# Patient Record
Sex: FEMALE | Race: WHITE | Employment: OTHER | URBAN - METROPOLITAN AREA
[De-identification: names, ages, dates, MRNs, and addresses within clinical notes are randomized per-mention and may not be internally consistent; named-entity substitution may affect disease eponyms.]

---

## 2017-03-02 ENCOUNTER — MYC MEDICAL ADVICE (OUTPATIENT)
Dept: FAMILY MEDICINE | Facility: CLINIC | Age: 67
End: 2017-03-02

## 2017-03-02 DIAGNOSIS — F33.0 MAJOR DEPRESSIVE DISORDER, RECURRENT EPISODE, MILD (H): ICD-10-CM

## 2017-03-02 RX ORDER — SERTRALINE HYDROCHLORIDE 100 MG/1
150 TABLET, FILM COATED ORAL DAILY
Qty: 135 TABLET | Refills: 0 | Status: SHIPPED | OUTPATIENT
Start: 2017-03-02 | End: 2017-03-23

## 2017-03-02 ASSESSMENT — PATIENT HEALTH QUESTIONNAIRE - PHQ9
10. IF YOU CHECKED OFF ANY PROBLEMS, HOW DIFFICULT HAVE THESE PROBLEMS MADE IT FOR YOU TO DO YOUR WORK, TAKE CARE OF THINGS AT HOME, OR GET ALONG WITH OTHER PEOPLE: SOMEWHAT DIFFICULT
SUM OF ALL RESPONSES TO PHQ QUESTIONS 1-9: 2

## 2017-03-02 NOTE — TELEPHONE ENCOUNTER
I will do one refill.  She is overdue for a diabetes follow up.  Please call and schedule her for this before the end of the month.

## 2017-03-02 NOTE — TELEPHONE ENCOUNTER
Pt wanting refill of her sertraline.  Had several visits last year  Sent her a my cht message to update her phq-9  Please advise on how long to refill for.   Last refill 1/16 for one year    Thanks!     Melba Dominguez RN

## 2017-03-03 ASSESSMENT — PATIENT HEALTH QUESTIONNAIRE - PHQ9: SUM OF ALL RESPONSES TO PHQ QUESTIONS 1-9: 2

## 2017-03-16 ENCOUNTER — TELEPHONE (OUTPATIENT)
Dept: FAMILY MEDICINE | Facility: CLINIC | Age: 67
End: 2017-03-16

## 2017-03-16 DIAGNOSIS — I10 HYPERTENSION GOAL BP (BLOOD PRESSURE) < 140/90: ICD-10-CM

## 2017-03-16 DIAGNOSIS — E03.9 HYPOTHYROIDISM, UNSPECIFIED TYPE: Primary | ICD-10-CM

## 2017-03-16 DIAGNOSIS — E78.5 HYPERLIPIDEMIA LDL GOAL <130: ICD-10-CM

## 2017-03-16 DIAGNOSIS — E11.9 TYPE 2 DIABETES MELLITUS WITHOUT COMPLICATION, WITHOUT LONG-TERM CURRENT USE OF INSULIN (H): ICD-10-CM

## 2017-03-16 NOTE — TELEPHONE ENCOUNTER
Contacted patient for diabetic appt, she scheduled her annual physical for 3/23/17.    She would like to come in 3/17/17 for labs, to discuss any concerning results at appt.   She said she would like potassium level checked along with regular screening labs if ok.

## 2017-03-17 DIAGNOSIS — E03.9 HYPOTHYROIDISM, UNSPECIFIED TYPE: ICD-10-CM

## 2017-03-17 DIAGNOSIS — Z13.21 ENCOUNTER FOR VITAMIN DEFICIENCY SCREENING: ICD-10-CM

## 2017-03-17 DIAGNOSIS — E11.9 TYPE 2 DIABETES MELLITUS WITHOUT COMPLICATION, WITHOUT LONG-TERM CURRENT USE OF INSULIN (H): ICD-10-CM

## 2017-03-17 DIAGNOSIS — E78.5 HYPERLIPIDEMIA LDL GOAL <130: ICD-10-CM

## 2017-03-17 LAB
ANION GAP SERPL CALCULATED.3IONS-SCNC: 11 MMOL/L (ref 3–14)
BUN SERPL-MCNC: 15 MG/DL (ref 7–30)
CALCIUM SERPL-MCNC: 9 MG/DL (ref 8.5–10.1)
CHLORIDE SERPL-SCNC: 104 MMOL/L (ref 94–109)
CHOLEST SERPL-MCNC: 247 MG/DL
CO2 SERPL-SCNC: 25 MMOL/L (ref 20–32)
CREAT SERPL-MCNC: 0.65 MG/DL (ref 0.52–1.04)
CREAT UR-MCNC: 166 MG/DL
GFR SERPL CREATININE-BSD FRML MDRD: ABNORMAL ML/MIN/1.7M2
GLUCOSE SERPL-MCNC: 124 MG/DL (ref 70–99)
HBA1C MFR BLD: 6.7 % (ref 4.3–6)
HDLC SERPL-MCNC: 34 MG/DL
LDLC SERPL CALC-MCNC: 153 MG/DL
MICROALBUMIN UR-MCNC: 18 MG/L
MICROALBUMIN/CREAT UR: 10.78 MG/G CR (ref 0–25)
NONHDLC SERPL-MCNC: 213 MG/DL
POTASSIUM SERPL-SCNC: 3.7 MMOL/L (ref 3.4–5.3)
SODIUM SERPL-SCNC: 140 MMOL/L (ref 133–144)
TRIGL SERPL-MCNC: 298 MG/DL
TSH SERPL DL<=0.005 MIU/L-ACNC: 1.32 MU/L (ref 0.4–4)

## 2017-03-17 PROCEDURE — 82306 VITAMIN D 25 HYDROXY: CPT | Performed by: NURSE PRACTITIONER

## 2017-03-17 PROCEDURE — 80061 LIPID PANEL: CPT | Performed by: NURSE PRACTITIONER

## 2017-03-17 PROCEDURE — 84443 ASSAY THYROID STIM HORMONE: CPT | Performed by: NURSE PRACTITIONER

## 2017-03-17 PROCEDURE — 36415 COLL VENOUS BLD VENIPUNCTURE: CPT | Performed by: NURSE PRACTITIONER

## 2017-03-17 PROCEDURE — 82043 UR ALBUMIN QUANTITATIVE: CPT | Performed by: NURSE PRACTITIONER

## 2017-03-17 PROCEDURE — 83036 HEMOGLOBIN GLYCOSYLATED A1C: CPT | Performed by: NURSE PRACTITIONER

## 2017-03-17 PROCEDURE — 80048 BASIC METABOLIC PNL TOTAL CA: CPT | Performed by: NURSE PRACTITIONER

## 2017-03-19 RX ORDER — ATORVASTATIN CALCIUM 10 MG/1
10 TABLET, FILM COATED ORAL DAILY
Qty: 90 TABLET | Refills: 3 | Status: SHIPPED | OUTPATIENT
Start: 2017-03-19 | End: 2019-09-25

## 2017-03-20 LAB — DEPRECATED CALCIDIOL+CALCIFEROL SERPL-MC: 24 UG/L (ref 20–75)

## 2017-03-23 ENCOUNTER — OFFICE VISIT (OUTPATIENT)
Dept: FAMILY MEDICINE | Facility: CLINIC | Age: 67
End: 2017-03-23
Payer: COMMERCIAL

## 2017-03-23 VITALS
WEIGHT: 213 LBS | HEIGHT: 65 IN | OXYGEN SATURATION: 96 % | HEART RATE: 86 BPM | TEMPERATURE: 97.8 F | BODY MASS INDEX: 35.49 KG/M2 | DIASTOLIC BLOOD PRESSURE: 82 MMHG | RESPIRATION RATE: 14 BRPM | SYSTOLIC BLOOD PRESSURE: 134 MMHG

## 2017-03-23 DIAGNOSIS — M79.641 PAIN OF RIGHT HAND: ICD-10-CM

## 2017-03-23 DIAGNOSIS — E03.9 ACQUIRED HYPOTHYROIDISM: ICD-10-CM

## 2017-03-23 DIAGNOSIS — Z00.00 ENCOUNTER FOR ROUTINE ADULT HEALTH EXAMINATION WITHOUT ABNORMAL FINDINGS: Primary | ICD-10-CM

## 2017-03-23 DIAGNOSIS — I10 HYPERTENSION GOAL BP (BLOOD PRESSURE) < 140/90: ICD-10-CM

## 2017-03-23 DIAGNOSIS — E11.9 TYPE 2 DIABETES MELLITUS WITHOUT COMPLICATION, WITHOUT LONG-TERM CURRENT USE OF INSULIN (H): ICD-10-CM

## 2017-03-23 DIAGNOSIS — F33.0 MAJOR DEPRESSIVE DISORDER, RECURRENT EPISODE, MILD (H): ICD-10-CM

## 2017-03-23 DIAGNOSIS — Z78.0 ASYMPTOMATIC MENOPAUSAL STATE: ICD-10-CM

## 2017-03-23 PROCEDURE — 99397 PER PM REEVAL EST PAT 65+ YR: CPT | Performed by: NURSE PRACTITIONER

## 2017-03-23 RX ORDER — LEVOTHYROXINE SODIUM 75 UG/1
75 TABLET ORAL DAILY
Qty: 90 TABLET | Refills: 3 | Status: SHIPPED | OUTPATIENT
Start: 2017-03-23 | End: 2018-05-18

## 2017-03-23 RX ORDER — TRAMADOL HYDROCHLORIDE 50 MG/1
50 TABLET ORAL EVERY 8 HOURS PRN
Qty: 30 TABLET | Refills: 2 | Status: SHIPPED | OUTPATIENT
Start: 2017-03-23 | End: 2017-08-11

## 2017-03-23 RX ORDER — TRIAMTERENE AND HYDROCHLOROTHIAZIDE 37.5; 25 MG/1; MG/1
CAPSULE ORAL
Qty: 90 CAPSULE | Status: SHIPPED | OUTPATIENT
Start: 2017-03-23 | End: 2018-05-18

## 2017-03-23 RX ORDER — SERTRALINE HYDROCHLORIDE 100 MG/1
150 TABLET, FILM COATED ORAL DAILY
Qty: 135 TABLET | Status: SHIPPED | OUTPATIENT
Start: 2017-03-23 | End: 2018-06-22

## 2017-03-23 NOTE — LETTER
My Depression Action Plan  Name: Leena Montaño   Date of Birth 1950  Date: 3/23/2017    My doctor: Jory Fragoso   My clinic: [address] [address]  [phone]          GREEN    ZONE   Good Control    What it looks like:     Things are going generally well. You have normal up s and down s. You may even feel depressed from time to time, but bad moods usually last less than a day.   What you need to do:  1. Continue to care for yourself (see self care plan)  2. Check your depression survival kit and update it as needed  3. Follow your physician s recommendations including any medication.  4. Do not stop taking medication unless you consult with your physician first.           YELLOW         ZONE Getting Worse    What it looks like:     Depression is starting to interfere with your life.     It may be hard to get out of bed; you may be starting to isolate yourself from others.    Symptoms of depression are starting to last most all day and this has happened for several days.     You may have suicidal thoughts but they are not constant.   What you need to do:     1. Call your care team, your response to treatment will improve if you keep your care team informed of your progress. Yellow periods are signs an adjustment may need to be made.     2. Continue your self-care, even if you have to fake it!    3. Talk to someone in your support network    4. Open up your depression survival kit           RED    ZONE Medical Alert - Get Help    What it looks like:     Depression is seriously interfering with your life.     You may experience these or other symptoms: You can t get out of bed most days, can t work or engage in other necessary activities, you have trouble taking care of basic hygiene, or basic responsibilities, thoughts of suicide or death that will not go away, self-injurious behavior.     What you need to do:  1. Call your  care team and request a same-day appointment. If they are not available (weekends or after hours) call your local crisis line, emergency room or 911.      Electronically signed by: Miladys Taveras, March 23, 2017    Depression Self Care Plan / Survival Kit    Self-Care for Depression  Here s the deal. Your body and mind are really not as separate as most people think.  What you do and think affects how you feel and how you feel influences what you do and think. This means if you do things that people who feel good do, it will help you feel better.  Sometimes this is all it takes.  There is also a place for medication and therapy depending on how severe your depression is, so be sure to consult with your medical provider and/ or Behavioral Health Consultant if your symptoms are worsening or not improving.     In order to better manage my stress, I will:    Exercise  Get some form of exercise, every day. This will help reduce pain and release endorphins, the  feel good  chemicals in your brain. This is almost as good as taking antidepressants!  This is not the same as joining a gym and then never going! (they count on that by the way ) It can be as simple as just going for a walk or doing some gardening, anything that will get you moving.      Hygiene   Maintain good hygiene (Get out of bed in the morning, Make your bed, Brush your teeth, Take a shower, and Get dressed like you were going to work, even if you are unemployed).  If your clothes don't fit try to get ones that do.    Diet  I will strive to eat foods that are good for me, drink plenty of water, and avoid excessive sugar, caffeine, alcohol, and other mood-altering substances.  Some foods that are helpful in depression are: complex carbohydrates, B vitamins, flaxseed, fish or fish oil, fresh fruits and vegetables.    Psychotherapy  I agree to participate in Individual Therapy (if recommended).    Medication  If prescribed medications, I agree to take them.   Missing doses can result in serious side effects.  I understand that drinking alcohol, or other illicit drug use, may cause potential side effects.  I will not stop my medication abruptly without first discussing it with my provider.    Staying Connected With Others  I will stay in touch with my friends, family members, and my primary care provider/team.    Use your imagination  Be creative.  We all have a creative side; it doesn t matter if it s oil painting, sand castles, or mud pies! This will also kick up the endorphins.    Witness Beauty  (AKA stop and smell the roses) Take a look outside, even in mid-winter. Notice colors, textures. Watch the squirrels and birds.     Service to others  Be of service to others.  There is always someone else in need.  By helping others we can  get out of ourselves  and remember the really important things.  This also provides opportunities for practicing all the other parts of the program.    Humor  Laugh and be silly!  Adjust your TV habits for less news and crime-drama and more comedy.    Control your stress  Try breathing deep, massage therapy, biofeedback, and meditation. Find time to relax each day.     My support system    Clinic Contact:  Phone number:    Contact 1:  Phone number:    Contact 2:  Phone number:    Rastafarian/:  Phone number:    Therapist:  Phone number:    Local crisis center:    Phone number:    Other community support:  Phone number:

## 2017-03-23 NOTE — LETTER
[address] [place], [place]  [address]  [phone]      March 23, 2017      eLena Hernandezunt  [address]  [facility] [address]              Dear Ms. Napoleon Montaño,        Results for orders placed or performed in visit on 03/17/17   Basic metabolic panel   Result Value Ref Range    Sodium 140 133 - 144 mmol/L    Potassium 3.7 3.4 - 5.3 mmol/L    Chloride 104 94 - 109 mmol/L    Carbon Dioxide 25 20 - 32 mmol/L    Anion Gap 11 3 - 14 mmol/L    Glucose 124 (H) 70 - 99 mg/dL    Urea Nitrogen 15 7 - 30 mg/dL    Creatinine 0.65 0.52 - 1.04 mg/dL    GFR Estimate >90  Non  GFR Calc   >60 mL/min/1.7m2    GFR Estimate If Black >90   GFR Calc   >60 mL/min/1.7m2    Calcium 9.0 8.5 - 10.1 mg/dL   TSH with free T4 reflex   Result Value Ref Range    TSH 1.32 0.40 - 4.00 mU/L   Hemoglobin A1c   Result Value Ref Range    Hemoglobin A1C 6.7 (H) 4.3 - 6.0 %   Albumin Random Urine Quantitative   Result Value Ref Range    Creatinine Urine 166 mg/dL    Albumin Urine mg/L 18 mg/L    Albumin Urine mg/g Cr 10.78 0 - 25 mg/g Cr   Lipid Profile with reflex to direct LDL   Result Value Ref Range    Cholesterol 247 (H) <200 mg/dL    Triglycerides 298 (H) <150 mg/dL    HDL Cholesterol 34 (L) >49 mg/dL    LDL Cholesterol Calculated 153 (H) <100 mg/dL    Non HDL Cholesterol 213 (H) <130 mg/dL   **Vitamin D Deficiency FUTURE anytime   Result Value Ref Range    Vitamin D Deficiency screening 24 20 - 75 ug/L               Sincerely,    Jory Fragoso, CNP

## 2017-03-23 NOTE — MR AVS SNAPSHOT
After Visit Summary   3/23/2017    Leena Montaño    MRN: [id number]           Patient Information     Date Of Birth          1950        Visit Information        Provider Department      3/23/2017 9:30 AM Jory Fragoso NP [facility]        Today's Diagnoses     Encounter for routine adult health examination without abnormal findings    -  1    Pain of right hand        Hypertension goal BP (blood pressure) < 140/90        Type 2 diabetes mellitus without complication, without long-term current use of insulin (H)        Acquired hypothyroidism        Asymptomatic menopausal state        Major depressive disorder, recurrent episode, mild (H)          Care Instructions      Preventive Health Recommendations    Female Ages 65 +    Yearly exam:     See your health care provider every year in order to  o Review health changes.   o Discuss preventive care.    o Review your medicines if your doctor has prescribed any.      You no longer need a yearly Pap test unless you've had an abnormal Pap test in the past 10 years. If you have vaginal symptoms, such as bleeding or discharge, be sure to talk with your provider about a Pap test.      Every 1 to 2 years, have a mammogram.  If you are over 69, talk with your health care provider about whether or not you want to continue having screening mammograms.      Every 10 years, have a colonoscopy. Or, have a yearly FIT test (stool test). These exams will check for colon cancer.       Have a cholesterol test every 5 years, or more often if your doctor advises it.       Have a diabetes test (fasting glucose) every three years. If you are at risk for diabetes, you should have this test more often.       At age 65, have a bone density scan (DEXA) to check for osteoporosis (brittle bone disease).    Shots:    Get a flu shot each year.    Get a tetanus shot every 10 years.    Talk to your doctor about your pneumonia vaccines. There  are now two you should receive - Pneumovax (PPSV 23) and Prevnar (PCV 13).    Talk to your doctor about the shingles vaccine.    Talk to your doctor about the hepatitis B vaccine.    Nutrition:     Eat at least 5 servings of fruits and vegetables each day.      Eat whole-grain bread, whole-wheat pasta and brown rice instead of white grains and rice.      Talk to your provider about Calcium and Vitamin D.     Lifestyle    Exercise at least 150 minutes a week (30 minutes a day, 5 days a week). This will help you control your weight and prevent disease.      Limit alcohol to one drink per day.      No smoking.       Wear sunscreen to prevent skin cancer.       See your dentist twice a year for an exam and cleaning.      See your eye doctor every 1 to 2 years to screen for conditions such as glaucoma, macular degeneration and cataracts.  Preventive Health Recommendations    Female Ages 65 +    Yearly exam:   See your health care provider every year in order to  Review health changes.   Discuss preventive care.    Review your medicines if your doctor has prescribed any.    You no longer need a yearly Pap test unless you've had an abnormal Pap test in the past 10 years. If you have vaginal symptoms, such as bleeding or discharge, be sure to talk with your provider about a Pap test.    Every 1 to 2 years, have a mammogram.  If you are over 69, talk with your health care provider about whether or not you want to continue having screening mammograms.    Every 10 years, have a colonoscopy. Or, have a yearly FIT test (stool test). These exams will check for colon cancer.     Have a cholesterol test every 5 years, or more often if your doctor advises it.     Have a diabetes test (fasting glucose) every three years. If you are at risk for diabetes, you should have this test more often.     At age 65, have a bone density scan (DEXA) to check for osteoporosis (brittle bone disease).    Shots:  Get a flu shot each year.  Get a  tetanus shot every 10 years.  Talk to your doctor about your pneumonia vaccines. There are now two you should receive - Pneumovax (PPSV 23) and Prevnar (PCV 13).  Talk to your doctor about the shingles vaccine.  Talk to your doctor about the hepatitis B vaccine.    Nutrition:   Eat at least 5 servings of fruits and vegetables each day.    Eat whole-grain bread, whole-wheat pasta and brown rice instead of white grains and rice.    Talk to your provider about Calcium and Vitamin D.     Lifestyle  Exercise at least 150 minutes a week (30 minutes a day, 5 days a week). This will help you control your weight and prevent disease.    Limit alcohol to one drink per day.    No smoking.     Wear sunscreen to prevent skin cancer.     See your dentist twice a year for an exam and cleaning.    See your eye doctor every 1 to 2 years to screen for conditions such as glaucoma, macular degeneration and cataracts.        Follow-ups after your visit        Additional Services     ORTHOPEDICS ADULT REFERRAL       Your provider has referred you to: [facility] - hand ortho - [place] [phone]   https://www.SANDOW/locations/[place]  [place] [phone]   https://www.SANDOW/locations/[facility] [phone]   https://www.SANDOW/locations/[place]  [place] [phone]   https://www.SANDOW/locations/[place]  [place] [phone]   https://www[employer]IP [employer]/locations/[place]  [place] [phone]   https://www.SANDOW/locations/[place]  [place] [phone]   https://www.SANDOW/locations/[place]  [place] [phone]   https://www.SANDOW/locations/[place] [phone]   https://www.SANDOW/locations/nathaniel Wolff [phone]   https://www.SANDOW/locations/[place]  [place] [phone]   https://www.[employer].com/locations/lisandra [place]ward [phone]   https://www.[employer].com/locations/marco Call [phone]    https://www.AppSame.Turn/locations/marjorie Griffin [phone]   https://www.[employer]Turn/locations/augusto Mayer [phone]   https://www.[employer]Turn/locations/shankar Cleaning [phone]   https://www.[employer]Turn/locations/[place]  [place] [phone]   https://www.[employer]Turn/locations/[place]  [place] [phone]   https://www.[employer]Turn/locations/[place]  [place] [phone]   https://www.Angle/locations/[facility]  [place] [phone]   https://www.[employer]Turn/locations/[place]  [place] [phone]   https://www.Angle/locations/[place]  [place] [phone]   https://www.Angle/locations/[place]  [place] [phone]   https://www.Angle/locations/[place]    Please be aware that coverage of these services is subject to the terms and limitations of your health insurance plan.  Call member services at your health plan with any benefit or coverage questions.      Please bring the following to your appointment:    >>   Any x-rays, CTs or MRIs which have been performed.  Contact the facility where they were done to arrange for  prior to your scheduled appointment.    >>   List of current medications   >>   This referral request   >>   Any documents/labs given to you for this referral                  Future tests that were ordered for you today     Open Future Orders        Priority Expected Expires Ordered    DX Hip/Pelvis/Spine Routine  3/23/2018 3/23/2017            Who to contact     If you have questions or need follow up information about today's clinic visit or your schedule please contact [facility] directly at [phone].  Normal or non-critical lab and imaging results will be communicated to you by MyChart, letter or phone within 4 business days after the clinic has received the results. If you do not hear from us within 7 days, please contact the clinic through [employer]STR[employer] or phone. If you have a critical or abnormal lab  "result, we will notify you by phone as soon as possible.  Submit refill requests through [employer] or call your pharmacy and they will forward the refill request to us. Please allow 3 business days for your refill to be completed.          Additional Information About Your Visit        Mes[employer] Information     [employer] gives you secure access to your electronic health record. If you see a primary care provider, you can also send messages to your care team and make appointments. If you have questions, please call your primary care clinic.  If you do not have a primary care provider, please call [phone] and they will assist you.        Care EveryWhere ID     This is your Care EveryWhere ID. This could be used by other organizations to access your [place] medical records  [id number]        Your Vitals Were     Pulse Temperature Respirations Height Pulse Oximetry BMI (Body Mass Index)    86 97.8  F (36.6  C) (Oral) 14 5' 5\" (1.651 m) 96% 35.45 kg/m2       Blood Pressure from Last 3 Encounters:   03/23/17 134/82   12/29/16 142/82   08/30/16 126/78    Weight from Last 3 Encounters:   03/23/17 213 lb (96.6 kg)   12/29/16 212 lb (96.2 kg)   08/30/16 208 lb (94.3 kg)              We Performed the Following     ORTHOPEDICS ADULT REFERRAL          Today's Medication Changes          These changes are accurate as of: 3/23/17 10:29 AM.  If you have any questions, ask your nurse or doctor.               Start taking these medicines.        Dose/Directions    traMADol 50 MG tablet   Commonly known as:  ULTRAM   Used for:  Pain of right hand   Started by:  Jory Fragoso NP        Dose:  50 mg   Take 1 tablet (50 mg) by mouth every 8 hours as needed for moderate pain   Quantity:  30 tablet   Refills:  2         These medicines have changed or have updated prescriptions.        Dose/Directions    diclofenac 1 % Gel topical gel   Commonly known as:  VOLTAREN   This may have changed:  when to take this   Used for:  Pain of " right hand   Changed by:  Jory Fragoso NP        Dose:  2 g   Apply 2 g topically 4 times daily   Quantity:  100 g   Refills:  PRN       levothyroxine 75 MCG tablet   Commonly known as:  SYNTHROID/LEVOTHROID   This may have changed:  additional instructions   Used for:  Acquired hypothyroidism   Changed by:  Jory Fragoso NP        Dose:  75 mcg   Take 1 tablet (75 mcg) by mouth daily   Quantity:  90 tablet   Refills:  3            Where to get your medicines      These medications were sent to [facility] - [place], [place] - [address]  [address], [place] [address]     Phone:  [phone]     diclofenac 1 % Gel topical gel    levothyroxine 75 MCG tablet    sertraline 100 MG tablet    triamterene-hydrochlorothiazide 37.5-25 MG per capsule         Some of these will need a paper prescription and others can be bought over the counter.  Ask your nurse if you have questions.     Bring a paper prescription for each of these medications     traMADol 50 MG tablet                Primary Care Provider Office Phone # Fax #    Jory Fragoso [phone]       [facility] [address]  [place] [address]        Thank you!     Thank you for choosing [facility]  for your care. Our goal is always to provide you with excellent care. Hearing back from our patients is one way we can continue to improve our services. Please take a few minutes to complete the written survey that you may receive in the mail after your visit with us. Thank you!             Your Updated Medication List - Protect others around you: Learn how to safely use, store and throw away your medicines at www.disposemymeds.org.          This list is accurate as of: 3/23/17 10:29 AM.  Always use your most recent med list.                   Brand Name Dispense Instructions for use    aspirin 325 MG EC tablet      Take 325 mg by mouth daily       atorvastatin 10 MG  tablet    LIPITOR    90 tablet    Take 1 tablet (10 mg) by mouth daily       blood glucose monitoring lancets     1 Box    Use to test blood sugar 2 times daily or as directed.       blood glucose monitoring meter device kit     1 kit    Use to test blood sugars 1 time daily or as directed.       blood glucose monitoring test strip    MILLY CONTOUR    3 Box    Use to test blood sugars 1-2 times daily or as directed.       CALCIUM 1200 PO      Take  by mouth.       cholecalciferol 1000 UNITS capsule    vitamin  -D     Take 1 capsule by mouth daily Vitamin D-3       diclofenac 1 % Gel topical gel    VOLTAREN    100 g    Apply 2 g topically 4 times daily       fluticasone 50 MCG/ACT spray    FLONASE    48 g    Spray 2 sprays into both nostrils daily       GLUCOSAMINE CHONDROITIN COMPLX PO      Take  by mouth daily.       ibuprofen 200 MG capsule     90 capsule    Take 400-600 mg by mouth every 6 hours as needed for fever       levothyroxine 75 MCG tablet    SYNTHROID/LEVOTHROID    90 tablet    Take 1 tablet (75 mcg) by mouth daily       MULTIVITAMIN TABS   OR      1 TABLET DAILY       * order for DME     1 each    Equipment being ordered: CPAP - adjustable levels (4-20) with a water chamber       * order for DME     1 Units    Equipment being ordered: CPAP mask and tubing       sertraline 100 MG tablet    ZOLOFT    135 tablet    Take 1.5 tablets (150 mg) by mouth daily       tiZANidine 2 MG tablet    ZANAFLEX    60 tablet    Take 1-2 tablets (2-4 mg) by mouth 3 times daily as needed for muscle spasms       traMADol 50 MG tablet    ULTRAM    30 tablet    Take 1 tablet (50 mg) by mouth every 8 hours as needed for moderate pain       triamterene-hydrochlorothiazide 37.5-25 MG per capsule    DYAZIDE    90 capsule    TAKE ONE CAPSULE BY MOUTH ONCE DAILY       * Notice:  This list has 2 medication(s) that are the same as other medications prescribed for you. Read the directions carefully, and ask your doctor or other care  provider to review them with you.

## 2017-03-23 NOTE — PROGRESS NOTES
SUBJECTIVE:                                                            Leena Montaño is a 66 year old female who presents for Preventive Visit.      Are you in the first 12 months of your Medicare coverage?  No    Physical   Annual:     Getting at least 3 servings of Calcium per day::  Yes    Bi-annual eye exam::  Yes    Dental care twice a year::  NO    Sleep apnea or symptoms of sleep apnea::  Sleep apnea    Diet::  Regular (no restrictions), Low salt and Gluten-free/reduced    Taking medications regularly::  Yes    Medication side effects::  Lightheadedness    Additional concerns today::  YES    She has not been checking her blood sugars.  She has not been very good about exercise and plans to start exercising regularly and eating better.  She currently is not on any medication for her diabetes.  She is interested in getting a heart scan for calcium.  I did send in a Lipitor prescription recently, but she is reluctant to take a statin.     She has been having right wrist and thumb pain since last summer.     COGNITIVE SCREEN  1) Repeat 3 items (Banana, Sunrise, Chair)    2) Clock draw: NORMAL  3) 3 item recall: Recalls 3 objects  Results: 3 items recalled: COGNITIVE IMPAIRMENT LESS LIKELY    Mini-CogTM Copyright S Taye. Licensed by the author for use in [facility]; reprinted with permission (soob@[facility]). All rights reserved.        Reviewed and updated as needed this visit by clinical staff  Tobacco  Allergies  Meds         Reviewed and updated as needed this visit by Provider        Social History   Substance Use Topics     Smoking status: Never Smoker     Smokeless tobacco: Never Used      Comment: am using sm. amt. of marijuana for sleep/pain     Alcohol use Yes      Comment: really managed/not my drug to enjoy. marijuana 3 times per week        The patient does not drink >3 drinks per day nor >7 drinks per week.        Skin tag      Onset:     Description (location/number): skin  tag beneath right breast    Accompanying signs and symptoms: Painful: no    History: prior skin tags: no    Therapies tried and outcome: None     Bone Density  Today's PHQ-2 Score:   PHQ-2 ( 1999 Pfizer) 3/23/2017   Little interest or pleasure in doing things Several days   Feeling down, depressed or hopeless Not at all   PHQ-2 Score 1       Do you feel safe in your environment - Yes    Do you have a Health Care Directive?: No: Advance care planning was reviewed with patient; patient declined at this time.    Current providers sharing in care for this patient include:   Patient Care Team:  Jory Fragoso, NP as PCP - General      Hearing impairment: No    Ability to successfully perform activities of daily living: Yes, no assistance needed     Fall risk:  Fallen 2 or more times in the past year?: No  Any fall with injury in the past year?: No  Timed Up and Go Test (>13.5 is fall risk; contact physician) : 12.4    Home safety:  none identified      The following health maintenance items are reviewed in Epic and correct as of today:  Health Maintenance   Topic Date Due     FOOT EXAM Q1 YEAR( NO INBASKET)  05/26/1951     EYE EXAM Q1 YEAR( NO INBASKET)  05/26/1951     MAMMO SCREEN Q2 YR (SYSTEM ASSIGNED)  08/01/2014     FALL RISK ASSESSMENT  05/26/2016     DEXA SCAN SCREENING (SYSTEM ASSIGNED)  05/26/2016     PNEUMOCOCCAL (1 of 2 - PCV13) 05/26/2016     TETANUS IMMUNIZATION (SYSTEM ASSIGNED)  06/16/2016     DEPRESSION ACTION PLAN Q1 YR (NO INBASKET)  01/08/2017     ADVANCE DIRECTIVE PLANNING Q5 YRS (NO INBASKET)  01/31/2017     INFLUENZA VACCINE (SYSTEM ASSIGNED)  09/01/2017     PHQ-9 Q6 MONTHS (NO INBASKET)  09/02/2017     A1C Q6 MO( NO INBASKET)  09/17/2017     COLONOSCOPY Q3 YR INBASKET MESSAGE  12/02/2017     TSH Q1 YEAR (NO INBASKET)  03/17/2018     BMP Q1 YR (NO INBASKET)  03/17/2018     LIPID MONITORING Q1 YEAR( NO INBASKET)  03/17/2018     MICROALBUMIN Q1 YEAR( NO INBASKET)  03/17/2018     TSH W/ FREE T4  "REFLEX Q2 YEAR (NO INBASKET)  03/17/2019     HEPATITIS C SCREENING  Completed              ROS:  C: NEGATIVE for fever, chills, change in weight  INTEGUMENTARY/SKIN: NEGATIVE for worrisome rashes, moles or lesions  EYES: NEGATIVE for vision changes or irritation  E/M: NEGATIVE for ear, mouth and throat problems  R: NEGATIVE for significant cough or SOB  BREAST: NEGATIVE for masses, tenderness or discharge  CV: NEGATIVE for chest pain, palpitations or peripheral edema  GI: NEGATIVE for nausea, abdominal pain, heartburn, or change in bowel habits  MUSCULOSKELETAL: see HPI  NEURO: NEGATIVE for weakness, dizziness or paresthesias  PSYCHIATRIC: NEGATIVE for changes in mood or affect    Problem list, Medication list, Allergies, and Medical/Social/Surgical histories reviewed in [facility] and updated as appropriate.  OBJECTIVE:                                                            /82 (BP Location: Left arm)  Pulse 86  Temp 97.8  F (36.6  C) (Oral)  Resp 14  Ht 5' 5\" (1.651 m)  Wt 213 lb (96.6 kg)  SpO2 96%  BMI 35.45 kg/m2 Estimated body mass index is 35.45 kg/(m^2) as calculated from the following:    Height as of this encounter: 5' 5\" (1.651 m).    Weight as of this encounter: 213 lb (96.6 kg).  EXAM:   GENERAL: healthy, alert and no distress  EYES: Eyes grossly normal to inspection, PERRL and conjunctivae and sclerae normal  HENT: ear canals and TM's normal, nose and mouth without ulcers or lesions  NECK: no adenopathy, no asymmetry, masses, or scars and thyroid normal to palpation  RESP: lungs clear to auscultation - no rales, rhonchi or wheezes  BREAST: normal without masses, tenderness or nipple discharge and no palpable axillary masses or adenopathy  CV: regular rate and rhythm, normal S1 S2, no S3 or S4, no murmur, click or rub, no peripheral edema and peripheral pulses strong  ABDOMEN: soft, nontender, no hepatosplenomegaly, no masses and bowel sounds normal  MS: tenderness right radial styoid  SKIN: " no suspicious lesions or rashes  NEURO: Normal strength and tone, mentation intact and speech normal  PSYCH: mentation appears normal, affect normal/bright    ASSESSMENT / PLAN:                                                            1. Encounter for routine adult health examination without abnormal findings      2. Pain of right hand  Will refer to hand orthopedist.   - diclofenac (VOLTAREN) 1 % GEL topical gel; Apply 2 g topically 4 times daily  Dispense: 100 g; Refill: PRN  - ORTHOPEDICS ADULT REFERRAL  - traMADol (ULTRAM) 50 MG tablet; Take 1 tablet (50 mg) by mouth every 8 hours as needed for moderate pain  Dispense: 30 tablet; Refill: 2    3. Hypertension goal BP (blood pressure) < 140/90  At goal  The current medical regimen is effective;  continue present plan and medications.   - triamterene-hydrochlorothiazide (DYAZIDE) 37.5-25 MG per capsule; TAKE ONE CAPSULE BY MOUTH ONCE DAILY  Dispense: 90 capsule; Refill: PRN    4. Type 2 diabetes mellitus without complication, without long-term current use of insulin (H)  Discussed diabetes/cholesterol guidelines, recommendation for a statin.  Offered her another referral to a diabetes educator, she declined.  Follow up in 3 months.     5. Acquired hypothyroidism  The current medical regimen is effective;  continue present plan and medications.   - levothyroxine (SYNTHROID/LEVOTHROID) 75 MCG tablet; Take 1 tablet (75 mcg) by mouth daily  Dispense: 90 tablet; Refill: 3    6. Asymptomatic menopausal state    - DX Hip/Pelvis/Spine; Future    7. Major depressive disorder, recurrent episode, mild (H)  Stable  The current medical regimen is effective;  continue present plan and medications.   - sertraline (ZOLOFT) 100 MG tablet; Take 1.5 tablets (150 mg) by mouth daily  Dispense: 135 tablet; Refill: PRN    End of Life Planning:  Patient currently has an advanced directive: Yes.  Practitioner is supportive of decision.    COUNSELING:  Reviewed preventive health  "counseling, as reflected in patient instructions        Estimated body mass index is 35.45 kg/(m^2) as calculated from the following:    Height as of this encounter: 5' 5\" (1.651 m).    Weight as of this encounter: 213 lb (96.6 kg).  Weight management plan: Discussed healthy diet and exercise guidelines and patient will follow up in 12 months in clinic to re-evaluate.   reports that she has never smoked. She has never used smokeless tobacco.      Appropriate preventive services were discussed with this patient, including applicable screening as appropriate for cardiovascular disease, diabetes, osteopenia/osteoporosis, and glaucoma.  As appropriate for age/gender, discussed screening for colorectal cancer, prostate cancer, breast cancer, and cervical cancer. Checklist reviewing preventive services available has been given to the patient.    Reviewed patients plan of care and provided an AVS. The Basic Care Plan (routine screening as documented in Health Maintenance) for Leena meets the Care Plan requirement. This Care Plan has been established and reviewed with the Patient.    Counseling Resources:  ATP IV Guidelines  Pooled Cohorts Equation Calculator  Breast Cancer Risk Calculator  FRAX Risk Assessment  ICSI Preventive Guidelines  Dietary Guidelines for Americans, 2010  Mercora's MyPlate  ASA Prophylaxis  Lung CA Screening    Jory Fragoso NP  [facility]  Answers for HPI/ROS submitted by the patient on 3/23/2017   Q1: Little interest or pleasure in doing things: 1=Several days  Q2: Feeling down, depressed or hopeless: 0=Not at all  PHQ-2 Score: 1    "

## 2017-03-23 NOTE — NURSING NOTE
"Chief Complaint   Patient presents with     Physical       Initial /82 (BP Location: Left arm)  Pulse 86  Temp 97.8  F (36.6  C) (Oral)  Resp 14  Ht 5' 5\" (1.651 m)  Wt 213 lb (96.6 kg)  SpO2 96%  BMI 35.45 kg/m2 Estimated body mass index is 35.45 kg/(m^2) as calculated from the following:    Height as of this encounter: 5' 5\" (1.651 m).    Weight as of this encounter: 213 lb (96.6 kg).  Medication Reconciliation: complete     Miladys Taveras CMA      "

## 2017-05-22 ENCOUNTER — MYC MEDICAL ADVICE (OUTPATIENT)
Dept: FAMILY MEDICINE | Facility: CLINIC | Age: 67
End: 2017-05-22

## 2017-05-22 DIAGNOSIS — M54.41 BILATERAL LOW BACK PAIN WITH RIGHT-SIDED SCIATICA, UNSPECIFIED CHRONICITY: ICD-10-CM

## 2017-05-22 NOTE — TELEPHONE ENCOUNTER
tiZANidine (ZANAFLEX) 2 MG tablet      Last Written Prescription Date: 8/30/2016  Last Fill Quantity: 60,  # refills: 0   Last Office Visit with [facility] or [facility] prescribing provider: 3/23/2017    Routing refill request to provider for review/approval because:  Drug not on the [facility] refill protocol       Thank you,  Samia Estevez RN

## 2017-05-23 RX ORDER — TIZANIDINE 2 MG/1
2-4 TABLET ORAL 3 TIMES DAILY PRN
Qty: 60 TABLET | Refills: 0 | Status: SHIPPED | OUTPATIENT
Start: 2017-05-23 | End: 2017-10-09

## 2017-06-06 ENCOUNTER — OFFICE VISIT (OUTPATIENT)
Dept: FAMILY MEDICINE | Facility: CLINIC | Age: 67
End: 2017-06-06
Payer: COMMERCIAL

## 2017-06-06 VITALS
RESPIRATION RATE: 16 BRPM | HEART RATE: 78 BPM | DIASTOLIC BLOOD PRESSURE: 79 MMHG | OXYGEN SATURATION: 98 % | WEIGHT: 208 LBS | SYSTOLIC BLOOD PRESSURE: 128 MMHG | TEMPERATURE: 98.5 F | BODY MASS INDEX: 34.61 KG/M2

## 2017-06-06 DIAGNOSIS — Z78.0 ASYMPTOMATIC POSTMENOPAUSAL STATUS: ICD-10-CM

## 2017-06-06 DIAGNOSIS — E11.9 TYPE 2 DIABETES MELLITUS WITHOUT COMPLICATION, WITHOUT LONG-TERM CURRENT USE OF INSULIN (H): ICD-10-CM

## 2017-06-06 DIAGNOSIS — Z12.31 VISIT FOR SCREENING MAMMOGRAM: ICD-10-CM

## 2017-06-06 DIAGNOSIS — F33.0 MAJOR DEPRESSIVE DISORDER, RECURRENT EPISODE, MILD (H): ICD-10-CM

## 2017-06-06 DIAGNOSIS — E66.01 MORBID OBESITY, UNSPECIFIED OBESITY TYPE (H): ICD-10-CM

## 2017-06-06 DIAGNOSIS — J01.90 ACUTE SINUSITIS WITH COEXISTING CONDITION REQUIRING PROPHYLACTIC TREATMENT: Primary | ICD-10-CM

## 2017-06-06 DIAGNOSIS — Z23 NEED FOR PROPHYLACTIC VACCINATION WITH TETANUS-DIPHTHERIA (TD): ICD-10-CM

## 2017-06-06 DIAGNOSIS — Z23 NEED FOR PROPHYLACTIC VACCINATION AGAINST STREPTOCOCCUS PNEUMONIAE (PNEUMOCOCCUS): ICD-10-CM

## 2017-06-06 PROCEDURE — 99214 OFFICE O/P EST MOD 30 MIN: CPT | Performed by: FAMILY MEDICINE

## 2017-06-06 RX ORDER — AMOXICILLIN 500 MG/1
1000 CAPSULE ORAL 3 TIMES DAILY
Qty: 60 CAPSULE | Refills: 0 | Status: SHIPPED | OUTPATIENT
Start: 2017-06-06 | End: 2017-06-16

## 2017-06-06 NOTE — MR AVS SNAPSHOT
After Visit Summary   6/6/2017    Leena Montaño    MRN: [id number]           Patient Information     Date Of Birth          1950        Visit Information        Provider Department      6/6/2017 10:00 AM Andrade Ramires MD [facility]        Today's Diagnoses     Acute sinusitis with coexisting condition requiring prophylactic treatment    -  1    Major depressive disorder, recurrent episode, mild (H)        Type 2 diabetes mellitus without complication, without long-term current use of insulin (H)        Morbid obesity, unspecified obesity type (H)        Asymptomatic postmenopausal status        Visit for screening mammogram        Need for prophylactic vaccination against Streptococcus pneumoniae (pneumococcus)        Need for prophylactic vaccination with tetanus-diphtheria (TD)           Follow-ups after your visit        Your next 10 appointments already scheduled     Jun 12, 2017 12:45 PM CDT   MA SCREENING DIGITAL BILATERAL with EAMA1   [facility]    [address] ,[address]  [place] [address] [phone]           Do not use any powder, lotion or deodorant under your arms or on your breast. If you do, we will ask you to remove it before your exam.  Wear comfortable, two-piece clothing.  If you have any allergies, tell your care team.  Bring any previous mammograms from other facilities or have them mailed to the breast center.              Future tests that were ordered for you today     Open Future Orders        Priority Expected Expires Ordered    MA SCREENING DIGITAL BILAT - Future  (s+30) Routine  6/6/2018 6/6/2017            Who to contact     If you have questions or need follow up information about today's clinic visit or your schedule please contact [facility] directly at [phone].  Normal or non-critical lab and imaging results will be communicated to you by Od[facility],  letter or phone within 4 business days after the clinic has received the results. If you do not hear from us within 7 days, please contact the clinic through [employer] or phone. If you have a critical or abnormal lab result, we will notify you by phone as soon as possible.  Submit refill requests through [employer] or call your pharmacy and they will forward the refill request to us. Please allow 3 business days for your refill to be completed.          Additional Information About Your Visit        [employer] Information     [employer] gives you secure access to your electronic health record. If you see a primary care provider, you can also send messages to your care team and make appointments. If you have questions, please call your primary care clinic.  If you do not have a primary care provider, please call [phone] and they will assist you.        Care EveryWhere ID     This is your Care EveryWhere ID. This could be used by other organizations to access your [facility] medical records  [id number]        Your Vitals Were     Pulse Temperature Respirations Pulse Oximetry BMI (Body Mass Index)       78 98.5  F (36.9  C) (Oral) 16 98% 34.61 kg/m2        Blood Pressure from Last 3 Encounters:   06/06/17 128/79   03/23/17 134/82   12/29/16 142/82    Weight from Last 3 Encounters:   06/06/17 208 lb (94.3 kg)   03/23/17 213 lb (96.6 kg)   12/29/16 212 lb (96.2 kg)                 Today's Medication Changes          These changes are accurate as of: 6/6/17  1:23 PM.  If you have any questions, ask your nurse or doctor.               Start taking these medicines.        Dose/Directions    amoxicillin 500 MG capsule   Commonly known as:  AMOXIL   Used for:  Acute sinusitis with coexisting condition requiring prophylactic treatment   Started by:  Andrade Ramires MD        Dose:  1000 mg   Take 2 capsules (1,000 mg) by mouth 3 times daily for 10 days   Quantity:  60 capsule   Refills:  0       blood glucose lancets standard    Commonly known as:  no brand specified   Used for:  Type 2 diabetes mellitus without complication, without long-term current use of insulin (H)   Started by:  Andrade Ramires MD        Use to test blood sugar 1 times daily or as directed.   Quantity:  1 Box   Refills:  0         These medicines have changed or have updated prescriptions.        Dose/Directions    * blood glucose monitoring meter device kit   This may have changed:  Another medication with the same name was added. Make sure you understand how and when to take each.   Used for:  Prediabetes   Changed by:  Jory Fragoso NP        Use to test blood sugars 1 time daily or as directed.   Quantity:  1 kit   Refills:  0       * blood glucose monitoring meter device kit   Commonly known as:  no brand specified   This may have changed:  You were already taking a medication with the same name, and this prescription was added. Make sure you understand how and when to take each.   Used for:  Type 2 diabetes mellitus without complication, without long-term current use of insulin (H)   Changed by:  Andrade Ramires MD        Use to test blood sugar 1 times daily or as directed.   Quantity:  1 kit   Refills:  0       * blood glucose monitoring test strip   Commonly known as:  MILLY CONTOUR   This may have changed:  Another medication with the same name was added. Make sure you understand how and when to take each.   Used for:  Type 2 diabetes mellitus without complication (H)   Changed by:  Jory Fragoso NP        Use to test blood sugars 1-2 times daily or as directed.   Quantity:  3 Box   Refills:  PRN       * blood glucose monitoring test strip   Commonly known as:  no brand specified   This may have changed:  You were already taking a medication with the same name, and this prescription was added. Make sure you understand how and when to take each.   Used for:  Type 2 diabetes mellitus without complication, without long-term current use of  insulin (H)   Changed by:  Andrade Ramires MD        Use to test blood sugars 1 times daily or as directed   Quantity:  100 strip   Refills:  11       * Notice:  This list has 4 medication(s) that are the same as other medications prescribed for you. Read the directions carefully, and ask your doctor or other care provider to review them with you.         Where to get your medicines      These medications were sent to [facility] - [place], [place] - [address]  [address], [place] [address]     Phone:  [phone]     amoxicillin 500 MG capsule    blood glucose lancets standard    blood glucose monitoring meter device kit    blood glucose monitoring test strip                Primary Care Provider Office Phone # Fax #    Jory Fragoso [phone]       [facility] [place] [address]  [facility] [address]        Thank you!     Thank you for choosing [facility]  for your care. Our goal is always to provide you with excellent care. Hearing back from our patients is one way we can continue to improve our services. Please take a few minutes to complete the written survey that you may receive in the mail after your visit with us. Thank you!             Your Updated Medication List - Protect others around you: Learn how to safely use, store and throw away your medicines at www.disposemymeds.org.          This list is accurate as of: 6/6/17  1:23 PM.  Always use your most recent med list.                   Brand Name Dispense Instructions for use    amoxicillin 500 MG capsule    AMOXIL    60 capsule    Take 2 capsules (1,000 mg) by mouth 3 times daily for 10 days       aspirin 325 MG EC tablet      Take 325 mg by mouth daily       atorvastatin 10 MG tablet    LIPITOR    90 tablet    Take 1 tablet (10 mg) by mouth daily       blood glucose lancets standard    no brand specified    1 Box    Use to test blood sugar 1 times daily or as  directed.       blood glucose monitoring lancets     1 Box    Use to test blood sugar 2 times daily or as directed.       * blood glucose monitoring meter device kit     1 kit    Use to test blood sugars 1 time daily or as directed.       * blood glucose monitoring meter device kit    no brand specified    1 kit    Use to test blood sugar 1 times daily or as directed.       * blood glucose monitoring test strip    MILLY CONTOUR    3 Box    Use to test blood sugars 1-2 times daily or as directed.       * blood glucose monitoring test strip    no brand specified    100 strip    Use to test blood sugars 1 times daily or as directed       CALCIUM 1200 PO      Take  by mouth.       cholecalciferol 1000 UNITS capsule    vitamin  -D     Take 1 capsule by mouth daily Vitamin D-3       diclofenac 1 % Gel topical gel    VOLTAREN    100 g    Apply 2 g topically 4 times daily       fluticasone 50 MCG/ACT spray    FLONASE    48 g    Spray 2 sprays into both nostrils daily       GLUCOSAMINE CHONDROITIN COMPLX PO      Take  by mouth daily.       ibuprofen 200 MG capsule     90 capsule    Take 400-600 mg by mouth every 6 hours as needed for fever       levothyroxine 75 MCG tablet    SYNTHROID/LEVOTHROID    90 tablet    Take 1 tablet (75 mcg) by mouth daily       MULTIVITAMIN TABS   OR      1 TABLET DAILY       * order for DME     1 each    Equipment being ordered: CPAP - adjustable levels (4-20) with a water chamber       * order for DME     1 Units    Equipment being ordered: CPAP mask and tubing       sertraline 100 MG tablet    ZOLOFT    135 tablet    Take 1.5 tablets (150 mg) by mouth daily       tiZANidine 2 MG tablet    ZANAFLEX    60 tablet    Take 1-2 tablets (2-4 mg) by mouth 3 times daily as needed for muscle spasms       traMADol 50 MG tablet    ULTRAM    30 tablet    Take 1 tablet (50 mg) by mouth every 8 hours as needed for moderate pain       triamterene-hydrochlorothiazide 37.5-25 MG per capsule    DYAZIDE    90  capsule    TAKE ONE CAPSULE BY MOUTH ONCE DAILY       * Notice:  This list has 6 medication(s) that are the same as other medications prescribed for you. Read the directions carefully, and ask your doctor or other care provider to review them with you.

## 2017-06-06 NOTE — NURSING NOTE
"Chief Complaint   Patient presents with     Sinus Problem       Initial /79  Pulse 78  Temp 98.5  F (36.9  C) (Oral)  Resp 16  Wt 208 lb (94.3 kg)  SpO2 98%  BMI 34.61 kg/m2 Estimated body mass index is 34.61 kg/(m^2) as calculated from the following:    Height as of 3/23/17: 5' 5\" (1.651 m).    Weight as of this encounter: 208 lb (94.3 kg).  Medication Reconciliation: complete     Ramila Gupta MA      "

## 2017-06-06 NOTE — PROGRESS NOTES
SUBJECTIVE:                                                    Leena Montaño is a 67 year old female who presents to clinic today for the following health issues:    Acute Illness   Acute illness concerns: nasal drainage, sinus pressure  Onset: x 4 days    Fever: no    Chills/Sweats: possible sweats    Headache (location?): no    Sinus Pressure:YES    Conjunctivitis:  Watery eyes    Ear Pain: small amount    Rhinorrhea: YES    Congestion: probably    Sore Throat: YES     Cough: YES    Wheeze: no    Decreased Appetite: no    Nausea: no    Vomiting: no    Diarrhea:  no    Dysuria/Freq.: no    Fatigue/Achiness: YES    Sick/Strep Exposure: YES- friend     Therapies Tried and outcome: Tessalon, lozenges, and cpap    No shortness of breath. No recent travel. No tobacco.     Exposed to an illness in a friend that was similar.     Hard for her to breathe due to CATIE.     The patient has a history of allergies.     Also would like new bg monitor. Last a1c at goal. Not taking statin medication. Nor ASA due to worries about side effects.    No cv, neuro symptoms, she reports her mood is good.   OBJECTIVE: /79  Pulse 78  Temp 98.5  F (36.9  C) (Oral)  Resp 16  Wt 208 lb (94.3 kg)  SpO2 98%  BMI 34.61 kg/m2 no apparent distress   Exam:  GENERAL APPEARANCE: healthy, alert and no distress  HENT: ear canals and TM's normal, nose and mouth without ulcers or lesions and maxillary sinus tenderness right  NECK: no adenopathy, no asymmetry, masses, or scars and thyroid normal to palpation  RESP: lungs clear to auscultation - no rales, rhonchi or wheezes  CV: regular rates and rhythm, normal S1 S2, no S3 or S4 and no murmur, click or rub -  ABDOMEN:  soft, nontender, no HSM or masses and bowel sounds normal  SKIN: no suspicious lesions or rashes  PSYCH: mentation appears normal and affect normal/bright       ICD-10-CM    1. Acute sinusitis with coexisting condition requiring prophylactic treatment J01.90  amoxicillin (AMOXIL) 500 MG capsule   2. Major depressive disorder, recurrent episode, mild (H) F33.0    3. Type 2 diabetes mellitus without complication, without long-term current use of insulin (H) E11.9 blood glucose monitoring (NO BRAND SPECIFIED) test strip     blood glucose (NO BRAND SPECIFIED) lancets standard     blood glucose monitoring (NO BRAND SPECIFIED) meter device kit   4. Morbid obesity, unspecified obesity type (H) E66.01    5. Asymptomatic postmenopausal status Z78.0    6. Visit for screening mammogram Z12.31 MA SCREENING DIGITAL BILAT - Future  (s+30)   7. Need for prophylactic vaccination against Streptococcus pneumoniae (pneumococcus) Z23    8. Need for prophylactic vaccination with tetanus-diphtheria (TD) Z23     discussed using antibiotics now or using over the counter meds first as more likely viral/allergic. Trial flonase, over the counters meds and Use antibiotics if symptoms persists > 3 days without change. Discussed statin use. Given script for monitor. Recommended screenings. Declined pneumovax, and adacel. Did not address her obesity today. Recommended primary follow up.

## 2017-06-12 ENCOUNTER — RADIANT APPOINTMENT (OUTPATIENT)
Dept: MAMMOGRAPHY | Facility: CLINIC | Age: 67
End: 2017-06-12
Attending: FAMILY MEDICINE
Payer: COMMERCIAL

## 2017-06-12 DIAGNOSIS — Z12.31 VISIT FOR SCREENING MAMMOGRAM: ICD-10-CM

## 2017-06-12 PROCEDURE — G0202 SCR MAMMO BI INCL CAD: HCPCS | Mod: TC

## 2017-06-12 PROCEDURE — 77063 BREAST TOMOSYNTHESIS BI: CPT | Mod: TC

## 2017-07-24 ENCOUNTER — MYC MEDICAL ADVICE (OUTPATIENT)
Dept: FAMILY MEDICINE | Facility: CLINIC | Age: 67
End: 2017-07-24

## 2017-07-24 DIAGNOSIS — E11.9 TYPE 2 DIABETES MELLITUS WITHOUT COMPLICATION, WITHOUT LONG-TERM CURRENT USE OF INSULIN (H): ICD-10-CM

## 2017-07-24 NOTE — TELEPHONE ENCOUNTER
Once daily on average is what is generally considered enough for people not on insulin and controlled. She could check some aftenoon and some mornings. But should on average be at once daily.     Andrade Ramires

## 2017-07-25 ENCOUNTER — MYC MEDICAL ADVICE (OUTPATIENT)
Dept: FAMILY MEDICINE | Facility: CLINIC | Age: 67
End: 2017-07-25

## 2017-07-25 NOTE — TELEPHONE ENCOUNTER
JOLYNN review:  This patient, even though she has refills of test strips,  used them up faster that the instructions allowed. She will not be able to fill them for the next week. ( until she is allowed another refill)     We would need a new order to test more than once daily. Somehow the patient thinks this is to her benefit to test more than once a day.  Please advise.   Phoebe Vicente RN

## 2017-07-25 NOTE — TELEPHONE ENCOUNTER
I don't think insurance will allow this early refill. Based on most recent A1c, should be OK to wait until allowed another refill.    John Bell MD  Family Medicine Physician

## 2017-07-25 NOTE — TELEPHONE ENCOUNTER
Sent patient the provider comments via my chart.  She does have test strips just ordered for the year on 6/6/17.  Phoebe Vicente RN

## 2017-08-11 DIAGNOSIS — M79.641 PAIN OF RIGHT HAND: ICD-10-CM

## 2017-08-11 NOTE — TELEPHONE ENCOUNTER
traMADol (ULTRAM) 50 MG tablet      Last Written Prescription Date:  03/23/17  Last Fill Quantity: 30,   # refills: 2  Last Office Visit with [facility], P or [facility] prescribing provider: 06/06/17  Future Office visit:       Routing refill request to provider for review/approval because:  Drug not on the [facility], [facility] or [facility] refill protocol or controlled substance

## 2017-08-14 RX ORDER — TRAMADOL HYDROCHLORIDE 50 MG/1
50 TABLET ORAL EVERY 8 HOURS PRN
Qty: 30 TABLET | Refills: 0 | Status: SHIPPED | OUTPATIENT
Start: 2017-08-14 | End: 2017-10-09

## 2017-08-15 NOTE — TELEPHONE ENCOUNTER
Took Rx for Tramadol (Ultram) 50 MG tablet from nurse basket and walked to  Pharmacy.  Start date: 08/14/2017    May Sears MA

## 2017-08-28 ENCOUNTER — MYC MEDICAL ADVICE (OUTPATIENT)
Dept: FAMILY MEDICINE | Facility: CLINIC | Age: 67
End: 2017-08-28

## 2017-08-28 DIAGNOSIS — R73.03 PRE-DIABETES: Primary | ICD-10-CM

## 2017-09-28 ENCOUNTER — TRANSFERRED RECORDS (OUTPATIENT)
Dept: HEALTH INFORMATION MANAGEMENT | Facility: CLINIC | Age: 67
End: 2017-09-28

## 2017-10-09 ENCOUNTER — RECORDS - HEALTHEAST (OUTPATIENT)
Dept: ADMINISTRATIVE | Facility: OTHER | Age: 67
End: 2017-10-09

## 2017-10-09 ENCOUNTER — OFFICE VISIT (OUTPATIENT)
Dept: FAMILY MEDICINE | Facility: CLINIC | Age: 67
End: 2017-10-09
Payer: COMMERCIAL

## 2017-10-09 VITALS
BODY MASS INDEX: 34.84 KG/M2 | TEMPERATURE: 98.5 F | RESPIRATION RATE: 16 BRPM | HEART RATE: 102 BPM | WEIGHT: 209.38 LBS | SYSTOLIC BLOOD PRESSURE: 138 MMHG | DIASTOLIC BLOOD PRESSURE: 88 MMHG | OXYGEN SATURATION: 97 %

## 2017-10-09 DIAGNOSIS — I10 HYPERTENSION GOAL BP (BLOOD PRESSURE) < 140/90: ICD-10-CM

## 2017-10-09 DIAGNOSIS — E55.9 VITAMIN D DEFICIENCY: ICD-10-CM

## 2017-10-09 DIAGNOSIS — Z23 NEED FOR VACCINATION: ICD-10-CM

## 2017-10-09 DIAGNOSIS — M48.02 CERVICAL STENOSIS OF SPINAL CANAL: ICD-10-CM

## 2017-10-09 DIAGNOSIS — E11.9 TYPE 2 DIABETES MELLITUS WITHOUT COMPLICATION, WITHOUT LONG-TERM CURRENT USE OF INSULIN (H): ICD-10-CM

## 2017-10-09 DIAGNOSIS — Z01.818 PREOP GENERAL PHYSICAL EXAM: Primary | ICD-10-CM

## 2017-10-09 DIAGNOSIS — M54.41 BILATERAL LOW BACK PAIN WITH RIGHT-SIDED SCIATICA, UNSPECIFIED CHRONICITY: ICD-10-CM

## 2017-10-09 PROBLEM — E66.01 MORBID OBESITY, UNSPECIFIED OBESITY TYPE (H): Status: RESOLVED | Noted: 2017-06-06 | Resolved: 2017-10-09

## 2017-10-09 LAB
ERYTHROCYTE [DISTWIDTH] IN BLOOD BY AUTOMATED COUNT: 13.5 % (ref 10–15)
HBA1C MFR BLD: 6.3 % (ref 4.3–6)
HCT VFR BLD AUTO: 42.5 % (ref 35–47)
HGB BLD-MCNC: 14.5 G/DL (ref 11.7–15.7)
MCH RBC QN AUTO: 29.6 PG (ref 26.5–33)
MCHC RBC AUTO-ENTMCNC: 34.1 G/DL (ref 31.5–36.5)
MCV RBC AUTO: 87 FL (ref 78–100)
PLATELET # BLD AUTO: 326 10E9/L (ref 150–450)
RBC # BLD AUTO: 4.9 10E12/L (ref 3.8–5.2)
WBC # BLD AUTO: 12.7 10E9/L (ref 4–11)

## 2017-10-09 PROCEDURE — 99215 OFFICE O/P EST HI 40 MIN: CPT | Mod: 25 | Performed by: NURSE PRACTITIONER

## 2017-10-09 PROCEDURE — 36415 COLL VENOUS BLD VENIPUNCTURE: CPT | Performed by: NURSE PRACTITIONER

## 2017-10-09 PROCEDURE — 82306 VITAMIN D 25 HYDROXY: CPT | Performed by: NURSE PRACTITIONER

## 2017-10-09 PROCEDURE — 80048 BASIC METABOLIC PNL TOTAL CA: CPT | Performed by: NURSE PRACTITIONER

## 2017-10-09 PROCEDURE — 83036 HEMOGLOBIN GLYCOSYLATED A1C: CPT | Performed by: NURSE PRACTITIONER

## 2017-10-09 PROCEDURE — 85027 COMPLETE CBC AUTOMATED: CPT | Performed by: NURSE PRACTITIONER

## 2017-10-09 PROCEDURE — 90662 IIV NO PRSV INCREASED AG IM: CPT | Performed by: NURSE PRACTITIONER

## 2017-10-09 PROCEDURE — 90471 IMMUNIZATION ADMIN: CPT | Performed by: NURSE PRACTITIONER

## 2017-10-09 PROCEDURE — 93000 ELECTROCARDIOGRAM COMPLETE: CPT | Performed by: NURSE PRACTITIONER

## 2017-10-09 RX ORDER — TRAMADOL HYDROCHLORIDE 50 MG/1
50 TABLET ORAL EVERY 8 HOURS PRN
Qty: 30 TABLET | Refills: 2 | Status: SHIPPED | OUTPATIENT
Start: 2017-10-09 | End: 2018-07-03

## 2017-10-09 ASSESSMENT — ANXIETY QUESTIONNAIRES
6. BECOMING EASILY ANNOYED OR IRRITABLE: SEVERAL DAYS
5. BEING SO RESTLESS THAT IT IS HARD TO SIT STILL: SEVERAL DAYS
7. FEELING AFRAID AS IF SOMETHING AWFUL MIGHT HAPPEN: NOT AT ALL
1. FEELING NERVOUS, ANXIOUS, OR ON EDGE: SEVERAL DAYS
2. NOT BEING ABLE TO STOP OR CONTROL WORRYING: SEVERAL DAYS
GAD7 TOTAL SCORE: 6
3. WORRYING TOO MUCH ABOUT DIFFERENT THINGS: SEVERAL DAYS

## 2017-10-09 ASSESSMENT — PATIENT HEALTH QUESTIONNAIRE - PHQ9
5. POOR APPETITE OR OVEREATING: SEVERAL DAYS
SUM OF ALL RESPONSES TO PHQ QUESTIONS 1-9: 6

## 2017-10-09 NOTE — NURSING NOTE
"Chief Complaint   Patient presents with     Pre-Op Exam       Initial BP (!) 147/95 (BP Location: Left arm, Patient Position: Sitting, Cuff Size: Adult Large)  Pulse 102  Temp 98.5  F (36.9  C) (Oral)  Resp 16  Wt 209 lb 6 oz (95 kg)  LMP  (LMP Unknown)  SpO2 97%  Breastfeeding? No  BMI 34.84 kg/m2 Estimated body mass index is 34.84 kg/(m^2) as calculated from the following:    Height as of 3/23/17: 5' 5\" (1.651 m).    Weight as of this encounter: 209 lb 6 oz (95 kg).  Medication Reconciliation: complete     Riley Gupta MA      "

## 2017-10-09 NOTE — LETTER
My Depression Action Plan  Name: Leena Montaño   Date of Birth 1950  Date: 10/9/2017    My doctor: Jory Fragoso   My clinic: [address] [address]  [phone]          GREEN    ZONE   Good Control    What it looks like:     Things are going generally well. You have normal up s and down s. You may even feel depressed from time to time, but bad moods usually last less than a day.   What you need to do:  1. Continue to care for yourself (see self care plan)  2. Check your depression survival kit and update it as needed  3. Follow your physician s recommendations including any medication.  4. Do not stop taking medication unless you consult with your physician first.           YELLOW         ZONE Getting Worse    What it looks like:     Depression is starting to interfere with your life.     It may be hard to get out of bed; you may be starting to isolate yourself from others.    Symptoms of depression are starting to last most all day and this has happened for several days.     You may have suicidal thoughts but they are not constant.   What you need to do:     1. Call your care team, your response to treatment will improve if you keep your care team informed of your progress. Yellow periods are signs an adjustment may need to be made.     2. Continue your self-care, even if you have to fake it!    3. Talk to someone in your support network    4. Open up your depression survival kit           RED    ZONE Medical Alert - Get Help    What it looks like:     Depression is seriously interfering with your life.     You may experience these or other symptoms: You can t get out of bed most days, can t work or engage in other necessary activities, you have trouble taking care of basic hygiene, or basic responsibilities, thoughts of suicide or death that will not go away, self-injurious behavior.     What you need to do:  1. Call your care  team and request a same-day appointment. If they are not available (weekends or after hours) call your local crisis line, emergency room or 911.      Electronically signed by: May Sears, October 9, 2017    Depression Self Care Plan / Survival Kit    Self-Care for Depression  Here s the deal. Your body and mind are really not as separate as most people think.  What you do and think affects how you feel and how you feel influences what you do and think. This means if you do things that people who feel good do, it will help you feel better.  Sometimes this is all it takes.  There is also a place for medication and therapy depending on how severe your depression is, so be sure to consult with your medical provider and/ or Behavioral Health Consultant if your symptoms are worsening or not improving.     In order to better manage my stress, I will:    Exercise  Get some form of exercise, every day. This will help reduce pain and release endorphins, the  feel good  chemicals in your brain. This is almost as good as taking antidepressants!  This is not the same as joining a gym and then never going! (they count on that by the way ) It can be as simple as just going for a walk or doing some gardening, anything that will get you moving.      Hygiene   Maintain good hygiene (Get out of bed in the morning, Make your bed, Brush your teeth, Take a shower, and Get dressed like you were going to work, even if you are unemployed).  If your clothes don't fit try to get ones that do.    Diet  I will strive to eat foods that are good for me, drink plenty of water, and avoid excessive sugar, caffeine, alcohol, and other mood-altering substances.  Some foods that are helpful in depression are: complex carbohydrates, B vitamins, flaxseed, fish or fish oil, fresh fruits and vegetables.    Psychotherapy  I agree to participate in Individual Therapy (if recommended).    Medication  If prescribed medications, I agree to take them.  Missing  doses can result in serious side effects.  I understand that drinking alcohol, or other illicit drug use, may cause potential side effects.  I will not stop my medication abruptly without first discussing it with my provider.    Staying Connected With Others  I will stay in touch with my friends, family members, and my primary care provider/team.    Use your imagination  Be creative.  We all have a creative side; it doesn t matter if it s oil painting, sand castles, or mud pies! This will also kick up the endorphins.    Witness Beauty  (AKA stop and smell the roses) Take a look outside, even in mid-winter. Notice colors, textures. Watch the squirrels and birds.     Service to others  Be of service to others.  There is always someone else in need.  By helping others we can  get out of ourselves  and remember the really important things.  This also provides opportunities for practicing all the other parts of the program.    Humor  Laugh and be silly!  Adjust your TV habits for less news and crime-drama and more comedy.    Control your stress  Try breathing deep, massage therapy, biofeedback, and meditation. Find time to relax each day.     My support system    Clinic Contact:  Phone number:    Contact 1:  Phone number:    Contact 2:  Phone number:    Ep[employer]/:  Phone number:    Therapist:  Phone number:    Local crisis center:    Phone number:    Other community support:  Phone number:

## 2017-10-09 NOTE — MR AVS SNAPSHOT
After Visit Summary   10/9/2017    Leena Montaño    MRN: [id number]           Patient Information     Date Of Birth          1950        Visit Information        Provider Department      10/9/2017 2:30 PM Jory Fragoso NP [facility]        Today's Diagnoses     Preop general physical exam    -  1    Cervical stenosis of spinal canal        Hypertension goal BP (blood pressure) < 140/90        Type 2 diabetes mellitus without complication, without long-term current use of insulin (H)        Need for vaccination        Vitamin D deficiency        Bilateral low back pain with right-sided sciatica, unspecified chronicity          Care Instructions      Before Your Surgery      Call your surgeon if there is any change in your health. This includes signs of a cold or flu (such as a sore throat, runny nose, cough, rash or fever).    Do not smoke, drink alcohol or take over the counter medicine (unless your surgeon or primary care doctor tells you to) for the 24 hours before and after surgery.    If you take prescribed drugs: Follow your doctor s orders about which medicines to take and which to stop until after surgery.    Eating and drinking prior to surgery: follow the instructions from your surgeon    Take a shower or bath the night before surgery. Use the soap your surgeon gave you to gently clean your skin. If you do not have soap from your surgeon, use your regular soap. Do not shave or scrub the surgery site.  Wear clean pajamas and have clean sheets on your bed.           Follow-ups after your visit        Future tests that were ordered for you today     Open Future Orders        Priority Expected Expires Ordered    PNEUMOCOCCAL CONJ VACCINE 13 VALENT IM Routine  10/9/2018 10/9/2017            Who to contact     If you have questions or need follow up information about today's clinic visit or your schedule please contact [facility] directly  at [phone].  Normal or non-critical lab and imaging results will be communicated to you by MyChart, letter or phone within 4 business days after the clinic has received the results. If you do not hear from us within 7 days, please contact the clinic through [employer] or phone. If you have a critical or abnormal lab result, we will notify you by phone as soon as possible.  Submit refill requests through [employer] or call your pharmacy and they will forward the refill request to us. Please allow 3 business days for your refill to be completed.          Additional Information About Your Visit        [employer] Information     [employer] gives you secure access to your electronic health record. If you see a primary care provider, you can also send messages to your care team and make appointments. If you have questions, please call your primary care clinic.  If you do not have a primary care provider, please call [phone] and they will assist you.        Care EveryWhere ID     This is your Care EveryWhere ID. This could be used by other organizations to access your [facility] medical records  [id number]        Your Vitals Were     Pulse Temperature Respirations Last Period Pulse Oximetry Breastfeeding?    102 98.5  F (36.9  C) (Oral) 16 (LMP Unknown) 97% No    BMI (Body Mass Index)                   34.84 kg/m2            Blood Pressure from Last 3 Encounters:   10/09/17 138/88   06/06/17 128/79   03/23/17 134/82    Weight from Last 3 Encounters:   10/09/17 209 lb 6 oz (95 kg)   06/06/17 208 lb (94.3 kg)   03/23/17 213 lb (96.6 kg)              We Performed the Following     Basic metabolic panel     CBC with platelets     EKG 12-lead complete w/read - Clinics     FLU VACCINE, INCREASED ANTIGEN, PRESV FREE     Hemoglobin A1c     Vitamin D Deficiency          Today's Medication Changes          These changes are accurate as of: 10/9/17  3:48 PM.  If you have any questions, ask your nurse or doctor.               These  medicines have changed or have updated prescriptions.        Dose/Directions    * blood glucose monitoring test strip   Commonly known as:  MILLY CONTOUR   This may have changed:  Another medication with the same name was changed. Make sure you understand how and when to take each.   Used for:  Type 2 diabetes mellitus without complication (H)   Changed by:  Jory Fragoso NP        Use to test blood sugars 1-2 times daily or as directed.   Quantity:  3 Box   Refills:  PRN       * blood glucose monitoring test strip   Commonly known as:  no brand specified   This may have changed:  additional instructions   Used for:  Type 2 diabetes mellitus without complication, without long-term current use of insulin (H)   Changed by:  Jory Fragoso NP        Use to test blood sugars 2 times daily or as directed   Quantity:  200 strip   Refills:  11       tiZANidine 4 MG tablet   Commonly known as:  ZANAFLEX   This may have changed:    - medication strength  - how much to take  - when to take this   Used for:  Bilateral low back pain with right-sided sciatica, unspecified chronicity   Changed by:  Jory Fragoso NP        Dose:  4 mg   Take 1 tablet (4 mg) by mouth nightly as needed for muscle spasms   Quantity:  30 tablet   Refills:  2       * Notice:  This list has 2 medication(s) that are the same as other medications prescribed for you. Read the directions carefully, and ask your doctor or other care provider to review them with you.         Where to get your medicines      These medications were sent to [facility] - [place], [place] - [address]  [address], [place] [address]     Phone:  [phone]     blood glucose monitoring test strip    tiZANidine 4 MG tablet         Some of these will need a paper prescription and others can be bought over the counter.  Ask your nurse if you have questions.     Bring a paper prescription for each of these medications     traMADol 50 MG  tablet                Primary Care Provider Office Phone # Fax #    Jory FragosoDINO [phone] [address] [place]  [facility] [address]        Equal Access to Services     MEGAN CABRERA : Hadii aad ku hadingrido Soomaali, waaxda luqadaha, qaybta kaalmada adeegyada, katt lópez montylashonda macdonald laCharodorys rockwell. So [facility] [phone].    ATENCIÓN: Si habla español, tiene a saenz disposición servicios gratuitos de asistencia lingüística. Llame al [phone].    We comply with applicable federal civil rights laws and [place] laws. We do not discriminate on the basis of race, color, national origin, age, disability, sex, sexual orientation, or gender identity.            Thank you!     Thank you for choosing [facility]  for your care. Our goal is always to provide you with excellent care. Hearing back from our patients is one way we can continue to improve our services. Please take a few minutes to complete the written survey that you may receive in the mail after your visit with us. Thank you!             Your Updated Medication List - Protect others around you: Learn how to safely use, store and throw away your medicines at www.disposemymeds.org.          This list is accurate as of: 10/9/17  3:48 PM.  Always use your most recent med list.                   Brand Name Dispense Instructions for use Diagnosis    aspirin 325 MG EC tablet      Take 325 mg by mouth daily        atorvastatin 10 MG tablet    LIPITOR    90 tablet    Take 1 tablet (10 mg) by mouth daily    Type 2 diabetes mellitus without complication, without long-term current use of insulin (H), Hyperlipidemia LDL goal <130       blood glucose lancets standard    no brand specified    1 Box    Use to test blood sugar 1 times daily or as directed.    Type 2 diabetes mellitus without complication, without long-term current use of insulin (H)       blood glucose monitoring lancets     1 Box    Use to test blood sugar 2 times  daily or as directed.    Type 2 diabetes mellitus without complication (H)       * blood glucose monitoring meter device kit     1 kit    Use to test blood sugars 1 time daily or as directed.    Prediabetes       * blood glucose monitoring meter device kit    no brand specified    1 kit    Use to test blood sugar 1 times daily or as directed.    Type 2 diabetes mellitus without complication, without long-term current use of insulin (H)       * blood glucose monitoring test strip    MILLY CONTOUR    3 Box    Use to test blood sugars 1-2 times daily or as directed.    Type 2 diabetes mellitus without complication (H)       * blood glucose monitoring test strip    no brand specified    200 strip    Use to test blood sugars 2 times daily or as directed    Type 2 diabetes mellitus without complication, without long-term current use of insulin (H)       CALCIUM 1200 PO      Take  by mouth.        cholecalciferol 1000 UNITS capsule    vitamin  -D     Take 1 capsule by mouth daily Vitamin D-3        diclofenac 1 % Gel topical gel    VOLTAREN    100 g    Apply 2 g topically 4 times daily    Pain of right hand       fluticasone 50 MCG/ACT spray    FLONASE    48 g    Spray 2 sprays into both nostrils daily    Allergic rhinitis, unspecified allergic rhinitis type       GLUCOSAMINE CHONDROITIN COMPLX PO      Take  by mouth daily.        ibuprofen 200 MG capsule     90 capsule    Take 400-600 mg by mouth every 6 hours as needed for fever    Shoulder joint pain, right, Knee pain, left       levothyroxine 75 MCG tablet    SYNTHROID/LEVOTHROID    90 tablet    Take 1 tablet (75 mcg) by mouth daily    Acquired hypothyroidism       MULTIVITAMIN TABS   OR      1 TABLET DAILY        * order for DME     1 each    Equipment being ordered: CPAP - adjustable levels (4-20) with a water chamber    Unspecified sleep apnea       * order for DME     1 Units    Equipment being ordered: CPAP mask and tubing    Unspecified sleep apnea        sertraline 100 MG tablet    ZOLOFT    135 tablet    Take 1.5 tablets (150 mg) by mouth daily    Major depressive disorder, recurrent episode, mild (H)       tiZANidine 4 MG tablet    ZANAFLEX    30 tablet    Take 1 tablet (4 mg) by mouth nightly as needed for muscle spasms    Bilateral low back pain with right-sided sciatica, unspecified chronicity       traMADol 50 MG tablet    ULTRAM    30 tablet    Take 1 tablet (50 mg) by mouth every 8 hours as needed for moderate pain    Bilateral low back pain with right-sided sciatica, unspecified chronicity       triamterene-hydrochlorothiazide 37.5-25 MG per capsule    DYAZIDE    90 capsule    TAKE ONE CAPSULE BY MOUTH ONCE DAILY    Hypertension goal BP (blood pressure) < 140/90       TYLENOL PO      Take 600-1,000 mg by mouth        * Notice:  This list has 6 medication(s) that are the same as other medications prescribed for you. Read the directions carefully, and ask your doctor or other care provider to review them with you.

## 2017-10-09 NOTE — PROGRESS NOTES
[address] [address]  [phone]  Dept: [phone]    PRE-OP EVALUATION:  Today's date: 10/9/2017    Leena Montaño (: 1950) presents for pre-operative evaluation assessment as requested by Dr. Fernández.  She requires evaluation and anesthesia risk assessment prior to undergoing surgery/procedure for treatment of C7 fusion .  Proposed procedure: C7 cental stenosis    Date of Surgery/ Procedure: 10/18/17  Time of Surgery/ Procedure: 11:00 am   Hospital/Surgical Facility: [facility]   Fax number for surgical facility:   Primary Physician: Jory Fragoso  Type of Anesthesia Anticipated: General    Patient has a Health Care Directive or Living Will:  NO    Preop Questions 10/9/2017   1.  Do you have a history of heart attack, stroke, stent, bypass or surgery on an artery in the head, neck, heart or legs? No   2.  Do you ever have any pain or discomfort in your chest? No   3.  Do you have a history of  Heart Failure? No   4.   Are you troubled by shortness of breath when:  walking on a level surface, or up a slight hill, or at night? No   5.  Do you currently have a cold, bronchitis or other respiratory infection? No   6.  Do you have a cough, shortness of breath, or wheezing? No   7.  Do you sometimes get pains in the calves of your legs when you walk? No   8. Do you or anyone in your family have previous history of blood clots? YES - mother had a stroke   9.  Do you or does anyone in your family have a serious bleeding problem such as prolonged bleeding following surgeries or cuts? No   10. Have you ever had problems with anemia or been told to take iron pills? No   11. Have you had any abnormal blood loss such as black, tarry or bloody stools, or abnormal vaginal bleeding? No   12. Have you ever had a blood transfusion? No   13. Have you or any of your relatives ever had problems with anesthesia? YES - mother sensitive to  anesthesia   14. Do you have sleep apnea, excessive snoring or daytime drowsiness? YES - CATIE, uses a CPAP   15. Do you have any prosthetic heart valves? No   16. Do you have prosthetic joints? YES - knee   17. Is there any chance that you may be pregnant? No           HPI:                                                      Brief HPI related to upcoming procedure: Right arm pain, MRI shows central stenosis      DIABETES - Patient has a longstanding history of DiabetesType Type II . Patient is being treated with diet and exercise and denies significant side effects. Control has been good. Complicating factors include but are not limited to: obesity.                                                                                                             .  HYPERTENSION - Patient has longstanding history of mod-severe HTN , currently denies any symptoms referable to elevated blood pressure. Specifically denies chest pain, palpitations, dyspnea, orthopnea, PND or peripheral edema. Blood pressure readings have been in normal range. Current medication regimen is as listed below. Patient denies any side effects of medication.         Sleep Apnea -  Uses a CPAP.                                                                                                                                                                                    .    MEDICAL HISTORY:                                                    Patient Active Problem List    Diagnosis Date Noted     Type 2 diabetes mellitus without complication (H) 03/28/2016     Priority: Medium     Mild major depression (H) 10/31/2012     Priority: Medium     Anxiety 09/11/2012     Priority: Medium     Advanced directives, counseling/discussion 01/31/2012     Priority: Medium     Advance Directive Problem List Overview:   Name Relationship Phone    Primary Health Care Agent            Alternative Health Care Agent          Discussed advance care planning with patient;  however, patient declined at this time. 1/31/2012          Neck pain 12/13/2011     Priority: Medium     Hypertension goal BP (blood pressure) < 140/90 05/05/2011     Priority: Medium     Hyperlipidemia LDL goal <130 10/31/2010     Priority: Medium     Provider agrees       Peripheral neuropathy      Priority: Medium     non-diabetic       Hypothyroidism 03/19/2010     Priority: Medium     OA (osteoarthritis) of knee 10/28/2008     Priority: Medium     Disorder of uterus 03/05/2007     Priority: Medium     Problem list name updated by automated process. Provider to review       CHRONIC BACK PAIN      Priority: Medium     degenerative disc disease and disc protrusion       Osteoarthritis 07/22/2004     Priority: Medium     Degenerative joint disease of the knees bilaterally  Problem list name updated by automated process. Provider to review       Sleep apnea 07/22/2004     Priority: Medium     Problem list name updated by automated process. Provider to review       Allergic rhinitis 07/22/2004     Priority: Medium     Problem list name updated by automated process. Provider to review        Past Medical History:   Diagnosis Date     Allergic rhinitis, cause unspecified      CKD (chronic kidney disease) stage 2, GFR 60-89 ml/min 12/31/2010     Depressive disorder, not elsewhere classified      Hyperlipidemia LDL goal <130 10/31/2010     Hypertension goal BP (blood pressure) < 130/80 12/31/2010     Lumbago     degenerative disc disease and disc protrusion     Osteoarthrosis, unspecified whether generalized or localized, unspecified site      Peripheral neuropathy     non-diabetic     Thyroid disease      Type 2 diabetes mellitus without complication (H) 3/28/2016     Unspecified essential hypertension      Unspecified sleep apnea      Past Surgical History:   Procedure Laterality Date     BIOPSY  10/15    during colonoscopy     COLONOSCOPY  10/15     HC REMOVAL OF TONSILS,<12 Y/O      Tonsils <13y.o.     JOINT  REPLACEMTN, KNEE RT/LT  2009    Joint Replacement knee RT     Current Outpatient Prescriptions   Medication Sig Dispense Refill     Acetaminophen (TYLENOL PO) Take 600-1,000 mg by mouth       blood glucose monitoring (NO BRAND SPECIFIED) test strip Use to test blood sugars 2 times daily or as directed 200 strip 11     traMADol (ULTRAM) 50 MG tablet Take 1 tablet (50 mg) by mouth every 8 hours as needed for moderate pain 30 tablet 2     tiZANidine (ZANAFLEX) 4 MG tablet Take 1 tablet (4 mg) by mouth nightly as needed for muscle spasms 30 tablet 2     blood glucose (NO BRAND SPECIFIED) lancets standard Use to test blood sugar 1 times daily or as directed. 1 Box 0     blood glucose monitoring (NO BRAND SPECIFIED) meter device kit Use to test blood sugar 1 times daily or as directed. 1 kit 0     diclofenac (VOLTAREN) 1 % GEL topical gel Apply 2 g topically 4 times daily 100 g PRN     levothyroxine (SYNTHROID/LEVOTHROID) 75 MCG tablet Take 1 tablet (75 mcg) by mouth daily 90 tablet 3     triamterene-hydrochlorothiazide (DYAZIDE) 37.5-25 MG per capsule TAKE ONE CAPSULE BY MOUTH ONCE DAILY 90 capsule PRN     sertraline (ZOLOFT) 100 MG tablet Take 1.5 tablets (150 mg) by mouth daily 135 tablet PRN     atorvastatin (LIPITOR) 10 MG tablet Take 1 tablet (10 mg) by mouth daily 90 tablet 3     aspirin 325 MG EC tablet Take 325 mg by mouth daily       blood glucose monitoring (MILLY CONTOUR) test strip Use to test blood sugars 1-2 times daily or as directed. 3 Box PRN     blood glucose monitoring (MILLY MICROLET) lancets Use to test blood sugar 2 times daily or as directed. 1 Box PRN     fluticasone (FLONASE) 50 MCG/ACT nasal spray Spray 2 sprays into both nostrils daily 48 g PRN     blood glucose monitoring (MILLY CONTOUR MONITOR) meter device kit Use to test blood sugars 1 time daily or as directed. 1 kit 0     ibuprofen 200 MG capsule Take 400-600 mg by mouth every 6 hours as needed for fever 90 capsule PRN     ORDER FOR DME  Equipment being ordered: CPAP mask and tubing 1 Units 0     Calcium Carbonate-Vit D-Min (CALCIUM 1200 PO) Take  by mouth.       ORDER FOR DME Equipment being ordered: CPAP - adjustable levels (4-20) with a water chamber 1 each 0     cholecalciferol (VITAMIN  -D) 1000 UNIT capsule Take 1 capsule by mouth daily Vitamin D-3       GLUCOSAMINE CHONDROITIN COMPLX PO Take  by mouth daily.       MULTIVITAMIN TABS   OR 1 TABLET DAILY       OTC products: None, except as noted above    Allergies   Allergen Reactions     Cats      Dogs       Latex Allergy: NO    Social History   Substance Use Topics     Smoking status: Never Smoker     Smokeless tobacco: Never Used      Comment: am using sm. amt. of marijuana for sleep/pain     Alcohol use Yes      Comment: really managed/not my drug to enjoy. marijuana 3 times per week      History   Drug Use     Yes     Special: IV       REVIEW OF SYSTEMS:                                                    C: NEGATIVE for fever, chills, change in weight  I: NEGATIVE for worrisome rashes, moles or lesions  E: NEGATIVE for vision changes or irritation  E/M: NEGATIVE for ear, mouth and throat problems  R: NEGATIVE for significant cough or SOB  B: NEGATIVE for masses, tenderness or discharge  CV: NEGATIVE for chest pain, palpitations or peripheral edema  GI: NEGATIVE for nausea, abdominal pain, heartburn, or change in bowel habits  : NEGATIVE for frequency, dysuria, or hematuria  MUSCULOSKELETAL:right arm pain  NEURO: right arm paresthesias  E: NEGATIVE for temperature intolerance, skin/hair changes  H: NEGATIVE for bleeding problems  P: NEGATIVE for changes in mood or affect    EXAM:                                                    /86  Pulse 102  Temp 98.5  F (36.9  C) (Oral)  Resp 16  Wt 209 lb 6 oz (95 kg)  LMP  (LMP Unknown)  SpO2 97%  Breastfeeding? No  BMI 34.84 kg/m2    GENERAL APPEARANCE: healthy, alert and no distress     EYES: EOMI, PERRL     HENT: ear canals and TM's  normal and nose and mouth without ulcers or lesions     NECK: no adenopathy, no asymmetry, masses, or scars and thyroid normal to palpation     RESP: lungs clear to auscultation - no rales, rhonchi or wheezes     CV: regular rates and rhythm, normal S1 S2, no S3 or S4 and no murmur, click or rub     ABDOMEN:  soft, nontender, no HSM or masses and bowel sounds normal     MS: extremities normal- no gross deformities noted, no evidence of inflammation in joints, FROM in all extremities.     SKIN: no suspicious lesions or rashes     NEURO: Normal strength and tone, sensory exam grossly normal, mentation intact and speech normal     PSYCH: mentation appears normal. and affect normal/bright     LYMPHATICS: No axillary, cervical, or supraclavicular nodes    DIAGNOSTICS:                                                      EKG: attached, unchanged from previous tracings  Labs Resulted Today:   Results for orders placed or performed in visit on 10/09/17   Hemoglobin A1c   Result Value Ref Range    Hemoglobin A1C 6.3 (H) 4.3 - 6.0 %   CBC with platelets   Result Value Ref Range    WBC 12.7 (H) 4.0 - 11.0 10e9/L    RBC Count 4.90 3.8 - 5.2 10e12/L    Hemoglobin 14.5 11.7 - 15.7 g/dL    Hematocrit 42.5 35.0 - 47.0 %    MCV 87 78 - 100 fl    MCH 29.6 26.5 - 33.0 pg    MCHC 34.1 31.5 - 36.5 g/dL    RDW 13.5 10.0 - 15.0 %    Platelet Count 326 150 - 450 10e9/L     Labs Drawn and in Process:   Unresulted Labs Ordered in the Past 30 Days of this Admission     Date and Time Order Name Status Description    10/9/2017 1538 VITAMIN D DEFICIENCY SCREENING In process     10/9/2017 1538 BASIC METABOLIC PANEL In process           Recent Labs   Lab Test  03/17/17   1013  03/30/16   1451   02/24/16   1043   HGB   --   14.0   --   13.6   PLT   --   313   --   287   INR   --   0.95   --   1.01   NA  140  136   < >   --    POTASSIUM  3.7  3.9   < >   --    CR  0.65  0.63   < >   --    A1C  6.7*   --    --   6.7*    < > = values in this interval  not displayed.        IMPRESSION:                                                    Reason for surgery/procedure: C6-7 fusion    The proposed surgical procedure is considered INTERMEDIATE risk.    REVISED CARDIAC RISK INDEX  The patient has the following serious cardiovascular risks for perioperative complications such as (MI, PE, VFib and 3  AV Block):  No serious cardiac risks  INTERPRETATION: 0 risks: Class I (very low risk - 0.4% complication rate)    The patient has the following additional risks for perioperative complications:  No identified additional risks      ICD-10-CM    1. Preop general physical exam Z01.818 CBC with platelets     EKG 12-lead complete w/read - Clinics   2. Cervical stenosis of spinal canal M48.02    3. Hypertension goal BP (blood pressure) < 140/90 I10 Basic metabolic panel   4. Type 2 diabetes mellitus without complication, without long-term current use of insulin (H) E11.9 Hemoglobin A1c     Basic metabolic panel     blood glucose monitoring (NO BRAND SPECIFIED) test strip   5. Need for vaccination Z23 FLU VACCINE, INCREASED ANTIGEN, PRESV FREE     PNEUMOCOCCAL CONJ VACCINE 13 VALENT IM   6. Vitamin D deficiency E55.9 Vitamin D Deficiency   7. Bilateral low back pain with right-sided sciatica, unspecified chronicity M54.41 traMADol (ULTRAM) 50 MG tablet     tiZANidine (ZANAFLEX) 4 MG tablet       RECOMMENDATIONS:                                                      Hypertension: Monitor blood pressure preoperatively  Sleep apnea: Recommend careful positioning to prevent airway compromise and close monitoring of the patient's respiratory status.    Diabetes: Recommend close monitoring of blood glucose levels throughout the perioperative period.  Obesity: Recommend careful positioning to prevent airway/ventilatory compromise, or tissue injury.       --Patient is to take all scheduled medications on the day of surgery EXCEPT for modifications listed below.    APPROVAL GIVEN to proceed  with proposed procedure, without further diagnostic evaluation       Signed Electronically by: Jory Fragoso NP    Copy of this evaluation report is provided to requesting physician.    [place] Preop Guidelines    Injectable Influenza Immunization Documentation    Prior to injection verified patient identity using patient's name and date of birth.      1.  Is the person to be vaccinated sick today?   No, headache     2. Does the person to be vaccinated have an allergy to a component   of the vaccine?   No    3. Has the person to be vaccinated ever had a serious reaction   to influenza vaccine in the past?   No    4. Has the person to be vaccinated ever had Guillain-Barré syndrome?   No    Form completed by May Sears MA

## 2017-10-10 LAB
ANION GAP SERPL CALCULATED.3IONS-SCNC: 13 MMOL/L (ref 3–14)
BUN SERPL-MCNC: 18 MG/DL (ref 7–30)
CALCIUM SERPL-MCNC: 9.4 MG/DL (ref 8.5–10.1)
CHLORIDE SERPL-SCNC: 100 MMOL/L (ref 94–109)
CO2 SERPL-SCNC: 24 MMOL/L (ref 20–32)
CREAT SERPL-MCNC: 0.72 MG/DL (ref 0.52–1.04)
DEPRECATED CALCIDIOL+CALCIFEROL SERPL-MC: 28 UG/L (ref 20–75)
GFR SERPL CREATININE-BSD FRML MDRD: 81 ML/MIN/1.7M2
GLUCOSE SERPL-MCNC: 92 MG/DL (ref 70–99)
POTASSIUM SERPL-SCNC: 3.5 MMOL/L (ref 3.4–5.3)
SODIUM SERPL-SCNC: 137 MMOL/L (ref 133–144)

## 2017-10-10 ASSESSMENT — ANXIETY QUESTIONNAIRES: GAD7 TOTAL SCORE: 6

## 2017-10-11 ENCOUNTER — MYC MEDICAL ADVICE (OUTPATIENT)
Dept: FAMILY MEDICINE | Facility: CLINIC | Age: 67
End: 2017-10-11

## 2017-10-12 NOTE — TELEPHONE ENCOUNTER
I sent her a result note.  Antibiotic prophylaxis should not be needed, but I can look at her paperwork when she drops it off.

## 2017-10-13 ASSESSMENT — MIFFLIN-ST. JEOR: SCORE: 1453.02

## 2017-10-18 ENCOUNTER — ANESTHESIA - HEALTHEAST (OUTPATIENT)
Dept: SURGERY | Facility: CLINIC | Age: 67
End: 2017-10-18

## 2017-10-18 ENCOUNTER — SURGERY - HEALTHEAST (OUTPATIENT)
Dept: SURGERY | Facility: CLINIC | Age: 67
End: 2017-10-18

## 2017-10-18 ENCOUNTER — TRANSFERRED RECORDS (OUTPATIENT)
Dept: HEALTH INFORMATION MANAGEMENT | Facility: CLINIC | Age: 67
End: 2017-10-18

## 2017-10-19 ASSESSMENT — MIFFLIN-ST. JEOR: SCORE: 1453.02

## 2017-10-26 ENCOUNTER — MYC MEDICAL ADVICE (OUTPATIENT)
Dept: FAMILY MEDICINE | Facility: CLINIC | Age: 67
End: 2017-10-26

## 2017-10-26 NOTE — TELEPHONE ENCOUNTER
Pt not on blood thinners, per se  Think she may just be referring to taking the asa 325??  Please see pt msg below and advise.    Thanks!     Melba Dominguez RN

## 2017-11-13 ENCOUNTER — TRANSFERRED RECORDS (OUTPATIENT)
Dept: HEALTH INFORMATION MANAGEMENT | Facility: CLINIC | Age: 67
End: 2017-11-13

## 2017-11-15 ENCOUNTER — TELEPHONE (OUTPATIENT)
Dept: FAMILY MEDICINE | Facility: CLINIC | Age: 67
End: 2017-11-15

## 2017-12-04 ENCOUNTER — OFFICE VISIT (OUTPATIENT)
Dept: FAMILY MEDICINE | Facility: CLINIC | Age: 67
End: 2017-12-04
Payer: COMMERCIAL

## 2017-12-04 VITALS
BODY MASS INDEX: 34.25 KG/M2 | RESPIRATION RATE: 16 BRPM | WEIGHT: 205.8 LBS | HEART RATE: 86 BPM | TEMPERATURE: 99 F | SYSTOLIC BLOOD PRESSURE: 119 MMHG | DIASTOLIC BLOOD PRESSURE: 76 MMHG

## 2017-12-04 DIAGNOSIS — J01.01 ACUTE RECURRENT MAXILLARY SINUSITIS: Primary | ICD-10-CM

## 2017-12-04 DIAGNOSIS — R05.9 COUGH: ICD-10-CM

## 2017-12-04 PROCEDURE — 99213 OFFICE O/P EST LOW 20 MIN: CPT | Performed by: NURSE PRACTITIONER

## 2017-12-04 RX ORDER — BENZONATATE 200 MG/1
200 CAPSULE ORAL 3 TIMES DAILY PRN
Qty: 21 CAPSULE | Refills: 0 | Status: SHIPPED | OUTPATIENT
Start: 2017-12-04 | End: 2018-07-03

## 2017-12-04 RX ORDER — AZITHROMYCIN 250 MG/1
TABLET, FILM COATED ORAL
Qty: 6 TABLET | Refills: 0 | Status: SHIPPED | OUTPATIENT
Start: 2017-12-04 | End: 2018-07-03

## 2017-12-04 NOTE — MR AVS SNAPSHOT
After Visit Summary   12/4/2017    Leena Montaño    MRN: [id number]           Patient Information     Date Of Birth          1950        Visit Information        Provider Department      12/4/2017 1:00 PM Karen Hernández APRN CNP [facility]        Today's Diagnoses     Acute recurrent maxillary sinusitis    -  1    Cough           Follow-ups after your visit        Who to contact     If you have questions or need follow up information about today's clinic visit or your schedule please contact [facility] directly at [phone].  Normal or non-critical lab and imaging results will be communicated to you by [employer], letter or phone within 4 business days after the clinic has received the results. If you do not hear from us within 7 days, please contact the clinic through [employer] or phone. If you have a critical or abnormal lab result, we will notify you by phone as soon as possible.  Submit refill requests through [employer] or call your pharmacy and they will forward the refill request to us. Please allow 3 business days for your refill to be completed.          Additional Information About Your Visit        MyChart Information     [employer] gives you secure access to your electronic health record. If you see a primary care provider, you can also send messages to your care team and make appointments. If you have questions, please call your primary care clinic.  If you do not have a primary care provider, please call [phone] and they will assist you.        Care EveryWhere ID     This is your Care EveryWhere ID. This could be used by other organizations to access your [facility] medical records  [id number]        Your Vitals Were     Pulse Temperature Respirations Last Period BMI (Body Mass Index)       86 99  F (37.2  C) (Oral) 16 (LMP Unknown) 34.25 kg/m2        Blood Pressure from Last 3 Encounters:   12/04/17 119/76   10/09/17 138/88    06/06/17 128/79    Weight from Last 3 Encounters:   12/04/17 205 lb 12.8 oz (93.4 kg)   10/09/17 209 lb 6 oz (95 kg)   06/06/17 208 lb (94.3 kg)              Today, you had the following     No orders found for display         Today's Medication Changes          These changes are accurate as of: 12/4/17  2:51 PM.  If you have any questions, ask your nurse or doctor.               Start taking these medicines.        Dose/Directions    azithromycin 250 MG tablet   Commonly known as:  ZITHROMAX   Used for:  Acute recurrent maxillary sinusitis   Started by:  Karen Hernández APRN CNP        Two tablets first day, then one tablet daily for four days.   Quantity:  6 tablet   Refills:  0       benzonatate 200 MG capsule   Commonly known as:  TESSALON   Used for:  Cough   Started by:  Karen Hernández APRN CNP        Dose:  200 mg   Take 1 capsule (200 mg) by mouth 3 times daily as needed for cough   Quantity:  21 capsule   Refills:  0            Where to get your medicines      These medications were sent to [facility] - [place], [place] - [address]  [address], [place] [address]     Phone:  [phone]     azithromycin 250 MG tablet    benzonatate 200 MG capsule                Primary Care Provider Office Phone # Fax #    Jory Fragoso [phone] [address] [facility] [address]        Equal Access to Services     [facility] AH: Hadii olayinka ku hadasho Sokinzaali, waaxda luqadaha, qaybta kaalmada adeegyada, katt null . So [facility] [phone].    ATENCIÓN: Si habla español, tiene a saenz disposición servicios gratuitos de asistencia lingüística. Llame al [phone].    We comply with applicable federal civil rights laws and [place] laws. We do not discriminate on the basis of race, color, national origin, age, disability, sex, sexual orientation, or gender identity.            Thank you!     Thank you for choosing [facility]  for your care. Our goal is always to provide you with excellent care. Hearing back from our patients is one way we can continue to improve our services. Please take a few minutes to complete the written survey that you may receive in the mail after your visit with us. Thank you!             Your Updated Medication List - Protect others around you: Learn how to safely use, store and throw away your medicines at www.disposemymeds.org.          This list is accurate as of: 12/4/17  2:51 PM.  Always use your most recent med list.                   Brand Name Dispense Instructions for use Diagnosis    * aspirin 325 MG EC tablet      Take 325 mg by mouth daily        * aspirin 81 MG tablet     30 tablet    Take 1 tablet (81 mg) by mouth daily        atorvastatin 10 MG tablet    LIPITOR    90 tablet    Take 1 tablet (10 mg) by mouth daily    Type 2 diabetes mellitus without complication, without long-term current use of insulin (H), Hyperlipidemia LDL goal <130       azithromycin 250 MG tablet    ZITHROMAX    6 tablet    Two tablets first day, then one tablet daily for four days.    Acute recurrent maxillary sinusitis       benzonatate 200 MG capsule    TESSALON    21 capsule    Take 1 capsule (200 mg) by mouth 3 times daily as needed for cough    Cough       blood glucose lancets standard    no brand specified    1 Box    Use to test blood sugar 1 times daily or as directed.    Type 2 diabetes mellitus without complication, without long-term current use of insulin (H)       blood glucose monitoring lancets     1 Box    Use to test blood sugar 2 times daily or as directed.    Type 2 diabetes mellitus without complication (H)       * blood glucose monitoring meter device kit     1 kit    Use to test blood sugars 1 time daily or as directed.    Prediabetes       * blood glucose monitoring meter device kit    no brand specified    1 kit    Use to test blood sugar 1 times daily or as directed.    Type 2  diabetes mellitus without complication, without long-term current use of insulin (H)       * blood glucose monitoring test strip    MILLY CONTOUR    3 Box    Use to test blood sugars 1-2 times daily or as directed.    Type 2 diabetes mellitus without complication (H)       * blood glucose monitoring test strip    no brand specified    200 strip    Use to test blood sugars 2 times daily or as directed    Type 2 diabetes mellitus without complication, without long-term current use of insulin (H)       CALCIUM 1200 PO      Take  by mouth.        cholecalciferol 1000 UNITS capsule    vitamin  -D     Take 1 capsule by mouth daily Vitamin D-3        diclofenac 1 % Gel topical gel    VOLTAREN    100 g    Apply 2 g topically 4 times daily    Pain of right hand       fluticasone 50 MCG/ACT spray    FLONASE    48 g    Spray 2 sprays into both nostrils daily    Allergic rhinitis, unspecified allergic rhinitis type       GLUCOSAMINE CHONDROITIN COMPLX PO      Take  by mouth daily.        ibuprofen 200 MG capsule     90 capsule    Take 400-600 mg by mouth every 6 hours as needed for fever    Shoulder joint pain, right, Knee pain, left       levothyroxine 75 MCG tablet    SYNTHROID/LEVOTHROID    90 tablet    Take 1 tablet (75 mcg) by mouth daily    Acquired hypothyroidism       MULTIVITAMIN TABS   OR      1 TABLET DAILY        * order for DME     1 each    Equipment being ordered: CPAP - adjustable levels (4-20) with a water chamber    Unspecified sleep apnea       * order for DME     1 Units    Equipment being ordered: CPAP mask and tubing    Unspecified sleep apnea       sertraline 100 MG tablet    ZOLOFT    135 tablet    Take 1.5 tablets (150 mg) by mouth daily    Major depressive disorder, recurrent episode, mild (H)       tiZANidine 4 MG tablet    ZANAFLEX    30 tablet    Take 1 tablet (4 mg) by mouth nightly as needed for muscle spasms    Bilateral low back pain with right-sided sciatica, unspecified chronicity        traMADol 50 MG tablet    ULTRAM    30 tablet    Take 1 tablet (50 mg) by mouth every 8 hours as needed for moderate pain    Bilateral low back pain with right-sided sciatica, unspecified chronicity       triamterene-hydrochlorothiazide 37.5-25 MG per capsule    DYAZIDE    90 capsule    TAKE ONE CAPSULE BY MOUTH ONCE DAILY    Hypertension goal BP (blood pressure) < 140/90       TYLENOL PO      Take 600-1,000 mg by mouth        * Notice:  This list has 8 medication(s) that are the same as other medications prescribed for you. Read the directions carefully, and ask your doctor or other care provider to review them with you.

## 2017-12-04 NOTE — PROGRESS NOTES
SUBJECTIVE:   Leena Montaño is a 67 year old female who presents to clinic today for the following health issues:      RESPIRATORY SYMPTOMS      Duration: 1 week    Description  cough, headache and diarrhea x 1    Severity: moderate    Accompanying signs and symptoms: None    History (predisposing factors):  none    Precipitating or alleviating factors: None    Therapies tried and outcome:  rest and fluids    Reviewed and updated as needed this visit by clinical staffTobacco  Allergies  Meds  Problems  Med Hx  Surg Hx  Fam Hx  Soc Hx        Reviewed and updated as needed this visit by Provider  Tobacco  Allergies  Meds  Problems  Med Hx  Surg Hx  Fam Hx  Soc Hx          ROS:  Constitutional, HEENT, cardiovascular, pulmonary, gi and gu systems are negative, except as otherwise noted.      OBJECTIVE:   /76  Pulse 86  Temp 99  F (37.2  C) (Oral)  Resp 16  Wt 205 lb 12.8 oz (93.4 kg)  LMP  (LMP Unknown)  BMI 34.25 kg/m2  Body mass index is 34.25 kg/(m^2).  GENERAL: healthy, alert and no distress  EYES: Eyes grossly normal to inspection, PERRL and conjunctivae and sclerae normal  HENT: Bilateral frontal and maxillary sinus tenderness ear canals and TM's normal, nose and mouth without ulcers or lesions  NECK: no adenopathy, no asymmetry, masses, or scars and thyroid normal to palpation  RESP: lungs clear to auscultation - no rales, rhonchi or wheezes  CV: regular rate and rhythm, normal S1 S2, no S3 or S4, no murmur, click or rub, no peripheral edema and peripheral pulses strong  ABDOMEN: soft, nontender, no hepatosplenomegaly, no masses and bowel sounds normal  MS: no gross musculoskeletal defects noted, no edema  SKIN: no suspicious lesions or rashes      ASSESSMENT/PLAN:       ICD-10-CM    1. Acute recurrent maxillary sinusitis J01.01 azithromycin (ZITHROMAX) 250 MG tablet   2. Cough R05 benzonatate (TESSALON) 200 MG capsule     I think this is primarily related to a viral illness  given the various associated symptoms.  Went over the treatment of viral illnesses, which is primarily supportive.    Recheck if not improving as expected.  Contigency script for antibiotics given if not improving.    See Patient Instructions    REYNALDO Clayton CNP  [facility]

## 2017-12-27 DIAGNOSIS — E11.9 TYPE 2 DIABETES MELLITUS WITHOUT COMPLICATION, WITHOUT LONG-TERM CURRENT USE OF INSULIN (H): ICD-10-CM

## 2017-12-28 RX ORDER — LANCETS 33 GAUGE
EACH MISCELLANEOUS
Qty: 100 EACH | Refills: 1 | Status: SHIPPED | OUTPATIENT
Start: 2017-12-28 | End: 2019-06-03

## 2018-01-29 ENCOUNTER — MYC MEDICAL ADVICE (OUTPATIENT)
Dept: FAMILY MEDICINE | Facility: CLINIC | Age: 68
End: 2018-01-29

## 2018-01-29 NOTE — TELEPHONE ENCOUNTER
It showed osteopenia - some bone loss, but not to the extent of osteoporosis.  She should continue with calcium and vitamin D.

## 2018-01-29 NOTE — TELEPHONE ENCOUNTER
[place]  Can you please review the dexa scan results in the abstracted transferred records from 11/13/17 and advise on    Thanks!     Melba Dominguez RN

## 2018-02-03 ENCOUNTER — HEALTH MAINTENANCE LETTER (OUTPATIENT)
Age: 68
End: 2018-02-03

## 2018-06-22 ENCOUNTER — TELEPHONE (OUTPATIENT)
Dept: FAMILY MEDICINE | Facility: CLINIC | Age: 68
End: 2018-06-22

## 2018-06-22 DIAGNOSIS — F33.0 MAJOR DEPRESSIVE DISORDER, RECURRENT EPISODE, MILD (H): ICD-10-CM

## 2018-06-22 RX ORDER — SERTRALINE HYDROCHLORIDE 100 MG/1
150 TABLET, FILM COATED ORAL DAILY
Qty: 135 TABLET | Refills: 0 | Status: SHIPPED | OUTPATIENT
Start: 2018-06-22 | End: 2018-07-03

## 2018-06-22 NOTE — TELEPHONE ENCOUNTER
Reason for call:  Medication   If this is a refill request, has the caller requested the refill from the pharmacy already? No  Will the patient be using a [facility]? Yes  Name of the pharmacy and phone number for the current request: [facility]    Name of the medication requested: FROM [facility]:  well, I have always been a procrastinator...my Sertraline prescription is running out by next Tues.  I was warned that I needed to see Shelia before it could be refilled......IS THERE A WAY I CAN GET A TWO WEEK EXTENSION OF THIS DRUG,  I REALLY DO NOT WANT TO EXPERIMENT FOR HOW I FEEL WITHOUT IT...             Other request: Patient has an appointment with Pema 7/3/18 please reply via [facility]     Phone number to reach patient:  Cell number on file:    Telephone Information:   Mobile [phone]       Best Time:  any    Can we leave a detailed message on this number?  YES

## 2018-07-03 ENCOUNTER — OFFICE VISIT (OUTPATIENT)
Dept: FAMILY MEDICINE | Facility: CLINIC | Age: 68
End: 2018-07-03
Payer: COMMERCIAL

## 2018-07-03 VITALS
RESPIRATION RATE: 18 BRPM | DIASTOLIC BLOOD PRESSURE: 85 MMHG | TEMPERATURE: 97.5 F | OXYGEN SATURATION: 97 % | HEART RATE: 73 BPM | BODY MASS INDEX: 36.44 KG/M2 | WEIGHT: 219 LBS | SYSTOLIC BLOOD PRESSURE: 132 MMHG

## 2018-07-03 DIAGNOSIS — B37.2 CUTANEOUS CANDIDIASIS: ICD-10-CM

## 2018-07-03 DIAGNOSIS — I10 HYPERTENSION GOAL BP (BLOOD PRESSURE) < 140/90: ICD-10-CM

## 2018-07-03 DIAGNOSIS — Z23 NEED FOR TDAP VACCINATION: ICD-10-CM

## 2018-07-03 DIAGNOSIS — E11.9 TYPE 2 DIABETES MELLITUS WITHOUT COMPLICATION, WITHOUT LONG-TERM CURRENT USE OF INSULIN (H): ICD-10-CM

## 2018-07-03 DIAGNOSIS — Z23 NEED FOR PNEUMOCOCCAL VACCINATION: ICD-10-CM

## 2018-07-03 DIAGNOSIS — E55.9 VITAMIN D DEFICIENCY: ICD-10-CM

## 2018-07-03 DIAGNOSIS — Z00.00 ENCOUNTER FOR ROUTINE ADULT HEALTH EXAMINATION WITHOUT ABNORMAL FINDINGS: Primary | ICD-10-CM

## 2018-07-03 DIAGNOSIS — B35.3 TINEA PEDIS OF BOTH FEET: ICD-10-CM

## 2018-07-03 DIAGNOSIS — E03.9 ACQUIRED HYPOTHYROIDISM: ICD-10-CM

## 2018-07-03 DIAGNOSIS — E03.9 HYPOTHYROIDISM, UNSPECIFIED TYPE: ICD-10-CM

## 2018-07-03 DIAGNOSIS — E66.01 MORBID OBESITY (H): ICD-10-CM

## 2018-07-03 DIAGNOSIS — F33.0 MAJOR DEPRESSIVE DISORDER, RECURRENT EPISODE, MILD (H): ICD-10-CM

## 2018-07-03 LAB
ALBUMIN SERPL-MCNC: 3.7 G/DL (ref 3.4–5)
ALP SERPL-CCNC: 86 U/L (ref 40–150)
ALT SERPL W P-5'-P-CCNC: 26 U/L (ref 0–50)
ANION GAP SERPL CALCULATED.3IONS-SCNC: 11 MMOL/L (ref 3–14)
AST SERPL W P-5'-P-CCNC: 15 U/L (ref 0–45)
BILIRUB SERPL-MCNC: 0.8 MG/DL (ref 0.2–1.3)
BUN SERPL-MCNC: 19 MG/DL (ref 7–30)
CALCIUM SERPL-MCNC: 9.1 MG/DL (ref 8.5–10.1)
CHLORIDE SERPL-SCNC: 103 MMOL/L (ref 94–109)
CHOLEST SERPL-MCNC: 251 MG/DL
CO2 SERPL-SCNC: 26 MMOL/L (ref 20–32)
CREAT SERPL-MCNC: 0.7 MG/DL (ref 0.52–1.04)
CREAT UR-MCNC: 64 MG/DL
GFR SERPL CREATININE-BSD FRML MDRD: 83 ML/MIN/1.7M2
GLUCOSE SERPL-MCNC: 148 MG/DL (ref 70–99)
HBA1C MFR BLD: 6.7 % (ref 0–5.6)
HDLC SERPL-MCNC: 39 MG/DL
LDLC SERPL CALC-MCNC: 175 MG/DL
MAGNESIUM SERPL-MCNC: 2.2 MG/DL (ref 1.6–2.3)
MICROALBUMIN UR-MCNC: <5 MG/L
MICROALBUMIN/CREAT UR: NORMAL MG/G CR (ref 0–25)
NONHDLC SERPL-MCNC: 212 MG/DL
POTASSIUM SERPL-SCNC: 3.7 MMOL/L (ref 3.4–5.3)
PROT SERPL-MCNC: 7.5 G/DL (ref 6.8–8.8)
SODIUM SERPL-SCNC: 140 MMOL/L (ref 133–144)
TRIGL SERPL-MCNC: 186 MG/DL
TSH SERPL DL<=0.005 MIU/L-ACNC: 1.24 MU/L (ref 0.4–4)

## 2018-07-03 PROCEDURE — 80061 LIPID PANEL: CPT | Performed by: NURSE PRACTITIONER

## 2018-07-03 PROCEDURE — 82306 VITAMIN D 25 HYDROXY: CPT | Performed by: NURSE PRACTITIONER

## 2018-07-03 PROCEDURE — 80053 COMPREHEN METABOLIC PANEL: CPT | Performed by: NURSE PRACTITIONER

## 2018-07-03 PROCEDURE — G0439 PPPS, SUBSEQ VISIT: HCPCS | Performed by: NURSE PRACTITIONER

## 2018-07-03 PROCEDURE — 83036 HEMOGLOBIN GLYCOSYLATED A1C: CPT | Performed by: NURSE PRACTITIONER

## 2018-07-03 PROCEDURE — 82043 UR ALBUMIN QUANTITATIVE: CPT | Performed by: NURSE PRACTITIONER

## 2018-07-03 PROCEDURE — 99213 OFFICE O/P EST LOW 20 MIN: CPT | Mod: 25 | Performed by: NURSE PRACTITIONER

## 2018-07-03 PROCEDURE — 36415 COLL VENOUS BLD VENIPUNCTURE: CPT | Performed by: NURSE PRACTITIONER

## 2018-07-03 PROCEDURE — 90471 IMMUNIZATION ADMIN: CPT | Performed by: NURSE PRACTITIONER

## 2018-07-03 PROCEDURE — 84443 ASSAY THYROID STIM HORMONE: CPT | Performed by: NURSE PRACTITIONER

## 2018-07-03 PROCEDURE — G0009 ADMIN PNEUMOCOCCAL VACCINE: HCPCS | Mod: 59 | Performed by: NURSE PRACTITIONER

## 2018-07-03 PROCEDURE — 90715 TDAP VACCINE 7 YRS/> IM: CPT | Performed by: NURSE PRACTITIONER

## 2018-07-03 PROCEDURE — 83735 ASSAY OF MAGNESIUM: CPT | Performed by: NURSE PRACTITIONER

## 2018-07-03 PROCEDURE — 90670 PCV13 VACCINE IM: CPT | Performed by: NURSE PRACTITIONER

## 2018-07-03 RX ORDER — SERTRALINE HYDROCHLORIDE 100 MG/1
150 TABLET, FILM COATED ORAL DAILY
Qty: 135 TABLET | Status: SHIPPED | OUTPATIENT
Start: 2018-07-03 | End: 2019-08-26

## 2018-07-03 RX ORDER — TRIAMTERENE AND HYDROCHLOROTHIAZIDE 37.5; 25 MG/1; MG/1
1 CAPSULE ORAL DAILY
Qty: 90 CAPSULE | Status: SHIPPED | OUTPATIENT
Start: 2018-07-03 | End: 2019-08-26

## 2018-07-03 RX ORDER — NYSTATIN 100000 U/G
CREAM TOPICAL 2 TIMES DAILY
Qty: 60 G | Status: SHIPPED | OUTPATIENT
Start: 2018-07-03 | End: 2022-09-23

## 2018-07-03 RX ORDER — LEVOTHYROXINE SODIUM 75 UG/1
75 TABLET ORAL DAILY
Qty: 90 TABLET | Status: SHIPPED | OUTPATIENT
Start: 2018-07-03 | End: 2019-08-26

## 2018-07-03 ASSESSMENT — ACTIVITIES OF DAILY LIVING (ADL)
I_NEED_ASSISTANCE_FOR_THE_FOLLOWING_DAILY_ACTIVITIES:: NO ASSISTANCE IS NEEDED
CURRENT_FUNCTION: NO ASSISTANCE NEEDED

## 2018-07-03 NOTE — PROGRESS NOTES
Screening Questionnaire for Adult Immunization    Are you sick today?   NO   Do you have allergies to medications, food, a vaccine component or latex?   No   Have you ever had a serious reaction after receiving a vaccination?   No   Do you have a long-term health problem with heart disease, lung disease, asthma, kidney disease, metabolic disease (e.g. diabetes), anemia, or other blood disorder?   No   Do you have cancer, leukemia, HIV/AIDS, or any other immune system problem?   No   In the past 3 months, have you taken medications that affect  your immune system, such as prednisone, other steroids, or anticancer drugs; drugs for the treatment of rheumatoid arthritis, Crohn s disease, or psoriasis; or have you had radiation treatments?   No   Have you had a seizure, or a brain or other nervous system problem?   No   During the past year, have you received a transfusion of blood or blood     products, or been given immune (gamma) globulin or antiviral drug?   No   For women: Are you pregnant or is there a chance you could become        pregnant during the next month?   No   Have you received any vaccinations in the past 4 weeks?   No     Immunization questionnaire answers were all negative.        Per orders of Dr. jean, injection of Adacep, Prenvar given by May Chandler. Patient instructed to remain in clinic for 15 minutes afterwards, and to report any adverse reaction to me immediately.       Screening performed by May Chandler on 7/3/2018 at 11:18 AM.  Answers for HPI/ROS submitted by the patient on 7/3/2018   Annual Exam:  Getting at least 3 servings of Calcium per day:: Yes  Bi-annual eye exam:: Yes  Dental care twice a year:: NO  Sleep apnea or symptoms of sleep apnea:: Sleep apnea  Diet:: Regular (no restrictions)  Taking medications regularly:: Yes  Medication side effects:: Other  Additional concerns today:: YES  Activities of Daily Living: no assistance needed  Home safety: no safety concerns  identified  Hearing Impairment:: difficult to understand a speaker at a public meeting or [employer]az[employer] service  PHQ-2 Score: 1

## 2018-07-03 NOTE — PROGRESS NOTES
SUBJECTIVE:   Leena Montaño is a 68 year old female who presents for Preventive Visit.  Refuses to take Lipitor   Are you in the first 12 months of your Medicare coverage?  No    Fall     Physical   Annual:     Getting at least 3 servings of Calcium per day:  Yes    Bi-annual eye exam:  Yes    Dental care twice a year:  NO    Sleep apnea or symptoms of sleep apnea:  Sleep apnea    Diet:  Regular (no restrictions)    Taking medications regularly:  Yes    Medication side effects:  Other    Additional concerns today:  YES    Ability to successfully perform activities of daily living: no assistance needed    Home Safety:  No safety concerns identified    Hearing Impairment: difficult to understand a speaker at a public meeting or [employer] service    185 blood sugar reading this morning     Possible athlete's foot.  Some itching.  Rash under her abdomen and in the inguinal creases.     She fell last night at home and hit her forehead on the radiator.  Vision is normal.  No headache.  No dizziness.  Lump on forehead is tender.          Fall risk: completed     COGNITIVE SCREEN  1) Repeat 3 items (Leader, Season, Table)    2) Clock draw: NORMAL  3) 3 item recall:   Results: NORMAL clock, 1-2 items recalled: COGNITIVE IMPAIRMENT LESS LIKELY    Mini-CogTM Copyright S Taye. Licensed by the author for use in [facility]; reprinted with permission (theresa@[facility]). All rights reserved.        Reviewed and updated as needed this visit by clinical staff  Tobacco  Allergies  Meds         Reviewed and updated as needed this visit by Provider        Social History   Substance Use Topics     Smoking status: Never Smoker     Smokeless tobacco: Never Used      Comment: am using sm. amt. of marijuana for sleep/pain     Alcohol use Yes      Comment: really managed/not my drug to enjoy. marijuana 3 times per week        Alcohol Use 7/3/2018   If you drink alcohol do you typically have greater than 3 drinks per  day OR greater than 7 drinks per week? Not Applicable               Today's PHQ-2 Score:   PHQ-2 ( 1999 [employer]) 7/3/2018   Q1: Little interest or pleasure in doing things 1   Q2: Feeling down, depressed or hopeless 0   PHQ-2 Score 1   Q1: Little interest or pleasure in doing things Several days   Q2: Feeling down, depressed or hopeless Not at all   PHQ-2 Score 1       Do you feel safe in your environment - Yes    Do you have a Health Care Directive?: Yes: Advance Directive has been received and scanned.    Current providers sharing in care for this patient include:   Patient Care Team:  Jory Fragoso NP as PCP - General    The following health maintenance items are reviewed in Epic and correct as of today:  Health Maintenance   Topic Date Due     FOOT EXAM Q1 YEAR  05/26/1951     EYE EXAM Q1 YEAR  05/26/1951     PNEUMOCOCCAL (1 of 2 - PCV13) 05/26/2016     TETANUS IMMUNIZATION (SYSTEM ASSIGNED)  06/16/2016     ADVANCE DIRECTIVE PLANNING Q5 YRS  01/31/2017     COLONOSCOPY Q3 YR  12/02/2017     TSH Q1 YEAR  03/17/2018     LIPID MONITORING Q1 YEAR  03/17/2018     MICROALBUMIN Q1 YEAR  03/17/2018     FALL RISK ASSESSMENT  03/23/2018     A1C Q6 MO  04/09/2018     PHQ-9 Q6 MONTHS  04/09/2018     INFLUENZA VACCINE (1) 09/01/2018     BMP Q1 YR  10/09/2018     DEPRESSION ACTION PLAN Q1 YR  10/09/2018     TSH W/ FREE T4 REFLEX Q2 YEAR  03/17/2019     MAMMO SCREEN Q2 YR (SYSTEM ASSIGNED)  06/13/2019     DEXA SCAN SCREENING (SYSTEM ASSIGNED)  Completed     HEPATITIS C SCREENING  Completed             Review of Systems  CONSTITUTIONAL: NEGATIVE for fever, chills, change in weight  INTEGUMENTARY/SKIN: see HPI  ENT/MOUTH: NEGATIVE for ear, mouth and throat problems  RESP: NEGATIVE for significant cough or SOB  CV: NEGATIVE for chest pain, palpitations or peripheral edema  GI: NEGATIVE for nausea, abdominal pain, heartburn, or change in bowel habits  MUSCULOSKELETAL: NEGATIVE for significant arthralgias or myalgia  NEURO: see  "HPI  ENDOCRINE: NEGATIVE for temperature intolerance, skin/hair changes  HEME/ALLERGY/IMMUNE: NEGATIVE for bleeding problems  PSYCHIATRIC: NEGATIVE for changes in mood or affect    OBJECTIVE:   /85  Pulse 73  Temp 97.5  F (36.4  C) (Oral)  Resp 18  Wt 219 lb (99.3 kg)  LMP  (LMP Unknown)  SpO2 97%  BMI 36.44 kg/m2 Estimated body mass index is 36.44 kg/(m^2) as calculated from the following:    Height as of 3/23/17: 5' 5\" (1.651 m).    Weight as of this encounter: 219 lb (99.3 kg).  Physical Exam  GENERAL: healthy, alert and no distress  EYES: Eyes grossly normal to inspection, PERRL and conjunctivae and sclerae normal  HENT: ear canals and TM's normal, nose and mouth without ulcers or lesions  NECK: no adenopathy, no asymmetry, masses, or scars and thyroid normal to palpation  RESP: lungs clear to auscultation - no rales, rhonchi or wheezes  BREAST: normal without masses, tenderness or nipple discharge and no palpable axillary masses or adenopathy  CV: regular rate and rhythm, normal S1 S2, no S3 or S4, no murmur, click or rub, no peripheral edema and peripheral pulses strong  ABDOMEN: soft, nontender, no hepatosplenomegaly, no masses and bowel sounds normal  MS: no gross musculoskeletal defects noted, no edema  SKIN: mild maceration between toes bilaterally; mild erythematous plaques under abdominal esdras and inguinal creases bilaterally  NEURO: Normal strength and tone, mentation intact and speech normal  PSYCH: mentation appears normal, affect normal/bright        ASSESSMENT / PLAN:   1. Encounter for routine adult health examination without abnormal findings    - GASTROENTEROLOGY ADULT REF PROCEDURE ONLY Other; [facility] [phone]    2. Type 2 diabetes mellitus without complication, without long-term current use of insulin (H)  At goal  Will refer to diabetes education for refresher.    Encouraged her to reconsider trying atorvastatin.   Follow up in 6 months.   - Magnesium  - Comprehensive " "metabolic panel (BMP + Alb, Alk Phos, ALT, AST, Total. Bili, TP)  - Lipid panel reflex to direct LDL Fasting  - Hemoglobin A1c  - DIABETES EDUCATOR REFERRAL  - Albumin Random Urine Quantitative with Creat Ratio    3. BMI > 35  Discussed diet and exercise.      4. Hypertension goal BP (blood pressure) < 140/90  At goal  The current medical regimen is effective;  continue present plan and medications.   - triamterene-hydrochlorothiazide (DYAZIDE) 37.5-25 MG per capsule; Take 1 capsule by mouth daily  Dispense: 90 capsule; Refill: PRN    5. Hypothyroidism, unspecified type    - TSH with free T4 reflex    6. Major depressive disorder, recurrent episode, mild (H)  The current medical regimen is effective;  continue present plan and medications.   - sertraline (ZOLOFT) 100 MG tablet; Take 1.5 tablets (150 mg) by mouth daily  Dispense: 135 tablet; Refill: PRN    7. Acquired hypothyroidism    - levothyroxine (SYNTHROID/LEVOTHROID) 75 MCG tablet; Take 1 tablet (75 mcg) by mouth daily  Dispense: 90 tablet; Refill: PRN    8. Vitamin D deficiency    - Vitamin D Deficiency    9. Cutaneous candidiasis  Discussed the use and indication of this medication as well as potential side effects.   - nystatin (MYCOSTATIN) cream; Apply topically 2 times daily  Dispense: 60 g; Refill: PRN    10. Tinea pedis of both feet  Use Lamisil or Lotrimin cream BID.     11. Need for Tdap vaccination    - TDAP, IM (10 - 64 YRS) - Adacel    12. Need for pneumococcal vaccination    - PNEUMOCOCCAL CONJ VACCINE 13 VALENT IM    End of Life Planning:  Patient currently has an advanced directive: Yes.  Practitioner is supportive of decision.    COUNSELING:  Reviewed preventive health counseling, as reflected in patient instructions    BP Readings from Last 1 Encounters:   07/03/18 132/85     Estimated body mass index is 36.44 kg/(m^2) as calculated from the following:    Height as of 3/23/17: 5' 5\" (1.651 m).    Weight as of this encounter: 219 lb (99.3 " kg).      Weight management plan: Discussed healthy diet and exercise guidelines and patient will follow up in 12 months in clinic to re-evaluate.     reports that she has never smoked. She has never used smokeless tobacco.      Appropriate preventive services were discussed with this patient, including applicable screening as appropriate for cardiovascular disease, diabetes, osteopenia/osteoporosis, and glaucoma.  As appropriate for age/gender, discussed screening for colorectal cancer, prostate cancer, breast cancer, and cervical cancer. Checklist reviewing preventive services available has been given to the patient.    Reviewed patients plan of care and provided an AVS. The Basic Care Plan (routine screening as documented in Health Maintenance) for Leena meets the Care Plan requirement. This Care Plan has been established and reviewed with the Patient.    Counseling Resources:  ATP IV Guidelines  Pooled Cohorts Equation Calculator  Breast Cancer Risk Calculator  FRAX Risk Assessment  ICSI Preventive Guidelines  Dietary Guidelines for Americans, 2010  USDA's MyPlate  ASA Prophylaxis  Lung CA Screening    Jory Fragoso NP  [facility]  Answers for HPI/ROS submitted by the patient on 7/3/2018   PHQ-2 Score: 1

## 2018-07-05 LAB — DEPRECATED CALCIDIOL+CALCIFEROL SERPL-MC: 30 UG/L (ref 20–75)

## 2018-08-11 ENCOUNTER — OFFICE VISIT (OUTPATIENT)
Dept: URGENT CARE | Facility: URGENT CARE | Age: 68
End: 2018-08-11
Payer: COMMERCIAL

## 2018-08-11 VITALS
HEIGHT: 65 IN | HEART RATE: 72 BPM | TEMPERATURE: 97.8 F | DIASTOLIC BLOOD PRESSURE: 80 MMHG | OXYGEN SATURATION: 96 % | SYSTOLIC BLOOD PRESSURE: 146 MMHG | BODY MASS INDEX: 36.12 KG/M2 | WEIGHT: 216.8 LBS

## 2018-08-11 DIAGNOSIS — J01.90 ACUTE SINUSITIS WITH SYMPTOMS > 10 DAYS: Primary | ICD-10-CM

## 2018-08-11 PROCEDURE — 99214 OFFICE O/P EST MOD 30 MIN: CPT | Performed by: FAMILY MEDICINE

## 2018-08-11 NOTE — PATIENT INSTRUCTIONS
Take full course of Augmentin for sinus infection.  Okay to take ibuprofen 200 mg - 4 tablets (800 mg) every 8 hours as needed.  Okay to take tylenol 500 mg - 2 tablets (1000 mg) every 6-8 hours as needed, do not exceed 3000 mg in 24 hours.    Okay to take claritin, zyrtec or allegra to minimize congestion.  Continue with flonase.        Sinusitis (Antibiotic Treatment)    The sinuses are air-filled spaces within the bones of the face. They connect to the inside of the nose. Sinusitis is an inflammation of the tissue that lines the sinuses. Sinusitis can occur during a cold. It can also happen due to allergies to pollens and other particles in the air. Sinusitis can cause symptoms of sinus congestion and a feeling of fullness. A sinus infection causes fever, headache, and facial pain. There is often green or yellow fluid draining from the nose or into the back of the throat (post-nasal drip). You have been given antibiotics to treat this condition.  Home care    Take the full course of antibiotics as instructed. Do not stop taking them, even when you feel better.    Drink plenty of water, hot tea, and other liquids. This may help thin nasal mucus. It also may help your sinuses drain fluids.    Heat may help soothe painful areas of your face. Use a towel soaked in hot water. Or,  the shower and direct the warm spray onto your face. Using a vaporizer along with a menthol rub at night may also help soothe symptoms.     An expectorant with guaifenesin may help thin nasal mucus and help your sinuses drain fluids.    You can use an over-the-counter decongestant, unless a similar medicine was prescribed to you. Nasal sprays work the fastest. Use one that contains phenylephrine or oxymetazoline. First blow your nose gently. Then use the spray. Do not use these medicines more often than directed on the label. If you do, your symptoms may get worse. You may also take pills that contain pseudoephedrine. Don t use  products that combine multiple medicines. This is because side effects may be increased. Read labels. You can also ask the pharmacist for help. (People with high blood pressure should not use decongestants. They can raise blood pressure.)    Over-the-counter antihistamines may help if allergies contributed to your sinusitis.      Do not use nasal rinses or irrigation during an acute sinus infection, unless your healthcare provider tells you to. Rinsing may spread the infection to other areas in your sinuses.    Use acetaminophen or ibuprofen to control pain, unless another pain medicine was prescribed to you. If you have chronic liver or kidney disease or ever had a stomach ulcer, talk with your healthcare provider before using these medicines. (Aspirin should never be taken by anyone under age 18 who is ill with a fever. It may cause severe liver damage.)    Don't smoke. This can make symptoms worse.  Follow-up care  Follow up with your healthcare provider or our staff if you are better in 1 week.  When to seek medical advice  Call your healthcare provider if any of these occur:    Facial pain or headache that gets worse    Stiff neck    Unusual drowsiness or confusion    Swelling of your forehead or eyelids    Vision problems, such as blurred or double vision    Fever of 100.4 F (38 C) or higher, or as directed by your healthcare provider    Seizure    Breathing problems    Symptoms don't go away in 10 days  Prevention  Here are steps you can take to help prevent an infection:    Keep good hand washing habits.    Don t have close contact with people who have sore throats, colds, or other upper respiratory infections.    Don t smoke, and stay away from secondhand smoke.    Stay up to date with of your vaccines.  Date Last Reviewed: 11/1/2017 2000-2017 [employer]. [address] [place] [address], [place] [address]. All rights reserved. This information is not intended as a substitute for professional  medical care. Always follow your healthcare professional's instructions.

## 2018-08-11 NOTE — PROGRESS NOTES
SUBJECTIVE:   Leena Montaño is a 68 year old female presenting with a chief complaint of facial pain/pressure, headache, body aches, fatigue and congestion.  Having purulent rhinitis now.  Was traveling and initially thought due to this.  No fever, no rash.  Denies any N/V/D.  Had a lot of neck discomfort.  Seen chiropractor for pressure point treatment but symptoms persisted.  Last time took antibiotic for sinus infection was in Dec 2017.  Onset of symptoms was 10 day(s) ago.  Course of illness is worsening.    Severity moderate  Current and Associated symptoms: sinus congestion, purulent rhinitis, fatigue  Treatment measures tried include: ibuprofen, Decongestants, Antihistamine, Fluids and Rest.  Predisposing factors include seasonal allergies and environmental allergies, diabetes, HTN.    Past Medical History:   Diagnosis Date     Allergic rhinitis, cause unspecified      CKD (chronic kidney disease) stage 2, GFR 60-89 ml/min 12/31/2010     Depressive disorder, not elsewhere classified      Hyperlipidemia LDL goal <130 10/31/2010     Hypertension goal BP (blood pressure) < 130/80 12/31/2010     Lumbago     degenerative disc disease and disc protrusion     Osteoarthrosis, unspecified whether generalized or localized, unspecified site      Peripheral neuropathy     non-diabetic     Thyroid disease      Type 2 diabetes mellitus without complication (H) 3/28/2016     Unspecified essential hypertension      Unspecified sleep apnea      Current Outpatient Prescriptions   Medication Sig Dispense Refill     Acetaminophen (TYLENOL PO) Take 600-1,000 mg by mouth as needed        aspirin 325 MG EC tablet Take 325 mg by mouth daily       aspirin 81 MG tablet Take 1 tablet (81 mg) by mouth daily 30 tablet      atorvastatin (LIPITOR) 10 MG tablet Take 1 tablet (10 mg) by mouth daily 90 tablet 3     blood glucose monitoring (MILLY CONTOUR MONITOR) meter device kit Use to test blood sugars 1 time daily or as  directed. 1 kit 0     blood glucose monitoring (MILLY CONTOUR) test strip Use to test blood sugars 1-2 times daily or as directed. 3 Box PRN     blood glucose monitoring (MILLY MICROLET) lancets Use to test blood sugar 2 times daily or as directed. 1 Box PRN     blood glucose monitoring (NO BRAND SPECIFIED) meter device kit Use to test blood sugar 1 times daily or as directed. 1 kit 0     blood glucose monitoring (NO BRAND SPECIFIED) test strip Use to test blood sugars 2 times daily or as directed 200 strip 11     Calcium Carbonate-Vit D-Min (CALCIUM 1200 PO) Take  by mouth.       cholecalciferol (VITAMIN  -D) 1000 UNIT capsule Take 1 capsule by mouth daily Vitamin D-3       diclofenac (VOLTAREN) 1 % GEL topical gel Apply 2 g topically 4 times daily 100 g PRN     fluticasone (FLONASE) 50 MCG/ACT nasal spray Spray 2 sprays into both nostrils daily 48 g PRN     GLUCOSAMINE CHONDROITIN COMPLX PO Take  by mouth daily.       ibuprofen 200 MG capsule Take 400-600 mg by mouth every 6 hours as needed for fever 90 capsule PRN     levothyroxine (SYNTHROID/LEVOTHROID) 75 MCG tablet Take 1 tablet (75 mcg) by mouth daily 90 tablet PRN     MULTIVITAMIN TABS   OR 1 TABLET DAILY       nystatin (MYCOSTATIN) cream Apply topically 2 times daily 60 g PRN     ONETOUCH DELICA LANCETS 33G MISC USE TO TEST BLOOD SUGAR 1 TIME DAILY OR AS DIRECTED 100 each 1     ORDER FOR DME Equipment being ordered: CPAP mask and tubing 1 Units 0     ORDER FOR DME Equipment being ordered: CPAP - adjustable levels (4-20) with a water chamber 1 each 0     sertraline (ZOLOFT) 100 MG tablet Take 1.5 tablets (150 mg) by mouth daily 135 tablet PRN     tiZANidine (ZANAFLEX) 4 MG tablet Take 1 tablet (4 mg) by mouth nightly as needed for muscle spasms 30 tablet 2     triamterene-hydrochlorothiazide (DYAZIDE) 37.5-25 MG per capsule Take 1 capsule by mouth daily 90 capsule PRN     Social History   Substance Use Topics     Smoking status: Never Smoker     Smokeless  "tobacco: Never Used      Comment: am using sm. amt. of marijuana for sleep/pain     Alcohol use Yes      Comment: really managed/not my drug to enjoy. marijuana 3 times per week        ROS:  Review of systems negative except as stated above.    OBJECTIVE:  /80  Pulse 72  Temp 97.8  F (36.6  C) (Oral)  Ht 5' 5\" (1.651 m)  Wt 216 lb 12.8 oz (98.3 kg)  LMP  (LMP Unknown)  SpO2 96%  BMI 36.08 kg/m2  GENERAL APPEARANCE: healthy, alert and no distress  EYES: EOMI,  PERRL, conjunctiva clear  HENT: ear canals and TM's normal.  Nose and mouth without ulcers, erythema or lesions.  Sinus tenderness on percussion  NECK: supple, nontender, mild enlarged submandibular glands  RESP: lungs clear to auscultation - no rales, rhonchi or wheezes  CV: regular rates and rhythm, normal S1 S2, no murmur noted  PSYCH: mentation appears normal and affect normal/bright    ASSESSMENT/PLAN:  (J01.90) Acute sinusitis with symptoms > 10 days  (primary encounter diagnosis)  Plan: amoxicillin-clavulanate (AUGMENTIN) 875-125 MG         per tablet            Reassurance given, reviewed symptomatic treatment, plenty of fluids and rest. RX Augmentin given for sinus infection due to prolong symptoms and worsening symptoms.  Okay to continue with flonase, use Allegra, Claritin or zyrtec as these will not cause BP elevation.    Return to clinic if no resolution of symptoms.    Saulo Nina MD  August 11, 2018 10:18 AM              "

## 2018-08-11 NOTE — NURSING NOTE
"Chief Complaint   Patient presents with     Nasal Congestion     x 10 days, sinus pressure, light greenish sputum,      /80  Pulse 72  Temp 97.8  F (36.6  C) (Oral)  Ht 5' 5\" (1.651 m)  Wt 216 lb 12.8 oz (98.3 kg)  LMP  (LMP Unknown)  SpO2 96%  BMI 36.08 kg/m2 Estimated body mass index is 36.08 kg/(m^2) as calculated from the following:    Height as of this encounter: 5' 5\" (1.651 m).    Weight as of this encounter: 216 lb 12.8 oz (98.3 kg).          Jamia Al CMA  "

## 2018-08-11 NOTE — MR AVS SNAPSHOT
After Visit Summary   8/11/2018    Leena Montaño    MRN: [id number]           Patient Information     Date Of Birth          1950        Visit Information        Provider Department      8/11/2018 9:50 AM Saulo Nina MD [facility]        Today's Diagnoses     Acute sinusitis with symptoms > 10 days    -  1      Care Instructions    Take full course of Augmentin for sinus infection.  Okay to take ibuprofen 200 mg - 4 tablets (800 mg) every 8 hours as needed.  Okay to take tylenol 500 mg - 2 tablets (1000 mg) every 6-8 hours as needed, do not exceed 3000 mg in 24 hours.    Okay to take claritin, zyrtec or allegra to minimize congestion.  Continue with flonase.        Sinusitis (Antibiotic Treatment)    The sinuses are air-filled spaces within the bones of the face. They connect to the inside of the nose. Sinusitis is an inflammation of the tissue that lines the sinuses. Sinusitis can occur during a cold. It can also happen due to allergies to pollens and other particles in the air. Sinusitis can cause symptoms of sinus congestion and a feeling of fullness. A sinus infection causes fever, headache, and facial pain. There is often green or yellow fluid draining from the nose or into the back of the throat (post-nasal drip). You have been given antibiotics to treat this condition.  Home care    Take the full course of antibiotics as instructed. Do not stop taking them, even when you feel better.    Drink plenty of water, hot tea, and other liquids. This may help thin nasal mucus. It also may help your sinuses drain fluids.    Heat may help soothe painful areas of your face. Use a towel soaked in hot water. Or,  the shower and direct the warm spray onto your face. Using a vaporizer along with a menthol rub at night may also help soothe symptoms.     An expectorant with guaifenesin may help thin nasal mucus and help your sinuses drain fluids.    You can use an  over-the-counter decongestant, unless a similar medicine was prescribed to you. Nasal sprays work the fastest. Use one that contains phenylephrine or oxymetazoline. First blow your nose gently. Then use the spray. Do not use these medicines more often than directed on the label. If you do, your symptoms may get worse. You may also take pills that contain pseudoephedrine. Don t use products that combine multiple medicines. This is because side effects may be increased. Read labels. You can also ask the pharmacist for help. (People with high blood pressure should not use decongestants. They can raise blood pressure.)    Over-the-counter antihistamines may help if allergies contributed to your sinusitis.      Do not use nasal rinses or irrigation during an acute sinus infection, unless your healthcare provider tells you to. Rinsing may spread the infection to other areas in your sinuses.    Use acetaminophen or ibuprofen to control pain, unless another pain medicine was prescribed to you. If you have chronic liver or kidney disease or ever had a stomach ulcer, talk with your healthcare provider before using these medicines. (Aspirin should never be taken by anyone under age 18 who is ill with a fever. It may cause severe liver damage.)    Don't smoke. This can make symptoms worse.  Follow-up care  Follow up with your healthcare provider or our staff if you are better in 1 week.  When to seek medical advice  Call your healthcare provider if any of these occur:    Facial pain or headache that gets worse    Stiff neck    Unusual drowsiness or confusion    Swelling of your forehead or eyelids    Vision problems, such as blurred or double vision    Fever of 100.4 F (38 C) or higher, or as directed by your healthcare provider    Seizure    Breathing problems    Symptoms don't go away in 10 days  Prevention  Here are steps you can take to help prevent an infection:    Keep good hand washing habits.    Don t have close contact  "with people who have sore throats, colds, or other upper respiratory infections.    Don t smoke, and stay away from secondhand smoke.    Stay up to date with of your vaccines.  Date Last Reviewed: 11/1/2017 2000-2017 [employer]. [address] [place] [address]. All rights reserved. This information is not intended as a substitute for professional medical care. Always follow your healthcare professional's instructions.                Follow-ups after your visit        Who to contact     If you have questions or need follow up information about today's clinic visit or your schedule please contact [facility] directly at [phone].  Normal or non-critical lab and imaging results will be communicated to you by [employer], letter or phone within 4 business days after the clinic has received the results. If you do not hear from us within 7 days, please contact the clinic through [employer] or phone. If you have a critical or abnormal lab result, we will notify you by phone as soon as possible.  Submit refill requests through [employer] or call your pharmacy and they will forward the refill request to us. Please allow 3 business days for your refill to be completed.          Additional Information About Your Visit        MyChart Information     [employer] gives you secure access to your electronic health record. If you see a primary care provider, you can also send messages to your care team and make appointments. If you have questions, please call your primary care clinic.  If you do not have a primary care provider, please call [phone] and they will assist you.        Care EveryWhere ID     This is your Care EveryWhere ID. This could be used by other organizations to access your [facility] medical records  [id number]        Your Vitals Were     Pulse Temperature Height Last Period Pulse Oximetry BMI (Body Mass Index)    72 97.8  F (36.6  C) (Oral) 5' 5\" (1.651 m) (LMP " Unknown) 96% 36.08 kg/m2       Blood Pressure from Last 3 Encounters:   08/11/18 146/80   07/03/18 132/85   12/04/17 119/76    Weight from Last 3 Encounters:   08/11/18 216 lb 12.8 oz (98.3 kg)   07/03/18 219 lb (99.3 kg)   12/04/17 205 lb 12.8 oz (93.4 kg)              Today, you had the following     No orders found for display         Today's Medication Changes          These changes are accurate as of 8/11/18 10:12 AM.  If you have any questions, ask your nurse or doctor.               Start taking these medicines.        Dose/Directions    amoxicillin-clavulanate 875-125 MG per tablet   Commonly known as:  AUGMENTIN   Used for:  Acute sinusitis with symptoms > 10 days   Started by:  Saulo Nina MD        Dose:  1 tablet   Take 1 tablet by mouth 2 times daily for 10 days   Quantity:  20 tablet   Refills:  0            Where to get your medicines      These medications were sent to [facility] - [place], [place] - [address]  [address], [place] [address]     Phone:  [phone]     amoxicillin-clavulanate 875-125 MG per tablet                Primary Care Provider Office Phone # Fax #    Jory Fragoso [phone]       [address] [facility] [address]        Equal Access to Services     MEGAN CABRERA AH: Hadii olayinka ku hadasho Soomaali, waaxda luqadaha, qaybta kaalmada adeegyada, katt rockwell. So [facility] [phone].    ATENCIÓN: Si habla español, tiene a saenz disposición servicios gratuitos de asistencia lingüística. Ferny al [phone].    We comply with applicable federal civil rights laws and [place] laws. We do not discriminate on the basis of race, color, national origin, age, disability, sex, sexual orientation, or gender identity.            Thank you!     Thank you for choosing [facility]  for your care. Our goal is always to provide you with excellent care. Hearing back from our patients is one way  we can continue to improve our services. Please take a few minutes to complete the written survey that you may receive in the mail after your visit with us. Thank you!             Your Updated Medication List - Protect others around you: Learn how to safely use, store and throw away your medicines at www.disposemymeds.org.          This list is accurate as of 8/11/18 10:12 AM.  Always use your most recent med list.                   Brand Name Dispense Instructions for use Diagnosis    amoxicillin-clavulanate 875-125 MG per tablet    AUGMENTIN    20 tablet    Take 1 tablet by mouth 2 times daily for 10 days    Acute sinusitis with symptoms > 10 days       * aspirin 325 MG EC tablet      Take 325 mg by mouth daily        * aspirin 81 MG tablet     30 tablet    Take 1 tablet (81 mg) by mouth daily        atorvastatin 10 MG tablet    LIPITOR    90 tablet    Take 1 tablet (10 mg) by mouth daily    Type 2 diabetes mellitus without complication, without long-term current use of insulin (H), Hyperlipidemia LDL goal <130       * blood glucose monitoring lancets     1 Box    Use to test blood sugar 2 times daily or as directed.    Type 2 diabetes mellitus without complication (H)       * ONETOUCH DELICA LANCETS 33G Misc     100 each    USE TO TEST BLOOD SUGAR 1 TIME DAILY OR AS DIRECTED    Type 2 diabetes mellitus without complication, without long-term current use of insulin (H)       * blood glucose monitoring meter device kit     1 kit    Use to test blood sugars 1 time daily or as directed.    Prediabetes       * blood glucose monitoring meter device kit    no brand specified    1 kit    Use to test blood sugar 1 times daily or as directed.    Type 2 diabetes mellitus without complication, without long-term current use of insulin (H)       * blood glucose monitoring test strip    MILLY CONTOUR    3 Box    Use to test blood sugars 1-2 times daily or as directed.    Type 2 diabetes mellitus without complication (H)       *  blood glucose monitoring test strip    no brand specified    200 strip    Use to test blood sugars 2 times daily or as directed    Type 2 diabetes mellitus without complication, without long-term current use of insulin (H)       CALCIUM 1200 PO      Take  by mouth.        cholecalciferol 1000 units capsule    vitamin  -D     Take 1 capsule by mouth daily Vitamin D-3        diclofenac 1 % Gel topical gel    VOLTAREN    100 g    Apply 2 g topically 4 times daily    Pain of right hand       fluticasone 50 MCG/ACT spray    FLONASE    48 g    Spray 2 sprays into both nostrils daily    Allergic rhinitis, unspecified allergic rhinitis type       GLUCOSAMINE CHONDROITIN COMPLX PO      Take  by mouth daily.        ibuprofen 200 MG capsule     90 capsule    Take 400-600 mg by mouth every 6 hours as needed for fever    Shoulder joint pain, right, Knee pain, left       levothyroxine 75 MCG tablet    SYNTHROID/LEVOTHROID    90 tablet    Take 1 tablet (75 mcg) by mouth daily    Acquired hypothyroidism       MULTIVITAMIN TABS   OR      1 TABLET DAILY        nystatin cream    MYCOSTATIN    60 g    Apply topically 2 times daily    Cutaneous candidiasis       * order for DME     1 each    Equipment being ordered: CPAP - adjustable levels (4-20) with a water chamber    Unspecified sleep apnea       * order for DME     1 Units    Equipment being ordered: CPAP mask and tubing    Unspecified sleep apnea       sertraline 100 MG tablet    ZOLOFT    135 tablet    Take 1.5 tablets (150 mg) by mouth daily    Major depressive disorder, recurrent episode, mild (H)       tiZANidine 4 MG tablet    ZANAFLEX    30 tablet    Take 1 tablet (4 mg) by mouth nightly as needed for muscle spasms    Bilateral low back pain with right-sided sciatica, unspecified chronicity       triamterene-hydrochlorothiazide 37.5-25 MG per capsule    DYAZIDE    90 capsule    Take 1 capsule by mouth daily    Hypertension goal BP (blood pressure) < 140/90       TYLENOL PO       Take 600-1,000 mg by mouth as needed        * Notice:  This list has 10 medication(s) that are the same as other medications prescribed for you. Read the directions carefully, and ask your doctor or other care provider to review them with you.

## 2018-09-10 ENCOUNTER — MYC MEDICAL ADVICE (OUTPATIENT)
Dept: FAMILY MEDICINE | Facility: CLINIC | Age: 68
End: 2018-09-10

## 2018-09-10 NOTE — TELEPHONE ENCOUNTER
JOLYNN -- please review pt message. She is requesting an opioid for back pain.     Please advise. Thank you!  ARCHANA Nicole, RN  [facility]

## 2018-09-10 NOTE — TELEPHONE ENCOUNTER
Needs an in-person appointment (30 minute). You can put her in a 30 min hold spot tomorrow if still available.

## 2018-09-11 ENCOUNTER — MYC MEDICAL ADVICE (OUTPATIENT)
Dept: FAMILY MEDICINE | Facility: CLINIC | Age: 68
End: 2018-09-11

## 2018-09-12 NOTE — TELEPHONE ENCOUNTER
Noted.    Writer reviewed 9/10/18 MyChart encounter.    Writer responded as per below.  JAYESH EspinoN, RN

## 2018-10-29 DIAGNOSIS — F33.0 MAJOR DEPRESSIVE DISORDER, RECURRENT EPISODE, MILD (H): ICD-10-CM

## 2018-10-30 RX ORDER — SERTRALINE HYDROCHLORIDE 100 MG/1
TABLET, FILM COATED ORAL
Qty: 0.01 TABLET | Refills: 0 | OUTPATIENT
Start: 2018-10-30

## 2018-10-30 NOTE — TELEPHONE ENCOUNTER
"Requested Prescriptions   Pending Prescriptions Disp Refills     sertraline (ZOLOFT) 100 MG tablet [Pharmacy Med Name: SERTRALINE HCL 100MG TABS] 135 tablet      Sig: TAKE ONE AND ONE-HALF TABLETS BY MOUTH EVERY DAY    SSRIs Protocol Failed    10/29/2018 10:51 AM       Failed - PHQ-9 score less than 5 in past 6 months    Please review last PHQ-9 score.     PHQ-9 SCORE 3/2/2017 3/3/2017 10/9/2017   Total Score - - -   Total Score MyChart 2 (Minimal depression) - -   Total Score - 2 6              Passed - Patient is age 18 or older       Passed - No active pregnancy on record       Passed - No positive pregnancy test in last 12 months       Passed - Recent (6 mo) or future (30 days) visit within the authorizing provider's specialty    Patient had office visit in the last 6 months or has a visit in the next 30 days with authorizing provider or within the authorizing provider's specialty.  See \"Patient Info\" tab in inbasket, or \"Choose Columns\" in Meds & Orders section of the refill encounter.            depression addressed 7/3/18 - [place]    6. Major depressive disorder, recurrent episode, mild (H)  The current medical regimen is effective;  continue present plan and medications.   - sertraline (ZOLOFT) 100 MG tablet; Take 1.5 tablets (150 mg) by mouth daily  Dispense: 135 tablet; Refill: PRN    Refused Prescriptions:                       Disp   Refills    sertraline (ZOLOFT) 100 MG tablet [Pharmac*0.01 t*0        Sig: TAKE ONE AND ONE-HALF TABLETS BY MOUTH EVERY DAY  Refused By: GEMINI CONTRERAS  Reason for Refusal: Duplicate      Routing to [facility] - please call to update PHQ-9    Thank you,  Gemini Contreras RN    "

## 2018-11-20 ASSESSMENT — PATIENT HEALTH QUESTIONNAIRE - PHQ9: SUM OF ALL RESPONSES TO PHQ QUESTIONS 1-9: 3

## 2019-02-25 ENCOUNTER — TRANSFERRED RECORDS (OUTPATIENT)
Dept: HEALTH INFORMATION MANAGEMENT | Facility: CLINIC | Age: 69
End: 2019-02-25

## 2019-03-29 ENCOUNTER — TRANSFERRED RECORDS (OUTPATIENT)
Dept: HEALTH INFORMATION MANAGEMENT | Facility: CLINIC | Age: 69
End: 2019-03-29

## 2019-04-30 ENCOUNTER — OFFICE VISIT (OUTPATIENT)
Dept: FAMILY MEDICINE | Facility: CLINIC | Age: 69
End: 2019-04-30
Payer: COMMERCIAL

## 2019-04-30 VITALS
SYSTOLIC BLOOD PRESSURE: 111 MMHG | WEIGHT: 216 LBS | HEART RATE: 84 BPM | RESPIRATION RATE: 16 BRPM | OXYGEN SATURATION: 93 % | TEMPERATURE: 98 F | BODY MASS INDEX: 36.88 KG/M2 | DIASTOLIC BLOOD PRESSURE: 76 MMHG | HEIGHT: 64 IN

## 2019-04-30 DIAGNOSIS — E11.9 TYPE 2 DIABETES MELLITUS WITHOUT COMPLICATION, WITHOUT LONG-TERM CURRENT USE OF INSULIN (H): ICD-10-CM

## 2019-04-30 DIAGNOSIS — F33.0 MAJOR DEPRESSIVE DISORDER, RECURRENT EPISODE, MILD (H): ICD-10-CM

## 2019-04-30 DIAGNOSIS — E03.9 HYPOTHYROIDISM, UNSPECIFIED TYPE: ICD-10-CM

## 2019-04-30 DIAGNOSIS — Z86.0100 HISTORY OF COLONIC POLYPS: ICD-10-CM

## 2019-04-30 DIAGNOSIS — R53.83 FATIGUE, UNSPECIFIED TYPE: Primary | ICD-10-CM

## 2019-04-30 DIAGNOSIS — E66.01 MORBID OBESITY (H): ICD-10-CM

## 2019-04-30 LAB
ALBUMIN SERPL-MCNC: 3.7 G/DL (ref 3.4–5)
ALP SERPL-CCNC: 75 U/L (ref 40–150)
ALT SERPL W P-5'-P-CCNC: 26 U/L (ref 0–50)
ANION GAP SERPL CALCULATED.3IONS-SCNC: 7 MMOL/L (ref 3–14)
AST SERPL W P-5'-P-CCNC: 19 U/L (ref 0–45)
BASOPHILS # BLD AUTO: 0.1 10E9/L (ref 0–0.2)
BASOPHILS NFR BLD AUTO: 0.4 %
BILIRUB SERPL-MCNC: 1 MG/DL (ref 0.2–1.3)
BUN SERPL-MCNC: 22 MG/DL (ref 7–30)
CALCIUM SERPL-MCNC: 9.1 MG/DL (ref 8.5–10.1)
CHLORIDE SERPL-SCNC: 107 MMOL/L (ref 94–109)
CO2 SERPL-SCNC: 28 MMOL/L (ref 20–32)
CREAT SERPL-MCNC: 0.64 MG/DL (ref 0.52–1.04)
DEPRECATED CALCIDIOL+CALCIFEROL SERPL-MC: 46 UG/L (ref 20–75)
DIFFERENTIAL METHOD BLD: ABNORMAL
EOSINOPHIL # BLD AUTO: 0.2 10E9/L (ref 0–0.7)
EOSINOPHIL NFR BLD AUTO: 1.3 %
ERYTHROCYTE [DISTWIDTH] IN BLOOD BY AUTOMATED COUNT: 13.8 % (ref 10–15)
ERYTHROCYTE [SEDIMENTATION RATE] IN BLOOD BY WESTERGREN METHOD: 16 MM/H (ref 0–30)
GFR SERPL CREATININE-BSD FRML MDRD: >90 ML/MIN/{1.73_M2}
GLUCOSE SERPL-MCNC: 149 MG/DL (ref 70–99)
HBA1C MFR BLD: 6.4 % (ref 0–5.6)
HCT VFR BLD AUTO: 40.5 % (ref 35–47)
HGB BLD-MCNC: 13.5 G/DL (ref 11.7–15.7)
LYMPHOCYTES # BLD AUTO: 3.5 10E9/L (ref 0.8–5.3)
LYMPHOCYTES NFR BLD AUTO: 26.4 %
MCH RBC QN AUTO: 29.7 PG (ref 26.5–33)
MCHC RBC AUTO-ENTMCNC: 33.3 G/DL (ref 31.5–36.5)
MCV RBC AUTO: 89 FL (ref 78–100)
MONOCYTES # BLD AUTO: 1.1 10E9/L (ref 0–1.3)
MONOCYTES NFR BLD AUTO: 8.6 %
NEUTROPHILS # BLD AUTO: 8.3 10E9/L (ref 1.6–8.3)
NEUTROPHILS NFR BLD AUTO: 63.3 %
PLATELET # BLD AUTO: 283 10E9/L (ref 150–450)
POTASSIUM SERPL-SCNC: 3.9 MMOL/L (ref 3.4–5.3)
PROT SERPL-MCNC: 7.1 G/DL (ref 6.8–8.8)
RBC # BLD AUTO: 4.54 10E12/L (ref 3.8–5.2)
SODIUM SERPL-SCNC: 142 MMOL/L (ref 133–144)
TSH SERPL DL<=0.005 MIU/L-ACNC: 1.68 MU/L (ref 0.4–4)
URATE SERPL-MCNC: 5.6 MG/DL (ref 2.6–6)
WBC # BLD AUTO: 13.1 10E9/L (ref 4–11)

## 2019-04-30 PROCEDURE — 84550 ASSAY OF BLOOD/URIC ACID: CPT | Performed by: NURSE PRACTITIONER

## 2019-04-30 PROCEDURE — 82306 VITAMIN D 25 HYDROXY: CPT | Performed by: NURSE PRACTITIONER

## 2019-04-30 PROCEDURE — 99214 OFFICE O/P EST MOD 30 MIN: CPT | Performed by: NURSE PRACTITIONER

## 2019-04-30 PROCEDURE — 86431 RHEUMATOID FACTOR QUANT: CPT | Performed by: NURSE PRACTITIONER

## 2019-04-30 PROCEDURE — 85652 RBC SED RATE AUTOMATED: CPT | Performed by: NURSE PRACTITIONER

## 2019-04-30 PROCEDURE — 36415 COLL VENOUS BLD VENIPUNCTURE: CPT | Performed by: NURSE PRACTITIONER

## 2019-04-30 PROCEDURE — 85025 COMPLETE CBC W/AUTO DIFF WBC: CPT | Performed by: NURSE PRACTITIONER

## 2019-04-30 PROCEDURE — 83036 HEMOGLOBIN GLYCOSYLATED A1C: CPT | Performed by: NURSE PRACTITIONER

## 2019-04-30 PROCEDURE — 80053 COMPREHEN METABOLIC PANEL: CPT | Performed by: NURSE PRACTITIONER

## 2019-04-30 PROCEDURE — 84443 ASSAY THYROID STIM HORMONE: CPT | Performed by: NURSE PRACTITIONER

## 2019-04-30 ASSESSMENT — ANXIETY QUESTIONNAIRES
GAD7 TOTAL SCORE: 8
3. WORRYING TOO MUCH ABOUT DIFFERENT THINGS: SEVERAL DAYS
IF YOU CHECKED OFF ANY PROBLEMS ON THIS QUESTIONNAIRE, HOW DIFFICULT HAVE THESE PROBLEMS MADE IT FOR YOU TO DO YOUR WORK, TAKE CARE OF THINGS AT HOME, OR GET ALONG WITH OTHER PEOPLE: NOT DIFFICULT AT ALL
5. BEING SO RESTLESS THAT IT IS HARD TO SIT STILL: SEVERAL DAYS
7. FEELING AFRAID AS IF SOMETHING AWFUL MIGHT HAPPEN: SEVERAL DAYS
1. FEELING NERVOUS, ANXIOUS, OR ON EDGE: SEVERAL DAYS
6. BECOMING EASILY ANNOYED OR IRRITABLE: MORE THAN HALF THE DAYS
2. NOT BEING ABLE TO STOP OR CONTROL WORRYING: NOT AT ALL

## 2019-04-30 ASSESSMENT — PATIENT HEALTH QUESTIONNAIRE - PHQ9
5. POOR APPETITE OR OVEREATING: MORE THAN HALF THE DAYS
SUM OF ALL RESPONSES TO PHQ QUESTIONS 1-9: 12

## 2019-04-30 ASSESSMENT — MIFFLIN-ST. JEOR: SCORE: 1499.77

## 2019-04-30 NOTE — PROGRESS NOTES
SUBJECTIVE:   Leena Montaño is a 68 year old female who presents to clinic today for the following   health issues:      Chief Complaint   Patient presents with     Fatigue     x3 weeks, blood shut eye on left eye       Leena has had problems with low energy for 3 weeks.    No fever.  Cold feet.  Some muscle cramping.  This is not usual for her.  She is trying to eat healthy and take good care of herself.  She is dealing with an overly rambunctious puppy and that is draining her energy.  She denies fever or chills.    No night sweats.  She is eating and drinking normally.  No vomiting, diarrhea etc.    Left eye:  Has a superficial bloodshot area.  No pain.  Her vision is clear.    Joint work-up:  She is requesting labs for arthritis and inflammation.      Additional history: as documented    Reviewed  and updated as needed this visit by clinical staff  Tobacco  Allergies  Meds  Med Hx  Surg Hx  Fam Hx  Soc Hx        Reviewed and updated as needed this visit by Provider         Patient Active Problem List   Diagnosis     Osteoarthritis     Sleep apnea     Allergic rhinitis     CHRONIC BACK PAIN     Disorder of uterus     OA (osteoarthritis) of knee     Hypothyroidism     Peripheral neuropathy     Hyperlipidemia LDL goal <130     Hypertension goal BP (blood pressure) < 140/90     Neck pain     Advanced directives, counseling/discussion     Anxiety     Mild major depression (H)     Type 2 diabetes mellitus without complication (H)     BMI > 35     Past Surgical History:   Procedure Laterality Date     BIOPSY  10/15    during colonoscopy     COLONOSCOPY  10/15     HC REMOVAL OF TONSILS,<12 Y/O      Tonsils <13y.o.     JOINT REPLACEMTN, KNEE RT/LT  2009    Joint Replacement knee RT     JOINT REPLACEMTN, KNEE RT/LT Left 2016       Social History     Tobacco Use     Smoking status: Never Smoker     Smokeless tobacco: Never Used     Tobacco comment: am using sm. amt. of marijuana for sleep/pain    Substance Use Topics     Alcohol use: Yes     Comment: really managed/not my drug to enjoy. marijuana 3 times per week      Family History   Problem Relation Age of Onset     Hypertension Father      Alcohol/Drug Father      Coronary Artery Disease Father         stroke/ carotid blockage     Cerebrovascular Disease Father         did not manage his blood pressure with meds smoked     Depression Father         came along after his first big stroke     Substance Abuse Father         alcohol     Hypertension Mother      Alcohol/Drug Mother      Depression Mother      Suicide Mother      Coronary Artery Disease Mother         surgery for carotid/surgery for femoral artery     Depression/Anxiety Mother      Cerebrovascular Disease Mother         small ones, smoked     Thyroid Disease Mother      Substance Abuse Mother         alcohol     Cerebrovascular Disease Paternal Grandfather         early age onset...60's     C.A.D. Maternal Grandfather         massive heart blow out     Depression/Anxiety Maternal Grandmother      Breast Cancer Maternal Grandmother         had a radical mastectomy....no recurrence  at     Substance Abuse Brother      Suicide Other      Depression Other      Diabetes Sister      Alcohol/Drug Brother      Substance Abuse Brother      Hypertension Brother         smoker     Cancer - colorectal No family hx of      Prostate Cancer No family hx of          Current Outpatient Medications   Medication Sig Dispense Refill     Acetaminophen (TYLENOL PO) Take 600-1,000 mg by mouth as needed        aspirin 325 MG EC tablet Take 325 mg by mouth daily       aspirin 81 MG tablet Take 1 tablet (81 mg) by mouth daily 30 tablet      atorvastatin (LIPITOR) 10 MG tablet Take 1 tablet (10 mg) by mouth daily 90 tablet 3     blood glucose monitoring (MILLY CONTOUR MONITOR) meter device kit Use to test blood sugars 1 time daily or as directed. 1 kit 0     blood glucose monitoring (MILLY CONTOUR) test strip Use to  test blood sugars 1-2 times daily or as directed. 3 Box PRN     blood glucose monitoring (MILLY MICROLET) lancets Use to test blood sugar 2 times daily or as directed. 1 Box PRN     blood glucose monitoring (NO BRAND SPECIFIED) meter device kit Use to test blood sugar 1 times daily or as directed. 1 kit 0     blood glucose monitoring (NO BRAND SPECIFIED) test strip Use to test blood sugars 2 times daily or as directed 200 strip 11     Calcium Carbonate-Vit D-Min (CALCIUM 1200 PO) Take  by mouth.       cholecalciferol (VITAMIN  -D) 1000 UNIT capsule Take 1 capsule by mouth daily Vitamin D-3       diclofenac (VOLTAREN) 1 % GEL topical gel Apply 2 g topically 4 times daily 100 g PRN     fluticasone (FLONASE) 50 MCG/ACT nasal spray Spray 2 sprays into both nostrils daily 48 g PRN     GLUCOSAMINE CHONDROITIN COMPLX PO Take  by mouth daily.       ibuprofen 200 MG capsule Take 400-600 mg by mouth every 6 hours as needed for fever 90 capsule PRN     levothyroxine (SYNTHROID/LEVOTHROID) 75 MCG tablet Take 1 tablet (75 mcg) by mouth daily 90 tablet PRN     MULTIVITAMIN TABS   OR 1 TABLET DAILY       nystatin (MYCOSTATIN) cream Apply topically 2 times daily 60 g PRN     ONETOUCH DELICA LANCETS 33G MISC USE TO TEST BLOOD SUGAR 1 TIME DAILY OR AS DIRECTED 100 each 1     ORDER FOR DME Equipment being ordered: CPAP mask and tubing 1 Units 0     ORDER FOR DME Equipment being ordered: CPAP - adjustable levels (4-20) with a water chamber 1 each 0     sertraline (ZOLOFT) 100 MG tablet Take 1.5 tablets (150 mg) by mouth daily 135 tablet PRN     triamterene-hydrochlorothiazide (DYAZIDE) 37.5-25 MG per capsule Take 1 capsule by mouth daily 90 capsule PRN     tiZANidine (ZANAFLEX) 4 MG tablet Take 1 tablet (4 mg) by mouth nightly as needed for muscle spasms (Patient not taking: Reported on 4/30/2019) 30 tablet 2     Allergies   Allergen Reactions     Cats      Dogs      Recent Labs   Lab Test 04/30/19  1216 07/03/18  1111 10/09/17  6828  "03/17/17  1013  02/17/14  1000  11/30/12  1033   A1C 6.4* 6.7* 6.3* 6.7*   < >  --   --  6.0   LDL  --  175*  --  153*  --  223*   < >  --    HDL  --  39*  --  34*  --  44*   < >  --    TRIG  --  186*  --  298*  --  203*   < >  --    ALT 26 26  --   --   --   --   --  31   CR 0.64 0.70 0.72 0.65   < >  --    < > 0.77   GFRESTIMATED >90 83 81 >90  Non  GFR Calc     < >  --    < > 76   GFRESTBLACK >90 >90 >90 >90  African American GFR Calc     < >  --    < > >90   POTASSIUM 3.9 3.7 3.5 3.7   < >  --    < > 3.4   TSH 1.68 1.24  --  1.32   < > 1.64  --  0.79    < > = values in this interval not displayed.      BP Readings from Last 3 Encounters:   04/30/19 111/76   08/11/18 146/80   07/03/18 132/85    Wt Readings from Last 3 Encounters:   04/30/19 98 kg (216 lb)   08/11/18 98.3 kg (216 lb 12.8 oz)   07/03/18 99.3 kg (219 lb)                  Labs reviewed in EPIC    ROS:  Constitutional, HEENT, cardiovascular, pulmonary, GI, , musculoskeletal, neuro, skin, endocrine and psych systems are negative, except as otherwise noted.    OBJECTIVE:     /76 (BP Location: Left arm, Patient Position: Sitting, Cuff Size: Adult Large)   Pulse 84   Temp 98  F (36.7  C) (Oral)   Resp 16   Ht 1.626 m (5' 4\")   Wt 98 kg (216 lb)   LMP  (LMP Unknown)   SpO2 93%   BMI 37.08 kg/m    Body mass index is 37.08 kg/m .  GENERAL: healthy, alert and no distress  EYES: Eyes grossly normal to inspection, PERRL and conjunctivae and sclerae normal.  Her left eye has a small, superficial bloodshot area, approximately 4 mm diameter, consistent with a sub-conjunctival hematoma that is resolving.  HENT: ear canals and TM's normal, nose and mouth without ulcers or lesions  NECK: no adenopathy and thyroid normal to palpation  RESP: lungs clear to auscultation - no rales, rhonchi or wheezes  CV: regular rate and rhythm, normal S1 S2, no S3 or S4, no murmur, click or rub, no peripheral edema and peripheral pulses strong  ABDOMEN: " soft, nontender, no hepatosplenomegaly, no masses and bowel sounds normal. No rebound or guarding.   MS: no gross musculoskeletal defects noted, no edema. Ambulatory with a steady gait.   SKIN: warm and dry  NEURO: Normal strength and tone, mentation intact and speech normal  PSYCH: mentation appears normal, affect normal/bright    Diagnostics:  Ordered and pending at the time of the appointment    ASSESSMENT/PLAN:     (R53.83) Fatigue, unspecified type  (primary encounter diagnosis)  Comment: Uncertain  Plan: Comprehensive metabolic panel, Vitamin D         Deficiency, TSH with free T4 reflex, Hemoglobin        A1c, CBC with platelets and differential, Uric         acid, Rheumatoid factor, ESR: Erythrocyte         sedimentation rate        I did go ahead and do comprehensive lab panel today.  I talked to the patient about taking good care of herself with a healthy diet, fluids, rest etc.  In the event that her labs are abnormal, she will be treated.  I did ask her to follow-up with her PCP if there is no logical answer for her fatigue on her lab studies or if her symptoms worsen.    (E03.9) Hypothyroidism, unspecified type  Comment: History of  Plan: TSH with free T4 reflex        TSH pending.    (Z86.010) History of colonic polyps  Comment: History of  Plan: GASTROENTEROLOGY ADULT REF PROCEDURE ONLY Other        She is due a colonoscopy.  Referral given today.    (E11.9) Type 2 diabetes mellitus without complication, without long-term current use of insulin (H)  Comment: History of  Plan: Comprehensive metabolic panel, Vitamin D         Deficiency, TSH with free T4 reflex, Hemoglobin        A1c        Lab test today are pending.  I did encourage her to take care of herself with her diabetes, smaller portions, less sugars, etc.  She is to follow-up with your PCP for diabetes check in.    (E66.01) Morbid obesity (H)  Comment: Chronic  Plan: Comprehensive metabolic panel, Vitamin D         Deficiency, TSH with free  T4 reflex, Hemoglobin        A1c        I encouraged her to eat smaller portions, more exercise etc.    (F33.0) Major depressive disorder, recurrent episode, mild (H)  Comment: History of  Plan:        REYNALDO Courtney [facility]

## 2019-05-01 LAB — RHEUMATOID FACT SER NEPH-ACNC: <20 IU/ML (ref 0–20)

## 2019-05-01 ASSESSMENT — ANXIETY QUESTIONNAIRES: GAD7 TOTAL SCORE: 8

## 2019-05-02 NOTE — RESULT ENCOUNTER NOTE
Val Stern,    This note is to let you know the results of your recent blood tests.    Overall, things look really good.     On your CBC, your white blood cell count is slightly elevated.  This is consistent with where your blood test is been in the past.  I would recommend that you follow-up with Jory Fragoso regarding this with your next check-in.    Your hemoglobin A1c and blood sugars are both elevated, consistent with diabetes but they are actually doing well.  Continue to take good care of yourself and recheck your diabetes blood test and an appointment with your primary care provider in the next 6 months.    I was able to add those other tests that you were requested for arthritis with rheumatoid factor, inflammation, and gout.  Those all came back normal.    Your vitamin D level is normal.    On your comprehensive metabolic panel, your electrolytes, kidney function studies, liver function studies, and calcium all came back normal.    I hope this helps.  Let me know if you have any questions.    Betty JACOBS CNP

## 2019-05-10 ENCOUNTER — TELEPHONE (OUTPATIENT)
Dept: SCHEDULING | Facility: CLINIC | Age: 69
End: 2019-05-10

## 2019-05-10 NOTE — TELEPHONE ENCOUNTER
5/10/2019    Patient declined- Preventive Health Screening Mammogram      PATIENT WAS UPSET FOR CALLING DURING EVENING HOURS AND WILL CALL TO SCHEDULE ON OWN TIME OR DECLINED.       OUTREACH   KELVIN

## 2019-06-03 DIAGNOSIS — E11.9 TYPE 2 DIABETES MELLITUS WITHOUT COMPLICATION, WITHOUT LONG-TERM CURRENT USE OF INSULIN (H): ICD-10-CM

## 2019-06-04 ENCOUNTER — TELEPHONE (OUTPATIENT)
Dept: FAMILY MEDICINE | Facility: CLINIC | Age: 69
End: 2019-06-04

## 2019-06-04 DIAGNOSIS — E11.9 TYPE 2 DIABETES MELLITUS WITHOUT COMPLICATION, WITHOUT LONG-TERM CURRENT USE OF INSULIN (H): Primary | ICD-10-CM

## 2019-06-04 RX ORDER — LANCETS 33 GAUGE
EACH MISCELLANEOUS
Qty: 100 EACH | Refills: 1 | Status: SHIPPED | OUTPATIENT
Start: 2019-06-04 | End: 2022-09-23

## 2019-06-04 NOTE — TELEPHONE ENCOUNTER
"Requested Prescriptions   Pending Prescriptions Disp Refills     ONETOUCH DELICA LANCETS 33G MISC [Pharmacy Med Name: ONETOUCH DELICA LANCETS 33G  MISC] 100 each 1     Sig: USE TO TEST BLOOD SUGAR ONCE A DAY OR AS DIRECTED         Last Written Prescription Date:  3/30/16  Last Fill Quantity: 1 box,   # refills: PRN  Last Office Visit: 4/30/19  Future Office visit:       Routing refill request to provider for review/approval because:  Break in prescription      Diabetic Supplies Protocol Passed - 6/3/2019  4:07 PM        Passed - Medication is active on med list        Passed - Patient is 18 years of age or older        Passed - Recent (6 mo) or future (30 days) visit within the authorizing provider's specialty     Patient had office visit in the last 6 months or has a visit in the next 30 days with authorizing provider.  See \"Patient Info\" tab in inbasket, or \"Choose Columns\" in Meds & Orders section of the refill encounter.              "

## 2019-06-04 NOTE — TELEPHONE ENCOUNTER
"Requested Prescriptions   Pending Prescriptions Disp Refills     blood glucose (ONETOUCH ULTRA) test strip [Pharmacy Med Name: ONETOUCH ULTRA BLUE  STRP] 200 each 11     Sig: USE TO TEST BLOOD SUGARS TWICE DAILY OR AS DIRECTED         Last Written Prescription Date:  10/9/17  Last Fill Quantity: 200   # refills: 11  Last Office Visit: 4/30/19  Future Office visit:       Routing refill request to provider for review/approval because:  Break in prescription writing      Diabetic Supplies Protocol Passed - 6/3/2019  4:07 PM        Passed - Medication is active on med list        Passed - Patient is 18 years of age or older        Passed - Recent (6 mo) or future (30 days) visit within the authorizing provider's specialty     Patient had office visit in the last 6 months or has a visit in the next 30 days with authorizing provider.  See \"Patient Info\" tab in inbasket, or \"Choose Columns\" in Meds & Orders section of the refill encounter.              "

## 2019-07-05 ENCOUNTER — OFFICE VISIT (OUTPATIENT)
Dept: URGENT CARE | Facility: URGENT CARE | Age: 69
End: 2019-07-05
Payer: COMMERCIAL

## 2019-07-05 ENCOUNTER — NURSE TRIAGE (OUTPATIENT)
Dept: NURSING | Facility: CLINIC | Age: 69
End: 2019-07-05

## 2019-07-05 VITALS
WEIGHT: 215 LBS | SYSTOLIC BLOOD PRESSURE: 120 MMHG | RESPIRATION RATE: 14 BRPM | HEART RATE: 60 BPM | HEIGHT: 64 IN | DIASTOLIC BLOOD PRESSURE: 82 MMHG | TEMPERATURE: 98.5 F | BODY MASS INDEX: 36.7 KG/M2
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
## 2019-07-05 DIAGNOSIS — S61.451A DOG BITE, HAND, RIGHT, INITIAL ENCOUNTER: Primary | ICD-10-CM

## 2019-07-05 PROCEDURE — 99212 OFFICE O/P EST SF 10 MIN: CPT | Performed by: FAMILY MEDICINE

## 2019-07-05 ASSESSMENT — MIFFLIN-ST. JEOR: SCORE: 1490.23

## 2019-07-05 NOTE — TELEPHONE ENCOUNTER
Patient reports She was bite by dog at a park. Just got home from the park. Patient was told dog is up to date with rabies shots. Puncture wound is near wrist, and about 1/2 inch wide. Able to move fingers, has some local swelling. Not bleeding now. Patient doesn't want to clean the wound herself.  She wants to go in to have the wound cleaned.  Triage guidelines recommend urgent care/ED. Protocol and care advice reviewed.  Caller states understanding of the recommended disposition. Patient will head to urgent care [place].  Advised to call back if further questions or concerns.    Francesca Ramirez RN/[facility]      Additional Information    Negative: Sounds like a life-threatening emergency to the triager    Negative: Major bleeding (actively dripping or spurting) that can't be stopped    Negative: Any break in skin (e.g., cut, puncture, or scratch) and wild animal at RISK FOR RABIES (e.g., bat, raccoon, lewis, skunk, coyote, other carnivores)    Negative: Any break in skin (e.g., cut, puncture, or scratch) and dog, cat, or ferret at risk for RABIES (e.g., sick, stray, unprovoked bite, developing country)    Negative: Any break in skin (e.g., cut, puncture, or scratch) and monkey    Cut (length > 1/8 inch or 3 mm) or skin tear and any animal    Protocols used: ANIMAL BITE-A-OH

## 2019-07-05 NOTE — PROGRESS NOTES
Subjective: Patient was at the dog park a couple of hours ago and was trying to stop a fight with her dog and got bitten.  She knows the owner and the dog is up-to-date on shots.    Objective: Dorsum of right hand has 2 open areas about a half a centimeter each.  We cleaned it well.  She has good range of motion in the hand.  Her tetanus shot is up-to-date.    Assessment and plan: Dog bite, relatively deep, definitely needs antibiotic prophylaxis.  Augmentin as prescribed, watch for worsening of symptoms

## 2019-08-26 DIAGNOSIS — F33.0 MAJOR DEPRESSIVE DISORDER, RECURRENT EPISODE, MILD (H): ICD-10-CM

## 2019-08-26 DIAGNOSIS — E03.9 ACQUIRED HYPOTHYROIDISM: ICD-10-CM

## 2019-08-26 DIAGNOSIS — I10 HYPERTENSION GOAL BP (BLOOD PRESSURE) < 140/90: ICD-10-CM

## 2019-08-26 NOTE — LETTER
[address] [address]  Phone: [phone]    08/28/19    Leena Bender [place]  [address] [facility] [address]      To whom it may concern:     In order to ensure we are providing the best quality care, we have reviewed your chart and see that you are due for an annual physical for further refills.    We have also sent your medications to your pharmacy. For future medication refills, please contact your primary care clinic to schedule the above appointment. This can be requested via [email or web address] or by calling the clinic at [phone].    We greatly appreciate the opportunity to serve you. Thank you for trusting us with your health care.      Sincerely,    Your care team at [facility]

## 2019-08-27 NOTE — TELEPHONE ENCOUNTER
"Requested Prescriptions   Pending Prescriptions Disp Refills     levothyroxine (SYNTHROID/LEVOTHROID) 75 MCG tablet [Pharmacy Med Name: LEVOTHYROXINE SODIUM 75MCG TABS]  Last Written Prescription Date:  7-3-18  Last Fill Quantity: 90 tab,  # refills: PRN   Last office visit: 4/30/2019 with prescribing provider:  Betty Ponce    Future Office Visit:    90 tablet PRN     Sig: TAKE ONE TABLET BY MOUTH EVERY DAY       Thyroid Protocol Passed - 8/26/2019  9:39 AM        Passed - Patient is 12 years or older        Passed - Recent (12 mo) or future (30 days) visit within the authorizing provider's specialty     Patient had office visit in the last 12 months or has a visit in the next 30 days with authorizing provider or within the authorizing provider's specialty.  See \"Patient Info\" tab in inbasket, or \"Choose Columns\" in Meds & Orders section of the refill encounter.            Passed - Medication is active on med list        Passed - Normal TSH on file in past 12 months     Recent Labs   Lab Test 04/30/19  1216   TSH 1.68           Passed - No active pregnancy on record     If patient is pregnant or has had a positive pregnancy test, please check TSH.        Passed - No positive pregnancy test in past 12 months     If patient is pregnant or has had a positive pregnancy test, please check TSH.     ____________________________________         triamterene-HCTZ (DYAZIDE) 37.5-25 MG capsule [Pharmacy Med Name: TRIAMTERENE-HCTZ 37.5-25MG CAPS]  Last Written Prescription Date:  7-3-18  Last Fill Quantity: 90 cap,  # refills: PRN   Last office visit: 4/30/2019 with prescribing provider:  Betty Ponce    Future Office Visit:    90 capsule PRN     Sig: TAKE ONE CAPSULE BY MOUTH EVERY DAY       Diuretics (Including Combos) Protocol Passed - 8/26/2019  9:39 AM        Passed - Blood pressure under 140/90 in past 12 months     BP Readings from Last 3 Encounters:   07/05/19 120/82   04/30/19 111/76   08/11/18 146/80 " "          Passed - Recent (12 mo) or future (30 days) visit within the authorizing provider's specialty     Patient had office visit in the last 12 months or has a visit in the next 30 days with authorizing provider or within the authorizing provider's specialty.  See \"Patient Info\" tab in inbasket, or \"Choose Columns\" in Meds & Orders section of the refill encounter.          Passed - Medication is active on med list        Passed - Patient is age 18 or older        Passed - No active pregancy on record        Passed - Normal serum creatinine on file in past 12 months     Recent Labs   Lab Test 04/30/19  1216   CR 0.64           Passed - Normal serum potassium on file in past 12 months     Recent Labs   Lab Test 04/30/19  1216   POTASSIUM 3.9           Passed - Normal serum sodium on file in past 12 months     Recent Labs   Lab Test 04/30/19  1216              Passed - No positive pregnancy test in past 12 months     ____________________________________         sertraline (ZOLOFT) 100 MG tablet [Pharmacy Med Name: SERTRALINE HCL 100MG TABS]  Last Written Prescription Date:  7-3-18  Last Fill Quantity: 135 tab,  # refills: PRN   Last office visit: 4/30/2019 with prescribing provider:  Betty Ponce    Future Office Visit:    135 tablet PRN     Sig: TAKE ONE AND ONE-HALF TABLET BY MOUTH ONCE DAILY       SSRIs Protocol Failed - 8/26/2019  9:39 AM        Failed - PHQ-9 score less than 5 in past 6 months     Please review last PHQ-9 score.   PHQ-9 SCORE 10/9/2017 11/20/2018 4/30/2019   PHQ-9 Total Score - - -   PHQ-9 Total Score MyChart - - -   PHQ-9 Total Score 6 3 12     RIO-7 SCORE 1/8/2016 10/9/2017 4/30/2019   Total Score - - -   Total Score 5 6 8           Passed - Medication is active on med list        Passed - Patient is age 18 or older        Passed - No active pregnancy on record        Passed - No positive pregnancy test in last 12 months        Passed - Recent (6 mo) or future (30 days) visit " "within the authorizing provider's specialty     Patient had office visit in the last 6 months or has a visit in the next 30 days with authorizing provider or within the authorizing provider's specialty.  See \"Patient Info\" tab in inbasket, or \"Choose Columns\" in Meds & Orders section of the refill encounter.             "

## 2019-08-28 DIAGNOSIS — E03.9 ACQUIRED HYPOTHYROIDISM: ICD-10-CM

## 2019-08-28 RX ORDER — SERTRALINE HYDROCHLORIDE 100 MG/1
TABLET, FILM COATED ORAL
Qty: 45 TABLET | Refills: 0 | Status: SHIPPED | OUTPATIENT
Start: 2019-08-28 | End: 2019-09-25

## 2019-08-28 RX ORDER — TRIAMTERENE AND HYDROCHLOROTHIAZIDE 37.5; 25 MG/1; MG/1
CAPSULE ORAL
Qty: 90 CAPSULE | Refills: 1 | Status: SHIPPED | OUTPATIENT
Start: 2019-08-28 | End: 2019-09-25

## 2019-08-28 RX ORDER — LEVOTHYROXINE SODIUM 75 UG/1
TABLET ORAL
Qty: 90 TABLET | Refills: 1 | Status: SHIPPED | OUTPATIENT
Start: 2019-08-28 | End: 2019-09-25

## 2019-08-28 NOTE — TELEPHONE ENCOUNTER
LM informing patient that RX was refilled. Patient is due for an annual physical prior to additional refills given. Sending letter.    Pam Huynh

## 2019-08-28 NOTE — TELEPHONE ENCOUNTER
"Requested Prescriptions   Pending Prescriptions Disp Refills     levothyroxine (SYNTHROID/LEVOTHROID) 75 MCG tablet [Pharmacy Med Name: LEVOTHYROXINE SODIUM 75MCG TABS] 90 tablet 1     Sig: TAKE ONE TABLET BY MOUTH ONCE DAILY      Last Written Prescription Date:  8/28/2019  Last Fill Quantity: 90 tablet     ,  # refills: 1   Last Office Visit: 4/30/2019   Future Office Visit:            Thyroid Protocol Passed - 8/28/2019  4:46 PM        Passed - Patient is 12 years or older        Passed - Recent (12 mo) or future (30 days) visit within the authorizing provider's specialty     Patient had office visit in the last 12 months or has a visit in the next 30 days with authorizing provider or within the authorizing provider's specialty.  See \"Patient Info\" tab in inbasket, or \"Choose Columns\" in Meds & Orders section of the refill encounter.          Passed - Medication is active on med list        Passed - Normal TSH on file in past 12 months     Recent Labs   Lab Test 04/30/19  1216   TSH 1.68           Passed - No active pregnancy on record     If patient is pregnant or has had a positive pregnancy test, please check TSH        Passed - No positive pregnancy test in past 12 months     If patient is pregnant or has had a positive pregnancy test, please check TSH          "

## 2019-08-28 NOTE — TELEPHONE ENCOUNTER
"LOV: 4/30/2019 coordinating labs 4/30/2019  Needs phq 9 in October.      \"   Return in about 2 months (around 6/30/2019) for Physical Exam, Follow up regarding today's concerns.    Please  Call patient to schedule.       Will refill 1 x int he interim.     Thanks! Teresa Mccord RN      "

## 2019-08-30 RX ORDER — LEVOTHYROXINE SODIUM 75 UG/1
TABLET ORAL
Qty: 0.1 TABLET | Refills: 0 | OUTPATIENT
Start: 2019-08-30

## 2019-08-30 NOTE — TELEPHONE ENCOUNTER
Duplicate see script sent 8/28/2019 levothyroxine (SYNTHROID/LEVOTHROID) 75 MCG tablet #90 tablet x 1 refill

## 2019-09-25 ENCOUNTER — OFFICE VISIT (OUTPATIENT)
Dept: FAMILY MEDICINE | Facility: CLINIC | Age: 69
End: 2019-09-25
Payer: COMMERCIAL

## 2019-09-25 DIAGNOSIS — E78.5 HYPERLIPIDEMIA LDL GOAL <130: ICD-10-CM

## 2019-09-25 DIAGNOSIS — E11.9 TYPE 2 DIABETES MELLITUS WITHOUT COMPLICATION, WITHOUT LONG-TERM CURRENT USE OF INSULIN (H): ICD-10-CM

## 2019-09-25 DIAGNOSIS — E03.9 ACQUIRED HYPOTHYROIDISM: ICD-10-CM

## 2019-09-25 DIAGNOSIS — F33.0 MAJOR DEPRESSIVE DISORDER, RECURRENT EPISODE, MILD (H): ICD-10-CM

## 2019-09-25 DIAGNOSIS — Z12.11 SPECIAL SCREENING FOR MALIGNANT NEOPLASMS, COLON: ICD-10-CM

## 2019-09-25 DIAGNOSIS — Z00.00 ENCOUNTER FOR MEDICARE ANNUAL WELLNESS EXAM: Primary | ICD-10-CM

## 2019-09-25 DIAGNOSIS — I10 HYPERTENSION GOAL BP (BLOOD PRESSURE) < 140/90: ICD-10-CM

## 2019-09-25 DIAGNOSIS — F33.8 SEASONAL AFFECTIVE DISORDER (H): ICD-10-CM

## 2019-09-25 DIAGNOSIS — D72.829 LEUKOCYTOSIS, UNSPECIFIED TYPE: ICD-10-CM

## 2019-09-25 DIAGNOSIS — F32.0 MILD MAJOR DEPRESSION (H): ICD-10-CM

## 2019-09-25 DIAGNOSIS — Z23 NEED FOR PNEUMOCOCCAL VACCINATION: ICD-10-CM

## 2019-09-25 LAB
BASOPHILS # BLD AUTO: 0.1 10E9/L (ref 0–0.2)
BASOPHILS NFR BLD AUTO: 0.5 %
CHOLEST SERPL-MCNC: 277 MG/DL
CREAT UR-MCNC: 36 MG/DL
DIFFERENTIAL METHOD BLD: ABNORMAL
EOSINOPHIL # BLD AUTO: 0.1 10E9/L (ref 0–0.7)
EOSINOPHIL NFR BLD AUTO: 1 %
ERYTHROCYTE [DISTWIDTH] IN BLOOD BY AUTOMATED COUNT: 13.6 % (ref 10–15)
HBA1C MFR BLD: 6.2 % (ref 0–5.6)
HCT VFR BLD AUTO: 42.9 % (ref 35–47)
HDLC SERPL-MCNC: 41 MG/DL
HGB BLD-MCNC: 14.5 G/DL (ref 11.7–15.7)
LDLC SERPL CALC-MCNC: 168 MG/DL
LYMPHOCYTES # BLD AUTO: 3.7 10E9/L (ref 0.8–5.3)
LYMPHOCYTES NFR BLD AUTO: 33.2 %
MCH RBC QN AUTO: 29.7 PG (ref 26.5–33)
MCHC RBC AUTO-ENTMCNC: 33.8 G/DL (ref 31.5–36.5)
MCV RBC AUTO: 88 FL (ref 78–100)
MICROALBUMIN UR-MCNC: <5 MG/L
MICROALBUMIN/CREAT UR: NORMAL MG/G CR (ref 0–25)
MONOCYTES # BLD AUTO: 1 10E9/L (ref 0–1.3)
MONOCYTES NFR BLD AUTO: 9 %
NEUTROPHILS # BLD AUTO: 6.2 10E9/L (ref 1.6–8.3)
NEUTROPHILS NFR BLD AUTO: 56.3 %
NONHDLC SERPL-MCNC: 236 MG/DL
PLATELET # BLD AUTO: 328 10E9/L (ref 150–450)
RBC # BLD AUTO: 4.89 10E12/L (ref 3.8–5.2)
TRIGL SERPL-MCNC: 341 MG/DL
WBC # BLD AUTO: 11.1 10E9/L (ref 4–11)

## 2019-09-25 PROCEDURE — 82043 UR ALBUMIN QUANTITATIVE: CPT | Performed by: NURSE PRACTITIONER

## 2019-09-25 PROCEDURE — 90732 PPSV23 VACC 2 YRS+ SUBQ/IM: CPT | Performed by: NURSE PRACTITIONER

## 2019-09-25 PROCEDURE — 83036 HEMOGLOBIN GLYCOSYLATED A1C: CPT | Performed by: NURSE PRACTITIONER

## 2019-09-25 PROCEDURE — 99207 C FOOT EXAM  NO CHARGE: CPT | Mod: 25 | Performed by: NURSE PRACTITIONER

## 2019-09-25 PROCEDURE — 36415 COLL VENOUS BLD VENIPUNCTURE: CPT | Performed by: NURSE PRACTITIONER

## 2019-09-25 PROCEDURE — 80061 LIPID PANEL: CPT | Performed by: NURSE PRACTITIONER

## 2019-09-25 PROCEDURE — G0009 ADMIN PNEUMOCOCCAL VACCINE: HCPCS | Performed by: NURSE PRACTITIONER

## 2019-09-25 PROCEDURE — 85025 COMPLETE CBC W/AUTO DIFF WBC: CPT | Performed by: NURSE PRACTITIONER

## 2019-09-25 PROCEDURE — 99213 OFFICE O/P EST LOW 20 MIN: CPT | Mod: 25 | Performed by: NURSE PRACTITIONER

## 2019-09-25 PROCEDURE — 99397 PER PM REEVAL EST PAT 65+ YR: CPT | Mod: 25 | Performed by: NURSE PRACTITIONER

## 2019-09-25 RX ORDER — SERTRALINE HYDROCHLORIDE 100 MG/1
TABLET, FILM COATED ORAL
Qty: 45 TABLET | Status: SHIPPED | OUTPATIENT
Start: 2019-09-25 | End: 2019-11-21

## 2019-09-25 RX ORDER — LEVOTHYROXINE SODIUM 75 UG/1
75 TABLET ORAL DAILY
Qty: 90 TABLET | Status: SHIPPED | OUTPATIENT
Start: 2019-09-25 | End: 2020-10-06

## 2019-09-25 RX ORDER — BUPROPION HYDROCHLORIDE 150 MG/1
150 TABLET ORAL EVERY MORNING
Qty: 30 TABLET | Refills: 1 | Status: SHIPPED | OUTPATIENT
Start: 2019-09-25 | End: 2019-10-21

## 2019-09-25 RX ORDER — TRIAMTERENE AND HYDROCHLOROTHIAZIDE 37.5; 25 MG/1; MG/1
1 CAPSULE ORAL DAILY
Qty: 90 CAPSULE | Status: SHIPPED | OUTPATIENT
Start: 2019-09-25 | End: 2020-06-02

## 2019-09-25 RX ORDER — ATORVASTATIN CALCIUM 10 MG/1
10 TABLET, FILM COATED ORAL DAILY
Qty: 90 TABLET | Refills: 3 | Status: SHIPPED | OUTPATIENT
Start: 2019-09-25 | End: 2020-10-06

## 2019-09-25 ASSESSMENT — ENCOUNTER SYMPTOMS
JOINT SWELLING: 0
WEAKNESS: 0
CHILLS: 0
EYE PAIN: 0
ABDOMINAL PAIN: 0
FREQUENCY: 0
ARTHRALGIAS: 0
PARESTHESIAS: 1
NERVOUS/ANXIOUS: 1
NAUSEA: 0
SORE THROAT: 0
DIARRHEA: 0
DYSURIA: 0
MYALGIAS: 0
FEVER: 0
BREAST MASS: 0
PALPITATIONS: 0
SHORTNESS OF BREATH: 0
HEMATURIA: 0
DIZZINESS: 0
COUGH: 0
HEMATOCHEZIA: 0
HEADACHES: 0
HEARTBURN: 0
CONSTIPATION: 0

## 2019-09-25 ASSESSMENT — PATIENT HEALTH QUESTIONNAIRE - PHQ9
SUM OF ALL RESPONSES TO PHQ QUESTIONS 1-9: 8
SUM OF ALL RESPONSES TO PHQ QUESTIONS 1-9: 11
SUM OF ALL RESPONSES TO PHQ QUESTIONS 1-9: 8
10. IF YOU CHECKED OFF ANY PROBLEMS, HOW DIFFICULT HAVE THESE PROBLEMS MADE IT FOR YOU TO DO YOUR WORK, TAKE CARE OF THINGS AT HOME, OR GET ALONG WITH OTHER PEOPLE: SOMEWHAT DIFFICULT

## 2019-09-25 ASSESSMENT — MIFFLIN-ST. JEOR: SCORE: 1422.19

## 2019-09-25 ASSESSMENT — ACTIVITIES OF DAILY LIVING (ADL): CURRENT_FUNCTION: NO ASSISTANCE NEEDED

## 2019-09-25 NOTE — PATIENT INSTRUCTIONS
Patient Education   Personalized Prevention Plan  You are due for the preventive services outlined below.  Your care team is available to assist you in scheduling these services.  If you have already completed any of these items, please share that information with your care team to update in your medical record.  Health Maintenance Due   Topic Date Due     Diabetic Foot Exam  1950     Eye Exam  1950     Zoster (Shingles) Vaccine (1 of 2) 05/26/2001     Discuss Advance Care Planning  01/31/2017     Colonscopy  12/02/2017     Mammogram  06/13/2019     Annual Wellness Visit  07/03/2019     Cholesterol Lab  07/03/2019     Kidney Microalbumin Urine Test  07/03/2019     FALL RISK ASSESSMENT  07/03/2019     Flu Vaccine (1) 09/01/2019     Pneumococcal Vaccine (2 of 2 - PPSV23) 07/03/2019       Urinary Incontinence, Female (Adult)  Urinary incontinence means loss of control of the bladder. This problem affects many women, especially as they get older. If you have incontinence, you may be embarrassed to ask for help. But know that this problem can be treated.  Types of Incontinence  There are different types of incontinence. Two of the main types are described here. You can have more than one type.    Stress incontinence. With this type, urine leaks when pressure (stress) is put on the bladder. This may happen when you cough, sneeze, or laugh. Stress incontinence most often occurs because the pelvic floor muscles that support the bladder and urethra are weak. This can happen after pregnancy and vaginal childbirth or a hysterectomy. It can also be due to excess body weight or hormone changes.    Urge incontinence (also called overactive bladder). With this type, a sudden urge to urinate is felt often. This may happen even though there may not be much urine in the bladder. The need to urinate often during the night is common. Urge incontinence most often occurs because of bladder spasms. This may be due to bladder  irritation or infection. Damage to bladder nerves or pelvic muscles, constipation, and certain medicines can also lead to urge incontinence.  Treatment of urinary incontinence depends on the cause. Further evaluation is needed to find the type you have. This will likely include an exam and certain tests. Based on the results, you and your healthcare provider can then plan treatment. Until a diagnosis is made, the home care tips below can help relieve symptoms.  Home care    Do pelvic floor muscle exercises, if they are prescribed. The pelvic floor muscles help support the bladder and urethra. Many women find that their symptoms improve when doing special exercises that strengthen these muscles. To do the exercises contract the muscles you would use to stop your stream of urine, but do this when you re not urinating. Hold for 10 seconds, then relax. Repeat 10 to 20 times in a row, at least 3 times a day. Your provider may give you other instructions for how to do the exercises and how often.    Keep a bladder diary. This helps track how often and how much you urinate over a set period of time. Bring this diary with you to your next visit with the provider. The information can help your provider learn more about your bladder problem.    Lose weight, if advised to by your provider. Excess weight puts pressure on the bladder. Your provider can help you create a weight-loss plan that s right for you. This may include exercising more and making certain diet changes.    Don't consume foods and drinks that may irritate the bladder. These can include alcohol and caffeinated drinks.    Quit smoking. Smoking and other tobacco use can lead to chronic cough that strains the pelvic floor muscles. Smoking may also damage the bladder and urethra. Talk with your provider about treatments or methods you can use to quit smoking.    If drinking large amounts of fluid causes you to have symptoms, you may be advised to limit your fluid  intake. You may also be advised to drink most of your fluids during the day and to limit fluids at night.    If you re worried about urine leakage or accidents, you may wear absorbent pads to catch urine. Change the pads often. This helps reduce discomfort. It may also reduce the risk of skin or bladder infections.  Follow-up care  Follow up with your healthcare provider, or as directed. It may take some to find the right treatment for your problem. Your treatment plan may include special therapies or medicines. Certain procedures or surgery may also be options. Be sure to discuss any questions you have with your provider.  When to seek medical advice  Call the healthcare provider right away if any of these occur:    Fever of 100.4 F (38 C) or higher, or as directed by your provider    Bladder pain or fullness    Abdominal swelling    Nausea or vomiting    Back pain    Weakness, dizziness or fainting  Date Last Reviewed: 10/1/2017    3080-9114 [employer]. [address] [place] [address], [place] [address]. All rights reserved. This information is not intended as a substitute for professional medical care. Always follow your healthcare professional's instructions.

## 2019-09-25 NOTE — NURSING NOTE
Prior to immunization administration, verified patients identity using patient s name and date of birth. Please see Immunization Activity for additional information.     Screening Questionnaire for Adult Immunization    Are you sick today?   No   Do you have allergies to medications, food, a vaccine component or latex?   No   Have you ever had a serious reaction after receiving a vaccination?   No   Do you have a long-term health problem with heart disease, lung disease, asthma, kidney disease, metabolic disease (e.g. diabetes), anemia, or other blood disorder?   No   Do you have cancer, leukemia, HIV/AIDS, or any other immune system problem?   No   In the past 3 months, have you taken medications that affect  your immune system, such as prednisone, other steroids, or anticancer drugs; drugs for the treatment of rheumatoid arthritis, Crohn s disease, or psoriasis; or have you had radiation treatments?   No   Have you had a seizure, or a brain or other nervous system problem?   No   During the past year, have you received a transfusion of blood or blood     products, or been given immune (gamma) globulin or antiviral drug?   No   For women: Are you pregnant or is there a chance you could become        pregnant during the next month?   No   Have you received any vaccinations in the past 4 weeks?   No     Immunization questionnaire answers were all negative.        Per orders of Jory Fragoso, injection of Pneumovax 23 given by May Sears. Patient instructed to remain in clinic for 15 minutes afterwards, and to report any adverse reaction to me immediately.       Screening performed by aMy Sears on 9/25/2019 at 12:09 PM.

## 2019-09-25 NOTE — PROGRESS NOTES
"SUBJECTIVE:   Leena Montaño is a 69 year old female who presents for Preventive Visit.  Notified pt additional charges may apply if additional concerns are addressed. Pt roomed by May KAUR MA  Are you in the first 12 months of your Medicare coverage?  No    Healthy Habits:     Frequency of exercise:  6-7 days/week    Do you usually eat at least 4 servings of fruit and vegetables a day, include whole grains    & fiber and avoid regularly eating high fat or \"junk\" foods?  Yes    Barriers to taking medications:  None    Ability to successfully perform activities of daily living:  No assistance needed    Home Safety:  Throw rugs in the hallway    Hearing Impairment:  No hearing concerns    In the past 6 months, have you been bothered by leaking of urine? Yes    PHQ-2 Total Score: 3    Additional concerns today:  Yes  Diabetes numbers   Fatigue: low energy     Do you feel safe in your environment? Yes    Do you have a Health Care Directive? No: Advance care planning reviewed with patient; information given to patient to review.      Fall risk  Fallen 2 or more times in the past year?: No  Any fall with injury in the past year?: No    Cognitive Screening   1) Repeat 3 items (Leader, Season, Table)    2) Clock draw: NORMAL  3) 3 item recall: Recalls 2 objects   Results: NORMAL clock, 1-2 items recalled: COGNITIVE IMPAIRMENT LESS LIKELY    Mini-CogTM Copyright S Taye. Licensed by the author for use in [facility]; reprinted with permission (theresa@.[email or web address]). All rights reserved.      Do you have sleep apnea, excessive snoring or daytime drowsiness?: sleep apnea     Reviewed and updated as needed this visit by clinical staff  Tobacco  Meds         Reviewed and updated as needed this visit by Provider        Social History     Tobacco Use     Smoking status: Never Smoker     Smokeless tobacco: Never Used     Tobacco comment: am using sm. amt. of marijuana for sleep/pain   Substance Use Topics     " Alcohol use: Yes     Comment: really managed/not my drug to enjoy. marijuana 3 times per week          Alcohol Use 9/25/2019   Prescreen: >3 drinks/day or >7 drinks/week? No   Prescreen: >3 drinks/day or >7 drinks/week? -           She has been feeling fatigued and having a lack of energy for many months.  She feels more flat and less able to experience normal emotions, has some anhedonia.    She normally has more difficulty with her depression in the fall and winter.  She is walking every day.  She is currently taking Zoloft 150 mg daily.  Labs were done recently, including TSH in the normal range.    Blood sugars have been around 120-130 in the morning.  She has been trying to watch the carbs in her diet.    Her blood pressure has been well-controlled.  She denies any side effects with her medication.     Current providers sharing in care for this patient include:   Patient Care Team:  Jory Fragoso NP as PCP - Betty Wright APRN CNP as Assigned PCP    The following health maintenance items are reviewed in Epic and correct as of today:  Health Maintenance   Topic Date Due     DIABETIC FOOT EXAM  1950     EYE EXAM  1950     ZOSTER IMMUNIZATION (1 of 2) 05/26/2001     ADVANCE CARE PLANNING  01/31/2017     COLONOSCOPY  12/02/2017     MAMMO SCREENING  06/13/2019     MEDICARE ANNUAL WELLNESS VISIT  07/03/2019     LIPID  07/03/2019     MICROALBUMIN  07/03/2019     FALL RISK ASSESSMENT  07/03/2019     INFLUENZA VACCINE (1) 09/01/2019     PNEUMOCOCCAL IMMUNIZATION 65+ LOW/MEDIUM RISK (2 of 2 - PPSV23) 07/03/2019     A1C  10/30/2019     PHQ-9  10/30/2019     BMP  04/30/2020     TSH W/FREE T4 REFLEX  04/30/2020     DTAP/TDAP/TD IMMUNIZATION (3 - Td) 07/03/2028     DEXA  Completed     HEPATITIS C SCREENING  Completed     DEPRESSION ACTION PLAN  Completed     IPV IMMUNIZATION  Aged Out     MENINGITIS IMMUNIZATION  Aged Out           Review of Systems   Constitutional: Negative for chills  "and fever.   HENT: Positive for congestion. Negative for ear pain, hearing loss and sore throat.    Eyes: Negative for pain and visual disturbance.   Respiratory: Negative for cough and shortness of breath.    Cardiovascular: Negative for chest pain, palpitations and peripheral edema.   Gastrointestinal: Negative for abdominal pain, constipation, heartburn, hematochezia and nausea.   Breasts:  Negative for tenderness, breast mass and discharge.   Genitourinary: Negative for dysuria, frequency, genital sores, hematuria, pelvic pain, urgency, vaginal bleeding and vaginal discharge.   Musculoskeletal: Negative for arthralgias, joint swelling and myalgias.   Skin: Positive for rash.   Neurological: Positive for paresthesias. Negative for dizziness, weakness and headaches.   Psychiatric/Behavioral: Positive for mood changes. The patient is nervous/anxious.          OBJECTIVE:   BP (P) 127/70   Temp (P) 99  F (37.2  C) (Oral)   Resp (P) 18   Ht (P) 1.626 m (5' 4\")   Wt (P) 90.7 kg (200 lb)   LMP  (LMP Unknown)   BMI (P) 34.33 kg/m   Estimated body mass index is 34.33 kg/m  (pended) as calculated from the following:    Height as of this encounter: (P) 1.626 m (5' 4\").    Weight as of this encounter: (P) 90.7 kg (200 lb).  Physical Exam  GENERAL: healthy, alert and no distress  EYES: Eyes grossly normal to inspection, PERRL and conjunctivae and sclerae normal  HENT: ear canals and TM's normal, nose and mouth without ulcers or lesions  NECK: no adenopathy, no asymmetry, masses, or scars and thyroid normal to palpation  RESP: lungs clear to auscultation - no rales, rhonchi or wheezes  BREAST: normal without masses, tenderness or nipple discharge and no palpable axillary masses or adenopathy  CV: regular rate and rhythm, normal S1 S2, no S3 or S4, no murmur, click or rub, no peripheral edema and peripheral pulses strong  ABDOMEN: soft, nontender, no hepatosplenomegaly, no masses and bowel sounds normal  MS: no gross " musculoskeletal defects noted, no edema  SKIN: no suspicious lesions or rashes  NEURO: Normal strength and tone, mentation intact and speech normal  PSYCH: mentation appears normal, affect normal/bright    Diagnostic Test Results:  Labs reviewed in Epic  Results for orders placed or performed in visit on 09/25/19 (from the past 24 hour(s))   CBC with platelets differential   Result Value Ref Range    WBC 11.1 (H) 4.0 - 11.0 10e9/L    RBC Count 4.89 3.8 - 5.2 10e12/L    Hemoglobin 14.5 11.7 - 15.7 g/dL    Hematocrit 42.9 35.0 - 47.0 %    MCV 88 78 - 100 fl    MCH 29.7 26.5 - 33.0 pg    MCHC 33.8 31.5 - 36.5 g/dL    RDW 13.6 10.0 - 15.0 %    Platelet Count 328 150 - 450 10e9/L    % Neutrophils 56.3 %    % Lymphocytes 33.2 %    % Monocytes 9.0 %    % Eosinophils 1.0 %    % Basophils 0.5 %    Absolute Neutrophil 6.2 1.6 - 8.3 10e9/L    Absolute Lymphocytes 3.7 0.8 - 5.3 10e9/L    Absolute Monocytes 1.0 0.0 - 1.3 10e9/L    Absolute Eosinophils 0.1 0.0 - 0.7 10e9/L    Absolute Basophils 0.1 0.0 - 0.2 10e9/L    Diff Method Automated Method    Hemoglobin A1c   Result Value Ref Range    Hemoglobin A1C 6.2 (H) 0 - 5.6 %       ASSESSMENT / PLAN:   1. Encounter for Medicare annual wellness exam    - Lipid panel reflex to direct LDL Fasting  - Albumin Random Urine Quantitative with Creat Ratio    2. Need for pneumococcal vaccination    - Pneumococcal vaccine 23 valent PPSV23  (Pneumovax) [[id number]]  - ADMIN MEDICARE: Pneumococcal Vaccine ()    3. Mild major depression (H)  Not controlled  Will add Wellbutrin  mg daily and continue on Zoloft 150 mg daily.  Prescription for new light box given.   Follow up in one month.   - order for DME; Equipment being ordered: light box  Dispense: 1 Units; Refill: 0  - buPROPion (WELLBUTRIN XL) 150 MG 24 hr tablet; Take 1 tablet (150 mg) by mouth every morning  Dispense: 30 tablet; Refill: 1    4. Seasonal affective disorder (H)  See above.   - order for DME; Equipment being ordered:  "light box  Dispense: 1 Units; Refill: 0  - buPROPion (WELLBUTRIN XL) 150 MG 24 hr tablet; Take 1 tablet (150 mg) by mouth every morning  Dispense: 30 tablet; Refill: 1    5. Type 2 diabetes mellitus without complication, without long-term current use of insulin (H)  At goal  Continue with diet and exercise.  Follow up in 6 months.   - Hemoglobin A1c  - AMBULATORY ADULT DIABETES EDUCATOR REFERRAL  - atorvastatin (LIPITOR) 10 MG tablet; Take 1 tablet (10 mg) by mouth daily  Dispense: 90 tablet; Refill: 3  - FOOT EXAM    6. Leukocytosis, unspecified type    - CBC with platelets differential    7. Major depressive disorder, recurrent episode, mild (H)  See above.   - sertraline (ZOLOFT) 100 MG tablet; TAKE ONE AND ONE-HALF TABLET BY MOUTH ONCE DAILY  Dispense: 45 tablet; Refill: PRN    8. Hypertension goal BP (blood pressure) < 140/90  At goal  The current medical regimen is effective;  continue present plan and medications.   - triamterene-HCTZ (DYAZIDE) 37.5-25 MG capsule; Take 1 capsule by mouth daily  Dispense: 90 capsule; Refill: PRN    9. Acquired hypothyroidism  The current medical regimen is effective;  continue present plan and medications.   - levothyroxine (SYNTHROID/LEVOTHROID) 75 MCG tablet; Take 1 tablet (75 mcg) by mouth daily  Dispense: 90 tablet; Refill: PRN    10. Hyperlipidemia LDL goal <130    - atorvastatin (LIPITOR) 10 MG tablet; Take 1 tablet (10 mg) by mouth daily  Dispense: 90 tablet; Refill: 3    11. Special screening for malignant neoplasms, colon    - GASTROENTEROLOGY ADULT REF PROCEDURE ONLY Other; [facility] [phone]    End of Life Planning:  Patient currently has an advanced directive: Yes.  Practitioner is supportive of decision.    COUNSELING:  Reviewed preventive health counseling, as reflected in patient instructions    Estimated body mass index is 34.33 kg/m  (pended) as calculated from the following:    Height as of this encounter: (P) 1.626 m (5' 4\").    Weight as of this " encounter: (P) 90.7 kg (200 lb).    Weight management plan: Discussed healthy diet and exercise guidelines     reports that she has never smoked. She has never used smokeless tobacco.      Appropriate preventive services were discussed with this patient, including applicable screening as appropriate for cardiovascular disease, diabetes, osteopenia/osteoporosis, and glaucoma.  As appropriate for age/gender, discussed screening for colorectal cancer, prostate cancer, breast cancer, and cervical cancer. Checklist reviewing preventive services available has been given to the patient.    Reviewed patients plan of care and provided an AVS. The Basic Care Plan (routine screening as documented in Health Maintenance) for Leena meets the Care Plan requirement. This Care Plan has been established and reviewed with the Patient.    Counseling Resources:  ATP IV Guidelines  Pooled Cohorts Equation Calculator  Breast Cancer Risk Calculator  FRAX Risk Assessment  ICSI Preventive Guidelines  Dietary Guidelines for Americans, 2010  USDA's MyPlate  ASA Prophylaxis  Lung CA Screening    Jory Fragoso NP  [facility]    Identified Health Risks:    Information on urinary incontinence and treatment options given to patient.

## 2019-09-26 ASSESSMENT — PATIENT HEALTH QUESTIONNAIRE - PHQ9: SUM OF ALL RESPONSES TO PHQ QUESTIONS 1-9: 8

## 2019-09-30 ENCOUNTER — HEALTH MAINTENANCE LETTER (OUTPATIENT)
Age: 69
End: 2019-09-30

## 2019-10-10 ENCOUNTER — TRANSFERRED RECORDS (OUTPATIENT)
Dept: HEALTH INFORMATION MANAGEMENT | Facility: CLINIC | Age: 69
End: 2019-10-10

## 2019-10-11 ENCOUNTER — TRANSFERRED RECORDS (OUTPATIENT)
Dept: HEALTH INFORMATION MANAGEMENT | Facility: CLINIC | Age: 69
End: 2019-10-11

## 2019-10-21 ENCOUNTER — MYC REFILL (OUTPATIENT)
Dept: FAMILY MEDICINE | Facility: CLINIC | Age: 69
End: 2019-10-21

## 2019-10-21 ENCOUNTER — MYC MEDICAL ADVICE (OUTPATIENT)
Dept: FAMILY MEDICINE | Facility: CLINIC | Age: 69
End: 2019-10-21

## 2019-10-21 DIAGNOSIS — F32.0 MILD MAJOR DEPRESSION (H): ICD-10-CM

## 2019-10-21 DIAGNOSIS — F33.8 SEASONAL AFFECTIVE DISORDER (H): ICD-10-CM

## 2019-10-21 DIAGNOSIS — F33.0 MAJOR DEPRESSIVE DISORDER, RECURRENT EPISODE, MILD (H): ICD-10-CM

## 2019-10-21 RX ORDER — SERTRALINE HYDROCHLORIDE 100 MG/1
TABLET, FILM COATED ORAL
Qty: 45 TABLET | Status: CANCELLED | OUTPATIENT
Start: 2019-10-21

## 2019-10-24 RX ORDER — BUPROPION HYDROCHLORIDE 150 MG/1
150 TABLET ORAL EVERY MORNING
Qty: 30 TABLET | Refills: 0 | Status: SHIPPED | OUTPATIENT
Start: 2019-10-24 | End: 2019-10-28 | Stop reason: SINTOL

## 2019-10-24 NOTE — TELEPHONE ENCOUNTER
"Requested Prescriptions   Pending Prescriptions Disp Refills     buPROPion (WELLBUTRIN XL) 150 MG 24 hr tablet 30 tablet 1     Sig: Take 1 tablet (150 mg) by mouth every morning       SSRIs Protocol Failed - 10/21/2019 10:12 AM        Failed - PHQ-9 score less than 5 in past 6 months     Please review last PHQ-9 score.           Passed - Medication is Bupropion     If the medication is Bupropion (Wellbutrin), and the patient is taking for smoking cessation; OK to refill.          Passed - Medication is active on med list        Passed - Patient is age 18 or older        Passed - No active pregnancy on record        Passed - No positive pregnancy test in last 12 months        Passed - Recent (6 mo) or future (30 days) visit within the authorizing provider's specialty     Patient had office visit in the last 6 months or has a visit in the next 30 days with authorizing provider or within the authorizing provider's specialty.  See \"Patient Info\" tab in inbasket, or \"Choose Columns\" in Meds & Orders section of the refill encounter.            sertraline (ZOLOFT) 100 MG tablet 45 tablet PRN     Sig: TAKE ONE AND ONE-HALF TABLET BY MOUTH ONCE DAILY       SSRIs Protocol Failed - 10/21/2019 10:12 AM        Failed - PHQ-9 score less than 5 in past 6 months     Please review last PHQ-9 score.     PHQ-9 SCORE 4/30/2019 9/25/2019 9/25/2019   PHQ-9 Total Score - - -   PHQ-9 Total Score MyChart - - 8 (Mild depression)   PHQ-9 Total Score 12 11 8               Passed - Medication is Bupropion     If the medication is Bupropion (Wellbutrin), and the patient is taking for smoking cessation; OK to refill.          Passed - Medication is active on med list        Passed - Patient is age 18 or older        Passed - No active pregnancy on record        Passed - No positive pregnancy test in last 12 months        Passed - Recent (6 mo) or future (30 days) visit within the authorizing provider's specialty     Patient had office visit in " "the last 6 months or has a visit in the next 30 days with authorizing provider or within the authorizing provider's specialty.  See \"Patient Info\" tab in inbasket, or \"Choose Columns\" in Meds & Orders section of the refill encounter.            Medication is being filled for 1 time refill only due to:  Patient needs to be seen because started bupropion 9/25 - due for one month check up.     Signed Prescriptions:                        Disp   Refills    buPROPion (WELLBUTRIN XL) 150 MG 24 hr tab*30 tab*0        Sig: Take 1 tablet (150 mg) by mouth every morning  Authorizing Provider: KAILEY PINO  Ordering User: GEMINI CONTRERAS    Sent [employer] update    "

## 2019-10-28 ENCOUNTER — TELEPHONE (OUTPATIENT)
Dept: FAMILY MEDICINE | Facility: CLINIC | Age: 69
End: 2019-10-28

## 2019-10-28 ENCOUNTER — NURSE TRIAGE (OUTPATIENT)
Dept: NURSING | Facility: CLINIC | Age: 69
End: 2019-10-28

## 2019-10-28 NOTE — TELEPHONE ENCOUNTER
"Clinic Action Needed:Yes, Please call patient   Reason for Call:Patient calling stating she started Wellbutrin 1 month ago. \"I thought it was fine.\" Stating after she refilled her 2nd bottle she realized she has been waking up \"pre kimber and having night sweats.\" Reporting feeling a little anxious. Patient stating the pharmacy advised to try and take it every other day which she has tried in the past 4 days. Stating she has now slept better today. Patient is questioning if she should have something to help wean her off? Or what would be advised. Stating she thinks she may be ok with out the Wellbutrin.    Caller verbalized understanding. Denies further questions.    Karen Preston RN  [facility]        "

## 2019-10-28 NOTE — TELEPHONE ENCOUNTER
"Clinic Action Needed:Yes, Please call patient   Reason for Call:Patient calling stating she started Wellbutrin 1 month ago. \"I thought it was fine.\" Stating after she refilled her 2nd bottle she realized she has been waking up \"pre kimber and having night sweats.\" Reporting feeling a little anxious. Patient stating the pharmacy advised to try and take it every other day which she has tried in the past 4 days. Stating she has now slept better. Patient is questioning if she should have something to help wean her off? Or what would be advised. Stating she thinks she may be ok with out it.     Caller verbalized understanding. Denies further questions.    Karen Preston RN  [facility]      "

## 2019-10-28 NOTE — TELEPHONE ENCOUNTER
Pema Fragoso review: Would you consider a wean of the Wellbutrin as patient would like to do this safely?  She is on the Wellbutrin XL. Taking every other day now for 4 days.  She prefers not to do an E visit if at all possible.  Has only been on this med a month.  Still takes the sertraline and has her SADD light.  See her particular comments below.     Phoebe Vicente RN

## 2019-11-18 ENCOUNTER — TELEPHONE (OUTPATIENT)
Dept: FAMILY MEDICINE | Facility: CLINIC | Age: 69
End: 2019-11-18

## 2019-11-18 NOTE — TELEPHONE ENCOUNTER
Reason for Call:  Other call back    Detailed comments: Pt called in and is wanting to come in and see Jory Tamikameri  For a new med for Antidepressant she says the sertraline (ZOLOFT) 100 MG tablet is giving her god awful night sweats and keeping her up at night once she wakes up . Pt would like a call back .     Phone Number Patient can be reached at: Cell number on file:    Telephone Information:   Mobile [phone]       Best Time: ASAP    Can we leave a detailed message on this number? YES    Call taken on 11/18/2019 at 9:01 AM by HAILEY Apple  [place]   Patient Rep

## 2019-11-18 NOTE — TELEPHONE ENCOUNTER
Can you fit her in sometime in the next couple of days? You have a hold on Thursday but she wants to be seen sooner.

## 2019-11-19 ENCOUNTER — MYC MEDICAL ADVICE (OUTPATIENT)
Dept: FAMILY MEDICINE | Facility: CLINIC | Age: 69
End: 2019-11-19

## 2019-11-19 NOTE — TELEPHONE ENCOUNTER
She cannot make this afternoon, she has a therapy appointment. She said she is open tomorrow after 1:30 and all day Thursday. Can you squeeze her in any of these times?

## 2019-11-20 ENCOUNTER — E-VISIT (OUTPATIENT)
Dept: FAMILY MEDICINE | Facility: CLINIC | Age: 69
End: 2019-11-20
Payer: COMMERCIAL

## 2019-11-20 DIAGNOSIS — F33.0 MAJOR DEPRESSIVE DISORDER, RECURRENT EPISODE, MILD (H): ICD-10-CM

## 2019-11-20 DIAGNOSIS — E11.9 TYPE 2 DIABETES MELLITUS WITHOUT COMPLICATION, WITHOUT LONG-TERM CURRENT USE OF INSULIN (H): ICD-10-CM

## 2019-11-20 DIAGNOSIS — G47.00 INSOMNIA, UNSPECIFIED TYPE: Primary | ICD-10-CM

## 2019-11-20 DIAGNOSIS — E55.9 VITAMIN D DEFICIENCY: ICD-10-CM

## 2019-11-20 PROCEDURE — 99444 ZZC PHYSICIAN ONLINE EVALUATION & MANAGEMENT SERVICE: CPT | Performed by: NURSE PRACTITIONER

## 2019-11-20 ASSESSMENT — ANXIETY QUESTIONNAIRES
GAD7 TOTAL SCORE: 11
GAD7 TOTAL SCORE: 11
7. FEELING AFRAID AS IF SOMETHING AWFUL MIGHT HAPPEN: SEVERAL DAYS
GAD7 TOTAL SCORE: 11
6. BECOMING EASILY ANNOYED OR IRRITABLE: SEVERAL DAYS
5. BEING SO RESTLESS THAT IT IS HARD TO SIT STILL: SEVERAL DAYS
2. NOT BEING ABLE TO STOP OR CONTROL WORRYING: MORE THAN HALF THE DAYS
4. TROUBLE RELAXING: MORE THAN HALF THE DAYS
7. FEELING AFRAID AS IF SOMETHING AWFUL MIGHT HAPPEN: SEVERAL DAYS
1. FEELING NERVOUS, ANXIOUS, OR ON EDGE: MORE THAN HALF THE DAYS
3. WORRYING TOO MUCH ABOUT DIFFERENT THINGS: MORE THAN HALF THE DAYS

## 2019-11-20 ASSESSMENT — PATIENT HEALTH QUESTIONNAIRE - PHQ9
SUM OF ALL RESPONSES TO PHQ QUESTIONS 1-9: 11
10. IF YOU CHECKED OFF ANY PROBLEMS, HOW DIFFICULT HAVE THESE PROBLEMS MADE IT FOR YOU TO DO YOUR WORK, TAKE CARE OF THINGS AT HOME, OR GET ALONG WITH OTHER PEOPLE: SOMEWHAT DIFFICULT
SUM OF ALL RESPONSES TO PHQ QUESTIONS 1-9: 11

## 2019-11-21 ENCOUNTER — MYC MEDICAL ADVICE (OUTPATIENT)
Dept: FAMILY MEDICINE | Facility: CLINIC | Age: 69
End: 2019-11-21

## 2019-11-21 DIAGNOSIS — F33.0 MAJOR DEPRESSIVE DISORDER, RECURRENT EPISODE, MILD (H): ICD-10-CM

## 2019-11-21 DIAGNOSIS — G47.00 INSOMNIA, UNSPECIFIED TYPE: ICD-10-CM

## 2019-11-21 RX ORDER — ESCITALOPRAM OXALATE 10 MG/1
10 TABLET ORAL DAILY
Qty: 30 TABLET | Refills: 5 | Status: SHIPPED | OUTPATIENT
Start: 2019-11-21 | End: 2019-12-10

## 2019-11-21 RX ORDER — TRAZODONE HYDROCHLORIDE 50 MG/1
50-100 TABLET, FILM COATED ORAL AT BEDTIME
Qty: 60 TABLET | Refills: 5 | Status: SHIPPED | OUTPATIENT
Start: 2019-11-21 | End: 2020-01-07

## 2019-11-21 RX ORDER — ESCITALOPRAM OXALATE 10 MG/1
10 TABLET ORAL DAILY
Qty: 30 TABLET | Refills: 5 | Status: SHIPPED | OUTPATIENT
Start: 2019-11-21 | End: 2019-11-21

## 2019-11-21 RX ORDER — TRAZODONE HYDROCHLORIDE 50 MG/1
50-100 TABLET, FILM COATED ORAL AT BEDTIME
Qty: 60 TABLET | Refills: 5 | Status: SHIPPED | OUTPATIENT
Start: 2019-11-21 | End: 2019-11-21

## 2019-11-21 ASSESSMENT — ANXIETY QUESTIONNAIRES: GAD7 TOTAL SCORE: 11

## 2019-11-21 ASSESSMENT — PATIENT HEALTH QUESTIONNAIRE - PHQ9: SUM OF ALL RESPONSES TO PHQ QUESTIONS 1-9: 11

## 2019-12-04 DIAGNOSIS — E11.9 TYPE 2 DIABETES MELLITUS WITHOUT COMPLICATION, WITHOUT LONG-TERM CURRENT USE OF INSULIN (H): ICD-10-CM

## 2019-12-04 DIAGNOSIS — E55.9 VITAMIN D DEFICIENCY: ICD-10-CM

## 2019-12-04 LAB
ALBUMIN UR-MCNC: NEGATIVE MG/DL
APPEARANCE UR: CLEAR
BILIRUB UR QL STRIP: NEGATIVE
COLOR UR AUTO: YELLOW
GLUCOSE UR STRIP-MCNC: NEGATIVE MG/DL
HGB UR QL STRIP: NEGATIVE
KETONES UR STRIP-MCNC: NEGATIVE MG/DL
LEUKOCYTE ESTERASE UR QL STRIP: NEGATIVE
NITRATE UR QL: NEGATIVE
PH UR STRIP: 7.5 PH (ref 5–7)
SOURCE: ABNORMAL
SP GR UR STRIP: 1.02 (ref 1–1.03)
UROBILINOGEN UR STRIP-ACNC: 0.2 EU/DL (ref 0.2–1)

## 2019-12-04 PROCEDURE — 84443 ASSAY THYROID STIM HORMONE: CPT | Performed by: NURSE PRACTITIONER

## 2019-12-04 PROCEDURE — 36415 COLL VENOUS BLD VENIPUNCTURE: CPT | Performed by: NURSE PRACTITIONER

## 2019-12-04 PROCEDURE — 82306 VITAMIN D 25 HYDROXY: CPT | Performed by: NURSE PRACTITIONER

## 2019-12-04 PROCEDURE — 81003 URINALYSIS AUTO W/O SCOPE: CPT | Performed by: NURSE PRACTITIONER

## 2019-12-04 PROCEDURE — 80048 BASIC METABOLIC PNL TOTAL CA: CPT | Performed by: NURSE PRACTITIONER

## 2019-12-05 LAB
ANION GAP SERPL CALCULATED.3IONS-SCNC: 11 MMOL/L (ref 3–14)
BUN SERPL-MCNC: 17 MG/DL (ref 7–30)
CALCIUM SERPL-MCNC: 8.9 MG/DL (ref 8.5–10.1)
CHLORIDE SERPL-SCNC: 99 MMOL/L (ref 94–109)
CO2 SERPL-SCNC: 23 MMOL/L (ref 20–32)
CREAT SERPL-MCNC: 0.71 MG/DL (ref 0.52–1.04)
GFR SERPL CREATININE-BSD FRML MDRD: 87 ML/MIN/{1.73_M2}
GLUCOSE SERPL-MCNC: 104 MG/DL (ref 70–99)
POTASSIUM SERPL-SCNC: 3.5 MMOL/L (ref 3.4–5.3)
SODIUM SERPL-SCNC: 133 MMOL/L (ref 133–144)
TSH SERPL DL<=0.005 MIU/L-ACNC: 1.43 MU/L (ref 0.4–4)

## 2019-12-06 LAB — DEPRECATED CALCIDIOL+CALCIFEROL SERPL-MC: 41 UG/L (ref 20–75)

## 2019-12-10 ENCOUNTER — MYC MEDICAL ADVICE (OUTPATIENT)
Dept: FAMILY MEDICINE | Facility: CLINIC | Age: 69
End: 2019-12-10

## 2019-12-10 DIAGNOSIS — F33.0 MAJOR DEPRESSIVE DISORDER, RECURRENT EPISODE, MILD (H): ICD-10-CM

## 2019-12-10 RX ORDER — ESCITALOPRAM OXALATE 10 MG/1
15 TABLET ORAL DAILY
Qty: 45 TABLET | Refills: 5 | Status: SHIPPED | OUTPATIENT
Start: 2019-12-10 | End: 2020-01-07

## 2019-12-10 NOTE — TELEPHONE ENCOUNTER
Pt following up from 11/20/19  Looks like she has enough refills     Thanks!     Melba Dominguez RN

## 2019-12-27 ENCOUNTER — OFFICE VISIT (OUTPATIENT)
Dept: URGENT CARE | Facility: URGENT CARE | Age: 69
End: 2019-12-27
Payer: COMMERCIAL

## 2019-12-27 VITALS
SYSTOLIC BLOOD PRESSURE: 124 MMHG | RESPIRATION RATE: 14 BRPM | WEIGHT: 199 LBS | BODY MASS INDEX: 33.97 KG/M2 | HEIGHT: 64 IN | TEMPERATURE: 98.7 F | DIASTOLIC BLOOD PRESSURE: 72 MMHG | OXYGEN SATURATION: 100 % | HEART RATE: 75 BPM

## 2019-12-27 DIAGNOSIS — J01.00 SUBACUTE MAXILLARY SINUSITIS: Primary | ICD-10-CM

## 2019-12-27 PROCEDURE — 99213 OFFICE O/P EST LOW 20 MIN: CPT | Performed by: FAMILY MEDICINE

## 2019-12-27 RX ORDER — AZITHROMYCIN 250 MG/1
TABLET, FILM COATED ORAL
Qty: 6 TABLET | Refills: 0 | Status: SHIPPED | OUTPATIENT
Start: 2019-12-27 | End: 2020-01-07

## 2019-12-27 ASSESSMENT — MIFFLIN-ST. JEOR: SCORE: 1417.66

## 2019-12-27 NOTE — PROGRESS NOTES
Subjective: Fifth day of illness that started with throat congestion and mucus, has stayed mainly in the head and she wears a CPAP at night, getting lots of thick yellow mucus out, makes it hard to sleep.  She has a history of sinus infections and is worried that it is heading in that direction.    Objective: Vitals are stable.  ENT with pain over the maxillary sinuses.  Neck is without nodes but slightly tender.  Thick yellow mucus in the back of the throat.  Lungs are clear.  Heart is regular without murmurs.    Assessment and plan: Likely early sinusitis.  Will treat with Zithromax after discussing options.

## 2020-01-07 ENCOUNTER — TELEPHONE (OUTPATIENT)
Dept: FAMILY MEDICINE | Facility: CLINIC | Age: 70
End: 2020-01-07

## 2020-01-07 ENCOUNTER — OFFICE VISIT (OUTPATIENT)
Dept: FAMILY MEDICINE | Facility: CLINIC | Age: 70
End: 2020-01-07
Payer: COMMERCIAL

## 2020-01-07 VITALS
BODY MASS INDEX: 32.61 KG/M2 | SYSTOLIC BLOOD PRESSURE: 116 MMHG | DIASTOLIC BLOOD PRESSURE: 63 MMHG | OXYGEN SATURATION: 97 % | WEIGHT: 190 LBS | HEART RATE: 100 BPM | TEMPERATURE: 98.1 F

## 2020-01-07 DIAGNOSIS — G47.00 INSOMNIA, UNSPECIFIED TYPE: ICD-10-CM

## 2020-01-07 DIAGNOSIS — F41.9 ANXIETY: Primary | ICD-10-CM

## 2020-01-07 DIAGNOSIS — E11.9 TYPE 2 DIABETES MELLITUS WITHOUT COMPLICATION, WITHOUT LONG-TERM CURRENT USE OF INSULIN (H): ICD-10-CM

## 2020-01-07 DIAGNOSIS — F33.0 MAJOR DEPRESSIVE DISORDER, RECURRENT EPISODE, MILD (H): ICD-10-CM

## 2020-01-07 PROBLEM — E66.01 MORBID OBESITY (H): Status: RESOLVED | Noted: 2017-06-06 | Resolved: 2020-01-07

## 2020-01-07 PROCEDURE — 99214 OFFICE O/P EST MOD 30 MIN: CPT | Performed by: NURSE PRACTITIONER

## 2020-01-07 RX ORDER — DOXEPIN 6 MG/1
6 TABLET, FILM COATED ORAL
Qty: 30 TABLET | Refills: 0 | Status: SHIPPED | OUTPATIENT
Start: 2020-01-07 | End: 2020-01-07

## 2020-01-07 RX ORDER — ESCITALOPRAM OXALATE 20 MG/1
20 TABLET ORAL DAILY
Qty: 30 TABLET | Refills: 5 | Status: SHIPPED | OUTPATIENT
Start: 2020-01-07 | End: 2020-05-15

## 2020-01-07 RX ORDER — DOXEPIN HYDROCHLORIDE 10 MG/1
10 CAPSULE ORAL AT BEDTIME
Qty: 30 CAPSULE | Refills: 0 | Status: SHIPPED | OUTPATIENT
Start: 2020-01-07 | End: 2020-03-04 | Stop reason: ALTCHOICE

## 2020-01-07 NOTE — TELEPHONE ENCOUNTER
Prior Authorization Retail Medication Request    Medication/Dose: pa for silenor or change med  ICD code (if different than what is on RX):    Previously Tried and Failed:    Rationale:      Insurance Name:  [facility] Part D  Insurance ID:  [id number]      Pharmacy Information (if different than what is on RX)  Name:  [facility]  Phone:  [phone]

## 2020-01-07 NOTE — PROGRESS NOTES
SUBJECTIVE:  Leena Montaño, a 69 year old female scheduled an appointment to discuss the following issues:     Anxiety  Major depressive disorder, recurrent episode, mild (H)  She has been on Lexapro for the past  6 weeks and is currently taking 15 mg daily.  She feels that this has been helpful, but still having some anxiety, low motivation, difficulty concentrating and focusing.  She denies any SI.  She would like to have testing for ADHD.      Insomnia, unspecified type  She has been on Trazodone, which is not helping her insomnia.  She continues to wake up in the middle of the night or too early in the morning and has difficulty falling back to sleep.  She is only able to sleep about 5 hours a night.    Type 2 diabetes mellitus without complication, without long-term current use of insulin (H)  Her blood sugar levels have been around 113 in the morning, not higher than 150.  She has been trying to eat healthier.        Medical, social, surgical, and family histories reviewed.    ROS:  CONSTITUTIONAL: NEGATIVE for fever, chills  RESP: NEGATIVE for significant cough or SOB  CV: NEGATIVE for chest pain, palpitations   GI: NEGATIVE for nausea, abdominal pain, heartburn, or change in bowel habits  MUSCULOSKELETAL: NEGATIVE for significant arthralgias or myalgia  NEURO: NEGATIVE for weakness, dizziness or paresthesias or headache  PSYCHIATRIC: see HPI    OBJECTIVE:  /63   Pulse 100   Temp 98.1  F (36.7  C) (Oral)   Wt 86.2 kg (190 lb)   LMP  (LMP Unknown)   SpO2 97%   BMI 32.61 kg/m    EXAM:  GENERAL APPEARANCE: healthy, alert and no distress  RESP: lungs clear to auscultation - no rales, rhonchi or wheezes  CV: regular rates and rhythm, normal S1 S2, no S3 or S4 and no murmur, click or rub -  PSYCH: mentation appears normal and affect normal/bright    ASSESSMENT/PLAN:  (F41.9) Anxiety  (primary encounter diagnosis)  Comment: not in remission  Plan: escitalopram (LEXAPRO) 20 MG tablet, MENTAL          HEALTH REFERRAL  - Adult; Assessments and         Testing; Neuropsychological Testing; Other:         Behavioral Access ([facility]) [phone];        We will contact you to schedule the appointment        or please call with any questions        Will increase Lexapro to 20 mg [facility]  She will continue to see her therapist.     (F33.0) Major depressive disorder, recurrent episode, mild (H)  Comment:   Plan: escitalopram (LEXAPRO) 20 MG tablet, MENTAL         HEALTH REFERRAL  - Adult; Assessments and         Testing; Neuropsychological Testing; Other:         Behavioral Access ([facility]) [phone];        We will contact you to schedule the appointment        or please call with any questions        See above.  Will refer for neuropsychological testing.     (G47.00) Insomnia, unspecified type  Comment:   Plan: doxepin (SILENOR) 6 MG tablet        Will change Trazodone to Doxepin.  Discussed the use and indication of this medication as well as potential side effects.     (E11.9) Type 2 diabetes mellitus without complication, without long-term current use of insulin (H)  Comment: at goal  Plan: The current medical regimen is effective;  continue present plan and medications.    Follow up in March.

## 2020-01-10 ENCOUNTER — TELEPHONE (OUTPATIENT)
Dept: FAMILY MEDICINE | Facility: CLINIC | Age: 70
End: 2020-01-10

## 2020-01-10 NOTE — TELEPHONE ENCOUNTER
Reason for Call:  Other     Detailed comments: patient was seen on 1-7-2020 said she was given antibiotic and her symptoms are back and would like to know she is to do about it did not stat what symptoms were.    Phone Number Patient can be reached at: Home number on file [phone] (home)    Best Time: any    Can we leave a detailed message on this number? YES    Call taken on 1/10/2020 at 2:06 PM by Treva Samuel

## 2020-01-10 NOTE — TELEPHONE ENCOUNTER
Pt was on zpak at 12/27 for sinuses  Pt thinks her infection may have come back- but now in her lungs  Little feverish  Tiny wheeze with deep breaths in- I didn't hear any  Pt spoke in full sentences without difficulty  She will keep up the mucinex and fluids  Rest and knows to come to [facility] for sob, worsening wheezing, fever, chills, shakes  Pt in aware of uc hours and FNA after hours nurse line- if she wants her sx assessed.   Melba Dominguez RN

## 2020-01-12 ENCOUNTER — NURSE TRIAGE (OUTPATIENT)
Dept: NURSING | Facility: CLINIC | Age: 70
End: 2020-01-12

## 2020-01-12 NOTE — TELEPHONE ENCOUNTER
Encouraged warm fluids to help relax the airway and loosen the phlegm. Also to expose to a warm mist in the foggy bathroom. She is using Mucinex.  Nadine Carter RN-[facility]      Additional Information    Negative: Severe difficulty breathing (e.g., struggling for each breath, speaks in single words)    Negative: Bluish (or gray) lips or face now    Negative: [1] Rapid onset of cough AND [2] has hives    Negative: Coughing started suddenly after medicine, an allergic food or bee sting    Negative: [1] Difficulty breathing AND [2] exposure to flames, smoke, or fumes    Negative: [1] Stridor AND [2] difficulty breathing    Negative: Sounds like a life-threatening emergency to the triager    Negative: Choked on object of food that could be caught in the throat    Negative: Chest pain is main symptom    Negative: [1] Previous asthma attacks AND [2] this feels like asthma attack    Negative: Cough lasts > 3 weeks    Negative: Wet (productive) cough (i.e., white-yellow, yellow, green, or tao colored sputum)    Negative: Chest pain  (Exception: MILD central chest pain, present only when coughing)    Negative: Difficulty breathing    Negative: Patient sounds very sick or weak to the triager    Negative: [1] Coughed up blood AND [2] > 1 tablespoon (15 ml) (Exception: blood-tinged sputum)    Negative: Fever > 103 F (39.4 C)    Negative: [1] Fever > 101 F (38.3 C) AND [2] age > 60    Negative: [1] Fever > 100.0 F (37.8 C) AND [2] bedridden (e.g., nursing home patient, CVA, chronic illness, recovering from surgery)    Negative: [1] Fever > 100.0 F (37.8 C) AND [2] diabetes mellitus or weak immune system (e.g., HIV positive, cancer chemo, splenectomy, chronic steroids)    Negative: Wheezing is present    Negative: [1] Ankle swelling AND [2] swelling is increasing    Negative: SEVERE coughing spells (e.g., whooping sound after coughing, vomiting after coughing)    Negative: [1] Continuous (nonstop) coughing  interferes with work or school AND [2] no improvement using cough treatment per protocol    Negative: Fever present > 3 days (72 hours)    Negative: [1] Fever returns after gone for over 24 hours AND [2] symptoms worse or not improved    Negative: [1] Using nasal washes and pain medicine > 24 hours AND [2] sinus pain (around cheekbone or eye) persists    Negative: Earache is present    Negative: Cough has been present for > 3 weeks    Negative: [1] Nasal discharge AND [2] present > 10 days    Negative: [1] Coughed up blood-tinged sputum AND [2] more than once    Negative: [1] Patient also has allergy symptoms (e.g., itchy eyes, clear nasal discharge, postnasal drip) AND [2] they are acting up    Negative: Taking an ACE Inhibitor medication  (e.g., benazepril/LOTENSIN, captopril/CAPOTEN, enalapril/VASOTEC, lisinopril/ZESTRIL)    Negative: Exposure to TB (Tuberculosis)    Negative: Cough    Negative: Cough with cold symptoms (e.g., runny nose, postnasal drip, throat clearing)    ALSO, mild central chest pain occurs only when coughing    Protocols used: COUGH - ACUTE NON-PRODUCTIVE-A-AH

## 2020-01-27 ENCOUNTER — TELEPHONE (OUTPATIENT)
Dept: FAMILY MEDICINE | Facility: CLINIC | Age: 70
End: 2020-01-27

## 2020-03-04 ENCOUNTER — OFFICE VISIT (OUTPATIENT)
Dept: FAMILY MEDICINE | Facility: CLINIC | Age: 70
End: 2020-03-04
Payer: COMMERCIAL

## 2020-03-04 VITALS
RESPIRATION RATE: 18 BRPM | HEART RATE: 79 BPM | OXYGEN SATURATION: 96 % | BODY MASS INDEX: 31.33 KG/M2 | WEIGHT: 182.5 LBS | TEMPERATURE: 98 F

## 2020-03-04 DIAGNOSIS — F51.01 PRIMARY INSOMNIA: ICD-10-CM

## 2020-03-04 DIAGNOSIS — E11.9 TYPE 2 DIABETES MELLITUS WITHOUT COMPLICATION, WITHOUT LONG-TERM CURRENT USE OF INSULIN (H): ICD-10-CM

## 2020-03-04 DIAGNOSIS — F32.0 MILD MAJOR DEPRESSION (H): ICD-10-CM

## 2020-03-04 DIAGNOSIS — N39.0 URINARY TRACT INFECTION WITHOUT HEMATURIA, SITE UNSPECIFIED: Primary | ICD-10-CM

## 2020-03-04 LAB
ALBUMIN UR-MCNC: NEGATIVE MG/DL
APPEARANCE UR: CLEAR
BACTERIA #/AREA URNS HPF: ABNORMAL /HPF
BILIRUB UR QL STRIP: NEGATIVE
COLOR UR AUTO: YELLOW
GLUCOSE UR STRIP-MCNC: NEGATIVE MG/DL
HBA1C MFR BLD: 5.8 % (ref 0–5.6)
HGB UR QL STRIP: ABNORMAL
KETONES UR STRIP-MCNC: NEGATIVE MG/DL
LEUKOCYTE ESTERASE UR QL STRIP: ABNORMAL
NITRATE UR QL: NEGATIVE
NON-SQ EPI CELLS #/AREA URNS LPF: ABNORMAL /LPF
PH UR STRIP: 7 PH (ref 5–7)
RBC #/AREA URNS AUTO: ABNORMAL /HPF
SOURCE: ABNORMAL
SP GR UR STRIP: 1.02 (ref 1–1.03)
SPECIMEN SOURCE: NORMAL
UROBILINOGEN UR STRIP-ACNC: 0.2 EU/DL (ref 0.2–1)
WBC #/AREA URNS AUTO: ABNORMAL /HPF
WET PREP SPEC: NORMAL

## 2020-03-04 PROCEDURE — 87088 URINE BACTERIA CULTURE: CPT | Performed by: NURSE PRACTITIONER

## 2020-03-04 PROCEDURE — 99214 OFFICE O/P EST MOD 30 MIN: CPT | Performed by: NURSE PRACTITIONER

## 2020-03-04 PROCEDURE — 87086 URINE CULTURE/COLONY COUNT: CPT | Performed by: NURSE PRACTITIONER

## 2020-03-04 PROCEDURE — 36415 COLL VENOUS BLD VENIPUNCTURE: CPT | Performed by: NURSE PRACTITIONER

## 2020-03-04 PROCEDURE — 81001 URINALYSIS AUTO W/SCOPE: CPT | Performed by: NURSE PRACTITIONER

## 2020-03-04 PROCEDURE — 83036 HEMOGLOBIN GLYCOSYLATED A1C: CPT | Performed by: NURSE PRACTITIONER

## 2020-03-04 PROCEDURE — 87210 SMEAR WET MOUNT SALINE/INK: CPT | Performed by: NURSE PRACTITIONER

## 2020-03-04 PROCEDURE — 87186 SC STD MICRODIL/AGAR DIL: CPT | Performed by: NURSE PRACTITIONER

## 2020-03-04 RX ORDER — ZOLPIDEM TARTRATE 5 MG/1
5 TABLET ORAL
Qty: 30 TABLET | Refills: 0 | Status: SHIPPED | OUTPATIENT
Start: 2020-03-04 | End: 2020-04-06

## 2020-03-04 RX ORDER — SULFAMETHOXAZOLE/TRIMETHOPRIM 800-160 MG
1 TABLET ORAL 2 TIMES DAILY
Qty: 6 TABLET | Refills: 0 | Status: SHIPPED | OUTPATIENT
Start: 2020-03-04 | End: 2020-03-07

## 2020-03-04 NOTE — PROGRESS NOTES
"Subjective     Leena Meaghan Montaño is a 69 year old female who presents to clinic today for the following health issues:  Pt has not been taking Lipitor   HPI   URINARY TRACT SYMPTOMS      Duration: 2 night, worse in the afternoon     Description  Itching and irritated, strong urine odor,     Accompanying signs and symptoms: loss of appetite, weight loss, sneezing a lot in the evening; unable to make it to the bathroom     Fell yesterday   Fever/chills: sweating, cold feet last night   Flank pain no   Nausea and vomiting: no   Vaginal symptoms: odor and itching  Abdominal/Pelvic Pain: \"sitting here maybe a little\"     History  History of frequent UTI's: no   History of kidney stones: no   Sexually Active: no   Possibility of pregnancy: No    Precipitating or alleviating factors: None    Therapies tried and outcome: Tylenol 2 nights ago, ibuprofen last night due to fall         She is still not feeling \"happy\".  She is seeing a therapist, Kylie Lara and she recommended that Mylene talk to me about adding a medication (she discussed Trintellix in particular).  She has been on the correct dose of Lexapro for the past 5 weeks.  She is still having difficulty with insomnia in the middle of the night.  Trazodone was not helpful.  Doxepin causes her to feel \"cotton-headed\" the next day.  She was on Ambien years ago when her mom passed away and this worked well.     Her blood sugars have been well-controlled.  The highest it has been in the past few months is 120.            Reviewed and updated as needed this visit by Provider         Review of Systems   ROS COMP: CONSTITUTIONAL: NEGATIVE for fever, chills, change in weight  ENT/MOUTH: NEGATIVE for ear, mouth and throat problems  RESP: NEGATIVE for significant cough or SOB  CV: NEGATIVE for chest pain, palpitations or peripheral edema  GI: NEGATIVE for nausea, abdominal pain, heartburn, or change in bowel habits  : see HPI  NEURO: NEGATIVE for weakness, dizziness " or paresthesias  PSYCHIATRIC: see HPI      Objective    BP (P) 120/65   Pulse 79   Temp 98  F (36.7  C) (Oral)   Resp 18   Wt 82.8 kg (182 lb 8 oz)   LMP  (LMP Unknown)   SpO2 96%   BMI 31.33 kg/m    Body mass index is 31.33 kg/m .  Physical Exam   GENERAL: healthy, alert and no distress  RESP: lungs clear to auscultation - no rales, rhonchi or wheezes  CV: regular rate and rhythm, normal S1 S2, no S3 or S4, no murmur, click or rub, no peripheral edema and peripheral pulses strong  ABDOMEN: soft, nontender, no hepatosplenomegaly, no masses and bowel sounds normal  MS: no gross musculoskeletal defects noted, no edema  BACK: no CVA tenderness, no paralumbar tenderness  PSYCH: mentation appears normal, affect normal/bright    Diagnostic Test Results:  Labs reviewed in Epic  Results for orders placed or performed in visit on 03/04/20 (from the past 24 hour(s))   UA reflex to Microscopic and Culture   Result Value Ref Range    Color Urine Yellow     Appearance Urine Clear     Glucose Urine Negative NEG^Negative mg/dL    Bilirubin Urine Negative NEG^Negative    Ketones Urine Negative NEG^Negative mg/dL    Specific Gravity Urine 1.020 1.003 - 1.035    Blood Urine Small (A) NEG^Negative    pH Urine 7.0 5.0 - 7.0 pH    Protein Albumin Urine Negative NEG^Negative mg/dL    Urobilinogen Urine 0.2 0.2 - 1.0 EU/dL    Nitrite Urine Negative NEG^Negative    Leukocyte Esterase Urine Large (A) NEG^Negative    Source Midstream Urine    Urine Microscopic   Result Value Ref Range    WBC Urine 25-50 (A) OTO5^0 - 5 /HPF    RBC Urine 2-5 (A) OTO2^O - 2 /HPF    Squamous Epithelial /LPF Urine Moderate (A) FEW^Few /LPF    Bacteria Urine Many (A) NEG^Negative /HPF   Wet prep   Result Value Ref Range    Specimen Description Vagina     Wet Prep WBC'S seen  Few       Wet Prep No Trichomonas seen     Wet Prep No clue cells seen     Wet Prep No yeast seen    Hemoglobin A1c   Result Value Ref Range    Hemoglobin A1C 5.8 (H) 0 - 5.6 %            Assessment & Plan     (N39.0) Urinary tract infection without hematuria, site unspecified  (primary encounter diagnosis)  Comment:   Plan: UA reflex to Microscopic and Culture, Wet prep,        Urine Microscopic, Urine Culture Aerobic         Bacterial, sulfamethoxazole-trimethoprim         (BACTRIM DS) 800-160 MG tablet        Discussed the use and indication of this medication as well as potential side effects.     (E11.9) Type 2 diabetes mellitus without complication, without long-term current use of insulin (H)  Comment: at goal  Plan: Hemoglobin A1c        The current medical regimen is effective;  continue present plan and medications.     (F51.01) Primary insomnia  Comment:   Plan: zolpidem (AMBIEN) 5 MG tablet        Discussed the use and indication of this medication as well as potential side effects.     (F32.0) Mild major depression (H)  Comment:   Plan: Discussed options such as adding Trintellix, but is likely too expensive.  We could try adding Wellbutrin  mg in the AM, but will wait to see if symptoms improve with better sleep.            No follow-ups on file.    Jory Fragoso NP  [facility]      Understanding Body Mass Index (BMI)  Body mass index (BMI) is a ratio of your height to your weight. Knowing your BMI is a way of finding whether you are at a healthy weight. The higher your BMI, the greater your risk for weight-related health problems.  What BMI Means  BMI below 18.5: Underweight  BMI 18.5 to 24.9: Healthy weight  BMI 25 to 29.9: Overweight  BMI 30 and over: Obese  Using the BMI Chart  To figure your BMI, find your height and weight (or the numbers closest to them) on the chart below. Follow each column of numbers to where your height and weight meet on the chart. That is your BMI. You can also calculate your BMI using the formula below.  Using the BMI Formula Example   Multiply your weight in pounds by 703. 160 pounds x 703= 609844   Divide the answer by  your height in inches. 924334 ÷ 63 inches= 1785   Divide this number by your height in inches again. This is your BMI. 1785 ÷ 63 inches= BMI of 28               [id number] [employer], [address] [place] [address], [place] [address]. All rights reserved. This information is not intended as a substitute for professional medical care. Always follow your healthcare professional's instructions.

## 2020-03-06 LAB
BACTERIA SPEC CULT: ABNORMAL
SPECIMEN SOURCE: ABNORMAL

## 2020-03-24 ENCOUNTER — TELEPHONE (OUTPATIENT)
Dept: FAMILY MEDICINE | Facility: CLINIC | Age: 70
End: 2020-03-24

## 2020-03-24 NOTE — TELEPHONE ENCOUNTER
"S-(situation): back pain started 2 days ago, this starts under her shoulder blade and now when trying to sit it is coming from her low right buttock and traveling the back side of the leg. When lifts up right foot she feels it pulling . Pain rates as 8 on 1-10 scale. Tried muscle relaxer yesterday \"which put me out\"     B-(background): fell 9 days ago - not sure if this was the cause, had 2 different falls  She is going to [facility] tomorrow    A-(assessment): offered phone visit - she declines, reasonable for homecare until seen in ortho tomorrow    R-(recommendations): she will alternate ibuprofen and tylenol every 6 hours (avoid more than 3000 mg of tylenol   If loss of bowel or bladder control or saddle numbness this could be emergency - she does not have that  Alternate ice and heat for 20 minute intervals    Cira Chandler, RN, BSN             "

## 2020-03-24 NOTE — TELEPHONE ENCOUNTER
Reason for call:  Patient reporting a symptom    Symptom or request: Severe Back Pain    Duration (how long have symptoms been present): 3 Days    Have you been treated for this before? No    Additional comments: None    Phone Number patient can be reached at:  Home number on file [phone] (home)    Best Time:  ASAP    Can we leave a detailed message on this number:  YES    Call taken on 3/24/2020 at 11:51 AM by Claudia Herrera

## 2020-04-06 ENCOUNTER — MYC MEDICAL ADVICE (OUTPATIENT)
Dept: FAMILY MEDICINE | Facility: CLINIC | Age: 70
End: 2020-04-06

## 2020-05-12 ENCOUNTER — TELEPHONE (OUTPATIENT)
Dept: FAMILY MEDICINE | Facility: CLINIC | Age: 70
End: 2020-05-12

## 2020-05-12 DIAGNOSIS — F33.0 MAJOR DEPRESSIVE DISORDER, RECURRENT EPISODE, MILD (H): ICD-10-CM

## 2020-05-12 DIAGNOSIS — F51.01 PRIMARY INSOMNIA: ICD-10-CM

## 2020-05-12 DIAGNOSIS — I10 HYPERTENSION GOAL BP (BLOOD PRESSURE) < 140/90: ICD-10-CM

## 2020-05-12 DIAGNOSIS — F41.9 ANXIETY: ICD-10-CM

## 2020-05-15 RX ORDER — ESCITALOPRAM OXALATE 20 MG/1
TABLET ORAL
Qty: 30 TABLET | Refills: 0 | Status: SHIPPED | OUTPATIENT
Start: 2020-05-15 | End: 2020-08-03

## 2020-05-15 RX ORDER — ZOLPIDEM TARTRATE 5 MG/1
TABLET ORAL
Qty: 10 TABLET | Refills: 0 | Status: SHIPPED | OUTPATIENT
Start: 2020-05-15 | End: 2020-07-20 | Stop reason: SINTOL

## 2020-05-15 NOTE — TELEPHONE ENCOUNTER
Routing refill request to provider for review/approval because:  Drug not on the [facility] refill protocol     Last filled: 4/6/2020 #30 no refills    Last visit: 3/4/2020      MA please call and do a phq-9 and jesus -7.    thanks

## 2020-05-15 NOTE — TELEPHONE ENCOUNTER
Refilled lexapro x 30d and ambien x10d, will route to PCP for consideration of additional refills or see if visit is recommended.    Krista Wood, CNP

## 2020-05-22 ASSESSMENT — ANXIETY QUESTIONNAIRES
1. FEELING NERVOUS, ANXIOUS, OR ON EDGE: SEVERAL DAYS
2. NOT BEING ABLE TO STOP OR CONTROL WORRYING: SEVERAL DAYS
GAD7 TOTAL SCORE: 7
3. WORRYING TOO MUCH ABOUT DIFFERENT THINGS: SEVERAL DAYS
5. BEING SO RESTLESS THAT IT IS HARD TO SIT STILL: SEVERAL DAYS
7. FEELING AFRAID AS IF SOMETHING AWFUL MIGHT HAPPEN: SEVERAL DAYS
IF YOU CHECKED OFF ANY PROBLEMS ON THIS QUESTIONNAIRE, HOW DIFFICULT HAVE THESE PROBLEMS MADE IT FOR YOU TO DO YOUR WORK, TAKE CARE OF THINGS AT HOME, OR GET ALONG WITH OTHER PEOPLE: SOMEWHAT DIFFICULT
6. BECOMING EASILY ANNOYED OR IRRITABLE: SEVERAL DAYS

## 2020-05-22 ASSESSMENT — PATIENT HEALTH QUESTIONNAIRE - PHQ9
5. POOR APPETITE OR OVEREATING: SEVERAL DAYS
SUM OF ALL RESPONSES TO PHQ QUESTIONS 1-9: 10

## 2020-05-22 NOTE — TELEPHONE ENCOUNTER
PHQ 9/25/2019 11/20/2019 5/22/2020   PHQ-9 Total Score 8 11 10   Q9: Thoughts of better off dead/self-harm past 2 weeks Not at all Not at all Not at all     Patient will need more ambien soon.     Sangita Nunez, [facility]

## 2020-05-23 ASSESSMENT — ANXIETY QUESTIONNAIRES: GAD7 TOTAL SCORE: 7

## 2020-06-02 RX ORDER — TRIAMTERENE AND HYDROCHLOROTHIAZIDE 37.5; 25 MG/1; MG/1
1 CAPSULE ORAL DAILY
Qty: 90 CAPSULE | Refills: 0 | Status: SHIPPED | OUTPATIENT
Start: 2020-06-02 | End: 2020-07-30

## 2020-06-02 NOTE — TELEPHONE ENCOUNTER
LVM - pt needs to schedule follow-up with pcp for medication follow-up     Kellie Piña CMA on 6/2/2020 at 10:19 AM

## 2020-06-02 NOTE — TELEPHONE ENCOUNTER
The patient called to advise that she will not be doing an Evisit, phone visit, video visit or in person visit.  The patient is still requesting a refill of triamterene-HCTZ (DYAZIDE) 37.5-25 MG capsule.

## 2020-07-20 DIAGNOSIS — G47.00 INSOMNIA, UNSPECIFIED TYPE: ICD-10-CM

## 2020-07-20 RX ORDER — TRAZODONE HYDROCHLORIDE 50 MG/1
50-100 TABLET, FILM COATED ORAL AT BEDTIME
Qty: 60 TABLET | Refills: 5 | Status: SHIPPED | OUTPATIENT
Start: 2020-07-20 | End: 2021-06-07

## 2020-07-20 NOTE — TELEPHONE ENCOUNTER
Trazodone 50 mg not on pt currect med list. She said she try Zolpidem and didn't really like it. Please send new RX to [facility] for the Trazodone. She only have one tablet left 07/20 will be out tomorrow 07/21. If you have questions please call pt        Thank You,  Blair Madison, [facility]  On behalf of [facility]

## 2020-07-20 NOTE — TELEPHONE ENCOUNTER
She was changed from trazodone to Ambien but does not like it and wants to go back to trazodone. Please sign off on script if correct.  thank  you

## 2020-07-29 DIAGNOSIS — I10 HYPERTENSION GOAL BP (BLOOD PRESSURE) < 140/90: ICD-10-CM

## 2020-07-29 DIAGNOSIS — F33.0 MAJOR DEPRESSIVE DISORDER, RECURRENT EPISODE, MILD (H): ICD-10-CM

## 2020-07-29 DIAGNOSIS — F41.9 ANXIETY: ICD-10-CM

## 2020-07-29 NOTE — LETTER
August 2, 2020      Leena Bender Sabra  [address] [facility] [address]        Val Stern,      We received a refill request for escitalopram (LEXAPRO) 20 MG tablet; however, you are due for a depression follow-up visit to receive a refill.        In order to keep all patients and staff safe during the COVID-19 pandemic, we ask that you schedule a virtual visit which includes a video or telephone visit with your provider.   Please call us at [phone] at your earliest convenience to schedule an appointment.       We greatly appreciate the opportunity to serve you and thank you for trusting us with your health care.        Your health care team at [facility]           Sincerely,        REYNALDO Carballo CNP

## 2020-07-30 RX ORDER — TRIAMTERENE AND HYDROCHLOROTHIAZIDE 37.5; 25 MG/1; MG/1
CAPSULE ORAL
Qty: 90 CAPSULE | Refills: 0 | Status: SHIPPED | OUTPATIENT
Start: 2020-07-30 | End: 2020-10-06

## 2020-07-30 NOTE — TELEPHONE ENCOUNTER
Diuretics (Including Combos) Protocol Aqdxvi0607/29/2020 01:55 PM   Blood pressure under 140/90 in past 12 months Protocol Details    Recent (12 mo) or future (30 days) visit within the authorizing provider's specialty     Medication is active on med list     Patient is age 18 or older     No active pregancy on record     Normal serum creatinine on file in past 12 months     Normal serum potassium on file in past 12 months     Normal serum sodium on file in past 12 months     No positive pregnancy test in past 12 months

## 2020-07-31 NOTE — TELEPHONE ENCOUNTER
PHQ-9 score:    PHQ 5/22/2020   PHQ-9 Total Score 10   Q9: Thoughts of better off dead/self-harm past 2 weeks Not at all

## 2020-07-31 NOTE — TELEPHONE ENCOUNTER
,    Please call pt now. She needs virtual visit for depression f/u. She was asked to do this on 5/27 by [employer]. Please phone her instead as she did not respond.    Will also route to PCP to sign rx for escitalopram so she does not run out. RN cannot sign due to drug interaction.    Thank you,  Cira Chandler RN

## 2020-08-02 NOTE — TELEPHONE ENCOUNTER
LM to call back and schedule a depression f/u visit. Pt has Stypihart, but not responding. Sent [employer] message and letter.    Cassandra KNOWLES  [facility]    Routing to nurse team due to pending medication.

## 2020-08-03 RX ORDER — ESCITALOPRAM OXALATE 20 MG/1
TABLET ORAL
Qty: 30 TABLET | Refills: 0 | Status: SHIPPED | OUTPATIENT
Start: 2020-08-03 | End: 2020-08-31

## 2020-08-17 ENCOUNTER — TELEPHONE (OUTPATIENT)
Dept: FAMILY MEDICINE | Facility: CLINIC | Age: 70
End: 2020-08-17

## 2020-08-17 ENCOUNTER — MEDICAL CORRESPONDENCE (OUTPATIENT)
Dept: HEALTH INFORMATION MANAGEMENT | Facility: CLINIC | Age: 70
End: 2020-08-17

## 2020-08-17 NOTE — TELEPHONE ENCOUNTER
Received forms from [employer]    Faxed singed order for water chamber for CPAP and made copy for abstraction.    Diagnoses: obstructive sleep apnea     May Sears MA

## 2020-08-31 ENCOUNTER — MYC MEDICAL ADVICE (OUTPATIENT)
Dept: FAMILY MEDICINE | Facility: CLINIC | Age: 70
End: 2020-08-31

## 2020-08-31 DIAGNOSIS — F41.9 ANXIETY: ICD-10-CM

## 2020-08-31 DIAGNOSIS — F33.0 MAJOR DEPRESSIVE DISORDER, RECURRENT EPISODE, MILD (H): ICD-10-CM

## 2020-08-31 RX ORDER — ESCITALOPRAM OXALATE 20 MG/1
20 TABLET ORAL DAILY
Qty: 30 TABLET | Refills: 0 | Status: SHIPPED | OUTPATIENT
Start: 2020-08-31 | End: 2020-09-14

## 2020-09-08 ENCOUNTER — NURSE TRIAGE (OUTPATIENT)
Dept: NURSING | Facility: CLINIC | Age: 70
End: 2020-09-08

## 2020-09-08 NOTE — TELEPHONE ENCOUNTER
"Pt calling  Neighbor is covid + , pt last contact with neighbor  was 9/2/202  No fever  +HA mild & ' off & on\"  + body aches \" that's normal for me\"  +cough for 24 hrs\" then it went away\"  Runny nose   No fever   No SOB, or chest tightness  Per protocol pt to be evaluated today  Referred pt to [email or web address]  Reviewed care advice & when to call back  Pt agrees with plan  Robyn Goodwin RN  [facility]    Reason for Disposition    [1] Symptoms of COVID-19 (e.g., cough, fever, SOB, or others) AND [2] within 14 days of EXPOSURE (close contact) with diagnosed or suspected COVID-19 patient    HIGH RISK patient (e.g., age > 64 years, diabetes, heart or lung disease, weak immune system) (Exception: Has already been evaluated by healthcare provider and has no new or worsening symptoms)    Additional Information    Negative: SEVERE difficulty breathing (e.g., struggling for each breath, speaks in single words)    Negative: Difficult to awaken or acting confused (e.g., disoriented, slurred speech)    Negative: Bluish (or gray) lips or face now    Negative: Shock suspected (e.g., cold/pale/clammy skin, too weak to stand, low BP, rapid pulse)    Negative: Sounds like a life-threatening emergency to the triager    Negative: COVID-19 has been diagnosed by a healthcare provider (HCP)    Negative: COVID-19 lab test positive    Negative: [1] Symptoms of COVID-19 (e.g., cough, fever, SOB, or others) AND [2] lives in an area with community spread    Negative: [1] Symptoms of COVID-19 (e.g., cough, fever, SOB, or others) AND [2] within 14 days of travel from high-risk area for COVID-19 community spread (identified by CDC)    Negative: [1] Difficulty breathing (shortness of breath) occurs AND [2] onset > 14 days after COVID-19 EXPOSURE (Close Contact) AND [3] no community spread where patient lives    Negative: [1] Dry cough occurs AND [2] onset > 14 days after COVID-19 EXPOSURE AND [3] no community spread where patient " lives    Negative: [1] COVID-19 exposure AND [2] no symptoms    Negative: COVID-19 and Breastfeeding, questions about    Negative: [1] Adult with possible COVID-19 symptoms AND [2] triager concerned about severity of symptoms or other causes    Negative: Patient sounds very sick or weak to the triager    Negative: SEVERE or constant chest pain or pressure (Exception: mild central chest pain, present only when coughing)    Negative: MODERATE difficulty breathing (e.g., speaks in phrases, SOB even at rest, pulse 100-120)    Negative: [1] Fever > 100.0 F (37.8 C) AND [2] bedridden (e.g., nursing home patient, CVA, chronic illness, recovering from surgery)    Negative: [1] Fever > 101 F (38.3 C) AND [2] age > 60    Negative: Fever > 103 F (39.4 C)    Negative: Chest pain or pressure    Negative: MILD difficulty breathing (e.g., minimal/no SOB at rest, SOB with walking, pulse <100)    Protocols used: CORONAVIRUS (COVID-19) EXPOSURE-A- 8.4.20, CORONAVIRUS (COVID-19) DIAGNOSED OR ELIADYDQG-T-AX 8.4.20    COVID 19 Nurse Triage Plan/Patient Instructions    Please be aware that novel coronavirus (COVID-19) may be circulating in the community. If you develop symptoms such as fever, cough, or SOB or if you have concerns about the presence of another infection including coronavirus (COVID-19), please contact your health care provider or visit [email or web address].     Disposition/Instructions    Virtual Visit with provider recommended. Reference Visit Selection Guide.    Thank you for taking steps to prevent the spread of this virus.  o Limit your contact with others.  o Wear a simple mask to cover your cough.  o Wash your hands well and often.    Resources    [facility]: About COVID-19: [email or web address]    CDC: What to Do If You're Sick: www.cdc.gov/coronavirus/2019-ncov/about/steps-when-sick.html    CDC: Ending Home Isolation: www.cdc.gov/coronavirus/2019-ncov/hcp/disposition-in-home-patients.html     CDC:  Caring for Someone: www.cdc.gov/coronavirus/2019-ncov/if-you-are-sick/care-for-someone.html     [facility]: Interim Guidance for Hospital Discharge to Home: www.health.[facility].[place]/diseases/coronavirus/hcp/hospdischarge.pdf    [facility] clinical trials (COVID-19 research studies): [email or web address]-clinical-trials     Below are the COVID-19 hotlines at the [place] Department of [facility] ([facility]). Interpreters are available.   o For health questions: Call 075-519-2278 or 1-414.794.2225 (7 a.m. to 7 p.m.)  o For questions about schools and childcare: Call 400-781-2520 or 1-802.509.5038 (7 a.m. to 7 p.m.)

## 2020-10-06 ENCOUNTER — OFFICE VISIT (OUTPATIENT)
Dept: FAMILY MEDICINE | Facility: CLINIC | Age: 70
End: 2020-10-06
Payer: COMMERCIAL

## 2020-10-06 VITALS
SYSTOLIC BLOOD PRESSURE: 139 MMHG | BODY MASS INDEX: 33.47 KG/M2 | WEIGHT: 195 LBS | DIASTOLIC BLOOD PRESSURE: 84 MMHG | TEMPERATURE: 98.6 F | OXYGEN SATURATION: 98 % | HEART RATE: 64 BPM

## 2020-10-06 DIAGNOSIS — E11.9 TYPE 2 DIABETES MELLITUS WITHOUT COMPLICATION, WITHOUT LONG-TERM CURRENT USE OF INSULIN (H): ICD-10-CM

## 2020-10-06 DIAGNOSIS — Z78.0 ASYMPTOMATIC MENOPAUSE: ICD-10-CM

## 2020-10-06 DIAGNOSIS — Z23 NEED FOR PROPHYLACTIC VACCINATION AND INOCULATION AGAINST INFLUENZA: ICD-10-CM

## 2020-10-06 DIAGNOSIS — Z00.00 ENCOUNTER FOR MEDICARE ANNUAL WELLNESS EXAM: Primary | ICD-10-CM

## 2020-10-06 DIAGNOSIS — F41.9 ANXIETY: ICD-10-CM

## 2020-10-06 DIAGNOSIS — L98.9 SKIN LESION: ICD-10-CM

## 2020-10-06 DIAGNOSIS — F33.0 MAJOR DEPRESSIVE DISORDER, RECURRENT EPISODE, MILD (H): ICD-10-CM

## 2020-10-06 DIAGNOSIS — E55.9 VITAMIN D DEFICIENCY: ICD-10-CM

## 2020-10-06 DIAGNOSIS — E78.5 HYPERLIPIDEMIA LDL GOAL <130: ICD-10-CM

## 2020-10-06 DIAGNOSIS — E03.9 ACQUIRED HYPOTHYROIDISM: ICD-10-CM

## 2020-10-06 DIAGNOSIS — Z12.31 VISIT FOR SCREENING MAMMOGRAM: ICD-10-CM

## 2020-10-06 DIAGNOSIS — M79.672 PAIN IN BOTH FEET: ICD-10-CM

## 2020-10-06 DIAGNOSIS — I10 HYPERTENSION GOAL BP (BLOOD PRESSURE) < 140/90: ICD-10-CM

## 2020-10-06 DIAGNOSIS — M79.671 PAIN IN BOTH FEET: ICD-10-CM

## 2020-10-06 LAB
ANION GAP SERPL CALCULATED.3IONS-SCNC: 8 MMOL/L (ref 3–14)
BUN SERPL-MCNC: 18 MG/DL (ref 7–30)
CALCIUM SERPL-MCNC: 8.9 MG/DL (ref 8.5–10.1)
CHLORIDE SERPL-SCNC: 106 MMOL/L (ref 94–109)
CHOLEST SERPL-MCNC: 252 MG/DL
CO2 SERPL-SCNC: 24 MMOL/L (ref 20–32)
CREAT SERPL-MCNC: 0.71 MG/DL (ref 0.52–1.04)
CREAT UR-MCNC: 41 MG/DL
DEPRECATED CALCIDIOL+CALCIFEROL SERPL-MC: 42 UG/L (ref 20–75)
GFR SERPL CREATININE-BSD FRML MDRD: 87 ML/MIN/{1.73_M2}
GLUCOSE SERPL-MCNC: 102 MG/DL (ref 70–99)
HBA1C MFR BLD: 6 % (ref 0–5.6)
HDLC SERPL-MCNC: 52 MG/DL
LDLC SERPL CALC-MCNC: 171 MG/DL
MICROALBUMIN UR-MCNC: <5 MG/L
MICROALBUMIN/CREAT UR: NORMAL MG/G CR (ref 0–25)
NONHDLC SERPL-MCNC: 200 MG/DL
POTASSIUM SERPL-SCNC: 4.1 MMOL/L (ref 3.4–5.3)
SODIUM SERPL-SCNC: 138 MMOL/L (ref 133–144)
TRIGL SERPL-MCNC: 146 MG/DL
TSH SERPL DL<=0.005 MIU/L-ACNC: 2.02 MU/L (ref 0.4–4)

## 2020-10-06 PROCEDURE — G0008 ADMIN INFLUENZA VIRUS VAC: HCPCS | Performed by: NURSE PRACTITIONER

## 2020-10-06 PROCEDURE — 90662 IIV NO PRSV INCREASED AG IM: CPT | Performed by: NURSE PRACTITIONER

## 2020-10-06 PROCEDURE — 80048 BASIC METABOLIC PNL TOTAL CA: CPT | Performed by: NURSE PRACTITIONER

## 2020-10-06 PROCEDURE — 99213 OFFICE O/P EST LOW 20 MIN: CPT | Mod: 25 | Performed by: NURSE PRACTITIONER

## 2020-10-06 PROCEDURE — 84443 ASSAY THYROID STIM HORMONE: CPT | Performed by: NURSE PRACTITIONER

## 2020-10-06 PROCEDURE — 99397 PER PM REEVAL EST PAT 65+ YR: CPT | Mod: 25 | Performed by: NURSE PRACTITIONER

## 2020-10-06 PROCEDURE — 80061 LIPID PANEL: CPT | Performed by: NURSE PRACTITIONER

## 2020-10-06 PROCEDURE — 36415 COLL VENOUS BLD VENIPUNCTURE: CPT | Performed by: NURSE PRACTITIONER

## 2020-10-06 PROCEDURE — 83036 HEMOGLOBIN GLYCOSYLATED A1C: CPT | Performed by: NURSE PRACTITIONER

## 2020-10-06 PROCEDURE — 82306 VITAMIN D 25 HYDROXY: CPT | Performed by: NURSE PRACTITIONER

## 2020-10-06 PROCEDURE — 99207 PR FOOT EXAM NO CHARGE: CPT | Performed by: NURSE PRACTITIONER

## 2020-10-06 PROCEDURE — 82043 UR ALBUMIN QUANTITATIVE: CPT | Performed by: NURSE PRACTITIONER

## 2020-10-06 RX ORDER — TRIAMTERENE AND HYDROCHLOROTHIAZIDE 37.5; 25 MG/1; MG/1
CAPSULE ORAL
Qty: 90 CAPSULE | Refills: 3 | Status: SHIPPED | OUTPATIENT
Start: 2020-10-06 | End: 2021-11-29

## 2020-10-06 RX ORDER — ESCITALOPRAM OXALATE 20 MG/1
20 TABLET ORAL DAILY
Qty: 90 TABLET | Status: SHIPPED | OUTPATIENT
Start: 2020-10-06 | End: 2021-12-22 | Stop reason: ALTCHOICE

## 2020-10-06 RX ORDER — LEVOTHYROXINE SODIUM 75 UG/1
75 TABLET ORAL DAILY
Qty: 90 TABLET | Status: SHIPPED | OUTPATIENT
Start: 2020-10-06 | End: 2021-11-03

## 2020-10-06 RX ORDER — ATORVASTATIN CALCIUM 10 MG/1
10 TABLET, FILM COATED ORAL DAILY
Qty: 90 TABLET | Refills: 3 | Status: SHIPPED | OUTPATIENT
Start: 2020-10-06 | End: 2022-01-18

## 2020-10-06 ASSESSMENT — PATIENT HEALTH QUESTIONNAIRE - PHQ9: SUM OF ALL RESPONSES TO PHQ QUESTIONS 1-9: 9

## 2020-10-06 NOTE — PROGRESS NOTES
"  SUBJECTIVE:   Leena Montaño is a 70 year old female who presents for Preventive Visit.        Patient has been advised of split billing requirements and indicates understanding: Yes     Are you in the first 12 months of your Medicare Part B coverage?  No    Physical Health:    In general, how would you rate your overall physical health? fair    Outside of work, how many days during the week do you exercise? Dog walking, outside chores     Outside of work, approximately how many minutes a day do you exercise? 1 hour     If you drink alcohol do you typically have >3 drinks per day or >7 drinks per week? No    Do you usually eat at least 4 servings of fruit and vegetables a day, include whole grains & fiber and avoid regularly eating high fat or \"junk\" foods? Yes    Do you have any problems taking medications regularly?  No, rarely     Do you have any side effects from medications? none    Needs assistance for the following daily activities: no assistance needed    Which of the following safety concerns are present in your home?  throw rugs in the hallway and lack of grab bars in the bathroom     Hearing impairment: No    In the past 6 months, have you been bothered by leaking of urine? yes    Mental Health:    In general, how would you rate your overall mental or emotional health? excellent  PHQ-2 Score:      Do you feel safe in your environment? Yes    Have you ever done Advance Care Planning? (For example, a Health Directive, POLST, or a discussion with a medical provider or your loved ones about your wishes): gave pt information     Additional concerns to address?      Fall risk:  Fallen 2 or more times in the past year?: No  Any fall with injury in the past year?: Yes(Left wrist/thumb. dogs. trying to slow down in the house)    Cognitive Screenin) Repeat 3 items (Leader, Season, Table)    2) Clock draw: NORMAL  3) 3 item recall: Recalls 3 objects  Results: 3 items recalled: COGNITIVE IMPAIRMENT " LESS LIKELY    Mini-CogTM Copyright DARREN Kothari. Licensed by the author for use in [facility]; reprinted with permission ([email or web address]). All rights reserved.      PHQ 11/20/2019 5/22/2020 10/6/2020   PHQ-9 Total Score 11 10 9   Q9: Thoughts of better off dead/self-harm past 2 weeks Not at all Not at all Not at all         Her mood is improved, feels that she is doing better with therapy and her current dose of Lexapro.  She is more active with her hobbies and finding esther in more things now, such as her dogs.  Not as excessively worried about COVID, less anxious.  She is sleeping better.     She is occasionally checking blood sugars, usually around 106.    She may want a referral for physical therapy for bilateral foot pain, will send me the name of a specific therapist she wants to see.     She has a skin lesion on her left cheek, which has been present for at least 6 months.           Reviewed and updated as needed this visit by clinical staff                 Reviewed and updated as needed this visit by Provider                Social History     Tobacco Use     Smoking status: Never Smoker     Smokeless tobacco: Never Used     Tobacco comment: am using sm. amt. of marijuana for sleep/pain   Substance Use Topics     Alcohol use: Yes     Comment: really managed/not my drug to enjoy. marijuana 3 times per week                            Current providers sharing in care for this patient include:   Patient Care Team:  Jory Fragoso NP as PCP - General  Jory Fragoso NP as Assigned PCP    The following health maintenance items are reviewed in Epic and correct as of today:  Health Maintenance   Topic Date Due     EYE EXAM  1950     HEPATITIS B IMMUNIZATION (1 of 3 - Risk 3-dose series) 05/26/1970     ZOSTER IMMUNIZATION (1 of 2) 05/26/2001     ADVANCE CARE PLANNING  01/31/2017     MAMMO SCREENING  06/13/2019     INFLUENZA VACCINE (1) 09/01/2020     A1C  09/04/2020     LIPID  09/25/2020      "MICROALBUMIN  09/25/2020     FALL RISK ASSESSMENT  09/25/2020     BMP  12/04/2020     TSH W/FREE T4 REFLEX  12/04/2020     PHQ-9  04/06/2021     MEDICARE ANNUAL WELLNESS VISIT  10/06/2021     DIABETIC FOOT EXAM  10/06/2021     COLORECTAL CANCER SCREENING  10/11/2022     DTAP/TDAP/TD IMMUNIZATION (3 - Td) 07/03/2028     DEXA  Completed     HEPATITIS C SCREENING  Completed     DEPRESSION ACTION PLAN  Completed     Pneumococcal Vaccine: 65+ Years  Completed     Pneumococcal Vaccine: Pediatrics (0 to 5 Years) and At-Risk Patients (6 to 64 Years)  Aged Out     IPV IMMUNIZATION  Aged Out     MENINGITIS IMMUNIZATION  Aged Out           ROS:  CONSTITUTIONAL: NEGATIVE for fever, chills, change in weight  INTEGUMENTARY/SKIN: see HPI  ENT/MOUTH: NEGATIVE for ear, mouth and throat problems  RESP: NEGATIVE for significant cough or SOB  CV: NEGATIVE for chest pain, palpitations or peripheral edema  GI: NEGATIVE for nausea, abdominal pain, heartburn, or change in bowel habits  MUSCULOSKELETAL: see HPI  NEURO: mild numbness of feet  ENDOCRINE: NEGATIVE for temperature intolerance, skin/hair changes  PSYCHIATRIC: NEGATIVE for changes in mood or affect    OBJECTIVE:   /84   Pulse 64   Temp 98.6  F (37  C) (Oral)   Wt 88.5 kg (195 lb)   LMP  (LMP Unknown)   SpO2 98%   BMI 33.47 kg/m   Estimated body mass index is 33.47 kg/m  as calculated from the following:    Height as of 12/27/19: 1.626 m (5' 4\").    Weight as of this encounter: 88.5 kg (195 lb).  EXAM:   GENERAL: healthy, alert and no distress  EYES: Eyes grossly normal to inspection, PERRL and conjunctivae and sclerae normal  HENT: ear canals and TM's normal, nose and mouth without ulcers or lesions  NECK: no adenopathy, no asymmetry, masses, or scars and thyroid normal to palpation  RESP: lungs clear to auscultation - no rales, rhonchi or wheezes  BREAST: normal without masses, tenderness or nipple discharge and no palpable axillary masses or adenopathy  CV: regular " rate and rhythm, normal S1 S2, no S3 or S4, no murmur, click or rub, no peripheral edema and peripheral pulses strong  ABDOMEN: soft, nontender, no hepatosplenomegaly, no masses and bowel sounds normal  MS: hammertoes bilaterally  SKIN: erythematous, slightly scaly patch left cheek  NEURO: Normal strength and tone, mentation intact and speech normal; decreased sensation feet bilaterally  PSYCH: mentation appears normal, affect normal/bright    Diagnostic Test Results:  Labs reviewed in Epic  Results for orders placed or performed in visit on 10/06/20 (from the past 24 hour(s))   Hemoglobin A1c   Result Value Ref Range    Hemoglobin A1C 6.0 (H) 0 - 5.6 %       ASSESSMENT / PLAN:   (Z00.00) Encounter for Medicare annual wellness exam  (primary encounter diagnosis)  Comment:   Plan:     (E11.9) Type 2 diabetes mellitus without complication, without long-term current use of insulin (H)  Comment: at goal  Plan: FOOT EXAM, Albumin Random Urine Quantitative         with Creat Ratio, Hemoglobin A1c, Lipid panel         reflex to direct LDL Fasting, Basic metabolic         panel  (Ca, Cl, CO2, Creat, Gluc, K, Na, BUN),         atorvastatin (LIPITOR) 10 MG tablet, blood         glucose (ONETOUCH ULTRA) test strip        The current medical regimen is effective;  continue present plan and medications.  Follow up in 6 months.     (F33.0) Major depressive disorder, recurrent episode, mild (H)  Comment: in remission  Plan: escitalopram (LEXAPRO) 20 MG tablet        The current medical regimen is effective;  continue present plan and medications.   Continue to see therapist.  Discussed start using her light box.     (F41.9) Anxiety  Comment:   Plan: escitalopram (LEXAPRO) 20 MG tablet        See above.     (E03.9) Acquired hypothyroidism  Comment:   Plan: levothyroxine (SYNTHROID/LEVOTHROID) 75 MCG         tablet, TSH with free T4 reflex        The current medical regimen is effective;  continue present plan and medications. Will  "adjust dose if needed based on test results.      (E78.5) Hyperlipidemia LDL goal <130  Comment:   Plan: Lipid panel reflex to direct LDL Fasting,         atorvastatin (LIPITOR) 10 MG tablet            (I10) Hypertension goal BP (blood pressure) < 140/90  Comment: at goal  Plan: Basic metabolic panel  (Ca, Cl, CO2, Creat,         Gluc, K, Na, BUN), triamterene-HCTZ (DYAZIDE)         37.5-25 MG capsule        The current medical regimen is effective;  continue present plan and medications.     (Z78.0) Asymptomatic menopause  Comment:   Plan: DX Hip/Pelvis/Spine            (Z23) Need for prophylactic vaccination and inoculation against influenza  Comment:   Plan: FLUZONE HIGH DOSE 65+  [01506], Vaccine         Administration, Initial [28511]            (Z12.31) Visit for screening mammogram  Comment:   Plan: *MA Screening Digital Bilateral            (L98.9) Skin lesion  Comment:   Plan: DERMATOLOGY ADULT REFERRAL        Will refer to derm.     (M79.671,  M79.672) Pain in both feet  Comment:   Plan: She will contact me for a PT referral.     Patient has been advised of split billing requirements and indicates understanding:     COUNSELING:  Reviewed preventive health counseling, as reflected in patient instructions    Estimated body mass index is 33.47 kg/m  as calculated from the following:    Height as of 12/27/19: 1.626 m (5' 4\").    Weight as of this encounter: 88.5 kg (195 lb).    Weight management plan: Discussed healthy diet and exercise guidelines    She reports that she has never smoked. She has never used smokeless tobacco.    Appropriate preventive services were discussed with this patient, including applicable screening as appropriate for cardiovascular disease, diabetes, osteopenia/osteoporosis, and glaucoma.  As appropriate for age/gender, discussed screening for colorectal cancer, prostate cancer, breast cancer, and cervical cancer. Checklist reviewing preventive services available has been given to the " patient.    Reviewed patients plan of care and provided an AVS. The Basic Care Plan (routine screening as documented in Health Maintenance) for Leena meets the Care Plan requirement. This Care Plan has been established and reviewed with the Patient.    Counseling Resources:  ATP IV Guidelines  Pooled Cohorts Equation Calculator  Breast Cancer Risk Calculator  BRCA-Related Cancer Risk Assessment: FHS-7 Tool  FRAX Risk Assessment  ICSI Preventive Guidelines  Dietary Guidelines for Americans, 2010  USDA's MyPlate  ASA Prophylaxis  Lung CA Screening    Jory Fragoso NP  [facility]

## 2020-10-06 NOTE — PATIENT INSTRUCTIONS
0CHIEF COMPLAINT  Patient was seen today in in the clinic for follow-up on uncontrolled type 2 diabetes.  HISTORY OF PRESENT ILLNESS:  The patient is a 53 year old female with a 7 year history of type 2 diabetes was last seen in the clinic on 18 when her A1c was elevated at 10.3 % for which insulin adjustments were made and she returns today for follow-up.    Current treatment:  metformin 1000 mg p.o. b.i.d., Lantus 72 units at bedtime, Humalog 8 units before breakfast, 10 units with lunch and 10 units before supperplus the correction scale of 1 extra unit for every 50 points greater than 150. Bydureon 2 mg subcutaneous q. week    Fingerstick checks/ results: 1-3 times/ day   fastin-165, few times higher 180-200s  Lunch: 148-209  Supper: 118-278  Bedtime: 145-296  Hypoglycemia: None    Reports being more compliant with diet.     Her most recent A1c was 10.3 % .  Hemoglobin A1C (no units)   Date Value   2018 10.3   2017 7.9     The patient also has hyperlipidemia for which she is currently using pravastatin 40 mg and fenofibrate 145 mg daily and reports compliance with this medication-being managed by her primary care physician.    Patient also has history of microalbuminuria/HTN for which she is now taking losartan 50 mg daily( changed from lisinopril for dry cough)    Current Outpatient Prescriptions   Medication Sig Dispense Refill   • insulin lispro (HUMALOG KWIKPEN) 100 UNIT/ML pen-injector Inject with meals three times per day: 10 units bkfst, 12 units lunch, 14 units supper + 1 for 50 >150. Max total 45 units/day. 15 mL 11   • TRICOR 145 MG tablet TAKE ONE TABLET BY MOUTH ONCE DAILY 30 tablet 1   • Lancets 28G Misc Tests qid, E11.8, E11.65, Freestyle meter 360 each 3   • insulin glargine (LANTUS SOLOSTAR) 100 UNIT/ML pen-injector Inject 72 units subcutaneously once daily in the evening 30 mL 5   • losartan (COZAAR) 50 MG tablet Take 1 tablet by mouth daily. 30 tablet 11   • metformin  Dexa Scan scheduling telephone #: [phone]    Mammogram Scheduling telephone #: [phone]          Patient Education   Personalized Prevention Plan  You are due for the preventive services outlined below.  Your care team is available to assist you in scheduling these services.  If you have already completed any of these items, please share that information with your care team to update in your medical record.  Health Maintenance Due   Topic Date Due     Eye Exam  1950     Hepatitis B Vaccine (1 of 3 - Risk 3-dose series) 05/26/1970     Zoster (Shingles) Vaccine (1 of 2) 05/26/2001     Discuss Advance Care Planning  01/31/2017     Mammogram  06/13/2019     Flu Vaccine (1) 09/01/2020     A1C Lab  09/04/2020     Cholesterol Lab  09/25/2020     Kidney Microalbumin Urine Test  09/25/2020     Diabetic Foot Exam  09/25/2020     FALL RISK ASSESSMENT  09/25/2020         (GLUCOPHAGE) 1000 MG tablet TAKE ONE TABLET BY MOUTH TWICE DAILY 180 tablet 1   • MAGNESIUM OXIDE PO Take 1 tablet by mouth every other day. Uses for headaches     • BD PEN NEEDLE MALACHI U/F 32G X 4 MM Misc USE WITH LANTUS INSULIN ONCE DAILY 100 each 11   • pravastatin (PRAVACHOL) 40 MG tablet TAKE ONE TABLET BY MOUTH ONCE DAILY 90 tablet 1   • blood glucose (FREESTYLE LITE) test strip Freestyle lite Test Strips: Use to test blood sugar four times a day. DX: E11.8 100 strip 11   • exenatide ER (BYDUREON) 2 MG pen-injector Inject 2 mg into the skin once a week. 4 each 11   • Multiple Vitamins-Minerals (MULTIVITAMIN PO) Take 1 tablet by mouth daily.     • Ascorbic Acid (VITAMIN C) 1000 MG tablet Take 1,000 mg by mouth daily.     • cholecalciferol (VITAMIN D3) 1000 UNITS tablet Take 1,000 Units by mouth daily.     • Olopatadine HCl (PATADAY) 0.2 % ophthalmic solution Place 1 drop into both eyes daily. 5 mL 12     No current facility-administered medications for this visit.          DIABETES SURVEILLANCE:  Dilated eye exam: 3-2-17: No diabetic retinopathy  Annual surveillance labs:   Hemoglobin A1C (no units)   Date Value   01/18/2018 10.3    ,   CHOLESTEROL (mg/dL)   Date Value   02/20/2017 240 (H)     HDL (mg/dL)   Date Value   02/20/2017 50 (L)     CHOL/HDL (no units)   Date Value   02/20/2017 4.8 (H)     TRIGLYCERIDE (mg/dL)   Date Value   02/20/2017 136     CALCULATED LDL (mg/dL)   Date Value   02/20/2017 163 (H)    ,   MICROALBUMIN/CREATININE (mcg/mg)   Date Value   08/21/2017     UNABLE TO CALCULATE DUE TO LOW ANALYTE CONCENTRATION.    ,   Creatinine (mg/dL)   Date Value   08/21/2017 0.51    ,   Potassium (mmol/L)   Date Value   08/21/2017 4.7    ,   ALT/SGPT (Units/L)   Date Value   02/20/2017 29     ,   TSH (mcUnits/mL)   Date Value   02/22/2016 2.126       REVIEW OF SYSTEMS  Constitutional:  [] Fatigue  []  Fever  [] Chills  [] Sleep Disturbance   []  Night Sweats [] Dizziness  [] Excessive Sweating  []   Increased Appetite     [] Decreased Appetite  []Weight loss   []Weight gain  [x]  All others negative  Ear, Nose,Throat:  [] Neck lump  [] Lump in throat [] Neck pain  [] Voice change  [] Sore throat  [] Hearing loss [] Difficulty swallowing  [] Difficulty breathing  [x]  All others negative  Eyes:  [] Eye Redness [] Eye pain  [] Visual disturbances  [] Eye discharge [] Eye itching  [x]  All others negative  Respiratory:  [x] Cough-dry, much improved after switching lisinopril to losartan  [] Wheezing  []  Choking    []  Shortness of breath  [x]  All others negative  Cardiovascular:  [] Chest pain  [] Palpitations  [] Shortness of breath  [x]  All others negative  Endocrine:  [] Hair Loss  []  Polyuria  []  Polydipsia  [] Polyphagia  [] Hot flash  [] Cold Intolerance [] Heat Intolerance   [x]  All others negative  Gastrointestinal:  [] Nausea  [] Vomiting  [] Constipation [] Diarrhea  [] Abdominal Pain  [x]  All others negative  Musculoskeletal:  [] Arthralgias  [] Back Pain  [] Myalgias  [] Gait Problem  [x]  All others negative  Genitourinary:  [] Frequency  [] Urgency  [] Nocturia  [] Difficulty urinating  [] Pelvic pain  [] Vaginal dryness [] Vaginal pain [] Flank pain  [] Hematuria  [] Menstrual problem   [x]  All others negative  Integumentary:  [] Dry Skin  [] Rash  [] Wound  [x]  All others negative  Hematologic/Lymphatic:  [] Easy bruising [] Enlarged Lymph Nodes  [x]  All others negative  Neurologic:  [] Headache  [] Dizziness  [] Numbness  [] Tingling  [] Tremors  [] Weakness  [x]  All others negative  Psychiatric:  [] Mood changes [] Anxiety  [] Agitation  [] Confusion  [] Decreased concentration   [x]  All others negative   Allergic/Immunologic:  [] Rash  [] Tongue Swelling  [x]  All others negative              Past Medical History:   Diagnosis Date   • DM (diabetes mellitus), type 2, uncontrolled (CMS/[facility]) 2/9/2016   • DM (diabetes mellitus), type 2, uncontrolled with complications (CMS/[facility]) 7/14/2016    • DM type 2 (diabetes mellitus, type 2) (CMS/[facility])    • Dry skin 2/9/2016   • Exogenous obesity    • Hyperlipidemia    • Hypertension        No past surgical history on file.    Family History   Problem Relation Age of Onset   • Diabetes Father    • Cataracts Father    • Kidney disease Sister    • Diabetes Sister      pre-diabetic   • Diabetes Brother    • High blood pressure Brother    • Diabetes Brother    • Glaucoma Paternal Aunt    • Glaucoma Brother        Social History     Social History   • Marital status: Single     Spouse name: N/A   • Number of children: N/A   • Years of education: N/A     Occupational History   • Not on file.     Social History Main Topics   • Smoking status: Passive Smoke Exposure - Never Smoker   • Smokeless tobacco: Never Used   • Alcohol use Not on file   • Drug use: Unknown   • Sexual activity: Not on file     Other Topics Concern   • Not on file     Social History Narrative   • No narrative on file       ALLERGIES:   Allergen Reactions   • Amoxicillin RASH   • Ceftin [Cefuroxime Axetil] RASH   • Levaquin Other (See Comments)     abdomenial pain,nausea   • Seafood RASH        PHYSICAL EXAMINATION:  VITAL SIGNS:   /90   Pulse 90   Ht 5' 2.01\" (1.575 m)   Wt 112.9 kg   BMI 45.51 kg/m²  , Body mass index is 45.51 kg/m². .  PHYSICAL EXAM:  GENERAL APPEARANCE:  [x] Obese [x] Pleasant  [x] no acute distress  [x] Cooperative White female   [] Appears chronological age  [] Anxious  MENTAL STATUS:  [x]  Alert & oriented x3   [x] Normal mood  [x] Judgement appears intact  EYE:  [x] Conjunctivae clear  [x]  Sclerae anicteric  [x]  Extraocular muscles intact                             []  Visual fields normal by confrontation  []  No lid lag   []  Exophthalmos  []  Lid lag present  [x]  No exophthalmos  ENT:  [x]  External inspection of ears & nose is normal  [x]  Hearing intact to normal conversation  NECK:  [x]  Supple   [x]  Trachea midline    [x]  No thyromegaly  []  No thyroid  nodules     []  Thyromegaly  []  Thyroid nodules  []  Thyroid bruit  CARDIOVASCULAR:  [x]  Regular rate  & rhythm [x] S1, S2 normal  [x]  No murmurs present []  Murmurs present  RESPIRATORY:  [x]  Respiratory effort normal [x]  Lungs clear to ascultation bilaterally  [x]  No added sounds  []  Wheezing  GASTROINTESTINAL:  [x]  Soft  [x]  Nontender  [x]  Bowel sounds present  [x]  No obvious palpable masses []  No organomegaly  [] Tender []  Organomegaly  NEUROLOGICAL:  []  Fine tremors of outstretched hands present  []  Fine tremors of outstretched hands absent  [x]  Normal deep tendon reflexes   []  Hyperactive deep tendon reflexes  []  Hypoactive deep tendon reflexes  []  Muscle strength  SKIN:  [x]  Exposed skin normal to inspection & palpation  [x]  No rash on exposed skin  []  Skin rash present  LYMPHATIC:  [x]  No cervical lymphadenopathy [x]  No supraclavicular lymphadenopathy  []  Lymphadenopathy  MUSCULOSKELETAL:  [x]  Gait normal  [x]  Symmetric spine on inspection  []  Kyphosis   []  Scoliosis  []  Spinal tenderness present  [x]  Spinal tenderness absent  EXTREMITIES:  [x]  No cyanosis  [x]  No edema  [x]  No clubbing  [x]  Normal pulses in bilateral lower extremities  []  Pedal edema  []  Chronic venous changes in lower extremities   []  Clubbing present  FOOT EXAMINATION  [x]  Sensations to touch with monofilament intact  []  Sensations to touch with monofilament diminished in  [] left , [] right lower extremity/ extremities  []  Sensations to touch with monofilament absent in  [] left , [] right lower extremity/ extremities    ASSESSMENT/PLAN:  is a pleasant 53-year-old woman who presents to the clinic for  follow-up on uncontrolled type 2 diabetes.  1. DM-2 : Uncontrolled. Fingersticks improved but not optimal: Increase Humalog 10-12-14 + 1 scale from 8-10-10 +1 scale. Continue with other medications. Counseled to check BGs 4x day. Congratulated about dietary and lifestyle changes and to  continue with the same.  -Patient does not have diabetic neuropathy and retinopathy and is taking Pravastatin regularly.  -Microalbuminuria: Losartan   2. HTN: Uncontrolled. Increase losartan to 100mg daily. Nurse visit for Blood pressure check in 1 week.    Kirsten was seen today for diabetes.    Diagnoses and all orders for this visit:    Uncontrolled type 2 diabetes mellitus with complication, with long-term current use of insulin (CMS/[facility])  -     insulin lispro (HUMALOG KWIKPEN) 100 UNIT/ML pen-injector; Inject with meals three times per day: 10 units bkfst, 12 units lunch, 14 units supper + 1 for 50 >150. Max total 45 units/day.  -     losartan (COZAAR) 100 MG tablet; Take 1 tablet by mouth daily.    Essential hypertension  -     losartan (COZAAR) 100 MG tablet; Take 1 tablet by mouth daily.      All recent endocrine labs, if available, were reviewed with the patient and all of her questions were answered to her apparent satisfaction.     FOLLOW UP INSTRUCTIONS:  Return in about 3 months (around 5/1/2018) for Nurse visit for BP check in 1 week.

## 2020-11-03 ENCOUNTER — TRANSFERRED RECORDS (OUTPATIENT)
Dept: HEALTH INFORMATION MANAGEMENT | Facility: CLINIC | Age: 70
End: 2020-11-03

## 2021-04-15 DIAGNOSIS — E11.9 TYPE 2 DIABETES MELLITUS WITHOUT COMPLICATION, WITHOUT LONG-TERM CURRENT USE OF INSULIN (H): ICD-10-CM

## 2021-04-15 LAB — HBA1C MFR BLD: 6.7 % (ref 0–5.6)

## 2021-04-15 PROCEDURE — 36415 COLL VENOUS BLD VENIPUNCTURE: CPT | Performed by: NURSE PRACTITIONER

## 2021-04-15 PROCEDURE — 83036 HEMOGLOBIN GLYCOSYLATED A1C: CPT | Performed by: NURSE PRACTITIONER

## 2021-04-19 ENCOUNTER — OFFICE VISIT (OUTPATIENT)
Dept: FAMILY MEDICINE | Facility: CLINIC | Age: 71
End: 2021-04-19
Payer: COMMERCIAL

## 2021-04-19 VITALS
HEART RATE: 67 BPM | DIASTOLIC BLOOD PRESSURE: 85 MMHG | BODY MASS INDEX: 37.56 KG/M2 | WEIGHT: 220 LBS | SYSTOLIC BLOOD PRESSURE: 132 MMHG | RESPIRATION RATE: 18 BRPM | HEIGHT: 64 IN | TEMPERATURE: 97.8 F | OXYGEN SATURATION: 97 %

## 2021-04-19 DIAGNOSIS — E66.01 MORBID OBESITY (H): ICD-10-CM

## 2021-04-19 DIAGNOSIS — F33.0 MAJOR DEPRESSIVE DISORDER, RECURRENT EPISODE, MILD (H): ICD-10-CM

## 2021-04-19 DIAGNOSIS — E11.9 TYPE 2 DIABETES MELLITUS WITHOUT COMPLICATION, WITHOUT LONG-TERM CURRENT USE OF INSULIN (H): Primary | ICD-10-CM

## 2021-04-19 DIAGNOSIS — R41.840 DIFFICULTY CONCENTRATING: ICD-10-CM

## 2021-04-19 PROCEDURE — 99207 PR FOOT EXAM NO CHARGE: CPT | Performed by: NURSE PRACTITIONER

## 2021-04-19 PROCEDURE — 99214 OFFICE O/P EST MOD 30 MIN: CPT | Performed by: NURSE PRACTITIONER

## 2021-04-19 ASSESSMENT — MIFFLIN-ST. JEOR: SCORE: 1507.91

## 2021-04-19 ASSESSMENT — ANXIETY QUESTIONNAIRES
6. BECOMING EASILY ANNOYED OR IRRITABLE: NOT AT ALL
7. FEELING AFRAID AS IF SOMETHING AWFUL MIGHT HAPPEN: NOT AT ALL
1. FEELING NERVOUS, ANXIOUS, OR ON EDGE: SEVERAL DAYS
3. WORRYING TOO MUCH ABOUT DIFFERENT THINGS: NOT AT ALL
IF YOU CHECKED OFF ANY PROBLEMS ON THIS QUESTIONNAIRE, HOW DIFFICULT HAVE THESE PROBLEMS MADE IT FOR YOU TO DO YOUR WORK, TAKE CARE OF THINGS AT HOME, OR GET ALONG WITH OTHER PEOPLE: SOMEWHAT DIFFICULT
5. BEING SO RESTLESS THAT IT IS HARD TO SIT STILL: NOT AT ALL
2. NOT BEING ABLE TO STOP OR CONTROL WORRYING: NOT AT ALL
GAD7 TOTAL SCORE: 2

## 2021-04-19 ASSESSMENT — PATIENT HEALTH QUESTIONNAIRE - PHQ9
SUM OF ALL RESPONSES TO PHQ QUESTIONS 1-9: 6
5. POOR APPETITE OR OVEREATING: SEVERAL DAYS

## 2021-04-19 NOTE — PROGRESS NOTES
Assessment & Plan     Type 2 diabetes mellitus without complication, without long-term current use of insulin (H)  At goal, but A1c is increasing.  She will work on diet and exercise and recheck A1c in 3 months.      Major depressive disorder, recurrent episode, mild (H)  Stable  The current medical regimen is effective;  continue present plan and medications.     Difficulty concentrating  Referral placed again for ADHD testing.   - MENTAL HEALTH REFERRAL  - Adult; Assessments and Testing; ADHD; [facility] [place]: [facility] [phone]; We will contact you to schedule the appointment or please call with any questions    BMI > 35  Discussed diet and exercise.                    Return in about 3 months (around 7/19/2021) for lab, then annual in October.    Jory Fragoso NP  [facility]    Leann titus is a 70 year old who presents for the following health issues     HPI     Diabetes Follow-up    How often are you checking your blood sugar? Two times daily  Blood sugar testing frequency justification:  Uncontrolled diabetes  What time of day are you checking your blood sugars (select all that apply)?  Before and after meals  Have you had any blood sugars above 200?  No  Have you had any blood sugars below 70?  No    What symptoms do you notice when your blood sugar is low?  None    What concerns do you have today about your diabetes? None and Blood sugar is often high      Do you have any of these symptoms? (Select all that apply)  Numbness in feet    Have you had a diabetic eye exam in the last 12 months? No    She started binge eating this past winter and she did gain weight.  Her blood sugars have been higher - around 145 in the AM.  The highest was 199 after eating.  She is now trying to exercise regularly.  She does feel fatigued.   She does see a therapist regularly.  She is sleeping better.      BP Readings from Last 2 Encounters:   04/19/21 132/85   10/06/20  139/84     Hemoglobin A1C (%)   Date Value   04/15/2021 6.7 (H)   10/06/2020 6.0 (H)     LDL Cholesterol Calculated (mg/dL)   Date Value   10/06/2020 171 (H)   09/25/2019 168 (H)             Review of Systems         Objective    /85 (BP Location: Left arm, Patient Position: Sitting, Cuff Size: Adult Large)   Pulse 67   Temp 97.8  F (36.6  C) (Tympanic)   Resp 18   Wt 99.8 kg (220 lb)   LMP  (LMP Unknown)   SpO2 97%   BMI 37.76 kg/m    Body mass index is 37.76 kg/m .  Physical Exam   GENERAL: healthy, alert and no distress  NECK: no adenopathy, no asymmetry, masses, or scars and thyroid normal to palpation  RESP: lungs clear to auscultation - no rales, rhonchi or wheezes  CV: regular rate and rhythm, normal S1 S2, no S3 or S4, no murmur, click or rub, no peripheral edema and peripheral pulses strong  ABDOMEN: soft, nontender, no hepatosplenomegaly, no masses and bowel sounds normal  MS: no gross musculoskeletal defects noted, no edema  NEURO: Normal strength and tone, mentation intact and speech normal  PSYCH: mentation appears normal, affect normal/bright

## 2021-04-20 ASSESSMENT — ANXIETY QUESTIONNAIRES: GAD7 TOTAL SCORE: 2

## 2021-05-31 VITALS — HEIGHT: 64 IN | BODY MASS INDEX: 35.68 KG/M2 | WEIGHT: 209 LBS

## 2021-06-07 ENCOUNTER — VIRTUAL VISIT (OUTPATIENT)
Dept: FAMILY MEDICINE | Facility: CLINIC | Age: 71
End: 2021-06-07
Payer: COMMERCIAL

## 2021-06-07 DIAGNOSIS — G47.00 INSOMNIA, UNSPECIFIED TYPE: ICD-10-CM

## 2021-06-07 DIAGNOSIS — F41.9 ANXIETY: ICD-10-CM

## 2021-06-07 DIAGNOSIS — F32.1 MODERATE MAJOR DEPRESSION (H): Primary | ICD-10-CM

## 2021-06-07 PROCEDURE — 99214 OFFICE O/P EST MOD 30 MIN: CPT | Mod: 95 | Performed by: NURSE PRACTITIONER

## 2021-06-07 RX ORDER — HYDROXYZINE PAMOATE 25 MG/1
25-50 CAPSULE ORAL AT BEDTIME
Qty: 60 CAPSULE | Refills: 5 | Status: SHIPPED | OUTPATIENT
Start: 2021-06-07 | End: 2022-09-22

## 2021-06-07 ASSESSMENT — PATIENT HEALTH QUESTIONNAIRE - PHQ9: SUM OF ALL RESPONSES TO PHQ QUESTIONS 1-9: 15

## 2021-06-07 NOTE — PROGRESS NOTES
"tacho is a 71 year old who is being evaluated via a billable telephone visit.      What phone number would you like to be contacted at? [phone]  How would you like to obtain your AVS? JeanHappy Jack    Assessment & Plan     Moderate major depression (H)  Worsening  She is interested in adding Trintellix, so will add this and refer to psychiatry.  She will update me via [employer] in one month.  - vortioxetine (TRINTELLIX) 10 MG tablet; Take 1/2 tablet daily for 2 weeks, then increase to one tablet daily  - MENTAL HEALTH REFERRAL  - Adult; Psychiatry; Psychiatry; [facility] [phone]; We will contact you to schedule the appointment or please call with any questions    Anxiety  See above.   - hydrOXYzine (VISTARIL) 25 MG capsule; Take 1-2 capsules (25-50 mg) by mouth At Bedtime  - MENTAL HEALTH REFERRAL  - Adult; Psychiatry; Psychiatry; Other: [facility] [phone]; We will contact you to schedule the appointment or please call with any questions    Insomnia, unspecified type  Will try Vistaril at HS.   - hydrOXYzine (VISTARIL) 25 MG capsule; Take 1-2 capsules (25-50 mg) by mouth At Bedtime  - MENTAL HEALTH REFERRAL  - Adult; Psychiatry; Psychiatry; Other: [facility] [phone]; We will contact you to schedule the appointment or please call with any questions      39 minutes spent on the date of the encounter doing chart review, patient visit and documentation        BMI:   Estimated body mass index is 37.76 kg/m  as calculated from the following:    Height as of 4/19/21: 1.626 m (5' 4\").    Weight as of 4/19/21: 99.8 kg (220 lb).           No follow-ups on file.    Jory Fragoso NP  [facility]    Subjective   tacho is a 71 year old who presents for the following health issues     HPI     4:07 left message on voicemail    For the most of the month of May, she has not been sleeping well.  She is waking up in the middle of the night   She has had a " decrease appetite, lack of motivation, feeling isolated.  She continues to feel very fatigued.         Review of Systems         Objective           Vitals:  No vitals were obtained today due to virtual visit.    Physical Exam   healthy, alert and no distress  PSYCH: Alert and oriented times 3; coherent speech, normal   rate and volume, able to articulate logical thoughts, able   to abstract reason, no tangential thoughts, no hallucinations   or delusions  Her affect is normal  RESP: No cough, no audible wheezing, able to talk in full sentences  Remainder of exam unable to be completed due to telephone visits                Phone call duration: 21 minutes

## 2021-06-13 NOTE — ANESTHESIA CARE TRANSFER NOTE
Last vitals:   Vitals:    10/18/17 1315   BP: 94/52   Pulse: 62   Resp: (P) 16   Temp: 98.2f   SpO2: 94     Patient's level of consciousness is drowsy  Spontaneous respirations: yes  Maintains airway independently: yes  Dentition unchanged: yes  Oropharynx: oropharynx clear of all foreign objects    QCDR Measures:  ASA# 20 - Surgical Safety Checklist: WHO surgical safety checklist completed prior to induction  PQRS# 430 - Adult PONV Prevention: 4558F - Pt received => 2 anti-emetic agents (different classes) preop & intraop  ASA# 8 - Peds PONV Prevention: NA - Not pediatric patient, not GA or 2 or more risk factors NOT present  PQRS# 424 - Yara-op Temp Management: 4559F - At least one body temp DOCUMENTED => 35.5C or 95.9F within required timeframe  PQRS# 426 - PACU Transfer Protocol: - Transfer of care checklist used  ASA# 14 - Acute Post-op Pain: ASA14B - Patient did NOT experience pain >= 7 out of 10  Extubated in PACU by CRNA

## 2021-06-13 NOTE — ANESTHESIA POSTPROCEDURE EVALUATION
Patient: Leena Bender-[place]  BILATERAL C7-T1 ANTERIOR CERVICAL DECOMPRESSION FUSION  Anesthesia type: general    Patient location: PACU  Last vitals:   Vitals:    10/18/17 1350   BP: 114/58   Pulse: 63   Resp: 17   Temp:    SpO2: 94%     Post vital signs: stable  Level of consciousness: awake, alert, oriented and responds to simple questions  Post-anesthesia pain: pain controlled  Post-anesthesia nausea and vomiting: no  Pulmonary: unassisted, return to baseline, nasal cannula  Cardiovascular: stable and blood pressure at baseline  Hydration: adequate  Anesthetic events: no    QCDR Measures:  ASA# 11 - Yara-op Cardiac Arrest: ASA11B - Patient did NOT experience unanticipated cardiac arrest  ASA# 12 - Yara-op Mortality Rate: ASA12B - Patient did NOT die  ASA# 13 - PACU Re-Intubation Rate: ASA13B - Patient did NOT require a new airway mgmt  ASA# 10 - Composite Anes Safety: ASA10A - No serious adverse event    Additional Notes:

## 2021-06-13 NOTE — ANESTHESIA PREPROCEDURE EVALUATION
Anesthesia Evaluation      Patient summary reviewed   No history of anesthetic complications     Airway   Mallampati: II  Neck ROM: full   Pulmonary - normal exam    breath sounds clear to auscultation  (+) sleep apnea,                          Cardiovascular - normal exam  (+) hypertension, ,     Rhythm: regular  Rate: normal,         Neuro/Psych - negative ROS     Endo/Other    (+) diabetes mellitus type 2,      GI/Hepatic/Renal - negative ROS           Dental    (+) caps                       Anesthesia Plan  Planned anesthetic: general endotracheal    ASA 3     Anesthetic plan and risks discussed with: patient    Post-op plan: routine recovery

## 2021-07-09 ENCOUNTER — OFFICE VISIT (OUTPATIENT)
Dept: URGENT CARE | Facility: URGENT CARE | Age: 71
End: 2021-07-09
Payer: COMMERCIAL

## 2021-07-09 ENCOUNTER — TELEPHONE (OUTPATIENT)
Dept: FAMILY MEDICINE | Facility: CLINIC | Age: 71
End: 2021-07-09

## 2021-07-09 VITALS
DIASTOLIC BLOOD PRESSURE: 75 MMHG | RESPIRATION RATE: 20 BRPM | OXYGEN SATURATION: 98 % | HEART RATE: 61 BPM | SYSTOLIC BLOOD PRESSURE: 136 MMHG

## 2021-07-09 DIAGNOSIS — I10 BENIGN ESSENTIAL HYPERTENSION: ICD-10-CM

## 2021-07-09 DIAGNOSIS — E03.8 OTHER SPECIFIED HYPOTHYROIDISM: ICD-10-CM

## 2021-07-09 DIAGNOSIS — R00.2 PALPITATIONS: Primary | ICD-10-CM

## 2021-07-09 LAB
ANION GAP SERPL CALCULATED.3IONS-SCNC: 7 MMOL/L (ref 3–14)
BUN SERPL-MCNC: 12 MG/DL (ref 7–30)
CALCIUM SERPL-MCNC: 9.1 MG/DL (ref 8.5–10.1)
CHLORIDE SERPL-SCNC: 95 MMOL/L (ref 94–109)
CO2 SERPL-SCNC: 28 MMOL/L (ref 20–32)
CREAT SERPL-MCNC: 0.74 MG/DL (ref 0.52–1.04)
GFR SERPL CREATININE-BSD FRML MDRD: 82 ML/MIN/{1.73_M2}
GLUCOSE SERPL-MCNC: 119 MG/DL (ref 70–99)
MAGNESIUM SERPL-MCNC: 2.2 MG/DL (ref 1.6–2.3)
POTASSIUM SERPL-SCNC: 3.5 MMOL/L (ref 3.4–5.3)
SODIUM SERPL-SCNC: 130 MMOL/L (ref 133–144)
TSH SERPL DL<=0.005 MIU/L-ACNC: 1.21 MU/L (ref 0.4–4)

## 2021-07-09 PROCEDURE — 83735 ASSAY OF MAGNESIUM: CPT | Performed by: INTERNAL MEDICINE

## 2021-07-09 PROCEDURE — 84443 ASSAY THYROID STIM HORMONE: CPT | Performed by: INTERNAL MEDICINE

## 2021-07-09 PROCEDURE — 99214 OFFICE O/P EST MOD 30 MIN: CPT | Performed by: INTERNAL MEDICINE

## 2021-07-09 PROCEDURE — 80048 BASIC METABOLIC PNL TOTAL CA: CPT | Performed by: INTERNAL MEDICINE

## 2021-07-09 PROCEDURE — 36415 COLL VENOUS BLD VENIPUNCTURE: CPT | Performed by: INTERNAL MEDICINE

## 2021-07-09 PROCEDURE — 93000 ELECTROCARDIOGRAM COMPLETE: CPT | Performed by: INTERNAL MEDICINE

## 2021-07-09 NOTE — PROGRESS NOTES
Pt here with concerns of palpitations that started this am. Alert/oriented X3. There is a mild pause in heart beat. Pt appears in no acute distress, VS stable. No chest pain or SOB. EKG ordered per VO from  provider. Kellie Patrick RN

## 2021-07-09 NOTE — PATIENT INSTRUCTIONS
EKG shows benign extra beat with pause    Reduce stress  Water   Sleep  Avoid stimulants, including caffeine/ tea (may drink decaf//herbal tea)      Screen thyroid test as on medication  Screen electrolytes as on diuretic blood pressure pill    Try weaning off your newer medication to see if helps.  Try 1/2 dose and see if difference in pulse.  Also concerned about cost of medication.    More likely culprit would be lexapro, although trintellix is new medication this past month    Has psychiatry appointment Monday

## 2021-07-09 NOTE — TELEPHONE ENCOUNTER
Patient states she has felt an irregular heart rate for the past 2 days and this morning has been constant since 0800.  Patient denies any dizziness or lightheadedness.  Patient refuses ER but agrees to Urgent Care at the [facility].  Patient was told not to drive herself but has no one to bring her.  Joselyn Polo RN  [facility]

## 2021-07-09 NOTE — PROGRESS NOTES
ASSESSMENT AND PLAN:      ICD-10-CM    1. Palpitations  R00.2 EKG 12-lead complete w/read - Clinics     Basic metabolic panel  (Ca, Cl, CO2, Creat, Gluc, K, Na, BUN)     TSH with free T4 reflex     Magnesium   2. Other specified hypothyroidism  E03.8 Basic metabolic panel  (Ca, Cl, CO2, Creat, Gluc, K, Na, BUN)     TSH with free T4 reflex     Magnesium   3. Benign essential hypertension  I10 Basic metabolic panel  (Ca, Cl, CO2, Creat, Gluc, K, Na, BUN)     TSH with free T4 reflex     Magnesium       Differential Diagnosis:  Cardiac: Acute MI  Angina  Palpitations  Atrial Fibrillation  Atrial Flutter  Supraventricular Tachycardia  Panic/Anxiety    Serious Comorbid Conditions: Diabetes, hypertension and thyroid disease      PLAN:    Patient Instructions   EKG shows benign extra beat with pause    Reduce stress  Water   Sleep  Avoid stimulants, including caffeine/coffee & tea (no use)      Screen thyroid test as on medication  Screen electrolytes as on diuretic blood pressure pill    Try weaning off your newer medication to see if helps.  Try 1/2 dose and see if difference in pulse.  Also concerned about cost of medication.    More likely culprit would be lexapro, although trintellix is new medication this past month    Has psychiatry appointment Monday        Return in about 2 weeks (around 7/23/2021).        Koki Howard MD  [facility] URGENT CARE    Subjective     Leena Montaño is a 71 year old who presents for Patient presents with:  Palpitations    [facility]    Symptom Onset: today  Current and Associated symptoms: irregular heart beats.  Skipping beats with pause  Noted while checking pulse.    No sob or no chest pain     No coffee  Occasional pot    Current Outpatient Medications   Medication Sig Dispense Refill                                                             Calcium Carbonate-Vit D-Min (CALCIUM 1200 PO) Take  by mouth.       cholecalciferol  (VITAMIN  -D) 1000 UNIT capsule Take 1 capsule by mouth daily Vitamin D-3       diclofenac (VOLTAREN) 1 % GEL topical gel Apply 2 g topically 4 times daily 100 g PRN     escitalopram (LEXAPRO) 20 MG tablet Take 1 tablet (20 mg) by mouth daily 90 tablet PRN     fluticasone (FLONASE) 50 MCG/ACT nasal spray Spray 2 sprays into both nostrils daily 48 g PRN     hydrOXYzine (VISTARIL) 25 MG capsule Take 1-2 capsules (25-50 mg) by mouth At Bedtime 60 capsule 5     ibuprofen 200 MG capsule Take 400-600 mg by mouth every 6 hours as needed for fever 90 capsule PRN     levothyroxine (SYNTHROID/LEVOTHROID) 75 MCG tablet Take 1 tablet (75 mcg) by mouth daily 90 tablet PRN     MULTIVITAMIN TABS   OR 1 TABLET DAILY       nystatin (MYCOSTATIN) cream Apply topically 2 times daily 60 g PRN     ONETOUCH DELICA LANCETS 33G MISC USE TO TEST BLOOD SUGAR ONCE A DAY OR AS DIRECTED 100 each 1     order for DME Equipment being ordered: light box 1 Units 0     ORDER FOR DME Equipment being ordered: CPAP mask and tubing 1 Units 0     ORDER FOR DME Equipment being ordered: CPAP - adjustable levels (4-20) with a water chamber 1 each 0     triamterene-HCTZ (DYAZIDE) 37.5-25 MG capsule TAKE ONE CAPSULE BY MOUTH ONCE DAILY. 90 capsule 3     vortioxetine (TRINTELLIX) 10 MG tablet Take 1/2 tablet daily for 2 weeks, then increase to one tablet daily 30 tablet 5         Objective    /75 (BP Location: Left arm, Patient Position: Chair, Cuff Size: Adult Regular)   Pulse 61   Resp 20   LMP  (LMP Unknown)   SpO2 98%   Physical Exam  Vitals signs reviewed.   Constitutional:       Appearance: Normal appearance. She is not ill-appearing.   Cardiovascular:      Rate and Rhythm: Normal rate and regular rhythm.      Heart sounds: Normal heart sounds.      Comments: Every 3rd to 5th beat consistent with PVC with pause  Pulmonary:      Effort: Pulmonary effort is normal.      Breath sounds: Normal breath sounds.   Neurological:      General: No focal  deficit present.      Mental Status: She is alert.            EKG - Reviewed and interpreted by me -compared to previous EKG October 9, 2017 with new findings being 2 PVCs noted otherwise   appears normal, NSR, normal axis, normal intervals, no acute ST/T changes c/w ischemia, no LVH by voltage criteria,

## 2021-07-21 ENCOUNTER — OFFICE VISIT (OUTPATIENT)
Dept: URGENT CARE | Facility: URGENT CARE | Age: 71
End: 2021-07-21
Payer: COMMERCIAL

## 2021-07-21 VITALS
TEMPERATURE: 99.7 F | OXYGEN SATURATION: 97 % | DIASTOLIC BLOOD PRESSURE: 81 MMHG | WEIGHT: 191 LBS | BODY MASS INDEX: 32.79 KG/M2 | SYSTOLIC BLOOD PRESSURE: 126 MMHG | HEART RATE: 105 BPM

## 2021-07-21 DIAGNOSIS — E87.1 HYPONATREMIA: ICD-10-CM

## 2021-07-21 DIAGNOSIS — H00.011 HORDEOLUM EXTERNUM OF RIGHT UPPER EYELID: Primary | ICD-10-CM

## 2021-07-21 DIAGNOSIS — E11.9 TYPE 2 DIABETES MELLITUS WITHOUT COMPLICATION, WITHOUT LONG-TERM CURRENT USE OF INSULIN (H): ICD-10-CM

## 2021-07-21 LAB — HBA1C MFR BLD: 6.2 % (ref 0–5.6)

## 2021-07-21 PROCEDURE — 99214 OFFICE O/P EST MOD 30 MIN: CPT | Performed by: INTERNAL MEDICINE

## 2021-07-21 PROCEDURE — 83036 HEMOGLOBIN GLYCOSYLATED A1C: CPT | Performed by: INTERNAL MEDICINE

## 2021-07-21 PROCEDURE — 36415 COLL VENOUS BLD VENIPUNCTURE: CPT | Performed by: INTERNAL MEDICINE

## 2021-07-21 RX ORDER — ERYTHROMYCIN 5 MG/G
0.5 OINTMENT OPHTHALMIC
Qty: 3.5 G | Refills: 0 | Status: SHIPPED | OUTPATIENT
Start: 2021-07-21 | End: 2021-07-28

## 2021-07-21 RX ORDER — DULOXETIN HYDROCHLORIDE 30 MG/1
30 CAPSULE, DELAYED RELEASE ORAL DAILY
Refills: 0 | COMMUNITY
Start: 2021-07-21 | End: 2021-12-22

## 2021-07-21 RX ORDER — TEMAZEPAM 30 MG
30 CAPSULE ORAL
COMMUNITY
Start: 2021-07-21 | End: 2022-09-22

## 2021-07-21 NOTE — PROGRESS NOTES
ASSESSMENT AND PLAN:      ICD-10-CM    1. Hordeolum externum of right upper eyelid  H00.011 erythromycin (ROMYCIN) 5 MG/GM ophthalmic ointment   2. Hyponatremia  E87.1      PLAN:    Discussed no PVCs noted on pulse exam  Recent psychiatry med changes  Recheck sodium  Discussed could be related to diazyde/hydrochlorothiazide/bp medication        Patient Instructions   Eye antibiotics ointment  Warm packs      Recheck sodium level      Patient Education     Sty (or Stye)  A sty is when the oil gland of the eyelid becomes inflamed. It may develop into an infection with a small pocket of pus (an abscess). This can cause pain, redness, and swelling. In early stages, a sty is treated with antibiotic cream, eye drops, or a small towel soaked in warm water (a warm compress). More severe cases may need to be opened and drained by a healthcare provider.   Home care    Eye drops or ointment are often prescribed to treat the infection. Use these as directed.     Artificial tears may also be used to lubricate the eye and make it more comfortable. You can buy these over the counter without a prescription. Talk with your healthcare provider before using any over-the-counter treatment for a sty.    Apply a warm, damp towel to the affected area for at least 5 minutes, 3 to 4 times a day for a week. Warm compresses open the pores and speed the healing. Make sure the compresses are not too hot, as they may burn your eyelid.    Sometimes the sty will drain with this treatment alone. If this happens, keep using the antibiotic until all the redness and swelling are gone.    Wash your hands before and after touching the infected eyelid.    Don t squeeze or try to break open the sty.    Follow-up care  Follow up with your healthcare provider, or as advised.   When to seek medical advice  Call your healthcare provider or seek medical care right away if any of these occur:     Increase in swelling or redness around the eyelid after 48 to 72  hours    Increase in eye pain or the eyelid blisters    Increase in warmth--the eyelid feels hot    Drainage of blood or thick pus from the sty    Blister on the eyelid    Inability to open the eyelid due to swelling    Fever of 100.4 F (38 C) or above, or as directed by your provider    Vision changes    Headache or stiff neck    The sty comes back  [employer] last reviewed this educational content on 6/1/2020 2000-2021 [employer]. All rights reserved. This information is not intended as a substitute for professional medical care. Always follow your healthcare professional's instructions.             Return in about 2 weeks (around 8/4/2021).        Koki Howard MD  [facility] URGENT CARE    Subjective     Leena Montaño is a 71 year old who presents for Patient presents with:  Urgent Care  Eye Lid Swelling: c/o eyelid swollen for 2 days    an established patient of [facility].    Eye Problem    Onset of symptoms was 1 week(s) ago.   Location: right eye lid  Felt odd for 1 week  Red irritated swollen eyelid for 2 days  Treatment measures tried include none  Context:   Sleeps on stomach    No Pink eye exposure and Recent URI    Also recently seen for heart skip beats.  7/9/2021  And noted to have low sodium at last visit    Hasn't noticed to skipped beats    Has had med changes with psychiatrist    Review of Systems        Objective    /81   Pulse 105   Temp 99.7  F (37.6  C) (Tympanic)   Wt 86.6 kg (191 lb)   LMP  (LMP Unknown)   SpO2 97%   BMI 32.79 kg/m    Physical Exam  Vitals and nursing note reviewed.   Constitutional:       Appearance: Normal appearance.   Eyes:      Comments: right eye lid red  Middle of eye lid raised area is sensitive  With eye lid eversion - small white head noted.    Sclera white.  normal    Cardiovascular:      Pulses: Normal pulses.      Comments: No skipped beats  Neurological:      Mental Status: She is alert.

## 2021-07-21 NOTE — PATIENT INSTRUCTIONS
Eye antibiotics ointment  Warm packs      Recheck sodium level      Patient Education     Sty (or Stye)  A sty is when the oil gland of the eyelid becomes inflamed. It may develop into an infection with a small pocket of pus (an abscess). This can cause pain, redness, and swelling. In early stages, a sty is treated with antibiotic cream, eye drops, or a small towel soaked in warm water (a warm compress). More severe cases may need to be opened and drained by a healthcare provider.   Home care    Eye drops or ointment are often prescribed to treat the infection. Use these as directed.     Artificial tears may also be used to lubricate the eye and make it more comfortable. You can buy these over the counter without a prescription. Talk with your healthcare provider before using any over-the-counter treatment for a sty.    Apply a warm, damp towel to the affected area for at least 5 minutes, 3 to 4 times a day for a week. Warm compresses open the pores and speed the healing. Make sure the compresses are not too hot, as they may burn your eyelid.    Sometimes the sty will drain with this treatment alone. If this happens, keep using the antibiotic until all the redness and swelling are gone.    Wash your hands before and after touching the infected eyelid.    Don t squeeze or try to break open the sty.    Follow-up care  Follow up with your healthcare provider, or as advised.   When to seek medical advice  Call your healthcare provider or seek medical care right away if any of these occur:     Increase in swelling or redness around the eyelid after 48 to 72 hours    Increase in eye pain or the eyelid blisters    Increase in warmth--the eyelid feels hot    Drainage of blood or thick pus from the sty    Blister on the eyelid    Inability to open the eyelid due to swelling    Fever of 100.4 F (38 C) or above, or as directed by your provider    Vision changes    Headache or stiff neck    The sty comes back  [employer] last  reviewed this educational content on 6/1/2020 2000-2021 [employer]. All rights reserved. This information is not intended as a substitute for professional medical care. Always follow your healthcare professional's instructions.

## 2021-08-31 ENCOUNTER — NURSE TRIAGE (OUTPATIENT)
Dept: FAMILY MEDICINE | Facility: CLINIC | Age: 71
End: 2021-08-31

## 2021-08-31 NOTE — TELEPHONE ENCOUNTER
Reason for call:  Patient reporting a symptom    Symptom or request: :Thinks it could be hemorroids. Old blood coming out bottem, low back pain    Duration (how long have symptoms been present): last couple of days    Have you been treated for this before? No    Additional comments: Patient states she was traveling on plane the last couple of days and was constipated Thursday-Friday. Took dulcolax. Next day had pink mucus discharge. Currently having small Otogami's       Phone Number patient can be reached at:  Home number on file [phone] (home)    Best Time:  any    Can we leave a detailed message on this number:  YES    Call taken on 8/31/2021 at 11:10 AM by Namrata Dupont

## 2021-08-31 NOTE — TELEPHONE ENCOUNTER
"  Reason for Disposition    Mild constipation    Additional Information    Negative: Abdomen pain is the main symptom and adult male    Negative: Abdomen pain is the main symptom and adult female    Negative: Rectal bleeding or blood in stool is the main symptom    Negative: Patient sounds very sick or weak to the triager    Negative: Constant abdominal pain lasting > 2 hours    Negative: Vomiting bile (green color)    Negative: Vomiting and abdomen looks much more swollen than usual    Negative: Rectal pain or fullness from fecal impaction (rectum full of stool) and NOT better after SITZ bath, suppository or enema    Negative: Abdomen is more swollen than usual    Negative: Last bowel movement (BM) > 4 days ago    Negative: Leaking stool    Negative: Intermittent mild abdominal pain and fever    Negative: Unable to have a bowel movement (BM) without manually removing stool (using finger to pull out stool or perform disimpaction)    Negative: Unable to have a bowel movement (BM) without using a laxative, suppository, or enema    Negative: Constipation persists > 1 week and no improvement after using CARE ADVICE    Negative: Weight loss greater than 10 pounds (5 kg) and not dieting    Negative: Pencil-like, narrow stools    Negative: Patient wants to be seen    Negative: Uses laxative (e.g., PEG / Miralax. milk of magnesia) or enema more than once a month    Negative: Constipation is a recurrent ongoing problem (i.e., < 3 BMs / week or straining > 25% of the time)    Negative: Minor bleeding from rectum (e.g., blood just on toilet paper, few drops, streaks on surface of normal formed BM) occurs more than twice    Answer Assessment - Initial Assessment Questions  1. STOOL PATTERN OR FREQUENCY: \"How often do you pass bowel movements (BMs)?\"  (Normal range: tid to q 3 days)  \"When was the last BM passed?\"        Road on airplane and got constipated, plane ride was 5 days ago  Small BM today and then a larger one  2. " "STRAINING: \"Do you have to strain to have a BM?\"       Yes \"and I dug myself out the first time\" The bleeding started after she did this - maybe  3. RECTAL PAIN: \"Does your rectum hurt when the stool comes out?\" If so, ask: \"Do you have hemorrhoids? How bad is the pain?\"  (Scale 1-10; or mild, moderate, severe)      No hx of hemorrhoids, didn't hurt much when passing the stool  4. STOOL COMPOSITION: \"Are the stools hard?\"      Started out like deer pellets , hard and dry  5. BLOOD ON STOOLS: \"Has there been any blood on the toilet tissue or on the surface of the BM?\" If so, ask: \"When was the last time?\"    feels like there is old blood coming with it - \"like red brown blood\" \"kind of not discoloring the toilet water a lot\"  6. CHRONIC CONSTIPATION: \"Is this a new problem for you?\"  If no, ask: \"How long have you had this problem?\" (days, weeks, months)       This is not chronic problem  7. CHANGES IN DIET: \"Have there been any recent changes in your diet?\"      Less water when traveling otherwise no change  8. MEDICATIONS: \"Have you been taking any new medications?\"     Has started taking an antidepressant and told could cause constipation but didn't happen after than  9. LAXATIVES: \"Have you been using any laxatives or enemas?\"  If yes, ask \"What, how often, and when was the last time?\"      Dulcolax pil  10. CAUSE: \"What do you think is causing the constipation?\"         Dehydrated as was on plane , was 2 plane rides and was 8 hour process  11. OTHER SYMPTOMS: \"Do you have any other symptoms?\" (e.g., abdominal pain, fever, vomiting)     No   The small of her back aches but she was hiking but doesn't feel muscular    Protocols used: CONSTIPATION-A-OH    "

## 2021-10-24 ENCOUNTER — HEALTH MAINTENANCE LETTER (OUTPATIENT)
Age: 71
End: 2021-10-24

## 2021-11-03 DIAGNOSIS — E03.9 ACQUIRED HYPOTHYROIDISM: ICD-10-CM

## 2021-11-04 RX ORDER — LEVOTHYROXINE SODIUM 75 UG/1
75 TABLET ORAL DAILY
Qty: 90 TABLET | Refills: 1 | Status: SHIPPED | OUTPATIENT
Start: 2021-11-04 | End: 2022-04-28

## 2021-11-04 NOTE — TELEPHONE ENCOUNTER
Thyroid Protocol [id number]/03/2021 04:16 PM   Patient is 12 years or older Protocol Details    Recent (12 mo) or future (30 days) visit within the authorizing provider's specialty     Medication is active on med list     Normal TSH on file in past 12 months     No active pregnancy on record     No positive pregnancy test in past 12 months

## 2021-12-16 ENCOUNTER — TELEPHONE (OUTPATIENT)
Dept: FAMILY MEDICINE | Facility: CLINIC | Age: 71
End: 2021-12-16
Payer: COMMERCIAL

## 2021-12-16 DIAGNOSIS — E87.1 HYPONATREMIA: ICD-10-CM

## 2021-12-16 DIAGNOSIS — F32.1 MODERATE MAJOR DEPRESSION (H): Primary | ICD-10-CM

## 2021-12-16 DIAGNOSIS — I10 HYPERTENSION GOAL BP (BLOOD PRESSURE) < 140/90: ICD-10-CM

## 2021-12-16 DIAGNOSIS — E55.9 VITAMIN D DEFICIENCY: ICD-10-CM

## 2021-12-16 NOTE — TELEPHONE ENCOUNTER
"Patient is asking about getting labs rechecked.  States at the beginning of the month she had HA 3 days in a row so tested for COVID and it came back negative.   12/6 she had a HA again and then again on 12/!!.  States she never gets headaches so she is wondering what is going on. Back in July her Na was borderline low but when she came in to recheck lab only jamel a Hgb A1C.  When she gets the HAs she has pain in her neck and upper shoulders.  Denies fever  More recently her temples ache off to the sides of her temples.  Has been eating more sugar  Knows she has been dehydrated a few days, but not in a row.  Had a bout of diarrhea and now has constipation.  All the mucous she has is running clear.    Patient is asking about getting an order to check her electrolytes, especially her sodium.  Also thinks she should have her D3 levels checked due to the time of year and her depression.  Orders pended. Not sure what to associate with the BMP.  Please sign if you agree.  When I asked patient to send an e-visit she said \"Oh no, I am not paying $40 for an e-mail.  Joselyn Polo RN  [facility]      "

## 2021-12-17 ENCOUNTER — MYC MEDICAL ADVICE (OUTPATIENT)
Dept: FAMILY MEDICINE | Facility: CLINIC | Age: 71
End: 2021-12-17
Payer: COMMERCIAL

## 2021-12-17 DIAGNOSIS — E78.5 HYPERLIPIDEMIA LDL GOAL <130: ICD-10-CM

## 2021-12-17 DIAGNOSIS — I10 HYPERTENSION GOAL BP (BLOOD PRESSURE) < 140/90: ICD-10-CM

## 2021-12-17 DIAGNOSIS — E03.9 HYPOTHYROIDISM, UNSPECIFIED TYPE: Primary | ICD-10-CM

## 2021-12-17 DIAGNOSIS — E11.9 TYPE 2 DIABETES MELLITUS WITHOUT COMPLICATION, WITHOUT LONG-TERM CURRENT USE OF INSULIN (H): ICD-10-CM

## 2021-12-19 ENCOUNTER — HEALTH MAINTENANCE LETTER (OUTPATIENT)
Age: 71
End: 2021-12-19

## 2021-12-20 NOTE — TELEPHONE ENCOUNTER
Writer responded via [employer].    JAYESH EspinoN, RN  [facility]

## 2021-12-20 NOTE — TELEPHONE ENCOUNTER
Patient called on 12/20/21 and has an appointment scheduled now for 12/22/21.  See My Chart encounter from 12/17/21.  Joselyn Polo RN  [facility]

## 2021-12-20 NOTE — TELEPHONE ENCOUNTER
See 12/17/2021 phone encounter is not better and would like a visit, appt made for 12/22/2021 with JOLYNN.

## 2021-12-22 ENCOUNTER — OFFICE VISIT (OUTPATIENT)
Dept: FAMILY MEDICINE | Facility: CLINIC | Age: 71
End: 2021-12-22
Payer: COMMERCIAL

## 2021-12-22 VITALS
DIASTOLIC BLOOD PRESSURE: 80 MMHG | TEMPERATURE: 98 F | SYSTOLIC BLOOD PRESSURE: 156 MMHG | HEART RATE: 78 BPM | OXYGEN SATURATION: 95 % | BODY MASS INDEX: 33.32 KG/M2 | WEIGHT: 194.12 LBS

## 2021-12-22 DIAGNOSIS — R51.9 NONINTRACTABLE EPISODIC HEADACHE, UNSPECIFIED HEADACHE TYPE: Primary | ICD-10-CM

## 2021-12-22 DIAGNOSIS — I10 HYPERTENSION GOAL BP (BLOOD PRESSURE) < 140/90: ICD-10-CM

## 2021-12-22 DIAGNOSIS — E11.9 TYPE 2 DIABETES MELLITUS WITHOUT COMPLICATION, WITHOUT LONG-TERM CURRENT USE OF INSULIN (H): ICD-10-CM

## 2021-12-22 DIAGNOSIS — Z78.0 ASYMPTOMATIC MENOPAUSAL STATE: ICD-10-CM

## 2021-12-22 DIAGNOSIS — M54.2 NECK PAIN: ICD-10-CM

## 2021-12-22 DIAGNOSIS — E55.9 VITAMIN D DEFICIENCY: ICD-10-CM

## 2021-12-22 DIAGNOSIS — M79.89 AXILLARY SWELLING: ICD-10-CM

## 2021-12-22 DIAGNOSIS — E78.5 HYPERLIPIDEMIA LDL GOAL <130: ICD-10-CM

## 2021-12-22 DIAGNOSIS — F32.1 MODERATE MAJOR DEPRESSION (H): ICD-10-CM

## 2021-12-22 LAB
ALBUMIN SERPL-MCNC: 3.9 G/DL (ref 3.4–5)
ALP SERPL-CCNC: 81 U/L (ref 40–150)
ALT SERPL W P-5'-P-CCNC: 25 U/L (ref 0–50)
ANION GAP SERPL CALCULATED.3IONS-SCNC: 5 MMOL/L (ref 3–14)
AST SERPL W P-5'-P-CCNC: 16 U/L (ref 0–45)
BILIRUB SERPL-MCNC: 1 MG/DL (ref 0.2–1.3)
BUN SERPL-MCNC: 16 MG/DL (ref 7–30)
CALCIUM SERPL-MCNC: 8.5 MG/DL (ref 8.5–10.1)
CHLORIDE BLD-SCNC: 94 MMOL/L (ref 94–109)
CO2 SERPL-SCNC: 30 MMOL/L (ref 20–32)
CREAT SERPL-MCNC: 0.59 MG/DL (ref 0.52–1.04)
ERYTHROCYTE [DISTWIDTH] IN BLOOD BY AUTOMATED COUNT: 12.2 % (ref 10–15)
GFR SERPL CREATININE-BSD FRML MDRD: >90 ML/MIN/1.73M2
GLUCOSE BLD-MCNC: 87 MG/DL (ref 70–99)
HBA1C MFR BLD: 6.5 % (ref 0–5.6)
HCT VFR BLD AUTO: 40.8 % (ref 35–47)
HGB BLD-MCNC: 14 G/DL (ref 11.7–15.7)
MCH RBC QN AUTO: 29.9 PG (ref 26.5–33)
MCHC RBC AUTO-ENTMCNC: 34.3 G/DL (ref 31.5–36.5)
MCV RBC AUTO: 87 FL (ref 78–100)
PLATELET # BLD AUTO: 361 10E3/UL (ref 150–450)
POTASSIUM BLD-SCNC: 3.2 MMOL/L (ref 3.4–5.3)
PROT SERPL-MCNC: 7.5 G/DL (ref 6.8–8.8)
RBC # BLD AUTO: 4.69 10E6/UL (ref 3.8–5.2)
SODIUM SERPL-SCNC: 129 MMOL/L (ref 133–144)
WBC # BLD AUTO: 11.6 10E3/UL (ref 4–11)

## 2021-12-22 PROCEDURE — 80061 LIPID PANEL: CPT | Performed by: NURSE PRACTITIONER

## 2021-12-22 PROCEDURE — 99215 OFFICE O/P EST HI 40 MIN: CPT | Performed by: NURSE PRACTITIONER

## 2021-12-22 PROCEDURE — 36415 COLL VENOUS BLD VENIPUNCTURE: CPT | Performed by: NURSE PRACTITIONER

## 2021-12-22 PROCEDURE — U0005 INFEC AGEN DETEC AMPLI PROBE: HCPCS | Performed by: NURSE PRACTITIONER

## 2021-12-22 PROCEDURE — 80053 COMPREHEN METABOLIC PANEL: CPT | Performed by: NURSE PRACTITIONER

## 2021-12-22 PROCEDURE — U0003 INFECTIOUS AGENT DETECTION BY NUCLEIC ACID (DNA OR RNA); SEVERE ACUTE RESPIRATORY SYNDROME CORONAVIRUS 2 (SARS-COV-2) (CORONAVIRUS DISEASE [COVID-19]), AMPLIFIED PROBE TECHNIQUE, MAKING USE OF HIGH THROUGHPUT TECHNOLOGIES AS DESCRIBED BY CMS-2020-01-R: HCPCS | Performed by: NURSE PRACTITIONER

## 2021-12-22 PROCEDURE — 83036 HEMOGLOBIN GLYCOSYLATED A1C: CPT | Performed by: NURSE PRACTITIONER

## 2021-12-22 PROCEDURE — 85027 COMPLETE CBC AUTOMATED: CPT | Performed by: NURSE PRACTITIONER

## 2021-12-22 PROCEDURE — 82306 VITAMIN D 25 HYDROXY: CPT | Performed by: NURSE PRACTITIONER

## 2021-12-22 PROCEDURE — 82043 UR ALBUMIN QUANTITATIVE: CPT | Performed by: NURSE PRACTITIONER

## 2021-12-22 RX ORDER — DULOXETIN HYDROCHLORIDE 30 MG/1
90 CAPSULE, DELAYED RELEASE ORAL DAILY
Refills: 0 | COMMUNITY
Start: 2021-12-22 | End: 2022-01-24

## 2021-12-22 NOTE — PROGRESS NOTES
"  Assessment & Plan     (R51.9) Nonintractable episodic headache, unspecified headache type  (primary encounter diagnosis)  Comment:   Plan: Asymptomatic COVID-19 Virus (Coronavirus) by         PCR, CBC with platelets        Differential includes sinusitis, viral syndrome, dehydration, cervical radiculopathy, uncontrolled hypertension.  Will get labs and cervical MRI.      (E11.9) Type 2 diabetes mellitus without complication, without long-term current use of insulin (H)  Comment:   Plan: Will check A1c.     (I10) Hypertension goal BP (blood pressure) < 140/90  Comment: not controlled  Plan: She will get a blood pressure monitor and check home readings and update me.     (F32.1) Moderate major depression (H)  Comment:   Plan: She is much improved on current medication.      (M54.2) Neck pain  Comment:   Plan: MR Cervical Spine w/o Contrast            (M79.89) Axillary swelling  Comment:   Plan: MA Diagnostic Digital Bilateral        Diagnostic mammogram and ultrasound ordered.     (Z78.0) Asymptomatic menopausal state  Comment:   Plan: DX Hip/Pelvis/Spine            (E55.9) Vitamin D deficiency  Comment:   Plan:     (E78.5) Hyperlipidemia LDL goal <130  Comment:   Plan:       44 minutes spent on the date of the encounter doing chart review, patient visit and documentation        BMI:   Estimated body mass index is 33.32 kg/m  as calculated from the following:    Height as of 4/19/21: 1.626 m (5' 4\").    Weight as of this encounter: 88.1 kg (194 lb 1.9 oz).   Weight management plan: Discussed healthy diet and exercise guidelines        No follow-ups on file.    Jory Fragoso NP  [facility]    Leann titus is a 71 year old who presents for the following health issues     HPI     Some body aches   Tender in the left side   Some low back ache bilateral    Some shooting pain in the right hand     Headaches      Duration: dec 1     Description  Location: bilateral in the frontal area " , some neck soreness   Character: squeezing pain  Frequency:  Intermittent   Duration:  Sometimes all day     Intensity:  mild    Accompanying signs and symptoms:    Precipitating or Alleviating factors:  Nausea/vomiting: occasionally  Dizziness: occasionally   Weakness or numbness: no  Visual changes: none  Fever: YES  Sinus or URI symptoms YES    History  Head trauma: no   Family history of migraines: no   Previous tests for headaches: no   Neurologist evaluations: no   Able to do daily activities when headache present: YES  Wake with headaches: YES  Daily pain medication use: no   Any changes in: nothing     Precipitating or Alleviating factors (light/sound/sleep/caffeine): none     Therapies tried and outcome: Ibuprofen (Advil, Motrin), Tylenol and Midrin    Outcome - not effective  Frequent/daily pain medication use: no     Sinus congestion, pain, rhinorrhea.  Miild productive cough.  Shooting pains into lower arm and hands.  Neck pain.   She has a history of a cervical fusion.  She did have a negative COVID test on 12/3.    Swelling and tenderness left axilla.          Review of Systems         Objective    BP (!) 156/80   Pulse 78   Temp 98  F (36.7  C) (Temporal)   Wt 88.1 kg (194 lb 1.9 oz)   LMP  (LMP Unknown)   SpO2 95%   BMI 33.32 kg/m    Body mass index is 33.32 kg/m .  Physical Exam   GENERAL: healthy, alert and no distress  EYES: Eyes grossly normal to inspection, PERRL and conjunctivae and sclerae normal  HENT: normal cephalic/atraumatic, ear canals and TM's normal, nose and mouth without ulcers or lesions, oropharynx clear, oral mucous membranes moist and sinuses: frontal tenderness on bilaterally  NECK: no adenopathy, no asymmetry, masses, or scars and thyroid normal to palpation  RESP: lungs clear to auscultation - no rales, rhonchi or wheezes  CV: regular rate and rhythm, normal S1 S2, no S3 or S4, no murmur, click or rub, no peripheral edema and peripheral pulses strong  ABDOMEN: soft,  nontender, no hepatosplenomegaly, no masses and bowel sounds normal  MS: no gross musculoskeletal defects noted, no edema  NEURO: Normal strength and tone, mentation intact and speech normal  PSYCH: mentation appears normal and tearful  LYMPH: left axillary edema  Breast: no palpable lumps

## 2021-12-23 ENCOUNTER — TELEPHONE (OUTPATIENT)
Dept: FAMILY MEDICINE | Facility: CLINIC | Age: 71
End: 2021-12-23
Payer: COMMERCIAL

## 2021-12-23 DIAGNOSIS — B96.89 BACTERIAL SINUSITIS: Primary | ICD-10-CM

## 2021-12-23 DIAGNOSIS — J32.9 BACTERIAL SINUSITIS: Primary | ICD-10-CM

## 2021-12-23 DIAGNOSIS — R09.82 POST-NASAL DRIP: ICD-10-CM

## 2021-12-23 LAB
CHOLEST SERPL-MCNC: 245 MG/DL
CREAT UR-MCNC: 28 MG/DL
DEPRECATED CALCIDIOL+CALCIFEROL SERPL-MC: 35 UG/L (ref 20–75)
FASTING STATUS PATIENT QL REPORTED: NO
HDLC SERPL-MCNC: 54 MG/DL
LDLC SERPL CALC-MCNC: 135 MG/DL
MICROALBUMIN UR-MCNC: <5 MG/L
MICROALBUMIN/CREAT UR: NORMAL MG/G{CREAT}
NONHDLC SERPL-MCNC: 191 MG/DL
SARS-COV-2 RNA RESP QL NAA+PROBE: NEGATIVE
TRIGL SERPL-MCNC: 282 MG/DL

## 2021-12-23 RX ORDER — FLUTICASONE PROPIONATE 50 MCG
1 SPRAY, SUSPENSION (ML) NASAL DAILY
Qty: 9.9 ML | Refills: 0 | Status: SHIPPED | OUTPATIENT
Start: 2021-12-23 | End: 2021-12-28

## 2021-12-23 RX ORDER — AMOXICILLIN 875 MG
875 TABLET ORAL 2 TIMES DAILY
Qty: 10 TABLET | Refills: 0 | Status: SHIPPED | OUTPATIENT
Start: 2021-12-23 | End: 2021-12-28

## 2021-12-23 NOTE — TELEPHONE ENCOUNTER
Rx for amoxicillin sent. Take if worsening sinus/respiratory symptoms.  Rx'ed flonase for post-nasal drip. Takes a few days to improve. Can get OTC too.

## 2021-12-23 NOTE — TELEPHONE ENCOUNTER
Writer called patient and reviewed message per Dr. Hernández.    Patient verbalized understanding and in agreement with plan.    ARCHANA Espino, RN  [facility]

## 2021-12-23 NOTE — TELEPHONE ENCOUNTER
HP Providers- Please review and advise if antibiotic recommended  1. Patient would like antibiotic prescribed and she can hold off on taking it unless symptoms worsen    Call received from patient:  1. PCP discussed at office visit yesterday possibly prescribing antibiotic for possible sinusitis   A.  Patient declined as she prefers to not take antibiotic medication   B. Headache persists and seems worse than yesterday  2. Has also started sneezing and nasal discharge is yellow   A. Feels symptoms may be the start of bronchitis   B. Sneezing prevented sleeping through the night even with taking Temazepam  3. Thinks has a low grade fever but has not checked temperature tody  4. No SOB nor heaviness in chest/chest pain  5. Can she take Tizanidine to help with cough caused by post-nasal drip?    Writer informed patient:  1. KENDAL Fragoso CNP, is out of clinic the rest of this year  2. Will send message to covering providers  3. Do not take Tizanidine with/alongside Temazepam  4. Can try honey to help cough, 1 to 1.5 tsp a few times per day    Patient verbalized understanding and in agreement with plan.    Thank you!  JAYESH EspinoN, RN  [facility]

## 2021-12-27 ENCOUNTER — TELEPHONE (OUTPATIENT)
Dept: FAMILY MEDICINE | Facility: CLINIC | Age: 71
End: 2021-12-27
Payer: COMMERCIAL

## 2021-12-27 NOTE — TELEPHONE ENCOUNTER
Reason for call:  Patient reporting a symptom    Symptom or request: low grade fever,headaches coming and going.a thick phelgm     Duration (how long have symptoms been present): dec 1 st     Have you been treated for this before? No    Additional comments: she wants to know the results of her Covid test    hasnt been taking medication for headaches, she has a few days left for her antibiotic   She just doesn't really feel like going outside she just feels sick      Phone Number patient can be reached at:  Home number on file [phone] (home)    Best Time: anytime     Can we leave a detailed message on this number:  YES    Call taken on 12/27/2021 at 2:28 PM by Sandra Francis

## 2021-12-28 NOTE — TELEPHONE ENCOUNTER
I would not recommend to take someone else's prescriptions as not prescribed and it would be hard for me to recommend a narcotic in someone I have never seen before in an electronic communication.  The headache could have preceded her current symptoms though given the new information may be something different.  Headache can also be due to rebound headache from pain med use.  I am glad has an MRI scheduled.  If feeling worse recommend an urgent care visit where labs can be rechecked to make sure sodium is not trending down contributing to symptoms.  Would still recommend taking Flonase and Zyrtec to see if it would help.

## 2021-12-28 NOTE — TELEPHONE ENCOUNTER
Patient is calling to follow up on phone call from yesterday. She is still having symptoms of headache, low back pain. She is finishing up the amoxicillan, but does not have relief of the headache. Would appreciate any advice from the provider what she should do next.  Needs to get some pain relief. Has had headaches for several weeks.  [phone].please call back to advise.  bp was 160/90 a few days ago.

## 2021-12-28 NOTE — TELEPHONE ENCOUNTER
Covid test on 12/22 was negative.  But noted asymptomatic at that time.  May be worth rechecking Covid if checked too early in an symptom process.  Antibiotics given yesterday will not work immediately.  They will only help if there is a true bacterial infection.  Most sinus infections are caused by viruses.  Cannot rule out she did not have the flu.  Recommend Flonase and Zyrtec to decongest the sinuses that will help the headache while continuing the antibiotic since already started, take Tylenol for pain increase water intake and go to urgent care if not better.

## 2021-12-28 NOTE — TELEPHONE ENCOUNTER
----- Message from Dorothy Loyd sent at 4/24/2019  8:07 AM CDT -----  Rx Refill/Request     Is this a Refill or New Rx:refill      Rx Name and Strength:  baclofen (LIORESAL) 20 MG tablet and dantrolene (DANTRIUM) 50 MG Cap    Preferred Pharmacy with phone number:     [id number] - [place] [address] [facility] [address] [facility] [address] [facility]  [address] [address]  Phone: [phone] Fax: [phone]    [id number] - [place], [place] - [address] AT [facility] [address]  [address] [facility] [address]  Phone: [phone] Fax: [phone]    Communication Preference:.Giovana (mother) @ [phone]  Additional Information:      Patient states she has had HA since December 1st and it has been frontal the whole time.  Has an MRI scheduled for 1/10/22   States for the past week has not really been taking anything.  Had tried Ibuprofen 400 to 600 mg at a time and Tylemol 500 to 1000 mg at a time. Did no get relief from these.  Patient has some hydrocodone that she states she got from a good source and it is fresh (not obtained on the street but also not prescribed for patient).  She is asking about takinf 1/2 to a full tab to see if that will work.  Otherwise would like to know what you recommend.  Joselyn Polo RN  [facility]

## 2021-12-28 NOTE — TELEPHONE ENCOUNTER
"RN called patient.     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     RN once again advised patient not take this medication, patient then hung up.     ARCHANA Moran RN  [facility]    "

## 2021-12-30 ENCOUNTER — HOSPITAL ENCOUNTER (OUTPATIENT)
Dept: MRI IMAGING | Facility: CLINIC | Age: 71
Discharge: HOME | End: 2021-12-30
Attending: NURSE PRACTITIONER | Admitting: NURSE PRACTITIONER
Payer: COMMERCIAL

## 2021-12-30 DIAGNOSIS — M54.2 NECK PAIN: ICD-10-CM

## 2021-12-30 PROCEDURE — 72141 MRI NECK SPINE W/O DYE: CPT | Mod: 26 | Performed by: RADIOLOGY

## 2021-12-30 PROCEDURE — 72141 MRI NECK SPINE W/O DYE: CPT

## 2021-12-31 ENCOUNTER — TELEPHONE (OUTPATIENT)
Dept: FAMILY MEDICINE | Facility: CLINIC | Age: 71
End: 2021-12-31
Payer: COMMERCIAL

## 2021-12-31 DIAGNOSIS — M62.830 SPASM OF MUSCLE OF LOWER BACK: Primary | ICD-10-CM

## 2021-12-31 NOTE — TELEPHONE ENCOUNTER
"Patient is asking for an order for pain medication to at least get her through the weekend.  States all she has at home is extra strength Tylenol.  Has had back pain ongoing since 12/1/21  Has been seen in clinic for this and had MRI yesterday. (Please see report)  Complaining of pain from the small of her back to over the tail bone and then up to under her shoulder blades.  Pain is moving around and feels like her back could go out.  Pain is a 7/10 and feels \"spasmy\"  Please let patient know if something can be sent in at least to cover for the weekend until JOVI Fragoso returns.  Joselyn Polo RN  [facility]  "

## 2021-12-31 NOTE — TELEPHONE ENCOUNTER
Per Nathan - Rx for Tizanidine sent to pharmacy in [facility].  Patient informed and will  before 5 PM today.  Joselyn Polo RN  [facility]

## 2022-01-10 DIAGNOSIS — E87.6 HYPOKALEMIA: Primary | ICD-10-CM

## 2022-01-12 ENCOUNTER — MYC MEDICAL ADVICE (OUTPATIENT)
Dept: FAMILY MEDICINE | Facility: CLINIC | Age: 72
End: 2022-01-12
Payer: COMMERCIAL

## 2022-01-12 ENCOUNTER — TELEPHONE (OUTPATIENT)
Dept: FAMILY MEDICINE | Facility: CLINIC | Age: 72
End: 2022-01-12
Payer: COMMERCIAL

## 2022-01-12 DIAGNOSIS — R51.9 NONINTRACTABLE HEADACHE, UNSPECIFIED CHRONICITY PATTERN, UNSPECIFIED HEADACHE TYPE: ICD-10-CM

## 2022-01-12 DIAGNOSIS — I10 HYPERTENSION GOAL BP (BLOOD PRESSURE) < 140/90: ICD-10-CM

## 2022-01-12 DIAGNOSIS — M48.02 CERVICAL STENOSIS OF SPINAL CANAL: Primary | ICD-10-CM

## 2022-01-12 RX ORDER — LOSARTAN POTASSIUM 50 MG/1
50 TABLET ORAL DAILY
Qty: 30 TABLET | Refills: 1 | Status: SHIPPED | OUTPATIENT
Start: 2022-01-12 | End: 2022-01-24

## 2022-01-12 NOTE — TELEPHONE ENCOUNTER
Writer called patient and reviewed message from KENDAL Fragoso CNP.    Patient verbalized understanding and in agreement with plan.    Patient stated Neurology visit scheduled on 2/21/22.    ARCHANA Espino, RN  [facility]

## 2022-01-12 NOTE — TELEPHONE ENCOUNTER
Her triamterene-hydrochlorothiazide is likely contributing to her lower potassium and sodium levels.  I think it's reasonable to stop it and start a new blood pressure medication (Losartan).  She should then make a lab-only appointment in 2-3 weeks to recheck labs.  She does not need a potassium supplement, as this change should fix the issue and it is more dangerous to have too high of potassium.    She should send a message with blood pressure results in a few weeks.    Her cervical spine MRI showed lots of degenerative/arthritic changes.  It is possible that this is contributing to her headaches.  I placed a referral to a spine specialist and a  should contact her to set this up.

## 2022-01-12 NOTE — TELEPHONE ENCOUNTER
Pema-  1. Patient reports the PA she saw told her it is unlikely headaches caused by these findings in her cervical spine   A. Would it be reasonable to refer patient to Neurology?   B. Patient reported she will try to work better on eating a more balanced diet.  Highest blood sugar level is 130 over the past few days  2. Patient is wondering if elevated WBC count is of any concern?        Writer called patient and reviewed message per KENDAL Fragoso CNP.    Patient verbalized understanding and in agreement with plan.    Thank you!  JAYESH EspinoN, RN  [facility]

## 2022-01-12 NOTE — TELEPHONE ENCOUNTER
Reason for Call:  Request for results:    Name of test or procedure: labs    Date of test of procedure: 12-    Location of the test or procedure: [facility]    OK to leave the result message on voice mail or with a family member? YES    Phone number Patient can be reached at:  Home number on file [phone] (home)    Additional comments: patient has question regarding test result comment   Your potassium level is a little low.  I would like you to increase consumption of high-potassium foods such as oranges, orange juice, bananas, avocados and recheck this in 2-3 weeks.    Call taken on 1/12/2022 at 9:19 AM by Treva Samuel

## 2022-01-12 NOTE — TELEPHONE ENCOUNTER
"Pema-Please review and advise.  Patient is willing to schedule a follow up visit with you.  1. Patient desires to increase her potassium level to the \"middle range\" as it is her recollection hypokalemia caused stroke in her father   A. Feels food sources will not act quick enough and wonders if potassium supplement would be recommended?  2. Concerned about sodium level   A. Is it likely hydrochlorothiazide (takes triamterene-hydrochlorothiazide) is contributing to this?   B. What can she do to increase sodium level?  3. Do you recommend a magnesium supplement?   A. Magnesium was normal in July 2021  Magnesium   Date Value Ref Range Status   07/09/2021 2.2 1.6 - 2.3 mg/dL Final         Writer called patient who stated:  1. Still getting headaches more frequently than previously  2. MRI was inconclusive as far as cause of headaches  3. Should sodium be addressed? Is hydrochlorothiazide contributing to lower sodium level?  4. Feels potassium level will not be able to be quickly corrected with diet: \"Diet won't be quick enough for me\"   A. Doesn't care if her results are \"on edge\" of being low/normal-wants to be in the \"middle range\" of potassium  5. Need magnesium supplement?  6. Told not to drink orange juice due to pre-diabetes  7. Elbows and shoulder \"joint points\" feel hot-this is an ongoing issue  8. Has not started monitoring BP at home   A. Would like to start BP monitoring soon and might purchase a BP home monitor today    e[employer] message sent to patient with additional information on potassium rich foods.    Patient informed her questions will be sent to PCP.  Can expect a response today or tomorrow.    Thank you!  ARCHANA Espino, RN  [facility]    "

## 2022-01-12 NOTE — TELEPHONE ENCOUNTER
Referral to neurology signed.  Her white count has historically been just above normal, so no concern.

## 2022-01-12 NOTE — PATIENT INSTRUCTIONS
Patient Education     Potassium-Rich Foods   The average daily intake of potassium for a healthy man is 3,400 mg a day. For a healthy woman who is not pregnant, the amount is 2,600 mg a day. More potassium is needed when you lose too much potassium from your body. This can happen if you have diarrhea or vomiting, or if you have inflammatory bowel disease. It can also happen if you take a medicine to make you urinate more (diuretic) or large doses of laxatives. People with chronic kidney disease must be careful not to get too much potassium.  If your healthcare provide tells you that you need to increase the amount of potassium in your diet, include these high-potassium foods.  [The (*) indicates foods highest in potassium.]  Vegetables  Artichokes. Cooked 1/2 cup, 200 mg to 300 mg*  Asparagus. Cooked 1/2 cup, 200 mg to 300 mg  Beans. White, red, du cooked 1/2 cup, 300 mg to 500 mg*  Beets. Cooked 1/2 cup, 200 mg to 300 mg  Broccoli. Cooked or raw 1 cup, 200 mg to 500 mg*  [place] sprouts. Cooked 1/2 cup, 200 mg to 300 mg  Cabbage. Raw 1 cup, 100 mg to 200 mg  Carrots. Raw or cooked 1/2 cup, 100 mg to 200 mg  Celery. Raw 1 cup, 200 mg to 300 mg  Lima beans. Fresh or frozen 1/2 cup, 300 mg to 500 mg*   Mushrooms. Raw or cooked 1/2 cup, 100 mg to 300 mg  Peas. Cooked 1/2 cup, 150 mg to 250 mg   Potatoes. Baked 1 medium, 500 mg to 900 mg*   Spinach. Cooked 1 cup, 800 mg to 900 mg*   Spinach. Raw 2 cups, 300 mg to 400 mg *  Squash, winter. Fresh, frozen, or cooked 1/2 cup, 200 mg to 400 mg   Tomato. Fresh 1 medium, 200 mg to 300 mg   Tomato juice. Canned 1/2 cup, 200 mg to 300 mg   Fruits  Apple juice. Unsweetened 1 cup, 200 mg to 300 mg   Apricots. Canned 1/2 cup, 200 mg to 300 mg   Apricots. Dried 4 pieces, 100 mg to 200 mg   Avocado. Raw 1/2 cup, 300 mg to 400 mg*  Banana. Fresh 1 small, 300 mg to 400 mg*   Cantaloupe. Fresh 1 cup diced, 300 mg to 400 mg*   Grape juice. Unsweetened 1 cup, 200 mg to 300  mg   Honeydew melon. Fresh 1 cup diced, 300 mg to 400 mg*   Orange. Fresh 1 medium, 200 mg to 300 mg    Orange juice. Unsweetened, fresh or frozen 1/2 cup, 200 mg to 300 mg  Pineapple juice. Unsweetened 1 cup, 300 mg to 400 mg   Prune juice. Unsweetened 1/2 cup, 300 mg to 400 mg*   Prunes. Dried 5 pieces, 300 mg to 400 mg*   Strawberries. Fresh or frozen 1 cup, 200 mg to 300 mg  Meat  Red meat. Cooked 3 ounces, 100 mg to 300 mg   Seafood  Cod, flounder, halibut. Cooked 3 ounces, 100 mg to 300 mg*  [place]. Cooked, 3 ounces 300 mg to 400 mg*   Scallops. Cooked 3 ounces, 200 mg to 300 mg*  Shrimp. Cooked 3/4 cup, 100 mg to 200 mg   Tuna. Fresh or canned 3/4 cup, 200 mg to 500 mg   Eron last reviewed this educational content on 12/1/2019 2000-2021 [employer]. All rights reserved. This information is not intended as a substitute for professional medical care. Always follow your healthcare professional's instructions.

## 2022-01-13 ENCOUNTER — TELEPHONE (OUTPATIENT)
Dept: FAMILY MEDICINE | Facility: CLINIC | Age: 72
End: 2022-01-13
Payer: COMMERCIAL

## 2022-01-13 DIAGNOSIS — R51.9 NONINTRACTABLE HEADACHE, UNSPECIFIED CHRONICITY PATTERN, UNSPECIFIED HEADACHE TYPE: Primary | ICD-10-CM

## 2022-01-13 NOTE — TELEPHONE ENCOUNTER
S-(situation): She had tick bite in summer (likely deer tick) and now wondering if her old lyme disease has flared up and causing her elbow pain and headaches.  She wants lab work for this  She gets headaches for three days at a time and then gets a respite     B-(background): lyme disease 10 years ago  Headaches and elbow pain started Dec 1, 2021  Has neurology appt on but not until 2/21    A-(assessment): needs provider input about possible lyme disease and testing    R-(recommendations): appt given with PCP on 1/18    Pema, if you feel this appt not needed please let RN know or if you have another plan.    Cira Chandler RN, BSN  [facility]

## 2022-01-17 NOTE — TELEPHONE ENCOUNTER
Lab order placed.  She could just make a lab-only appointment to check for Lyme disease.  I don't think she needs to see me, as if it's negative, I have already placed a neurology referral and that is what I would recommend as a next step.

## 2022-01-17 NOTE — TELEPHONE ENCOUNTER
Patient states she thinks she was on muscle relaxers when she talked to Crystal.  Would like to come in in person tomorrow as she bought a blood pressure cuff and her blood pressure last night was 190/?.  She did say it was hard to get a good fit so I told her to bring the cuff to appointment tomorrow.  It may be the cuff is too small for patient's arm.  Patient is also asking for Lymes testing and COVID Test.  Did NOT want to come in for lab only.  Joselyn Polo RN  [facility]

## 2022-01-18 ENCOUNTER — OFFICE VISIT (OUTPATIENT)
Dept: FAMILY MEDICINE | Facility: CLINIC | Age: 72
End: 2022-01-18
Payer: COMMERCIAL

## 2022-01-18 VITALS
SYSTOLIC BLOOD PRESSURE: 126 MMHG | BODY MASS INDEX: 33.3 KG/M2 | DIASTOLIC BLOOD PRESSURE: 74 MMHG | RESPIRATION RATE: 16 BRPM | TEMPERATURE: 98.8 F | WEIGHT: 194 LBS | HEART RATE: 78 BPM | OXYGEN SATURATION: 99 %

## 2022-01-18 DIAGNOSIS — R51.9 NONINTRACTABLE HEADACHE, UNSPECIFIED CHRONICITY PATTERN, UNSPECIFIED HEADACHE TYPE: ICD-10-CM

## 2022-01-18 DIAGNOSIS — E87.1 HYPONATREMIA: ICD-10-CM

## 2022-01-18 DIAGNOSIS — E11.9 TYPE 2 DIABETES MELLITUS WITHOUT COMPLICATION, WITHOUT LONG-TERM CURRENT USE OF INSULIN (H): ICD-10-CM

## 2022-01-18 DIAGNOSIS — M25.50 MULTIPLE JOINT PAIN: Primary | ICD-10-CM

## 2022-01-18 DIAGNOSIS — F32.1 MODERATE MAJOR DEPRESSION (H): ICD-10-CM

## 2022-01-18 DIAGNOSIS — E87.6 HYPOKALEMIA: ICD-10-CM

## 2022-01-18 DIAGNOSIS — E66.01 MORBID OBESITY (H): ICD-10-CM

## 2022-01-18 LAB
ANION GAP SERPL CALCULATED.3IONS-SCNC: 8 MMOL/L (ref 3–14)
B BURGDOR IGG+IGM SER QL: 0.07
BUN SERPL-MCNC: 27 MG/DL (ref 7–30)
CALCIUM SERPL-MCNC: 8.9 MG/DL (ref 8.5–10.1)
CHLORIDE BLD-SCNC: 102 MMOL/L (ref 94–109)
CO2 SERPL-SCNC: 26 MMOL/L (ref 20–32)
CREAT SERPL-MCNC: 0.77 MG/DL (ref 0.52–1.04)
CRP SERPL-MCNC: <2.9 MG/L (ref 0–8)
ERYTHROCYTE [SEDIMENTATION RATE] IN BLOOD BY WESTERGREN METHOD: 13 MM/HR (ref 0–30)
GFR SERPL CREATININE-BSD FRML MDRD: 83 ML/MIN/1.73M2
GLUCOSE BLD-MCNC: 108 MG/DL (ref 70–99)
MAGNESIUM SERPL-MCNC: 2.2 MG/DL (ref 1.6–2.3)
POTASSIUM BLD-SCNC: 4.6 MMOL/L (ref 3.4–5.3)
SARS-COV-2 RNA RESP QL NAA+PROBE: NORMAL
SODIUM SERPL-SCNC: 136 MMOL/L (ref 133–144)

## 2022-01-18 PROCEDURE — 86140 C-REACTIVE PROTEIN: CPT | Performed by: NURSE PRACTITIONER

## 2022-01-18 PROCEDURE — 36415 COLL VENOUS BLD VENIPUNCTURE: CPT | Performed by: NURSE PRACTITIONER

## 2022-01-18 PROCEDURE — 85652 RBC SED RATE AUTOMATED: CPT | Performed by: NURSE PRACTITIONER

## 2022-01-18 PROCEDURE — 86431 RHEUMATOID FACTOR QUANT: CPT | Performed by: NURSE PRACTITIONER

## 2022-01-18 PROCEDURE — 86038 ANTINUCLEAR ANTIBODIES: CPT | Performed by: NURSE PRACTITIONER

## 2022-01-18 PROCEDURE — 99215 OFFICE O/P EST HI 40 MIN: CPT | Performed by: NURSE PRACTITIONER

## 2022-01-18 PROCEDURE — 86618 LYME DISEASE ANTIBODY: CPT | Performed by: NURSE PRACTITIONER

## 2022-01-18 PROCEDURE — 86364 TISS TRNSGLTMNASE EA IG CLAS: CPT | Performed by: NURSE PRACTITIONER

## 2022-01-18 PROCEDURE — 86039 ANTINUCLEAR ANTIBODIES (ANA): CPT | Performed by: NURSE PRACTITIONER

## 2022-01-18 PROCEDURE — 83735 ASSAY OF MAGNESIUM: CPT | Performed by: NURSE PRACTITIONER

## 2022-01-18 PROCEDURE — U0005 INFEC AGEN DETEC AMPLI PROBE: HCPCS | Performed by: NURSE PRACTITIONER

## 2022-01-18 PROCEDURE — 80048 BASIC METABOLIC PNL TOTAL CA: CPT | Performed by: NURSE PRACTITIONER

## 2022-01-18 NOTE — PROGRESS NOTES
Assessment & Plan     (M25.50) Multiple joint pain  (primary encounter diagnosis)  Comment:   Plan: Symptomatic; Unknown COVID-19 Virus         (Coronavirus) by PCR, Lyme Disease Dari with         reflex to WB Serum, CRP, inflammation, ESR:         Erythrocyte sedimentation rate, Anti Nuclear         Dari IgG by IFA with Reflex, Magnesium, Tissue         transglutaminase dari IgA and IgG, Rheumatoid         factor        Will check labs to rule out rheumatological conditions, Lyme disease; she requests another COVID test.     (R51.9) Nonintractable headache, unspecified chronicity pattern, unspecified headache type  Comment:   Plan: She will keep a headache diary.  She does have an appointment scheduled with neurology.      (E87.1) Hyponatremia  Comment:   Plan: Basic metabolic panel  (Ca, Cl, CO2, Creat,         Gluc, K, Na, BUN)        Will recheck.  If not back to normal, will recheck since it has only been 6 days off of Dyazide.      (E87.6) Hypokalemia  Comment:   Plan: Basic metabolic panel  (Ca, Cl, CO2, Creat,         Gluc, K, Na, BUN)        See above.     Type 2 Diabetes  At goal  Plan: The current medical regimen is effective;  continue present plan and medications.     Depression  Stable  Plan: She will continue to follow up with her psychiatrist.    Obesity  Plan: work on diet and exercise.         41 minutes spent on the date of the encounter doing chart review, patient visit and documentation            No follow-ups on file.    Jory Fragoso NP  [facility]    Leann Chakraborty is a 71 year old who presents for the following health issues     HPI     Her sodium and potassium level was a little low.  We stopped her Dyazide and started on Losartan.  Her blood pressure was elevated last night, but she has only been on this new medication for a few days.     She did have a deer tick bite in October.  She has joint pains.    The antibiotic for sinusitis did not help with her  headaches.  She has an appointment with neurology next month.   She has had an eye exam.  She is taking Tizanidine sporadically.                 Review of Systems         Objective    /74   Pulse 78   Temp 98.8  F (37.1  C) (Tympanic)   Resp 16   Wt 88 kg (194 lb)   LMP  (LMP Unknown)   SpO2 99%   BMI 33.30 kg/m    Body mass index is 33.3 kg/m .  Physical Exam   GENERAL: healthy, alert and no distress  NEURO: Normal strength and tone, mentation intact and speech normal  PSYCH: mentation appears normal, affect normal/bright

## 2022-01-19 DIAGNOSIS — R76.8 ELEVATED ANTINUCLEAR ANTIBODY (ANA) LEVEL: ICD-10-CM

## 2022-01-19 DIAGNOSIS — M25.50 MULTIPLE JOINT PAIN: Primary | ICD-10-CM

## 2022-01-19 LAB
ANA PAT SER IF-IMP: ABNORMAL
ANA SER QL IF: POSITIVE
ANA TITR SER IF: ABNORMAL {TITER}
RHEUMATOID FACT SER NEPH-ACNC: <7 IU/ML
SARS-COV-2 RNA RESP QL NAA+PROBE: NOT DETECTED
TTG IGA SER-ACNC: 0.5 U/ML
TTG IGG SER-ACNC: 2.1 U/ML

## 2022-01-24 ENCOUNTER — NURSE TRIAGE (OUTPATIENT)
Dept: FAMILY MEDICINE | Facility: CLINIC | Age: 72
End: 2022-01-24
Payer: COMMERCIAL

## 2022-01-24 DIAGNOSIS — F41.9 ANXIETY: ICD-10-CM

## 2022-01-24 DIAGNOSIS — F32.1 MODERATE MAJOR DEPRESSION (H): Primary | ICD-10-CM

## 2022-01-24 DIAGNOSIS — I10 HYPERTENSION GOAL BP (BLOOD PRESSURE) < 140/90: ICD-10-CM

## 2022-01-24 RX ORDER — LOSARTAN POTASSIUM 100 MG/1
100 TABLET ORAL DAILY
Qty: 90 TABLET | Refills: 1 | Status: SHIPPED | OUTPATIENT
Start: 2022-01-24 | End: 2022-07-29

## 2022-01-24 RX ORDER — DULOXETIN HYDROCHLORIDE 30 MG/1
60 CAPSULE, DELAYED RELEASE ORAL DAILY
Refills: 0 | COMMUNITY
Start: 2022-01-24 | End: 2022-09-23

## 2022-01-24 NOTE — TELEPHONE ENCOUNTER
Increase Losartan to 100 mg and send a message with readings in 3 weeks.    I filled out a temporary handicap permit form and placed in basket.

## 2022-01-24 NOTE — TELEPHONE ENCOUNTER
Left message to call back and ask to speak with an available triage nurse.  JAYESH EspinoN, RN  [facility]

## 2022-01-24 NOTE — TELEPHONE ENCOUNTER
Reason for Call:  Call back    Detailed comments: Looking to speak to care team regarding BP. States it has been very high lately when she is out driving, or taking her dogs out. 190 lately and 45mins ago was 160 over 100. Has some ideas she would like to discuss to get it under control    Phone Number Patient can be reached at: Home number on file [phone] (home)    Best Time: At earliest convenience     Can we leave a detailed message on this number? YES    Call taken on 1/24/2022 at 10:50 AM by Gosia Crum

## 2022-01-24 NOTE — TELEPHONE ENCOUNTER
Pema-Please review and advise:  1. Do you recommend change to BP medication?  2. Patient asked for handicap parking permit application be completed as she does not feel safe walking the 1.5 blocks when takes dog to dog park.  Writer explained to patient certain condition(s) need to be met in order for someone to qualify for a handicap parking permit  3. Patient's Psychiatrist recommended decrease Cymbalta to 60 mg daily to see if this helps reduce headache frequency   A. Med list updated    Writer called patient who stated:  1. Took BP medications at 1100 today  2. Spoke with Psychiatrist who prescribes patient's Cymbalta and he thinks patient should decrease by 30 mg and see if that reduces headache frequency   A. As of today will be taking Cymbalta 60 mg daily  3. Took BP while on phone with writer: 170/102 LUE and 173/103 RUE  4. Staying well hydrated  5. Rheumatology appt is 2/21/22   A. Joints are     Reason for Disposition    Systolic BP >= 160 OR Diastolic >= 100    Additional Information    Negative: Sounds like a life-threatening emergency to the triager    Negative: Pregnant > 20 weeks or postpartum (< 6 weeks after delivery) and new hand or face swelling    Negative: Pregnant > 20 weeks and BP > 140/90    Negative: Systolic BP >= 160 OR Diastolic >= 100, and any cardiac or neurologic symptoms (e.g., chest pain, difficulty breathing, unsteady gait, blurred vision)    Negative: Patient sounds very sick or weak to the triager    Negative: BP Systolic BP >= 140 OR Diastolic >= 90 and postpartum (from 0 to 6 weeks after delivery)    Negative: Systolic BP >= 180 OR Diastolic >= 110, and missed most recent dose of blood pressure medication    Negative: Systolic BP >= 180 OR Diastolic >= 110    Negative: Patient wants to be seen    Negative: Ran out of BP medications    Negative: Taking BP medications and feels is having side effects (e.g., impotence, cough, dizziness)    Protocols used: HIGH BLOOD  PRESSURE-A-OH    Thank you!  JAYESH EspinoN, RN  [facility]

## 2022-01-25 ENCOUNTER — TELEPHONE (OUTPATIENT)
Dept: FAMILY MEDICINE | Facility: CLINIC | Age: 72
End: 2022-01-25
Payer: COMMERCIAL

## 2022-01-25 NOTE — TELEPHONE ENCOUNTER
Mailed [place] department of public safety  and vehicle services applicattion for disability parking certificate to  Patient  Sent copy to abstraction  Kept copy in clinic

## 2022-01-26 RX ORDER — HYDROCHLOROTHIAZIDE 12.5 MG/1
12.5 TABLET ORAL DAILY
Qty: 90 TABLET | Refills: 3 | Status: SHIPPED | OUTPATIENT
Start: 2022-01-26 | End: 2022-09-23

## 2022-01-26 NOTE — TELEPHONE ENCOUNTER
Pt called back.  She wanted PCP response on this. She knows PCP will be out until next week.    Today- bp= 1160/100 now.  This is before her meds.  She will take her losartan now. Recheck bp in 2 hours.  She has been on the 100 mg losartan for 2 days.  No headache yesterday.    Plan:  If bp is above 190/100, pt will go to  or be seen in clinic.    Pt is concerned with water weight. Not on a diuretic.  She used to be on one.  Has edema in her legs.  She has not had a BP as high as 190/-- in the last few days.    Is pt OK to drive with the losartan? PT read about sedation?  She has a strong family hx of HTN. Family has  from it.    Pended pharmacy.   Should pt see a provider this week?   Would you want edema and wt assessed?  Pt wanted response from PCP.  Knows this may take until tomorrow.    LORA Heredia

## 2022-01-27 NOTE — TELEPHONE ENCOUNTER
Losartan does not cause drowsiness.  We can add a low dose of hydrochlorothiazide to her Losartan.  She was on triamterene-hydrochlorothiazide, which was a combo of two diuretics and it caused low sodium and potassium, but this low dose should be fine.

## 2022-01-27 NOTE — TELEPHONE ENCOUNTER
RN called patient and relayed message below, she will try the hydrochlorothiazide and call us if any problems arise.     ARCHANA Moran RN  [facility]

## 2022-02-10 NOTE — TELEPHONE ENCOUNTER
FUTURE VISIT INFORMATION      FUTURE VISIT INFORMATION:    Date: 2/21/2022    Time: 130pm    [place] [facility]  REFERRAL INFORMATION:    Referring provider:  Jory Fragoso NP    Referring providers clinic:  [facility]    Reason for visit/diagnosis  Headaches     RECORDS REQUESTED FROM:       Clinic name Comments Records Status Imaging Status   Internal KENDAL Fragoso-1/18/2022, 12/22/2021 Epic No Images                                    2/17/2022-Called and Spoke with patient, she told me no outside Headache records.-MR @ 815am

## 2022-02-15 ENCOUNTER — TELEPHONE (OUTPATIENT)
Dept: FAMILY MEDICINE | Facility: CLINIC | Age: 72
End: 2022-02-15
Payer: COMMERCIAL

## 2022-02-15 NOTE — TELEPHONE ENCOUNTER
Reason for call:  Patient reporting a symptom    Symptom or request: symptoms    Duration (how long have symptoms been present): Saturday the 12th    Have you been treated for this before? No    Additional comments: patient said that on Saturday she cleaned out her litter box with out wearing a mask and that evening around 5:00 pm she became very sleepy. Slept for 3 hour. Then on Sunday night she took her sleeping meds at 4:00 am and when she woke she was on the floor she saids she fell out of bed. She has a Cpap but must of came off. Her throat is sore and her snot is clear she has never had allergy issues like this before. She thinks it's from her khari litter box she normally uses unscented but she received some from a friend and it's scented. Her blood pressure has be good 120/67 so is there anything that she could take over the counter or would a prescription be better.     Phone Number patient can be reached at:  Home number on file [phone] (home)    Best Time:      Can we leave a detailed message on this number:  YES    Call taken on 2/15/2022 at 2:34 PM by Treva Samuel

## 2022-02-15 NOTE — TELEPHONE ENCOUNTER
I talked to to pt about these concerns.    Pt has clear snot now.  PT did accumulate a bit of used cat litter in the basement.  She does feel sinus pressure high in the sinuses.  This is day #3 since onset on Saturday.    PT can taste food.  This cat litter was scented. She usually uses non scented litter.  120/67 was a very good bp that she got today at home.    Should pt take an allergy medicine. Any recommendation, due to HTN.    Pt is also wondering if she should get her potassium and sodium rechecked.  PT is also very tired. Really tired.    PT can kind of be all over the place with her narrative.    Do you recommend a allergy medicine, covid test, or visit?  I explained we might not have a provider response today. Will send to AMMON Heredia RN

## 2022-02-16 NOTE — TELEPHONE ENCOUNTER
Went over providers message she will try loratadine, she is also having issues with her right middle toe, has a black spot on it and it is painful at times, appt made for next week.    Marlin Huff RN

## 2022-02-21 ENCOUNTER — PRE VISIT (OUTPATIENT)
Dept: NEUROLOGY | Facility: CLINIC | Age: 72
End: 2022-02-21

## 2022-02-22 ENCOUNTER — OFFICE VISIT (OUTPATIENT)
Dept: FAMILY MEDICINE | Facility: CLINIC | Age: 72
End: 2022-02-22
Payer: COMMERCIAL

## 2022-02-22 VITALS
DIASTOLIC BLOOD PRESSURE: 73 MMHG | WEIGHT: 204.5 LBS | SYSTOLIC BLOOD PRESSURE: 128 MMHG | HEART RATE: 96 BPM | TEMPERATURE: 98.7 F | OXYGEN SATURATION: 98 % | BODY MASS INDEX: 35.1 KG/M2

## 2022-02-22 DIAGNOSIS — L98.9 LESION OF SKIN OF FOOT: Primary | ICD-10-CM

## 2022-02-22 DIAGNOSIS — E11.9 TYPE 2 DIABETES MELLITUS WITHOUT COMPLICATION, WITHOUT LONG-TERM CURRENT USE OF INSULIN (H): ICD-10-CM

## 2022-02-22 PROCEDURE — 99214 OFFICE O/P EST MOD 30 MIN: CPT | Performed by: NURSE PRACTITIONER

## 2022-02-22 ASSESSMENT — PATIENT HEALTH QUESTIONNAIRE - PHQ9: SUM OF ALL RESPONSES TO PHQ QUESTIONS 1-9: 3

## 2022-02-22 NOTE — PROGRESS NOTES
Assessment & Plan     (L98.9) Lesion of skin of foot  (primary encounter diagnosis)  Comment:   Plan: Orthopedic  Referral        Unclear if just a corn with subcutaneous capillary hemorrhage, vs ulceration vs wart.  Due to her diabetes, discussed that I was not comfortable debriding.  Will refer to podiatry.     (E11.9) Type 2 diabetes mellitus without complication, without long-term current use of insulin (H)  Comment:   Plan: Orthopedic  Referral                           No follow-ups on file.    Jory Fragoso NP  [facility]    Leann Chakraborty is a 71 year old who presents for the following health issues     HPI     She has a hammertoe on her right foot, now having pain.  She denies any fevers or drainage.  She did cut her nail and accidentally cut her toe several months ago.  She does have diabetes.          Review of Systems         Objective    /73   Pulse 96   Temp 98.7  F (37.1  C) (Tympanic)   Wt 92.8 kg (204 lb 8 oz)   LMP  (LMP Unknown)   SpO2 98%   BMI 35.10 kg/m    Body mass index is 35.1 kg/m .  Physical Exam   GENERAL: healthy, alert and no distress  MS: second toe right foot hammertoe  SKIN: calloused skin distal right second toe, subcutaneous dark red color, tender to palpation  PSYCH: mentation appears normal, affect normal/bright

## 2022-03-04 ENCOUNTER — OFFICE VISIT (OUTPATIENT)
Dept: URGENT CARE | Facility: URGENT CARE | Age: 72
End: 2022-03-04
Payer: COMMERCIAL

## 2022-03-04 ENCOUNTER — ANCILLARY PROCEDURE (OUTPATIENT)
Dept: GENERAL RADIOLOGY | Facility: CLINIC | Age: 72
End: 2022-03-04
Attending: PHYSICIAN ASSISTANT
Payer: COMMERCIAL

## 2022-03-04 ENCOUNTER — NURSE TRIAGE (OUTPATIENT)
Dept: NURSING | Facility: CLINIC | Age: 72
End: 2022-03-04
Payer: COMMERCIAL

## 2022-03-04 VITALS
DIASTOLIC BLOOD PRESSURE: 78 MMHG | WEIGHT: 210 LBS | BODY MASS INDEX: 36.05 KG/M2 | HEART RATE: 98 BPM | TEMPERATURE: 98 F | OXYGEN SATURATION: 98 % | SYSTOLIC BLOOD PRESSURE: 121 MMHG

## 2022-03-04 DIAGNOSIS — M25.532 LEFT WRIST PAIN: ICD-10-CM

## 2022-03-04 DIAGNOSIS — M54.2 NECK PAIN: ICD-10-CM

## 2022-03-04 DIAGNOSIS — M54.50 ACUTE BILATERAL LOW BACK PAIN WITHOUT SCIATICA: ICD-10-CM

## 2022-03-04 DIAGNOSIS — M25.532 LEFT WRIST PAIN: Primary | ICD-10-CM

## 2022-03-04 PROCEDURE — 99213 OFFICE O/P EST LOW 20 MIN: CPT | Performed by: PHYSICIAN ASSISTANT

## 2022-03-04 PROCEDURE — 73110 X-RAY EXAM OF WRIST: CPT | Mod: LT | Performed by: RADIOLOGY

## 2022-03-04 NOTE — PROGRESS NOTES
Assessment & Plan:      Plan/Clinical Decision Making:  Patient fell last night and has multiple aches and pains.   Has some numbness last night in hands with pain in arms.   Patient has good sensation in hands.  Good ROM of extremities.   Paraspinal tenderness.   Left wrist swollen and painful. Xray done and negative for fracture.   Discussed ice, light stretching, Tylenol for pain.     Follow-up if worsening or not improving.       ICD-10-CM    1. Left wrist pain  M25.532 XR Wrist Left G/E 3 Views   2. Neck pain  M54.2    3. Acute bilateral low back pain without sciatica  M54.50          At the end of the encounter, I discussed results, diagnosis, medications. Discussed red flags for immediate return to clinic/ER, as well as indications for follow up if no improvement. Patient understood and agreed to plan. Patient was stable for discharge.        Sofi Park PA-C on 3/4/2022 at 3:49 PM      30 minutes spent on the date of the encounter doing chart review, history and exam, documentation and further activities per the note.  Time spent on education concerning injuries and treatment.     Follow-up if worsening pain.       Subjective:     HPI:    Mylene is a 71 year old female who presents to clinic today for the following health issues:  Chief Complaint   Patient presents with     Urgent Care     Generalized Body Aches     c/o body pain after a fall yesterday      HPI    Patient complains of falling last night.   Patient was at home stepped on air mattress, fell forward, Catching self when she was close to the ground with arms.   Has abrasions on knees, pain bilateral triceps, bruising upper abdomen. Neck and back soreness.    Took muscle relaxer last night.   Patient has been taking ibuprofen 2 every 6 hours.       History obtained from the patient.    Review of Systems  No fever or illness,  Body aches. Tingling in hands.   No radiating pain.     Problem List:  2017-06: BMI > 35  2016-09: Acute right-sided  low back pain with right-sided sciatica  2016-03: Type 2 diabetes mellitus without complication (H)  2013-03: Prediabetes  2013-02: Ankle pain  2013-02: Aftercare for healing traumatic fracture of lower leg  2013-02: Abnormal gait  2013-01: Fibula fracture  2012-10: Moderate major depression (H)  2012-09: Anxiety  2012-05: Recurrent major depression in complete remission (H)  2012-01: Advanced directives, counseling/discussion  2011-12: Neck pain  2011-05: Hypertension goal BP (blood pressure) < 140/90  2010-12: Hypertension goal BP (blood pressure) < 130/80  2010-12: CKD (chronic kidney disease) stage 2, GFR 60-89 ml/min  2010-10: Hyperlipidemia LDL goal <130  2010-03: Hypothyroidism  2008-10: OA (osteoarthritis) of knee  2007-03: Sprain and strain of unspecified site of knee and leg  2007-03: Disorder of uterus  2007-02: iamSPRAIN OF KNEE & LEG NOS  2006-07: iamJOINT PAIN-LOWER LEG  2004-07: Osteoarthritis  2004-07: Sleep apnea  2004-07: DEPRESSION  2004-07: Allergic rhinitis  CHRONIC BACK PAIN  Peripheral neuropathy      Past Medical History:   Diagnosis Date     Allergic rhinitis, cause unspecified      CKD (chronic kidney disease) stage 2, GFR 60-89 ml/min 12/31/2010     Depressive disorder, not elsewhere classified      Hyperlipidemia LDL goal <130 10/31/2010     Hypertension goal BP (blood pressure) < 130/80 12/31/2010     Lumbago     degenerative disc disease and disc protrusion     Osteoarthrosis, unspecified whether generalized or localized, unspecified site      Peripheral neuropathy     non-diabetic     Thyroid disease      Type 2 diabetes mellitus without complication (H) 3/28/2016     Unspecified essential hypertension      Unspecified sleep apnea        Social History     Tobacco Use     Smoking status: Never Smoker     Smokeless tobacco: Never Used     Tobacco comment: am using sm. amt. of marijuana for sleep/pain   Substance Use Topics     Alcohol use: Yes     Comment: really managed/not my drug to  enjoy. marijuana 3 times per week              Objective:     Vitals:    03/04/22 1528   BP: 121/78   Pulse: 98   Temp: 98  F (36.7  C)   TempSrc: Tympanic   SpO2: 98%   Weight: 95.3 kg (210 lb)         Physical Exam   EXAM:   Pleasant, alert, appropriate appearance. NAD.  Head Exam: Normocephalic, atraumatic.  Eye Exam:  PERRLA, EOMI, non icteric/injection.    Chest/Respiratory Exam: CTAB.  Cardiovascular Exam: RRR. No murmur or rubs.  ABD: soft, Non-tender, non-distended, no rebound/guarding.  No masses/organomegaly.  Ext/musculoskeletal: Neck and back with good ROM. Paraspinal tenderness of back.    Good ROM of UE.  Pain on palpation of left wrist.  No scaphoid tenderness.   Neuro: CN II-XII intact grossly intact. Nl reflexes, nl sensation.   Skin: bruise mid upper abdomen.       Results:  Results for orders placed or performed in visit on 03/04/22   XR Wrist Left G/E 3 Views     Status: None    Narrative    XR WRIST LEFT G/E 3 VIEWS   3/4/2022 4:27 PM     HISTORY: Left wrist pain    COMPARISON: None.      Impression    IMPRESSION: Advanced first carpometacarpal degenerative changes. Mild  ulna minus variance. No evidence of acute fracture.    KARISHMA OHARA MD         SYSTEM ID:  [id number]

## 2022-03-04 NOTE — TELEPHONE ENCOUNTER
Fell in garage last night and hurt her arms - pt stated that she fell forward     Pt also hurt her back     Rates back pain 3/10    Pt stated that her fingers are going numb at times on both hands - not sure if she was sleeping funny on them     Pt has bruising on her left inner thigh     Pt stated that she has a hx of a neck fusion     Per protocol - See Physician within next 24 hours     Transferred to scheduling for appointment today     Care advice given per protocol and when to call back. Pt verbalized understanding and agrees to plan of care.    Mony Crawford RN  [facility]  7:29 AM 3/4/2022      COVID 19 Nurse Triage Plan/Patient Instructions    Please be aware that novel coronavirus (COVID-19) may be circulating in the community. If you develop symptoms such as fever, cough, or SOB or if you have concerns about the presence of another infection including coronavirus (COVID-19), please contact your health care provider or visit [email or web address].     Disposition/Instructions    In-Person Visit with provider recommended. Reference Visit Selection Guide.    Thank you for taking steps to prevent the spread of this virus.  o Limit your contact with others.  o Wear a simple mask to cover your cough.  o Wash your hands well and often.    Resources    [facility]: About COVID-19: [email or web address]    CDC: What to Do If You're Sick: www.cdc.gov/coronavirus/2019-ncov/about/steps-when-sick.html    CDC: Ending Home Isolation: www.cdc.gov/coronavirus/2019-ncov/hcp/disposition-in-home-patients.html     CDC: Caring for Someone: www.cdc.gov/coronavirus/2019-ncov/if-you-are-sick/care-for-someone.html     [facility]: Interim Guidance for Hospital Discharge to Home: www.health.[facility].mn.us/diseases/coronavirus/hcp/hospdischarge.pdf    [facility] clinical trials (COVID-19 research studies): [email or web address]     Below are the COVID-19 hotlines at the  [facility] Interpreters are available.   o For health questions: Call 054-939-7727 or 1-976.937.3420 (7 a.m. to 7 p.m.)  o For questions about schools and childcare: Call 200-303-4983 or 1-281.312.3411 (7 a.m. to 7 p.m.)                           Reason for Disposition    [1] High-risk adult (e.g., age > 60, osteoporosis, chronic steroid use) AND [2] still hurts    Additional Information    Negative: Dangerous mechanism of injury (e.g., MVA, contact sports, trampoline, diving, fall > 10 feet or 3 meters)  (Exception: back pain began > 1 hour after injury)    Negative: [1] Weakness (i.e., paralysis, loss of muscle strength) of the leg(s) or foot AND [2] sudden onset after back injury    Negative: [1] Numbness (i.e., loss of sensation) of the leg(s) or foot AND [2] sudden onset after back injury    Negative: [1] Major bleeding (e.g., actively dripping or spurting) AND [2] can't be stopped    Negative: Bullet wound, knife wound, or other penetrating object    Negative: Shock suspected (e.g., cold/pale/clammy skin, too weak to stand, low BP, rapid pulse)    Negative: Sounds like a life-threatening emergency to the triager    Negative: [1] Injuries at more than 1 site AND [2] unsure which guideline to use    Negative: Injury to the neck    Negative: Injury to the tailbone    Negative: Back pain not from an injury    Negative: Back pain from overuse (work, exercise, gardening) OR from twisting, lifting, or bending injury    Negative: [1] SEVERE PAIN in kidney area (flank) AND [2] follows direct blow to that site    Negative: Blood in urine (red, pink, or tea-colored)    Negative: [1] Unable to urinate (or only a few drops) > 4 hours AND [2] bladder feels very full (e.g., palpable bladder or strong urge to urinate)    Negative: [1] Loss of bladder or bowel control (urine or bowel incontinence; wetting self, leaking stool) AND [2] new onset    Negative: Numbness (loss of sensation) in groin or  rectal area    Negative: Skin is split open or gaping  (or length > 1/2 inch or 12 mm)    Negative: Puncture wound of back    Negative: [1] Bleeding AND [2] won't stop after 10 minutes of direct pressure (using correct technique)    Negative: Sounds like a serious injury to the triager    Negative: Weakness of a leg or foot (e.g., unable to bear weight, dragging foot)    Negative: Numbness of a leg or foot (i.e.., loss of sensation)    Negative: [1] SEVERE pain (e.g., excruciating) AND [2] not improved 2 hours after pain medicine/ice packs    Negative: Pain radiates into the thigh or further down the leg now    Negative: [1] Landed hard on feet or buttocks AND [2] pain over spine    Negative: Suspicious history for the injury    Negative: Patient is confused or is an unreliable provider of information (e.g., dementia, severe intellectual disability, alcohol intoxication)    Protocols used: BACK INJURY-A-AH

## 2022-03-31 ENCOUNTER — NURSE TRIAGE (OUTPATIENT)
Dept: FAMILY MEDICINE | Facility: CLINIC | Age: 72
End: 2022-03-31
Payer: COMMERCIAL

## 2022-03-31 NOTE — PATIENT INSTRUCTIONS
PAIN MEDICINES:  * For pain relief, you can take either acetaminophen, ibuprofen, or naproxen.  * They are over-the-counter (OTC) pain drugs. You can buy them at the drugstore.  * ACETAMINOPHEN - REGULAR STRENGTH TYLENOL: Take 650 mg (two 325 mg pills) by mouth every 4-6 hours as needed. Each Regular Strength Tylenol pill has 325 mg of acetaminophen. The most you should take each day is 3,250 mg (10 pills a day).  * ACETAMINOPHEN - EXTRA STRENGTH TYLENOL: Take 1,000 mg (two 500 mg pills) every 8 hours as needed. Each Extra Strength Tylenol pill has 500 mg of acetaminophen. The most you should take each day is 3,000 mg (6 pills a day).  * IBUPROFEN (E.G., MOTRIN, ADVIL): Take 400 mg (two 200 mg pills) by mouth every 6 hours. The most you should take each day is 1,200 mg (six 200 mg pills), unless your doctor has told you to take more.        CALL BACK IF:  * You have more questions  * You become worse      APPLY A COLD PACK:  * Apply a cold pack or an ice bag (wrapped in a moist towel) to the area for 20 minutes. Repeat in 1 hour, then every 4 hours while awake.  * Continue this for the first 48 hours after an injury.  * This will help decrease pain and swelling.      APPLY HEAT TO THE AREA:  * Beginning 48 hours after an injury, apply a warm washcloth or heating pad for 10 minutes three times a day.  * This will help increase blood flow and improve healing.      OBSERVATION AFTER HEAD INJURY:   * Watch a person with a head injury very closely during the first 2 hours following the injury.  * Wake the head-injured person every 4 hours for the first 24 hours. Check to see if he (or she) can walk and talk.      CALL BACK IF:  * Severe headache  * Extremity weakness or numbness occurs  * Blurred vision occurs   * Vomiting occurs  * You become worse

## 2022-03-31 NOTE — TELEPHONE ENCOUNTER
Patient fell at home just before calling.  Hit her head on a waffle iron  Home care and can think about Urgent Care.  May need to call 911 if getting much worse as patient lives alone and has no one that could drive her to clinic or ER.  Will send home care instructions through My Chart to patient.  Joselyn Polo RN  [facility]  Joselyn Polo RN  [facility]    Reason for Disposition    Minor head injury    Additional Information    Negative: ACUTE NEUROLOGIC SYMPTOM and symptom present now    Negative: Knocked out (unconscious) > 1 minute    Negative: Seizure (convulsion) occurred (Exception: prior history of seizures and now alert and without Acute Neurologic Symptoms)    Negative: Neck pain after dangerous injury (e.g., MVA, diving, trampoline, contact sports, fall > 10 feet or 3 meters) (Exception: neck pain began > 1 hour after injury)    Negative: Major bleeding (actively dripping or spurting) that can't be stopped    Negative: Penetrating head injury (e.g., knife, gunshot wound, metal object)    Negative: Sounds like a life-threatening emergency to the triager    Negative: Recently examined and diagnosed with a concussion by a healthcare provider and has questions about concussion symptoms    Negative: Can't remember what happened (amnesia)    Negative: Vomiting once or more    Negative: Watery or blood-tinged fluid dripping from the nose or ears    Negative: ACUTE NEUROLOGIC SYMPTOM and now fine    Negative: Knocked out (unconscious) < 1 minute and now fine    Negative: Severe headache    Negative: Dangerous injury (e.g., MVA, diving, trampoline, contact sports, fall > 10 feet or 3 meters) or severe blow from hard object (e.g., golf club or baseball bat)    Negative: Large swelling or bruise > 2 inches (5 cm)    Negative: Skin is split open or gaping (length > 1/2 inch or 12 mm)    Negative: Bleeding won't stop after 10 minutes of direct pressure  "(using correct technique)    Negative: One or two 'black eyes' (bruising, purple color of eyelids), and onset within 24 hours of head injury    Negative: Taking Coumadin (warfarin) or other strong blood thinner, or known bleeding disorder (e.g., thrombocytopenia)    Negative: Age over 65 years with and area of head swelling or bruise    Negative: Sounds like a serious injury to the triager    Negative: Patient is confused or is an unreliable provider of information (e.g., dementia, severe intellectual disability, alcohol intoxication)    Negative: No prior tetanus shots (or is not fully vaccinated) and any wound (e.g., cut or scrape)    Negative: HIV positive or severe immunodeficiency (severely weak immune system) and DIRTY cut or scrape    Negative: Patient wants to be seen    Negative: Suspicious history for the injury    Negative: After 3 days and headache persists    Negative: Last tetanus shot > 5 years ago and DIRTY cut or scrape    Negative: Last tetanus shot >10 years ago and CLEAN cut or scrape    Answer Assessment - Initial Assessment Questions  1. MECHANISM: \"How did the injury happen?\" For falls, ask: \"What height did you fall from?\" and \"What surface did you fall against?\"       Fell from standing, hit head on waffle iron  2. ONSET: \"When did the injury happen?\" (Minutes or hours ago)       Minutes ago  3. NEUROLOGIC SYMPTOMS: \"Was there any loss of consciousness?\" \"Are there any other neurological symptoms?\"       No LOC, no  4. MENTAL STATUS: \"Does the person know who he is, who you are, and where he is?\"       yes  5. LOCATION: \"What part of the head was hit?\"       Back of head at the level of the ears  6. SCALP APPEARANCE: \"What does the scalp look like? Is it bleeding now?\" If so, ask: \"Is it difficult to stop?\"       No bleeding  7. SIZE: For cuts, bruises, or swelling, ask: \"How large is it?\" (e.g., inches or centimeters)       none  8. PAIN: \"Is there any pain?\" If so, ask: \"How bad is it?\"  " "(e.g., Scale 1-10; or mild, moderate, severe)      Some, when she first hit her head.  9. TETANUS: For any breaks in the skin, ask: \"When was the last tetanus booster?\"      No skin break that she is aware of.  10. OTHER SYMPTOMS: \"Do you have any other symptoms?\" (e.g., neck pain, vomiting)        no  11. PREGNANCY: \"Is there any chance you are pregnant?\" \"When was your last menstrual period?\"        n/a    Protocols used: HEAD INJURY-A-OH      "

## 2022-04-27 DIAGNOSIS — E03.9 ACQUIRED HYPOTHYROIDISM: ICD-10-CM

## 2022-04-28 RX ORDER — LEVOTHYROXINE SODIUM 75 UG/1
TABLET ORAL
Qty: 90 TABLET | Refills: 0 | Status: SHIPPED | OUTPATIENT
Start: 2022-04-28 | End: 2022-07-29

## 2022-04-28 NOTE — TELEPHONE ENCOUNTER
Prescription approved per [facility] Refill Protocol.  LALY Mendiola RN  [facility]

## 2022-05-02 ENCOUNTER — NURSE TRIAGE (OUTPATIENT)
Dept: FAMILY MEDICINE | Facility: CLINIC | Age: 72
End: 2022-05-02
Payer: COMMERCIAL

## 2022-05-02 ENCOUNTER — MYC MEDICAL ADVICE (OUTPATIENT)
Dept: FAMILY MEDICINE | Facility: CLINIC | Age: 72
End: 2022-05-02
Payer: COMMERCIAL

## 2022-05-02 DIAGNOSIS — E11.9 TYPE 2 DIABETES MELLITUS WITHOUT COMPLICATION, WITHOUT LONG-TERM CURRENT USE OF INSULIN (H): Primary | ICD-10-CM

## 2022-05-02 NOTE — TELEPHONE ENCOUNTER
Pema-Please review and advise/sign if agree. Patient declined provider visit and requesting you place A1C order.  Patient is next due for A1C on 6/22/22.    Writer called patient:  1. Blood sugar today was   A. 155-fasting this AM   B. 110 after breakfast and before lunch   C. Would like to check A1C (not due until 6/22/22)  2. Blood sugars have not been below 100 for a while  3. Gained weight this winter and not eating as well as she should  4. More fatigued than usual-sleeping 8 hours at night  5. Needs Radiology scheduling number for DEXA and mammogram sent via [employer] message sent to patient.    Reason for Disposition    Caller has NON-URGENT medication question about med that PCP prescribed and triager unable to answer question    Additional Information    Negative: Unconscious or difficult to awaken    Negative: Acting confused (e.g., disoriented, slurred speech)    Negative: Very weak (can't stand)    Negative: Sounds like a life-threatening emergency to the triager    Negative: Vomiting and signs of dehydration (e.g., very dry mouth, lightheaded, dark urine)    Negative: Blood glucose > 240 mg/dL (13.3 mmol/L) and rapid breathing    Negative: Blood glucose > 500 mg/dL (27.8 mmol/L)    Negative: Blood glucose > 240 mg/dL (13.3 mmol/L) AND urine ketones moderate-large (or more than 1+)    Negative: Blood glucose > 240 mg/dL (13.3 mmol/L) and blood ketones > 1.4 mmol/L    Negative: Blood glucose > 240 mg/dL (13.3 mmol/L) AND vomiting AND unable to check for ketones (in blood or urine)    Negative: Vomiting lasting > 4 hours    Negative: Patient sounds very sick or weak to the triager    Negative: Fever > 100.4 F (38.0 C)    Negative: Caller has URGENT medication or insulin pump question and triager unable to answer question    Negative: Blood glucose > 400 mg/dL (22.2 mmol/L)    Negative: Blood glucose > 300 mg/dL (16.7 mmol/L) AND two or more times in a row    Negative: Urine ketones  moderate - large (or blood ketones > 1.4 mmol/L)    Negative: New-onset diabetes suspected (e.g., frequent urination, weak, weight loss)    Negative: Symptoms of high blood sugar (e.g., frequent urination, weak, weight loss) and not able to test blood glucose    Negative: Patient wants to be seen    Protocols used: DIABETES - HIGH BLOOD SUGAR-HENRIQUE-JAYESH LoeraN, RN  [facility]

## 2022-05-02 NOTE — TELEPHONE ENCOUNTER
Jumping in to help route message-    Patient came in to clinic as she could not wait our hold times. She has high glucose levels and is wondering if she needs an appointment or not. Please advise and reach out to patient. Thanks!

## 2022-05-02 NOTE — MR AVS SNAPSHOT
After Visit Summary   7/3/2018    Leena Montaño    MRN: [id number]           Patient Information     Date Of Birth          1950        Visit Information        Provider Department      7/3/2018 10:00 AM Jory Fragoso NP [facility]        Today's Diagnoses     Encounter for routine adult health examination without abnormal findings    -  1    Type 2 diabetes mellitus without complication, without long-term current use of insulin (H)        Hypertension goal BP (blood pressure) < 140/90        Hypothyroidism, unspecified type        Major depressive disorder, recurrent episode, mild (H)        Acquired hypothyroidism        Vitamin D deficiency        Cutaneous candidiasis        Tinea pedis of both feet           Follow-ups after your visit        Additional Services     DIABETES EDUCATOR REFERRAL       DIABETES SELF MANAGEMENT TRAINING (DSMT)      Your provider has referred you to Diabetes Education: FMG: Diabetes Education - All [facility] [phone]   [email or web address]     If an urgent visit is needed or A1C is above 12, Care Team to call the Diabetes  Education Team at [phone] or send an In Basket message to the Diabetes Education Pool (P DIAB ED-PATIENT CARE).    A  will call you to make your appointment. If it has been more than 3 business days since your referral was placed, please call the above phone number to schedule.    Type of training and number of hours: Previous Diagnosis: Follow-up DSMT - 2 hours.    Diabetes Type: Type 2 - Diet Control   Medicare covers: 10 hours of initial DSMT in 12 month period from the time of first visit, plus 2 hours of follow-up DSMT annually, and additional hours as requested for insulin training.         Diabetes Co-Morbidities: hypertension               A1C Goal:  <7.0       A1C is: Lab Results       Component                Value                Date                       A1C                      6.3                 10/09/2017              MEDICAL NUTRITION THERAPY (MNT) for Diabetes    Medical Nutrition Therapy with a Registered Dietitian can be provided in coordination with Diabetes Self-Management Training to assist in achieving optimal diabetes management.    MNT Type and Hours: Previous diagnosis: Annual follow-up MNT - 2 hours                       Medicare will cover: 3 hours initial MNT in 12 month period after first visit, plus 2 hours of follow-up MNT annually        Diabetes Education Topics: Comprehensive Knowledge Assessment and Instruction    Special Educational Needs Requiring Individual DSMT: None      Please be aware that coverage of these services is subject to the terms and limitations of your health insurance plan.  Call member services at your health plan to determine Diabetes Self-Management Training (Codes  and ) and Medical Nutrition Therapy (Codes 74724 and 43811) benefits and ask which blood glucose monitor brands are covered by your plan.  Please bring the following with you to your appointment:    (1)  List of current medications   (2)  List of Blood Glucose Monitor brands that are covered by your insurance plan  (3)  Blood Glucose Monitor and log book  (4)   Food records for the 3 days prior to your visit    The Certified Diabetes Educator may make diabetes medication adjustments per the CDE Protocol and Collaborative Practice Agreement.            GASTROENTEROLOGY ADULT REF PROCEDURE ONLY Other; [place] [facility] [phone]       Last Lab Result: Creatinine (mg/dL)       Date                     Value                 10/09/2017               0.72             ----------  Body mass index is 36.44 kg/(m^2).     Needed:  No  Language:  English    Patient will be contacted to schedule procedure.     Please be aware that coverage of these services is subject to the terms and limitations of your health insurance plan.   Call member services at your health plan with any benefit or coverage questions.  Any procedures must be performed at a [facility] facility OR coordinated by your clinic's referral office.    Please bring the following with you to your appointment:    (1) Any X-Rays, CTs or MRIs which have been performed.  Contact the facility where they were done to arrange for  prior to your scheduled appointment.    (2) List of current medications   (3) This referral request   (4) Any documents/labs given to you for this referral                  Who to contact     If you have questions or need follow up information about today's clinic visit or your schedule please contact [facility] directly at [phone].  Normal or non-critical lab and imaging results will be communicated to you by MyChart, letter or phone within 4 business days after the clinic has received the results. If you do not hear from us within 7 days, please contact the clinic through [employer] or phone. If you have a critical or abnormal lab result, we will notify you by phone as soon as possible.  Submit refill requests through [employer] or call your pharmacy and they will forward the refill request to us. Please allow 3 business days for your refill to be completed.          Additional Information About Your Visit        MyChart Information     [employer] gives you secure access to your electronic health record. If you see a primary care provider, you can also send messages to your care team and make appointments. If you have questions, please call your primary care clinic.  If you do not have a primary care provider, please call [phone] and they will assist you.        Care EveryWhere ID     This is your Care EveryWhere ID. This could be used by other organizations to access your [facility] medical records  [id number]        Your Vitals Were     Pulse Temperature Respirations Last Period Pulse Oximetry BMI (Body Mass Index)    73 97.5   F (36.4  C) (Oral) 18 (LMP Unknown) 97% 36.44 kg/m2       Blood Pressure from Last 3 Encounters:   07/03/18 132/85   12/04/17 119/76   10/09/17 138/88    Weight from Last 3 Encounters:   07/03/18 219 lb (99.3 kg)   12/04/17 205 lb 12.8 oz (93.4 kg)   10/09/17 209 lb 6 oz (95 kg)              We Performed the Following     Albumin Random Urine Quantitative with Creat Ratio     Comprehensive metabolic panel (BMP + Alb, Alk Phos, ALT, AST, Total. Bili, TP)     DIABETES EDUCATOR REFERRAL     GASTROENTEROLOGY ADULT REF PROCEDURE ONLY Other; [facility] [phone]     Hemoglobin A1c     Lipid panel reflex to direct LDL Fasting     Magnesium     TSH with free T4 reflex     Vitamin D Deficiency          Today's Medication Changes          These changes are accurate as of 7/3/18 10:58 AM.  If you have any questions, ask your nurse or doctor.               Start taking these medicines.        Dose/Directions    nystatin cream   Commonly known as:  MYCOSTATIN   Used for:  Cutaneous candidiasis   Started by:  Jory Fragoso NP        Apply topically 2 times daily   Quantity:  60 g   Refills:  PRN         These medicines have changed or have updated prescriptions.        Dose/Directions    levothyroxine 75 MCG tablet   Commonly known as:  SYNTHROID/LEVOTHROID   This may have changed:  See the new instructions.   Used for:  Acquired hypothyroidism   Changed by:  Jory Fragoso NP        Dose:  75 mcg   Take 1 tablet (75 mcg) by mouth daily   Quantity:  90 tablet   Refills:  PRN       triamterene-hydrochlorothiazide 37.5-25 MG per capsule   Commonly known as:  DYAZIDE   This may have changed:  See the new instructions.   Used for:  Hypertension goal BP (blood pressure) < 140/90   Changed by:  Jory Fragoso NP        Dose:  1 capsule   Take 1 capsule by mouth daily   Quantity:  90 capsule   Refills:  PRN            Where to get your medicines      These medications were sent to [facility] - [place], [place] - [address]  [address], [place] [address]     Phone:  [phone]     levothyroxine 75 MCG tablet    nystatin cream    sertraline 100 MG tablet    triamterene-hydrochlorothiazide 37.5-25 MG per capsule                Primary Care Provider Office Phone # Fax #    Jory Fragoso [phone]       [address] [place]  [facility] [address]        Equal Access to Services     MEGAN CABRERA : Hadii aad ku hadasho Soomaali, waaxda luqadaha, qaybta kaalmada adeegyada, waxay idiin hayaan adeeg kharacristy laearline . So [facility] [phone].    ATENCIÓN: Si habla español, tiene a saenz disposición servicios gratuitos de asistencia lingüística. [facility] [phone].    We comply with applicable federal civil rights laws and [place] laws. We do not discriminate on the basis of race, color, national origin, age, disability, sex, sexual orientation, or gender identity.            Thank you!     Thank you for choosing [facility]  for your care. Our goal is always to provide you with excellent care. Hearing back from our patients is one way we can continue to improve our services. Please take a few minutes to complete the written survey that you may receive in the mail after your visit with us. Thank you!             Your Updated Medication List - Protect others around you: Learn how to safely use, store and throw away your medicines at www.disposemymeds.org.          This list is accurate as of 7/3/18 10:58 AM.  Always use your most recent med list.                   Brand Name Dispense Instructions for use Diagnosis    * aspirin 325 MG EC tablet      Take 325 mg by mouth daily        * aspirin 81 MG tablet     30 tablet    Take 1 tablet (81 mg) by mouth daily        atorvastatin 10 MG tablet    LIPITOR    90 tablet    Take 1 tablet (10 mg) by mouth daily    Type 2 diabetes mellitus without complication, without long-term current use of insulin (H), Hyperlipidemia LDL goal <130        * blood glucose monitoring lancets     1 Box    Use to test blood sugar 2 times daily or as directed.    Type 2 diabetes mellitus without complication (H)       * ONETOUCH DELICA LANCETS 33G Misc     100 each    USE TO TEST BLOOD SUGAR 1 TIME DAILY OR AS DIRECTED    Type 2 diabetes mellitus without complication, without long-term current use of insulin (H)       * blood glucose monitoring meter device kit     1 kit    Use to test blood sugars 1 time daily or as directed.    Prediabetes       * blood glucose monitoring meter device kit    no brand specified    1 kit    Use to test blood sugar 1 times daily or as directed.    Type 2 diabetes mellitus without complication, without long-term current use of insulin (H)       * blood glucose monitoring test strip    MILLY CONTOUR    3 Box    Use to test blood sugars 1-2 times daily or as directed.    Type 2 diabetes mellitus without complication (H)       * blood glucose monitoring test strip    no brand specified    200 strip    Use to test blood sugars 2 times daily or as directed    Type 2 diabetes mellitus without complication, without long-term current use of insulin (H)       CALCIUM 1200 PO      Take  by mouth.        cholecalciferol 1000 units capsule    vitamin  -D     Take 1 capsule by mouth daily Vitamin D-3        diclofenac 1 % Gel topical gel    VOLTAREN    100 g    Apply 2 g topically 4 times daily    Pain of right hand       fluticasone 50 MCG/ACT spray    FLONASE    48 g    Spray 2 sprays into both nostrils daily    Allergic rhinitis, unspecified allergic rhinitis type       GLUCOSAMINE CHONDROITIN COMPLX PO      Take  by mouth daily.        ibuprofen 200 MG capsule     90 capsule    Take 400-600 mg by mouth every 6 hours as needed for fever    Shoulder joint pain, right, Knee pain, left       levothyroxine 75 MCG tablet    SYNTHROID/LEVOTHROID    90 tablet    Take 1 tablet (75 mcg) by mouth daily    Acquired hypothyroidism       MULTIVITAMIN TABS   OR       1 TABLET DAILY        nystatin cream    MYCOSTATIN    60 g    Apply topically 2 times daily    Cutaneous candidiasis       * order for DME     1 each    Equipment being ordered: CPAP - adjustable levels (4-20) with a water chamber    Unspecified sleep apnea       * order for DME     1 Units    Equipment being ordered: CPAP mask and tubing    Unspecified sleep apnea       sertraline 100 MG tablet    ZOLOFT    135 tablet    Take 1.5 tablets (150 mg) by mouth daily    Major depressive disorder, recurrent episode, mild (H)       tiZANidine 4 MG tablet    ZANAFLEX    30 tablet    Take 1 tablet (4 mg) by mouth nightly as needed for muscle spasms    Bilateral low back pain with right-sided sciatica, unspecified chronicity       triamterene-hydrochlorothiazide 37.5-25 MG per capsule    DYAZIDE    90 capsule    Take 1 capsule by mouth daily    Hypertension goal BP (blood pressure) < 140/90       TYLENOL PO      Take 600-1,000 mg by mouth as needed        * Notice:  This list has 10 medication(s) that are the same as other medications prescribed for you. Read the directions carefully, and ask your doctor or other care provider to review them with you.       [address] [place]

## 2022-05-03 ENCOUNTER — LAB (OUTPATIENT)
Dept: LAB | Facility: CLINIC | Age: 72
End: 2022-05-03
Payer: COMMERCIAL

## 2022-05-03 DIAGNOSIS — E11.9 TYPE 2 DIABETES MELLITUS WITHOUT COMPLICATION, WITHOUT LONG-TERM CURRENT USE OF INSULIN (H): ICD-10-CM

## 2022-05-03 LAB — HBA1C MFR BLD: 6.3 % (ref 0–5.6)

## 2022-05-03 PROCEDURE — 36415 COLL VENOUS BLD VENIPUNCTURE: CPT

## 2022-05-03 PROCEDURE — 83036 HEMOGLOBIN GLYCOSYLATED A1C: CPT

## 2022-05-03 NOTE — TELEPHONE ENCOUNTER
Left message to call back and ask to speak with an available triage nurse.  JAYESH EspinoN, RN  [facility]

## 2022-05-03 NOTE — TELEPHONE ENCOUNTER
Provider Response to 2nd Level Triage Request    I have reviewed the RN documentation. My recommendation is:  order signed.

## 2022-05-03 NOTE — TELEPHONE ENCOUNTER
Patient informed that an A1C has been ordered and appointment for lab only was mnade for later today (5/3/22).  Joselyn Polo RN  [facility]

## 2022-05-05 ENCOUNTER — HOSPITAL ENCOUNTER (OUTPATIENT)
Dept: MAMMOGRAPHY | Facility: CLINIC | Age: 72
Discharge: HOME | End: 2022-05-05
Attending: NURSE PRACTITIONER
Payer: COMMERCIAL

## 2022-05-05 ENCOUNTER — ANCILLARY PROCEDURE (OUTPATIENT)
Dept: BONE DENSITY | Facility: CLINIC | Age: 72
End: 2022-05-05
Attending: NURSE PRACTITIONER
Payer: COMMERCIAL

## 2022-05-05 DIAGNOSIS — Z78.0 ASYMPTOMATIC MENOPAUSAL STATE: ICD-10-CM

## 2022-05-05 DIAGNOSIS — M79.89 AXILLARY SWELLING: ICD-10-CM

## 2022-05-05 PROCEDURE — 77062 BREAST TOMOSYNTHESIS BI: CPT

## 2022-05-05 PROCEDURE — 77080 DXA BONE DENSITY AXIAL: CPT | Mod: TC | Performed by: INTERNAL MEDICINE

## 2022-05-09 ENCOUNTER — MYC MEDICAL ADVICE (OUTPATIENT)
Dept: FAMILY MEDICINE | Facility: CLINIC | Age: 72
End: 2022-05-09
Payer: COMMERCIAL

## 2022-05-09 NOTE — TELEPHONE ENCOUNTER
If she is not getting 3-4 servings of high-calcium foods daily, then she could certainly take a calcium with vitamin D supplement once daily.  A 1000 international unit(s) vitamin D daily is also reasonable.

## 2022-05-09 NOTE — TELEPHONE ENCOUNTER
It looks like patient is supposed to be on Calcium and Vitamin D.  How much Calcium would you like patient to take daily?  Do you still want her on the 1000 units of Vitamin D daily?    Calcium was within normal range at 8.9 three months ago and Vitamin D was normal at 35 four months ago.  Please advise.  Joselyn Polo RN  [facility]

## 2022-05-10 NOTE — TELEPHONE ENCOUNTER
Writer responded via [employer].    JAYESH EspinoN, RN  [facility]

## 2022-06-13 DIAGNOSIS — E11.9 TYPE 2 DIABETES MELLITUS WITHOUT COMPLICATION, WITHOUT LONG-TERM CURRENT USE OF INSULIN (H): ICD-10-CM

## 2022-06-13 NOTE — TELEPHONE ENCOUNTER
Please send new orders for strips & lancets. [facility] would not let me request lancets from med list.   Thanks,     Inez

## 2022-06-16 NOTE — TELEPHONE ENCOUNTER
---Prescription approved per [facility] Refill Protocol.       Sarahi Garcia RN BSN     [facility]          --Last visit:  2/22/2022     --Future Visit: none.

## 2022-06-22 ENCOUNTER — TELEPHONE (OUTPATIENT)
Dept: FAMILY MEDICINE | Facility: CLINIC | Age: 72
End: 2022-06-22

## 2022-06-22 NOTE — TELEPHONE ENCOUNTER
"I talked to pt.  These sx started about a month ago.  Bilat from ankle to calves.  No \"big\" swelling.  No redness.  Feels like she is wearing socks all the time. And she is not.  Pt is not on any meds for neuropathy.    Rec a clinic visit to assess and work on diagnostic.  PT made an appt on 6/24/22 with Adriana Lee.    PT wanted PCP's response, too.    OK to leave a detailed message.  LORA Heredia    "

## 2022-06-22 NOTE — TELEPHONE ENCOUNTER
Gave pt this provider response.  If sx worsen, with swelling, pain, difficulty walking, pt should go right to ER.  PT agreed and confirmed appt for 6/24/22.    LORA Heredia

## 2022-06-22 NOTE — TELEPHONE ENCOUNTER
Reason for Call: Other call back    Detailed comments: Patient states she has neuropathy that is now extending [facility] up her ankles and lower legs on both sides. She is worried / concerned and would like a call back to discuss what she should do. Please advise. Thanks!    Phone Number Patient can be reached at: Home number on file [phone] (home)    Best Time: Any    Can we leave a detailed message on this number? YES    Call taken on 6/22/2022 at 9:44 AM by Leyla Robb

## 2022-06-24 ENCOUNTER — OFFICE VISIT (OUTPATIENT)
Dept: FAMILY MEDICINE | Facility: CLINIC | Age: 72
End: 2022-06-24
Payer: COMMERCIAL

## 2022-06-24 VITALS
BODY MASS INDEX: 32.91 KG/M2 | HEART RATE: 97 BPM | HEIGHT: 64 IN | WEIGHT: 192.75 LBS | RESPIRATION RATE: 12 BRPM | TEMPERATURE: 97.3 F | OXYGEN SATURATION: 95 % | DIASTOLIC BLOOD PRESSURE: 68 MMHG | SYSTOLIC BLOOD PRESSURE: 108 MMHG

## 2022-06-24 DIAGNOSIS — G62.9 NEUROPATHY: Primary | ICD-10-CM

## 2022-06-24 DIAGNOSIS — E11.42 DM TYPE 2 WITH DIABETIC PERIPHERAL NEUROPATHY (H): ICD-10-CM

## 2022-06-24 DIAGNOSIS — I10 HYPERTENSION GOAL BP (BLOOD PRESSURE) < 140/90: ICD-10-CM

## 2022-06-24 DIAGNOSIS — E55.9 VITAMIN D DEFICIENCY: ICD-10-CM

## 2022-06-24 DIAGNOSIS — E03.9 HYPOTHYROIDISM, UNSPECIFIED TYPE: ICD-10-CM

## 2022-06-24 LAB
DEPRECATED CALCIDIOL+CALCIFEROL SERPL-MC: 28 UG/L (ref 20–75)
VIT B12 SERPL-MCNC: 487 PG/ML (ref 232–1245)

## 2022-06-24 PROCEDURE — 82607 VITAMIN B-12: CPT | Performed by: NURSE PRACTITIONER

## 2022-06-24 PROCEDURE — 36415 COLL VENOUS BLD VENIPUNCTURE: CPT | Performed by: NURSE PRACTITIONER

## 2022-06-24 PROCEDURE — 99214 OFFICE O/P EST MOD 30 MIN: CPT | Performed by: NURSE PRACTITIONER

## 2022-06-24 PROCEDURE — 82306 VITAMIN D 25 HYDROXY: CPT | Performed by: NURSE PRACTITIONER

## 2022-06-24 PROCEDURE — 84443 ASSAY THYROID STIM HORMONE: CPT | Performed by: NURSE PRACTITIONER

## 2022-06-24 PROCEDURE — 80053 COMPREHEN METABOLIC PANEL: CPT | Performed by: NURSE PRACTITIONER

## 2022-06-24 RX ORDER — METHYLPHENIDATE HYDROCHLORIDE 5 MG/1
TABLET ORAL
COMMUNITY
Start: 2022-05-26 | End: 2022-09-22

## 2022-06-24 RX ORDER — METHYLPHENIDATE HYDROCHLORIDE 10 MG/1
TABLET ORAL
COMMUNITY
Start: 2022-06-20 | End: 2022-09-23

## 2022-06-24 ASSESSMENT — ENCOUNTER SYMPTOMS: NUMBNESS: 1

## 2022-06-24 NOTE — PROGRESS NOTES
"  Assessment & Plan     Neuropathy  Patient does have history of diabetes.  Could be secondary to this, however, A1C has remained between 6.3-6.7.  Other differentials include vitamin deficiency and electrolyte imbalance.  We will check labs today for correctable causes.  Will consider referral to neurology for additional work up pending labs.  Lower suspicion of neurological disorder considering she has no other associated symptoms and her exam was benign, but she may benefit from EMG.    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 Bili, TP); Future  - Comprehensive metabolic panel (BMP + Alb, Alk Phos, ALT, AST, Total. Bili, TP)    Vitamin D deficiency  - Vitamin D Deficiency; Future  - Vitamin D Deficiency             BMI:   Estimated body mass index is 33.09 kg/m  as calculated from the following:    Height as of this encounter: 1.626 m (5' 4\").    Weight as of this encounter: 87.4 kg (192 lb 12 oz).   Weight management plan: Discussed healthy diet and exercise guidelines        Return for Medicare annual wellness visit..    REYNALDO Zavaleta CNP  [facility]   Mylene is a 72 year old, presenting for the following health issues:  Numbness (BOTH LEGS [id number], PATIENT NOTICED WHILE SWIMMING, SHAKINESS IN BOTH HANDS)      Numbness  Associated symptoms include numbness.   History of Present Illness       Reason for visit:  Numbness in ankles and shins    She eats 2-3 servings of fruits and vegetables daily.She consumes 0 sweetened beverage(s) daily.She exercises with enough effort to increase her heart rate 30 to 60 minutes per day.  She exercises with enough " "effort to increase her heart rate 5 days per week.   She is taking medications regularly.       Numbness anterior shins down into ankle.  Couple months, maybe longer. Sometimes can't tell if sandals are on or not.  No change in ROM or strength.  Few more muscle cramps lately.  Not currently taking vitamins.  No claudication, just cramping at \"odd times\".  Constant throughout the day, does not progress as the day goes on.      Ankles could have been going on for a while, but now more noticeable that it is in the shin.        Review of Systems   Neurological: Positive for numbness.      Constitutional, HEENT, cardiovascular, pulmonary, gi and gu systems are negative, except as otherwise noted.      Objective    /68   Pulse 97   Temp 97.3  F (36.3  C) (Temporal)   Resp 12   Ht 1.626 m (5' 4\")   Wt 87.4 kg (192 lb 12 oz)   LMP  (LMP Unknown)   SpO2 95%   BMI 33.09 kg/m    Body mass index is 33.09 kg/m .  Physical Exam   GENERAL: healthy, alert and no distress  NECK: no adenopathy, no asymmetry, masses, or scars and thyroid normal to palpation  RESP: lungs clear to auscultation - no rales, rhonchi or wheezes  CV: regular rate and rhythm, normal S1 S2, no S3 or S4, no murmur, click or rub, no peripheral edema and peripheral pulses strong  ABDOMEN: soft, nontender, no hepatosplenomegaly, no masses and bowel sounds normal  MS: no gross musculoskeletal defects noted, no edema.  +2 DP pulses bilaterally.  No skin changes or discoloration                    .  ..  "

## 2022-06-26 LAB
ALBUMIN SERPL-MCNC: 4.1 G/DL (ref 3.4–5)
ALP SERPL-CCNC: 69 U/L (ref 40–150)
ALT SERPL W P-5'-P-CCNC: 23 U/L (ref 0–50)
ANION GAP SERPL CALCULATED.3IONS-SCNC: 9 MMOL/L (ref 3–14)
AST SERPL W P-5'-P-CCNC: 22 U/L (ref 0–45)
BILIRUB SERPL-MCNC: 2.2 MG/DL (ref 0.2–1.3)
BUN SERPL-MCNC: 18 MG/DL (ref 7–30)
CALCIUM SERPL-MCNC: 9.1 MG/DL (ref 8.5–10.1)
CHLORIDE BLD-SCNC: 100 MMOL/L (ref 94–109)
CO2 SERPL-SCNC: 25 MMOL/L (ref 20–32)
CREAT SERPL-MCNC: 0.77 MG/DL (ref 0.52–1.04)
GFR SERPL CREATININE-BSD FRML MDRD: 82 ML/MIN/1.73M2
GLUCOSE BLD-MCNC: 120 MG/DL (ref 70–99)
POTASSIUM BLD-SCNC: 4.6 MMOL/L (ref 3.4–5.3)
PROT SERPL-MCNC: 7.4 G/DL (ref 6.8–8.8)
SODIUM SERPL-SCNC: 134 MMOL/L (ref 133–144)
TSH SERPL DL<=0.005 MIU/L-ACNC: 1.14 MU/L (ref 0.4–4)

## 2022-07-27 DIAGNOSIS — E03.9 ACQUIRED HYPOTHYROIDISM: ICD-10-CM

## 2022-07-27 DIAGNOSIS — I10 HYPERTENSION GOAL BP (BLOOD PRESSURE) < 140/90: ICD-10-CM

## 2022-07-29 ENCOUNTER — MYC MEDICAL ADVICE (OUTPATIENT)
Dept: FAMILY MEDICINE | Facility: CLINIC | Age: 72
End: 2022-07-29

## 2022-07-29 RX ORDER — LOSARTAN POTASSIUM 100 MG/1
TABLET ORAL
Qty: 90 TABLET | Refills: 0 | Status: SHIPPED | OUTPATIENT
Start: 2022-07-29 | End: 2022-10-31

## 2022-07-29 RX ORDER — LEVOTHYROXINE SODIUM 75 UG/1
TABLET ORAL
Qty: 90 TABLET | Refills: 0 | Status: SHIPPED | OUTPATIENT
Start: 2022-07-29 | End: 2022-10-12

## 2022-07-29 NOTE — TELEPHONE ENCOUNTER
Per 6/245/2022 visit:Return for Medicare annual wellness visit.   1 refill approved.    TC: please call and schedule medicare wellness visit.    Thank you    Thyroid Protocol Passed 07/27/2022 09:14 AM   Protocol Details  Patient is 12 years or older    Recent (12 mo) or future (30 days) visit within the authorizing provider's specialty    Medication is active on med list    Normal TSH on file in past 12 months    No active pregnancy on record    No positive pregnancy test in past 12 months     Angiotensin-II Receptors Passed 07/27/2022 09:14 AM   Protocol Details  Last blood pressure under 140/90 in past 12 months    Recent (12 mo) or future (30 days) visit within the authorizing provider's specialty    Medication is active on med list    Patient is age 18 or older    No active pregnancy on record    Normal serum creatinine on file in past 12 months    Normal serum potassium on file in past 12 months    No positive pregnancy test in past 12 months

## 2022-07-29 NOTE — TELEPHONE ENCOUNTER
Spoke with patient regarding no access for Pema prior to 10/12 to complete an annual. Patient asked what she would do if her blood pressure began to act up and was advised another provider could see her prior to the 10/12 date. Patient said she will monitor and call back to schedule with different provider if needed, and selected a time on 10/12 that worked for her.

## 2022-09-23 ENCOUNTER — OFFICE VISIT (OUTPATIENT)
Dept: FAMILY MEDICINE | Facility: CLINIC | Age: 72
End: 2022-09-23
Payer: COMMERCIAL

## 2022-09-23 VITALS
HEART RATE: 79 BPM | BODY MASS INDEX: 30.9 KG/M2 | DIASTOLIC BLOOD PRESSURE: 79 MMHG | HEIGHT: 64 IN | SYSTOLIC BLOOD PRESSURE: 125 MMHG | RESPIRATION RATE: 15 BRPM | TEMPERATURE: 98.2 F | WEIGHT: 181 LBS | OXYGEN SATURATION: 97 %

## 2022-09-23 DIAGNOSIS — F41.9 ANXIETY: ICD-10-CM

## 2022-09-23 DIAGNOSIS — M15.9 OSTEOARTHRITIS OF MULTIPLE JOINTS, UNSPECIFIED OSTEOARTHRITIS TYPE: ICD-10-CM

## 2022-09-23 DIAGNOSIS — Z71.89 ADVANCED DIRECTIVES, COUNSELING/DISCUSSION: ICD-10-CM

## 2022-09-23 DIAGNOSIS — Z12.11 SCREEN FOR COLON CANCER: ICD-10-CM

## 2022-09-23 DIAGNOSIS — R76.8 ELEVATED ANTINUCLEAR ANTIBODY (ANA) LEVEL: ICD-10-CM

## 2022-09-23 DIAGNOSIS — G47.30 SLEEP APNEA, UNSPECIFIED TYPE: ICD-10-CM

## 2022-09-23 DIAGNOSIS — Z20.89 EXPOSURE TO BLASTOMYCOSIS: ICD-10-CM

## 2022-09-23 DIAGNOSIS — R53.81 PHYSICAL DECONDITIONING: ICD-10-CM

## 2022-09-23 DIAGNOSIS — J30.9 ALLERGIC RHINITIS, UNSPECIFIED SEASONALITY, UNSPECIFIED TRIGGER: ICD-10-CM

## 2022-09-23 DIAGNOSIS — E66.811 CLASS 1 OBESITY DUE TO EXCESS CALORIES WITH SERIOUS COMORBIDITY AND BODY MASS INDEX (BMI) OF 31.0 TO 31.9 IN ADULT: ICD-10-CM

## 2022-09-23 DIAGNOSIS — F32.1 MODERATE MAJOR DEPRESSION (H): ICD-10-CM

## 2022-09-23 DIAGNOSIS — E66.09 CLASS 1 OBESITY DUE TO EXCESS CALORIES WITH SERIOUS COMORBIDITY AND BODY MASS INDEX (BMI) OF 31.0 TO 31.9 IN ADULT: ICD-10-CM

## 2022-09-23 DIAGNOSIS — Z23 NEED FOR SHINGLES VACCINE: ICD-10-CM

## 2022-09-23 DIAGNOSIS — Z12.31 ENCOUNTER FOR SCREENING MAMMOGRAM FOR MALIGNANT NEOPLASM OF BREAST: ICD-10-CM

## 2022-09-23 DIAGNOSIS — G47.00 INSOMNIA, UNSPECIFIED TYPE: ICD-10-CM

## 2022-09-23 DIAGNOSIS — I10 HYPERTENSION GOAL BP (BLOOD PRESSURE) < 140/90: ICD-10-CM

## 2022-09-23 DIAGNOSIS — E11.9 TYPE 2 DIABETES MELLITUS WITHOUT COMPLICATION, WITHOUT LONG-TERM CURRENT USE OF INSULIN (H): ICD-10-CM

## 2022-09-23 DIAGNOSIS — H53.9 VISUAL DISTURBANCE: ICD-10-CM

## 2022-09-23 DIAGNOSIS — G62.9 PERIPHERAL POLYNEUROPATHY: ICD-10-CM

## 2022-09-23 DIAGNOSIS — E78.5 HYPERLIPIDEMIA LDL GOAL <130: ICD-10-CM

## 2022-09-23 DIAGNOSIS — R17 ELEVATED BILIRUBIN: ICD-10-CM

## 2022-09-23 DIAGNOSIS — Z00.00 ENCOUNTER FOR MEDICARE ANNUAL WELLNESS EXAM: Primary | ICD-10-CM

## 2022-09-23 DIAGNOSIS — Z23 NEED FOR COVID-19 VACCINE: ICD-10-CM

## 2022-09-23 DIAGNOSIS — Z23 NEED FOR PROPHYLACTIC VACCINATION AND INOCULATION AGAINST INFLUENZA: ICD-10-CM

## 2022-09-23 DIAGNOSIS — E03.9 HYPOTHYROIDISM, UNSPECIFIED TYPE: ICD-10-CM

## 2022-09-23 LAB
BASOPHILS # BLD AUTO: 0 10E3/UL (ref 0–0.2)
BASOPHILS NFR BLD AUTO: 0 %
EOSINOPHIL # BLD AUTO: 0.1 10E3/UL (ref 0–0.7)
EOSINOPHIL NFR BLD AUTO: 1 %
ERYTHROCYTE [DISTWIDTH] IN BLOOD BY AUTOMATED COUNT: 12.9 % (ref 10–15)
HBA1C MFR BLD: 5.8 % (ref 0–5.6)
HCT VFR BLD AUTO: 38.4 % (ref 35–47)
HGB BLD-MCNC: 12.8 G/DL (ref 11.7–15.7)
IMM GRANULOCYTES # BLD: 0 10E3/UL
IMM GRANULOCYTES NFR BLD: 0 %
LYMPHOCYTES # BLD AUTO: 3.6 10E3/UL (ref 0.8–5.3)
LYMPHOCYTES NFR BLD AUTO: 31 %
MCH RBC QN AUTO: 30.3 PG (ref 26.5–33)
MCHC RBC AUTO-ENTMCNC: 33.3 G/DL (ref 31.5–36.5)
MCV RBC AUTO: 91 FL (ref 78–100)
MONOCYTES # BLD AUTO: 0.8 10E3/UL (ref 0–1.3)
MONOCYTES NFR BLD AUTO: 7 %
NEUTROPHILS # BLD AUTO: 6.9 10E3/UL (ref 1.6–8.3)
NEUTROPHILS NFR BLD AUTO: 60 %
PLATELET # BLD AUTO: 358 10E3/UL (ref 150–450)
RBC # BLD AUTO: 4.23 10E6/UL (ref 3.8–5.2)
VIT B12 SERPL-MCNC: 404 PG/ML (ref 232–1245)
WBC # BLD AUTO: 11.4 10E3/UL (ref 4–11)

## 2022-09-23 PROCEDURE — 90662 IIV NO PRSV INCREASED AG IM: CPT | Performed by: FAMILY MEDICINE

## 2022-09-23 PROCEDURE — 86225 DNA ANTIBODY NATIVE: CPT | Performed by: FAMILY MEDICINE

## 2022-09-23 PROCEDURE — 83735 ASSAY OF MAGNESIUM: CPT | Performed by: FAMILY MEDICINE

## 2022-09-23 PROCEDURE — 86235 NUCLEAR ANTIGEN ANTIBODY: CPT | Performed by: FAMILY MEDICINE

## 2022-09-23 PROCEDURE — 99214 OFFICE O/P EST MOD 30 MIN: CPT | Mod: 25 | Performed by: FAMILY MEDICINE

## 2022-09-23 PROCEDURE — 36415 COLL VENOUS BLD VENIPUNCTURE: CPT | Performed by: FAMILY MEDICINE

## 2022-09-23 PROCEDURE — 85025 COMPLETE CBC W/AUTO DIFF WBC: CPT | Performed by: FAMILY MEDICINE

## 2022-09-23 PROCEDURE — 82043 UR ALBUMIN QUANTITATIVE: CPT | Performed by: FAMILY MEDICINE

## 2022-09-23 PROCEDURE — 84443 ASSAY THYROID STIM HORMONE: CPT | Performed by: FAMILY MEDICINE

## 2022-09-23 PROCEDURE — 86038 ANTINUCLEAR ANTIBODIES: CPT | Performed by: FAMILY MEDICINE

## 2022-09-23 PROCEDURE — 86039 ANTINUCLEAR ANTIBODIES (ANA): CPT | Performed by: FAMILY MEDICINE

## 2022-09-23 PROCEDURE — 86235 NUCLEAR ANTIGEN ANTIBODY: CPT | Mod: 59 | Performed by: FAMILY MEDICINE

## 2022-09-23 PROCEDURE — G0439 PPPS, SUBSEQ VISIT: HCPCS | Performed by: FAMILY MEDICINE

## 2022-09-23 PROCEDURE — 80053 COMPREHEN METABOLIC PANEL: CPT | Performed by: FAMILY MEDICINE

## 2022-09-23 PROCEDURE — G0008 ADMIN INFLUENZA VIRUS VAC: HCPCS | Performed by: FAMILY MEDICINE

## 2022-09-23 PROCEDURE — 86431 RHEUMATOID FACTOR QUANT: CPT | Performed by: FAMILY MEDICINE

## 2022-09-23 PROCEDURE — 83036 HEMOGLOBIN GLYCOSYLATED A1C: CPT | Performed by: FAMILY MEDICINE

## 2022-09-23 PROCEDURE — 86200 CCP ANTIBODY: CPT | Performed by: FAMILY MEDICINE

## 2022-09-23 PROCEDURE — 99207 PR FOOT EXAM NO CHARGE: CPT | Performed by: FAMILY MEDICINE

## 2022-09-23 PROCEDURE — 80061 LIPID PANEL: CPT | Performed by: FAMILY MEDICINE

## 2022-09-23 PROCEDURE — 82607 VITAMIN B-12: CPT | Performed by: FAMILY MEDICINE

## 2022-09-23 RX ORDER — TRAZODONE HYDROCHLORIDE 50 MG/1
50 TABLET, FILM COATED ORAL AT BEDTIME
Qty: 30 TABLET | Refills: 0 | Status: SHIPPED | OUTPATIENT
Start: 2022-09-23 | End: 2022-10-12

## 2022-09-23 RX ORDER — TEMAZEPAM 15 MG/1
CAPSULE ORAL
COMMUNITY
Start: 2022-08-05 | End: 2022-10-12 | Stop reason: ALTCHOICE

## 2022-09-23 RX ORDER — DULOXETIN HYDROCHLORIDE 60 MG/1
CAPSULE, DELAYED RELEASE ORAL
COMMUNITY
Start: 2022-07-08 | End: 2022-10-12

## 2022-09-23 ASSESSMENT — ENCOUNTER SYMPTOMS
SORE THROAT: 0
PALPITATIONS: 0
CONSTIPATION: 0
JOINT SWELLING: 0
HEMATURIA: 0
DIARRHEA: 0
EYE PAIN: 0
WEAKNESS: 1
HEARTBURN: 0
ABDOMINAL PAIN: 0
BREAST MASS: 0
CHILLS: 0
ARTHRALGIAS: 0
SHORTNESS OF BREATH: 0
MYALGIAS: 0
FEVER: 0
DYSURIA: 0
COUGH: 0
HEADACHES: 0
FREQUENCY: 0
NAUSEA: 0
NERVOUS/ANXIOUS: 0
DIZZINESS: 0
PARESTHESIAS: 0
HEMATOCHEZIA: 0

## 2022-09-23 ASSESSMENT — PATIENT HEALTH QUESTIONNAIRE - PHQ9
10. IF YOU CHECKED OFF ANY PROBLEMS, HOW DIFFICULT HAVE THESE PROBLEMS MADE IT FOR YOU TO DO YOUR WORK, TAKE CARE OF THINGS AT HOME, OR GET ALONG WITH OTHER PEOPLE: SOMEWHAT DIFFICULT
SUM OF ALL RESPONSES TO PHQ QUESTIONS 1-9: 7
SUM OF ALL RESPONSES TO PHQ QUESTIONS 1-9: 7

## 2022-09-23 ASSESSMENT — ACTIVITIES OF DAILY LIVING (ADL): CURRENT_FUNCTION: HOUSEWORK REQUIRES ASSISTANCE

## 2022-09-23 NOTE — Clinical Note
She saw me for a physical and said she was seeing you in oct too for one as well. I told her she cant have two so she will see you for an office visit to follow up on today instead.

## 2022-09-23 NOTE — RESULT ENCOUNTER NOTE
Val Ms. Bender Sabra,  Some of your results came back and are within acceptable limits. -Normal red blood cell (hgb) levels, normal white blood cell count and normal platelet levels. White count is minimally elevated but unchanged past 6 years. Increase water intake. No sign of infection on exam today . If you have any further concerns please do not hesitate to contact us by message, phone or making an appointment.  Have a good day   Dr [place] SKINNER

## 2022-09-23 NOTE — PATIENT INSTRUCTIONS
Seen for Medicare care wellness/preventive health and additional concerns today   Self breast check regularly   Consider referral to a  to assess personal risk if should have any family hx of breast, ovarian or bowel cancer  Mammogram due & order in place  Follow-up with the eye doctor for diabetes eye check and vision yearly.  Hearing and cognition intact.  Has had no falls.  Health care maintenance reviewed  Consider working on health care directives  Colon cancer screening due and colonoscopy order in place.  Recommend Flu shot yearly and high-dose flu shot given today.  COVID-vaccine primary series x3 and 1 booster up-to-date in the past.  New bivalent Pfizer COVID vaccine available and can get anytime plans to get another day.    Recommend Tdap every 10 yrs  Pneumonia vaccine up to date due again 2023 given history of diabetes.  Recommend Shingrix: shingles vaccines (series of 2 shots 2 to 6 months apart that can cause couple days of flu like symptoms but is expensive so check with insurance and get at pharmacy where cheaper).  If travelling out of the country recommend seeing the travel clinic to update appropriate shots etc  Labs today and will make further recommendations once reviewed    Depression, anxiety & history of insomnia previously on temazepam for sleep and Cymbalta 90 mg for depression anxiety and on Ritalin prescribed by prior psychiatrist who is now retiring.  Reported has stopped the Ritalin and tapering off the temazepam from 30 mg down to 15 and currently on 7.5.  Recommend 7.5 mg every other day for 1 week and then every 3 days for 1 week and then stop.  Given trazodone 30 mg to take instead.  May start once of the temazepam.  Risk of serotonin syndrome with jerky movements since also on Cymbalta explained.  Consider referral to a counselor and a new psychiatrist in the future advise follow-up with your primary Merissa trivedi on 10/12/2022 and an office visit as planned to discuss  this further.  Currently has enough Cymbalta to last till then and may discuss other options at that time.    Diabetes currently appears well controlled on diet alone.  Continue with Mediterranean style diet, low carbohydrates, increase exercise and weight loss.  We will do lab work today and make further recommendations.  Recommend diabetes eye check yearly.  Check feet regularly due to neuropathy.  Does have hammertoes.  Can refer to podiatry in the future.  Continue on losartan which is renal protective and consider statin medication if needed we will check cholesterol today.    BMI 31 we will check labs. A healthy diet and weight loss and exercise encouraged    History of obstructive sleep apnea on CPAP not assessed in over 20 years.  Machine is currently 5 years old and no longer working.  Referred to sleep to further evaluate and treat and to get a new machine.    Hypertension currently blood pressure stable on losartan alone.  Even though has had hydrochlorothiazide prescribed last prescription and not filled or taking since given in 2022.  Blood pressure currently stable at 125/79 so may stay off the hydrochlorothiazide and continue on the losartan for now.  Should have enough losartan till can see primary in October as planned.    Hyperlipidemia we will check lipids and make recommendations.    Hypothyroidism on levothyroxine we will check TSH and make adjustments after that so refill not sent in until can review lab work.    Peripheral neuropathy noted not to be due to diabetes in the past.  Feels neuropathy is going up the legs.  Reported no back pain.  We will check B12 level.  Consider recheck EMG and neurology consultation with PCP.    Reported history of exposure to blastomycosis both your dogs somehow got this. & one unfortunately  of pneumonia recently & the other 1 is being treated with itraconazole and doing okay.  Currently you appear clinically well with no symptoms of blastomycosis.  We will check basic lab work and go from there. If you should have fever or any new symptoms contact the clinic to be assessed further.  Can refer you to infectious disease if needed.    History of elevated JUDY in January 2022 and referred to rheumatology but not seen by them yet.  No joint swelling redness warmth or ulcer or rash noted.  We will do further evaluation for autoimmune disease and based on that can make further recommendations.  25% of the general population may have an elevated JUDY without any clinical significance.    History of elevated bilirubin in the past could have been due to fasting sample or from absence of enzyme that breaks down bilirubin.  We will recheck labs today.  In the absence of any jaundice would not be too concerned.  Stay well-hydrated.  If desires further evaluation can do an ultrasound of the liver to see if you have any gallstones or biliary duct issues.    Osteoarthritis multiple joints and physical deconditioning.  Could be why you are on Cymbalta as that helps with aches and pains 2.  We will do lab work today and go from there.    Allergic rhinitis manageable with over-the-counter antihistamines.    History of visual disturbance recommend seeing eye doctor to be further evaluated.    Follow-up with PCP Shelia Fragoso in office visit on 10/12/2022 is planned to follow-up from today and discuss results and further plan of care.  Continue routine care with them.    Patient Education   Personalized Prevention Plan  You are due for the preventive services outlined below.  Your care team is available to assist you in scheduling these services.  If you have already completed any of these items, please share that information with your care team to update in your medical record.  Health Maintenance Due   Topic Date Due    Eye Exam  Never done    Zoster (Shingles) Vaccine (1 of 2) Never done    Discuss Advance Care Planning  01/31/2017    Annual Wellness Visit  10/06/2021    Diabetic  "Foot Exam  04/19/2022    COVID-19 Vaccine (5 - Booster for Pfizer series) 08/31/2022    Flu Vaccine (1) 09/01/2022    Colorectal Cancer Screening  10/11/2022       Depression and Suicide in Older Adults    Nearly 2 million older Americans have some type of depression. Some of them even take their own lives. Yet depression among older adults is often ignored. Learn the warning signs. You may help spare a loved one needless pain. You may also save a life.   What is depression?  Depression is a common and serious illness that affects the way you think and feel. It is not a normal part of aging, nor is it a sign of weakness, a character flaw, or something you can snap out of. Most people with depression need treatment to get better. The most common symptom is a feeling of deep sadness. People who are depressed also may seem tired and listless. And nothing seems to give them pleasure. It s normal to grieve or be sad sometimes. But sadness lessens or passes with time. Depression rarely goes away or improves on its own. A person with clinical depression can't \"snap out of it.\" Other symptoms of depression are:   Sleeping more or less than normal  Eating more or less than normal  Having headaches, stomachaches, or other pains that don t go away  Feeling nervous,  empty,  or worthless  Crying a great deal  Thinking or talking about suicide or death  Loss of interest in activities previously enjoyed  Social isolation  Feeling confused or forgetful  What causes it?  The causes of depression aren t fully known. But it is thought to result from a complex blend of these factors:   Biochemistry. Certain chemicals in the brain play a role.  Genes. Depression does run in families.  Life stress. Life stresses can also trigger depression in some people. Older adults often face many stressors, such as death of friends or a spouse, health problems, and financial concerns.  Chronic conditions. This includes conditions such as diabetes, " heart disease, or cancer. These can cause symptoms of depression. Medicine side effects can cause changes in thoughts and behaviors.  How you can help  Often, depressed people may not want to ask for help. When they do, they may be ignored. Or, they may receive the wrong treatment. You can help by showing parents and older friends love and support. If they seem depressed, don t lecture the person, ignore the symptoms, or discount the symptoms as a  normal  part of aging -which they are not. Get involved, listen, and show interest and support.   Help them understand that depression is a treatable illness. Tell them you can help them find the right treatment. Offer to go to their healthcare provider's appointment with them for support when the symptoms are discussed. With their approval, contact a local mental health center, social service agency, or hospital about services.   You can be an advocate for him or her at healthcare appointments. Many older adults have chronic illnesses that can cause symptoms of depression. Medicine side effects can change thoughts and behaviors. You can help make sure that the healthcare provider looks at all of these factors. He or she should refer your family member or friend to a mental healthcare provider when needed. in some cases, untreated depression can lead to a misdiagnosis. A person may be diagnosed with a brain disorder such as dementia. If the healthcare provider does not take the issue of depression seriously, help your family member or friend to find another provider.   Don't be afraid to ask  If you think an older person you care about could be suicidal, ask,  Have you thought about suicide?  Most people will tell you the truth. If they say  yes,  they may already have a plan for how and when they will attempt it. Find out as much as you can. The more detailed the plan, and the easier it is to carry out, the more danger the person is in right now. Tell the person you are  there for them and do not want them to harm him or herself. Don't wait to get help for the person. Call the person's healthcare provider, local hospital, or emergency services.   To learn more  National Suicide Prevention Lifeline (crisis hotline) 114-477-NVLH (404-621-0153)  National [place] of Mental Rhejxy445-703-3620ktm.[facility].nih.gov  National [place] on Mental Mmenpsl168-860-8748psi.ian.org  Mental Health Rymbmef737-407-8688wxp.[facility].org  National Suicide Vujsfzj770-SKNRWLA (453-993-1796)    Call 911  Never leave the person alone. A person who is actively suicidal needs psychiatric care right away. They will need constant supervision. Never leave the person out of sight. Call 911 or the national 24-hour suicide crisis hotline at 812-588-TESK (555-896-0837). You can also take the person to the closest emergency room.   [employer] last reviewed this educational content on 5/1/2020 2000-2021 [employer]. All rights reserved. This information is not intended as a substitute for professional medical care. Always follow your healthcare professional's instructions.

## 2022-09-23 NOTE — RESULT ENCOUNTER NOTE
Val Whitehead Napoleon Sabra,  Some of your results came back and are within acceptable limits. -A1C (test of diabetes control the last 2-3 months) is at your goal. Please continue with your current plan. Also, recheck your A1C test in 6 months with your pcp. . If you have any further concerns please do not hesitate to contact us by message, phone or making an appointment.  Have a good day   Dr Caleb SKINNER

## 2022-09-23 NOTE — PROGRESS NOTES
"  SUBJECTIVE:   Mylene is a 72 year old who presents for Preventive Visit.    Patient has been advised of split billing requirements and indicates understanding: Yes  Are you in the first 12 months of your Medicare coverage?  No  Healthy Habits:     In general, how would you rate your overall health?  Fair    Frequency of exercise:  2-3 days/week    Do you usually eat at least 4 servings of fruit and vegetables a day, include whole grains    & fiber and avoid regularly eating high fat or \"junk\" foods?  No    Taking medications regularly:  Yes    Medication side effects:  Significant flushing    Ability to successfully perform activities of daily living:  Housework requires assistance    Home Safety:  No safety concerns identified    Hearing Impairment:  Difficulty following a conversation in a noisy restaurant or crowded room    In the past 6 months, have you been bothered by leaking of urine?  No    In general, how would you rate your overall mental or emotional health?  Fair      PHQ-2 Total Score: 1    Additional concerns today:  Yes  Do you feel safe in your environment? Yes   Have you ever done Advance Care Planning? (For example, a Health Directive, POLST, or a discussion with a medical provider or your loved ones about your wishes): No, advance care planning information given to patient to review.  Patient declined advance care planning discussion at this time.Fall risk  Fallen 2 or more times in the past year?: No  Any fall with injury in the past year?: No   Cognitive Screening   1) Repeat 3 items (Leader, Season, Table)    2) Clock draw: normal  3) 3 item recall: Recalls 3 objects  Results: 3 items recalled: COGNITIVE IMPAIRMENT LESS LIKELY  Mini-CogTM Copyright DARREN Kothari. Licensed by the author for use in [facility]; reprinted with permission ([email or web address]@.[email or web address]). All rights reserved.    Do you have sleep apnea, excessive snoring or daytime drowsiness?: no  Answers for HPI/ROS submitted by the patient " on 2022  If you checked off any problems, how difficult have these problems made it for you to do your work, take care of things at home, or get along with other people?: Somewhat difficult  PHQ9 TOTAL SCORE: 7    72 yr old , B positive, BMI > 35, with hx of DM diet controlled, CKD2, HTN on losartan & hydrochlorothiazide, HLD on no meds, Hypothyroidism on levothyroxine, CATIE, allergic rhinitis, OA, knee s/p joint replacement, neck OA, prior cervical fusion, back OA with, chronic pain, hx of peripheral neuropathy non diabetic , MDD, Anxiety on cymbalta, temazepam and methylphenidate managed by psychiatry, [place]  shows received temazepam previously 30 then 15mg # 60 monthly last on 22 & ritalin 15 mg then 10 then 5 mg ,asy 15 mg # 90 given 22 & 22 by triston Al, prior uterine issues, colonoscopy in , prior tonsillectomy, under care of PCP Pema Fragoso    Here for physical. New to this provider. Made apt today as a physical and also has a physical planned with her PCP in oct in a couple weeks.     No breast issues  mammogram due  Reports no fh of breast,ovarian or colon cancer    MDD/ Insomnia/ anxiety: notes her psychiatrist Dr Siu is leaving. He had her on Cymbalta 30 +90 mg. His colleagues are not covered by her insurance.wondering if PCP can take over. Has enough meds till can see PCP in a couple weeks. Was also on temazepam 30 mg for 3/4 of a year and heard not good for her so cut down to 15 mg at night & plans to taper off. On 7.5 mg right now. Wondering if can get trazodone instead. Also had bene put on ritalin but took herself off that and no longer on this. Therapist not covered     DM on no med, will do labs    Obesity , working on diet etc    CATIE  On CPAP, last evaluated 20 yrs prior, Machine 5 yrs old but not working and now on a wait list for a new machine, as there is a shortage    HTN on losartan 100 mg daily/ notes was given hydrochlorothiazide  in  but lost the  script and ended up not taking and BP stable on one med alone today .  Not been taking     HLD on no meds will do labs    Hypothyroidism on levothyroxine 75 mcg , has enough meds till can see PCP and get labs reviewed    Peripheral poly neuropathy non dm , not any worse, wondering about another EMG. Will discuss with PCP.    Notes recently expos  to blastomycoses spores from her dogs, 7/15/22  One of her dogs had a skin bulge that broke open & she dealt with the fluid that turned out to be blastomycosis. Read on mixed reviewed of whether its dangerous or not. That was in late august . One dog  of pneumonia from it  The other is getting treated for it with itraconazole. The vet is not sure how they got it to begin with and advised she discuss her concerns and risk with her PCP. She has had no symptoms. Reports no fever or chills, no headache or dizziness, no double or blurry vision, no facial pain, earache, sore throat, has occasional runny nose, ? Allergies, No post nasal drip, no trouble hearing, smelling, tasting, sometimes swallowing wrong if eats too fast, has had no cough, no chest pain, trouble breathing or palpitations, No abdominal pain, heart burn, reflux, nausea or vomiting or diarrhea or constipation, no blood in stools or black stools, no night sweats. No dysuria, hematuria, frequency, urgency, hesitancy, incontinence, No pelvic complaints. No leg swelling or joint pain. No rash. Weight 192 lb in  and now 181 lbs but usually lesser in the summer and feels not a significant change to prior.     Elevated JUDY noted high in , PCP advised rheumatology consult. Not scheduled with them yet. Wonders if could recheck first. No rash or ulcers or joint swelling.     Hx of elevated bilirubin and would like it rechecked. No jaundice or change in color of stools or abdominal pain    OA multiple joints   Physical deconditioning    Allergic rhinitis manageable    Visual disturbance thinks from being on  cymbalta, and cataracts    HM reviewed  No ACP on file  Due for mammogram  Colon cancer screening options discussed, will do a colonoscopy  Due for flu shot, Covid booster, will do flu today and will do Covid another day  Discussed Shingrex    Reviewed and updated as needed this visit by clinical staff   Tobacco  Allergies  Meds  Problems  Med Hx  Surg Hx  Fam Hx  Soc   Hx          Reviewed and updated as needed this visit by Provider   Tobacco  Allergies  Meds  Problems              Social History     Tobacco Use     Smoking status: Never Smoker     Smokeless tobacco: Never Used     Tobacco comment: am using sm. amt. of marijuana for sleep/pain   Substance Use Topics     Alcohol use: Yes     Comment: really managed/not my drug to enjoy. marijuana 3 times per week      If you drink alcohol do you typically have >3 drinks per day or >7 drinks per week? No    Alcohol Use 9/23/2022   Prescreen: >3 drinks/day or >7 drinks/week? Not Applicable   Prescreen: >3 drinks/day or >7 drinks/week? -   No flowsheet data found.    Current providers sharing in care for this patient include:   Patient Care Team:  Jory Fragoso NP as PCP - General  Jory Fragoso NP as Assigned PCP  Karen Regalado MD as MD (Neurology)    The following health maintenance items are reviewed in Epic and correct as of today:  Health Maintenance   Topic Date Due     EYE EXAM  Never done     ZOSTER IMMUNIZATION (1 of 2) Never done     COVID-19 Vaccine (5 - Booster for Pfizer series) 08/31/2022     COLORECTAL CANCER SCREENING  10/11/2022     ANNUAL REVIEW OF HM ORDERS  12/20/2022     LIPID  12/22/2022     MICROALBUMIN  12/22/2022     A1C  03/23/2023     PHQ-9  03/23/2023     BMP  06/24/2023     TSH W/FREE T4 REFLEX  06/24/2023     MEDICARE ANNUAL WELLNESS VISIT  09/23/2023     DIABETIC FOOT EXAM  09/23/2023     FALL RISK ASSESSMENT  09/23/2023     MAMMO SCREENING  05/05/2024     ADVANCE CARE PLANNING  09/23/2027      DTAP/TDAP/TD IMMUNIZATION (3 - Td or Tdap) 07/03/2028     DEXA  05/05/2037     HEPATITIS C SCREENING  Completed     DEPRESSION ACTION PLAN  Completed     INFLUENZA VACCINE  Completed     Pneumococcal Vaccine: 65+ Years  Completed     IPV IMMUNIZATION  Aged Out     MENINGITIS IMMUNIZATION  Aged Out     Lab work is in process  Labs reviewed in EPIC  BP Readings from Last 3 Encounters:   09/23/22 125/79   06/24/22 108/68   03/04/22 121/78    Wt Readings from Last 3 Encounters:   09/23/22 82.1 kg (181 lb)   06/24/22 87.4 kg (192 lb 12 oz)   03/04/22 95.3 kg (210 lb)                  Patient Active Problem List   Diagnosis     Osteoarthritis     Sleep apnea     Allergic rhinitis     Hypothyroidism     Peripheral neuropathy     Hyperlipidemia LDL goal <130     Hypertension goal BP (blood pressure) < 140/90     Advanced directives, counseling/discussion     Anxiety     Moderate major depression (H)     Type 2 diabetes mellitus without complication (H)     Class 1 obesity due to excess calories with serious comorbidity and body mass index (BMI) of 31.0 to 31.9 in adult     Exposure to blastomycosis     Elevated antinuclear antibody (JUDY) level     Physical deconditioning     Insomnia, unspecified type     Past Surgical History:   Procedure Laterality Date     BIOPSY  10/2015    during colonoscopy     CERVICAL FUSION N/A 10/18/2017    Procedure: BILATERAL C7-T1 ANTERIOR CERVICAL DECOMPRESSION FUSION;  Surgeon: Victor Hugo Fernández MD;  Location: [facility] OR;  Service:      COLONOSCOPY  10/2015     JOINT REPLACEMTN, KNEE RT/LT Right 2009    Joint Replacement knee RT     JOINT REPLACEMTN, KNEE RT/LT Left 2016     TONSILLECTOMY         Social History     Tobacco Use     Smoking status: Never Smoker     Smokeless tobacco: Never Used     Tobacco comment: am using sm. amt. of marijuana for sleep/pain   Substance Use Topics     Alcohol use: Yes     Comment: really managed/not my drug to enjoy. marijuana 3 times per week       Family History   Problem Relation Age of Onset     Hypertension Father      Alcohol/Drug Father      Coronary Artery Disease Father         stroke/ carotid blockage     Cerebrovascular Disease Father         did not manage his blood pressure with meds smoked     Depression Father         came along after his first big stroke     Substance Abuse Father         alcohol     Hypertension Mother      Alcohol/Drug Mother      Depression Mother      Suicide Mother      Coronary Artery Disease Mother         surgery for carotid/surgery for femoral artery     Depression/Anxiety Mother      Cerebrovascular Disease Mother         small ones, smoked     Thyroid Disease Mother      Substance Abuse Mother         alcohol     Cerebrovascular Disease Paternal Grandfather         early age onset...60's     C.A.D. Maternal Grandfather         massive heart blow out     Depression/Anxiety Maternal Grandmother      Breast Cancer Maternal Grandmother         had a radical mastectomy....no recurrence  at     Substance Abuse Brother      Suicide Other      Depression Other      Diabetes Sister      Alcohol/Drug Brother      Substance Abuse Brother      Hypertension Brother         smoker     Cancer - colorectal No family hx of      Prostate Cancer No family hx of          Current Outpatient Medications   Medication Sig Dispense Refill     blood glucose (NO BRAND SPECIFIED) lancets standard Use to test blood sugar TWO times daily or as directed. 100 each 0     blood glucose (NO BRAND SPECIFIED) test strip Use to test blood sugar 2 times daily or as directed. 200 strip 0     blood glucose monitoring (NO BRAND SPECIFIED) meter device kit Use to test blood sugar 2  times daily or as directed. Contour next ez 1 kit 0     DULoxetine (CYMBALTA) 60 MG capsule        levothyroxine (SYNTHROID/LEVOTHROID) 75 MCG tablet TAKE ONE TABLET BY MOUTH ONCE DAILY 90 tablet 0     losartan (COZAAR) 100 MG tablet TAKE ONE TABLET BY MOUTH ONCE DAILY 90  tablet 0     temazepam (RESTORIL) 15 MG capsule        traZODone (DESYREL) 50 MG tablet Take 1 tablet (50 mg) by mouth At Bedtime 30 tablet 0     MULTIVITAMIN TABS   OR 1 TABLET DAILY       order for DME Equipment being ordered: light box 1 Units 0     ORDER FOR DME Equipment being ordered: CPAP mask and tubing 1 Units 0     Allergies   Allergen Reactions     Cats      Dogs      Recent Labs   Lab Test 09/23/22  1606 06/24/22  1126 05/03/22  1237 01/18/22  1111 12/22/21  1626 07/21/21  1406 07/09/21  1212 04/15/21  0931 10/06/20  1004   A1C 5.8*  --  6.3*  --  6.5*   < >  --    < > 6.0*   *  --   --   --  135*  --   --   --  171*   HDL 48*  --   --   --  54  --   --   --  52   TRIG 176*  --   --   --  282*  --   --   --  146   ALT 19 23  --   --  25  --   --   --   --    CR 0.61 0.77  --    < > 0.59  --  0.74  --  0.71   GFRESTIMATED >90 82  --    < > >90  --  82  --  87   GFRESTBLACK  --   --   --   --   --   --  >90  --  >90   POTASSIUM 4.0 4.6  --    < > 3.2*  --  3.5  --  4.1   TSH 1.17 1.14  --   --   --   --  1.21  --  2.02    < > = values in this interval not displayed.      Review of Systems   Constitutional: Negative for chills and fever.   HENT: Negative for congestion, ear pain, hearing loss and sore throat.    Eyes: Positive for visual disturbance. Negative for pain.   Respiratory: Negative for cough and shortness of breath.    Cardiovascular: Negative for chest pain, palpitations and peripheral edema.   Gastrointestinal: Negative for abdominal pain, constipation, diarrhea, heartburn, hematochezia and nausea.   Breasts:  Negative for tenderness, breast mass and discharge.   Genitourinary: Negative for dysuria, frequency, genital sores, hematuria, pelvic pain, urgency, vaginal bleeding and vaginal discharge.   Musculoskeletal: Negative for arthralgias, joint swelling and myalgias.   Skin: Negative for rash.   Neurological: Positive for weakness. Negative for dizziness, headaches and paresthesias.  "  Psychiatric/Behavioral: Negative for mood changes. The patient is not nervous/anxious.      Constitutional, HEENT, cardiovascular, pulmonary, GI, , musculoskeletal, neuro, skin, endocrine and psych systems are negative, except as otherwise noted.    OBJECTIVE:   /79 (BP Location: Right arm, Patient Position: Sitting, Cuff Size: Adult Large)   Pulse 79   Temp 98.2  F (36.8  C) (Temporal)   Resp 15   Ht 1.626 m (5' 4\")   Wt 82.1 kg (181 lb)   LMP  (LMP Unknown)   SpO2 97%   BMI 31.07 kg/m   Estimated body mass index is 31.07 kg/m  as calculated from the following:    Height as of this encounter: 1.626 m (5' 4\").    Weight as of this encounter: 82.1 kg (181 lb).  Physical Exam  GENERAL: healthy, alert, no distress and obese  EYES: Eyes grossly normal to inspection, PERRL and conjunctivae and sclerae normal  HENT: ear canals and TM's normal, nose and mouth without ulcers or lesions  NECK: no adenopathy, no asymmetry, masses, or scars and thyroid normal to palpation  RESP: lungs clear to auscultation - no rales, rhonchi or wheezes  BREAST: normal without masses, tenderness or nipple discharge and no palpable axillary masses or adenopathy  CV: regular rate and rhythm, normal S1 S2, no S3 or S4, no murmur, click or rub, no peripheral edema and peripheral pulses strong  ABDOMEN: soft, non tender, no hepatosplenomegaly, no masses and bowel sounds normal  MS: no gross musculoskeletal defects noted, no edema  SKIN: no suspicious lesions or rashes  NEURO: Normal strength and tone, mentation intact and speech normal  PSYCH: mentation appears normal, affect normal/bright  DM foot exam; normal no ulcers, monofilament test intact, nails normal     Diagnostic Test Results:  Labs reviewed in Epic  No results found for this or any previous visit (from the past 24 hour(s)).  Results for orders placed or performed in visit on 09/23/22   Comprehensive metabolic panel (BMP + Alb, Alk Phos, ALT, AST, Total. Bili, TP)     " Status: Normal   Result Value Ref Range    Sodium 136 133 - 144 mmol/L    Potassium 4.0 3.4 - 5.3 mmol/L    Chloride 102 94 - 109 mmol/L    Carbon Dioxide (CO2) 28 20 - 32 mmol/L    Anion Gap 6 3 - 14 mmol/L    Urea Nitrogen 19 7 - 30 mg/dL    Creatinine 0.61 0.52 - 1.04 mg/dL    Calcium 9.0 8.5 - 10.1 mg/dL    Glucose 95 70 - 99 mg/dL    Alkaline Phosphatase 85 40 - 150 U/L    AST 13 0 - 45 U/L    ALT 19 0 - 50 U/L    Protein Total 6.8 6.8 - 8.8 g/dL    Albumin 3.8 3.4 - 5.0 g/dL    Bilirubin Total 1.3 0.2 - 1.3 mg/dL    GFR Estimate >90 >60 mL/min/1.73m2   Lipid Profile (Chol, Trig, HDL, LDL calc)     Status: Abnormal   Result Value Ref Range    Cholesterol 245 (H) <200 mg/dL    Triglycerides 176 (H) <150 mg/dL    Direct Measure HDL 48 (L) >=50 mg/dL    LDL Cholesterol Calculated 162 (H) <=100 mg/dL    Non HDL Cholesterol 197 (H) <130 mg/dL    Patient Fasting > 8hrs? Yes     Narrative    Cholesterol  Desirable:  <200 mg/dL    Triglycerides  Normal:  Less than 150 mg/dL  Borderline High:  150-199 mg/dL  High:  200-499 mg/dL  Very High:  Greater than or equal to 500 mg/dL    Direct Measure HDL  Female:  Greater than or equal to 50 mg/dL   Male:  Greater than or equal to 40 mg/dL    LDL Cholesterol  Desirable:  <100mg/dL  Above Desirable:  100-129 mg/dL   Borderline High:  130-159 mg/dL   High:  160-189 mg/dL   Very High:  >= 190 mg/dL    Non HDL Cholesterol  Desirable:  130 mg/dL  Above Desirable:  130-159 mg/dL  Borderline High:  160-189 mg/dL  High:  190-219 mg/dL  Very High:  Greater than or equal to 220 mg/dL   TSH with free T4 reflex     Status: Normal   Result Value Ref Range    TSH 1.17 0.40 - 4.00 mU/L   Albumin Random Urine Quantitative with Creat Ratio     Status: None   Result Value Ref Range    Creatinine Urine mg/dL 58 mg/dL    Albumin Urine mg/L <5 mg/L    Albumin Urine mg/g Cr     Vitamin B12     Status: Normal   Result Value Ref Range    Vitamin B12 404 232 - 1,245 pg/mL   Hemoglobin A1c     Status:  Abnormal   Result Value Ref Range    Hemoglobin A1C 5.8 (H) 0.0 - 5.6 %   Magnesium     Status: Normal   Result Value Ref Range    Magnesium 2.3 1.6 - 2.3 mg/dL   Cyclic Citrullinated Peptide Antibody IgG     Status: Normal   Result Value Ref Range    Cyclic Citrullinated Peptide Antibody IgG 0.5 <7.0 U/mL   Rheumatoid factor     Status: Normal   Result Value Ref Range    Rheumatoid Factor <6 <12 IU/mL   DNA double stranded antibodies     Status: Normal   Result Value Ref Range    DNA (ds) Antibody 6.7 <10.0 IU/mL    Narrative    Negative:  Less than 10  Equivocal: 10-15  Positive:  Greater than 15   SSA Ro JESSICA Antibody IgG     Status: Normal   Result Value Ref Range    SSA Rachel IgG Instrument Value <0.5 <7.0 U/mL    SSA (Ro) Antibody IgG Negative Negative   SSB La JESSICA Antibody IgG     Status: Normal   Result Value Ref Range    SSB Rachel IgG Instrument Value <0.6 <7.0 U/mL    SSB (La) Antibody IgG Negative Negative   Anti Nuclear Rachel IgG by IFA with Reflex     Status: Abnormal   Result Value Ref Range    JUDY interpretation Positive (A) Negative    JUDY pattern 1 Dense fine speckled     JUDY titer 1 1:160    CBC with platelets and differential     Status: Abnormal   Result Value Ref Range    WBC Count 11.4 (H) 4.0 - 11.0 10e3/uL    RBC Count 4.23 3.80 - 5.20 10e6/uL    Hemoglobin 12.8 11.7 - 15.7 g/dL    Hematocrit 38.4 35.0 - 47.0 %    MCV 91 78 - 100 fL    MCH 30.3 26.5 - 33.0 pg    MCHC 33.3 31.5 - 36.5 g/dL    RDW 12.9 10.0 - 15.0 %    Platelet Count 358 150 - 450 10e3/uL    % Neutrophils 60 %    % Lymphocytes 31 %    % Monocytes 7 %    % Eosinophils 1 %    % Basophils 0 %    % Immature Granulocytes 0 %    Absolute Neutrophils 6.9 1.6 - 8.3 10e3/uL    Absolute Lymphocytes 3.6 0.8 - 5.3 10e3/uL    Absolute Monocytes 0.8 0.0 - 1.3 10e3/uL    Absolute Eosinophils 0.1 0.0 - 0.7 10e3/uL    Absolute Basophils 0.0 0.0 - 0.2 10e3/uL    Absolute Immature Granulocytes 0.0 <=0.4 10e3/uL   CBC with platelets and differential      Status: Abnormal    Narrative    The following orders were created for panel order CBC with platelets and differential.  Procedure                               Abnormality         Status                     ---------                               -----------         ------                     CBC with platelets and d...[[id number]]  Abnormal            Final result                 Please view results for these tests on the individual orders.       ASSESSMENT / PLAN:       ICD-10-CM    1. Encounter for Medicare annual wellness exam  Z00.00 Colonoscopy Screening  Referral     *MA Screening Digital Bilateral   2. Moderate major depression (H)  F32.1 TSH with free T4 reflex     TSH with free T4 reflex   3. Insomnia, unspecified type  G47.00 TSH with free T4 reflex     traZODone (DESYREL) 50 MG tablet     Adult Sleep Eval & Management  Referral   4. Anxiety  F41.9 TSH with free T4 reflex     TSH with free T4 reflex   5. Type 2 diabetes mellitus without complication, without long-term current use of insulin (H)  E11.9 FOOT EXAM     Comprehensive metabolic panel (BMP + Alb, Alk Phos, ALT, AST, Total. Bili, TP)     Lipid Profile (Chol, Trig, HDL, LDL calc)     TSH with free T4 reflex     Albumin Random Urine Quantitative with Creat Ratio     Hemoglobin A1c     Comprehensive metabolic panel (BMP + Alb, Alk Phos, ALT, AST, Total. Bili, TP)     Lipid Profile (Chol, Trig, HDL, LDL calc)     TSH with free T4 reflex     Albumin Random Urine Quantitative with Creat Ratio     Hemoglobin A1c   6. Class 1 obesity due to excess calories with serious comorbidity and body mass index (BMI) of 31.0 to 31.9 in adult  E66.09 Lipid Profile (Chol, Trig, HDL, LDL calc)    Z68.31 TSH with free T4 reflex     Lipid Profile (Chol, Trig, HDL, LDL calc)     TSH with free T4 reflex   7. Sleep apnea, unspecified type  G47.30 Adult Sleep Eval & Management  Referral   8. Hypertension goal BP (blood pressure) < 140/90  I10  Comprehensive metabolic panel (BMP + Alb, Alk Phos, ALT, AST, Total. Bili, TP)     Albumin Random Urine Quantitative with Creat Ratio     Comprehensive metabolic panel (BMP + Alb, Alk Phos, ALT, AST, Total. Bili, TP)     Albumin Random Urine Quantitative with Creat Ratio   9. Hyperlipidemia LDL goal <130  E78.5 Lipid Profile (Chol, Trig, HDL, LDL calc)     TSH with free T4 reflex     Lipid Profile (Chol, Trig, HDL, LDL calc)     TSH with free T4 reflex   10. Hypothyroidism, unspecified type  E03.9 TSH with free T4 reflex     TSH with free T4 reflex   11. Peripheral polyneuropathy  G62.9 Vitamin B12     Vitamin B12    non dm,    12. Exposure to blastomycosis  Z20.89 CBC with platelets and differential     Comprehensive metabolic panel (BMP + Alb, Alk Phos, ALT, AST, Total. Bili, TP)     Magnesium     CBC with platelets and differential     Comprehensive metabolic panel (BMP + Alb, Alk Phos, ALT, AST, Total. Bili, TP)     Magnesium   13. Elevated antinuclear antibody (JUDY) level  R76.8 Cyclic Citrullinated Peptide Antibody IgG     Rheumatoid factor     DNA double stranded antibodies     SSA Ro JESSICA Antibody IgG     SSB La JESSICA Antibody IgG     Anti Nuclear Rachel IgG by IFA with Reflex     Cyclic Citrullinated Peptide Antibody IgG     Rheumatoid factor     DNA double stranded antibodies     SSA Ro JESSICA Antibody IgG     SSB La JESSICA Antibody IgG     Anti Nuclear Rachel IgG by IFA with Reflex   14. Elevated bilirubin  R17 Comprehensive metabolic panel (BMP + Alb, Alk Phos, ALT, AST, Total. Bili, TP)   15. Osteoarthritis of multiple joints, unspecified osteoarthritis type  M15.9    16. Physical deconditioning  R53.81 CBC with platelets and differential     Comprehensive metabolic panel (BMP + Alb, Alk Phos, ALT, AST, Total. Bili, TP)     TSH with free T4 reflex     Vitamin B12     Magnesium     CBC with platelets and differential     Comprehensive metabolic panel (BMP + Alb, Alk Phos, ALT, AST, Total. Bili, TP)     TSH with free T4  reflex     Vitamin B12     Magnesium   17. Allergic rhinitis, unspecified seasonality, unspecified trigger  J30.9 CBC with platelets and differential     CBC with platelets and differential   18. Visual disturbance  H53.9    19. Screen for colon cancer  Z12.11 Colonoscopy Screening  Referral   20. Advanced directives, counseling/discussion  Z71.89    21. Encounter for screening mammogram for malignant neoplasm of breast   Z12.31 *MA Screening Digital Bilateral   22. Need for prophylactic vaccination and inoculation against influenza  Z23 INFLUENZA, QUAD, HD, PF, 65+ (FLUZONE HD)   23. Need for COVID-19 vaccine  Z23    24. Need for shingles vaccine  Z23      Seen for Medicare care wellness/preventive health and additional concerns today   Self breast check regularly   Consider referral to a  to assess personal risk if should have any family hx of breast, ovarian or bowel cancer  Mammogram due & order in place  Follow-up with the eye doctor for diabetes eye check and vision yearly.  Hearing and cognition intact.  Has had no falls.  Health care maintenance reviewed  Consider working on health care directives  Colon cancer screening due and colonoscopy order in place.  Recommend Flu shot yearly and high-dose flu shot given today.  COVID-vaccine primary series x3 and 1 booster up-to-date in the past. New bivalent Pfizer COVID vaccine available and can get anytime plans to get another day.    Recommend Tdap every 10 yrs  Pneumonia vaccine up to date due again 2023 given history of diabetes.  Recommend Shingrix: shingles vaccines a series of 2 shots 2 to 6 months apart that can cause couple days of flu like symptoms but is expensive so check with insurance and to get at pharmacy if cheaper. .  If travelling out of the country recommend seeing the travel clinic to update appropriate shots etc  Labs today and will make further recommendations once reviewed    Depression, anxiety & history of insomnia  previously on temazepam for sleep and Cymbalta 90 mg for depression anxiety and on Ritalin prescribed by prior psychiatrist who is now retiring.  Reported has stopped the Ritalin and tapering off the temazepam from 30 mg down to 15 and currently on 7.5.  Recommend 7.5 mg every other day for 1 week and then every 3 days for 1 week and then stop.  Given trazodone 30 mg to take instead.  May start once off the temazepam.  Risk of serotonin syndrome with jerky movements since also on Cymbalta explained.  Consider referral to a counselor and a new psychiatrist in the future advised follow-up with her primary Merissa Carty on 10/12/2022 and an office visit as planned to discuss this further.  Currently has enough Cymbalta to last till then and may discuss other options at that time.    Diabetes currently appears well controlled on diet alone.  Continue with Mediterranean style diet, low carbohydrates, increase exercise and weight loss.  We will do lab work today and make further recommendations.  Recommend diabetes eye check yearly.  Check feet regularly due to neuropathy.  Does have hammertoes.  Can refer to podiatry in the future.  Continue on losartan which is renal protective and consider statin medication if needed we will check cholesterol today.    BMI 31 we will check labs. A healthy diet and weight loss and exercise encouraged    History of obstructive sleep apnea on CPAP not assessed in over 20 years.  Machine is currently 5 years old and no longer working.  Referred to sleep to further evaluate and treat and to get a new machine.    Hypertension currently blood pressure stable on losartan alone.  Even though has had hydrochlorothiazide prescribed last prescription and not filled or taking since given in January 2022.  Blood pressure currently stable at 125/79 so may stay off the hydrochlorothiazide and continue on the losartan for now.  Should have enough losartan till can see primary in October as  planned.    Hyperlipidemia we will check lipids and make recommendations.    Hypothyroidism on levothyroxine we will check TSH and make adjustments after that so refill not sent in until can review lab work.has enough med till PCP apt.     Peripheral neuropathy noted not to be due to diabetes in the past.  Feels neuropathy is going up the legs.  Reported no back pain.  We will check B12 level.  Consider recheck EMG and neurology consultation with PCP.    Reported history of exposure to blastomycosis through both her dogs who somehow got this. & one unfortunately  of pneumonia recently & the other 1 is being treated with itraconazole and doing okay.  Currently appears clinically well with no symptoms of blastomycosis. We will check basic lab work and go from there. If you should have fever or any new symptoms contact the clinic to be assessed further.  Can refer to infectious disease if needed.    History of elevated JUDY in 2022 and referred to rheumatology but not seen by them yet.  No joint swelling redness warmth or ulcer or rash noted.  We will do further evaluation for autoimmune disease and based on that can make further recommendations.  25% of the general population may have an elevated JUDY without any clinical significance.    History of elevated bilirubin in the past, could have been due to a fasting sample or from absence of enzyme that breaks down bilirubin.  We will recheck labs today.  In the absence of any jaundice would not be too concerned.  Stay well-hydrated.  If desires further evaluation can do an ultrasound of the liver to see if has any gallstones or biliary duct issues.    Osteoarthritis multiple joints and physical deconditioning.  Could be why is on Cymbalta as that helps with aches and pains too.  We will do lab work today and go from there.    Allergic rhinitis manageable with over-the-counter antihistamines.    History of visual disturbance recommend seeing eye doctor to be  "further evaluated.    Follow-up with PCP Shelia Fragoso in office visit planned already on 10/12/2022 to follow-up from today and discuss results and further plan of care.  Continue routine care with them.    Patient has been advised of split billing requirements and indicates understanding: Yes    COUNSELING:  Reviewed preventive health counseling, as reflected in patient instructions       Regular exercise       Healthy diet/nutrition       Vision screening       Hearing screening       Dental care       Bladder control       Fall risk prevention       Immunizations       Aspirin prophylaxis        Alcohol Use        Osteoporosis prevention/bone health       Colon cancer screening       (Yara)menopause management       The 10-year ASCVD risk score (Dominick OSBORN Jr., et al., 2013) is: 25.9%    Values used to calculate the score:      Age: 72 years      Sex: Female      Is Non- : No      Diabetic: Yes      Tobacco smoker: No      Systolic Blood Pressure: 125 mmHg      Is BP treated: Yes      HDL Cholesterol: 48 mg/dL      Total Cholesterol: 245 mg/dL       Advanced Planning     Estimated body mass index is 31.07 kg/m  as calculated from the following:    Height as of this encounter: 1.626 m (5' 4\").    Weight as of this encounter: 82.1 kg (181 lb).    Weight management plan: Discussed healthy diet and exercise guidelines    She reports that she has never smoked. She has never used smokeless tobacco.      Appropriate preventive services were discussed with this patient, including applicable screening as appropriate for cardiovascular disease, diabetes, osteopenia/osteoporosis, and glaucoma.  As appropriate for age/gender, discussed screening for colorectal cancer, prostate cancer, breast cancer, and cervical cancer. Checklist reviewing preventive services available has been given to the patient.    Reviewed patients plan of care and provided an AVS. The Complex Care Plan (for patients with higher " acuity and needing more deliberate coordination of services) for Leena meets the Care Plan requirement. This Care Plan has been established and reviewed with the Patient.    Identified Health Risks:  The patient's PHQ-9 score is consistent with mild depression. She was provided with information regarding depression and was advised to schedule a follow up appointment in 4 weeks to further address this issue with PCP.    Counseling Resources:  ATP IV Guidelines  Pooled Cohorts Equation Calculator  Breast Cancer Risk Calculator  Breast Cancer: Medication to Reduce Risk  FRAX Risk Assessment  ICSI Preventive Guidelines  Dietary Guidelines for Americans, 2010  USDA's MyPlate  ASA Prophylaxis  Lung CA Screening    Jazmyn Ellis MD  [facility]

## 2022-09-24 LAB
ALBUMIN SERPL-MCNC: 3.8 G/DL (ref 3.4–5)
ALP SERPL-CCNC: 85 U/L (ref 40–150)
ALT SERPL W P-5'-P-CCNC: 19 U/L (ref 0–50)
ANION GAP SERPL CALCULATED.3IONS-SCNC: 6 MMOL/L (ref 3–14)
AST SERPL W P-5'-P-CCNC: 13 U/L (ref 0–45)
BILIRUB SERPL-MCNC: 1.3 MG/DL (ref 0.2–1.3)
BUN SERPL-MCNC: 19 MG/DL (ref 7–30)
CALCIUM SERPL-MCNC: 9 MG/DL (ref 8.5–10.1)
CHLORIDE BLD-SCNC: 102 MMOL/L (ref 94–109)
CHOLEST SERPL-MCNC: 245 MG/DL
CO2 SERPL-SCNC: 28 MMOL/L (ref 20–32)
CREAT SERPL-MCNC: 0.61 MG/DL (ref 0.52–1.04)
CREAT UR-MCNC: 58 MG/DL
FASTING STATUS PATIENT QL REPORTED: YES
GFR SERPL CREATININE-BSD FRML MDRD: >90 ML/MIN/1.73M2
GLUCOSE BLD-MCNC: 95 MG/DL (ref 70–99)
HDLC SERPL-MCNC: 48 MG/DL
LDLC SERPL CALC-MCNC: 162 MG/DL
MAGNESIUM SERPL-MCNC: 2.3 MG/DL (ref 1.6–2.3)
MICROALBUMIN UR-MCNC: <5 MG/L
MICROALBUMIN/CREAT UR: NORMAL MG/G{CREAT}
NONHDLC SERPL-MCNC: 197 MG/DL
POTASSIUM BLD-SCNC: 4 MMOL/L (ref 3.4–5.3)
PROT SERPL-MCNC: 6.8 G/DL (ref 6.8–8.8)
SODIUM SERPL-SCNC: 136 MMOL/L (ref 133–144)
TRIGL SERPL-MCNC: 176 MG/DL
TSH SERPL DL<=0.005 MIU/L-ACNC: 1.17 MU/L (ref 0.4–4)

## 2022-09-24 NOTE — RESULT ENCOUNTER NOTE
Val Whitehead Napoleon Sabra,  Some of your results came back and are within acceptable limits. -Microalbumin (urine protein) test is normal.  ADVISE: rechecking this annually.. If you have any further concerns please do not hesitate to contact us by message, phone or making an appointment.  Have a good day   Dr Caleb SKINNER

## 2022-09-24 NOTE — RESULT ENCOUNTER NOTE
Val Whitehead Napoleon Sabra,  Your results came back . with a normal vitamin b 12If you have any further concerns please do not hesitate to contact us by message, phone or making an appointment.  Have a good day   Dr Caleb SKINNER

## 2022-09-24 NOTE — RESULT ENCOUNTER NOTE
Val Ms. Napoleon Montaño,  Your results came back showing   The 10-year ASCVD risk score (Dominick OSBORN JrEugene, et al., 2013) is: 25.9%    Values used to calculate the score:      Age: 72 years      Sex: Female      Is Non- : No      Diabetic: Yes      Tobacco smoker: No      Systolic Blood Pressure: 125 mmHg      Is BP treated: Yes      HDL Cholesterol: 48 mg/dL      Total Cholesterol: 245 mg/dL  -LDL(bad) cholesterol level is elevated, HDL(good) cholesterol level is low and your triglycerides are elevated which can increase your heart disease risk.  A diet high in fat and simple carbohydrates, genetics and being overweight can contribute to this. ADVISE: exercising 150 minutes of aerobic exercise per week (30 minutes for 5 days per week or 50 minutes for 3 days per week are options), eating a low saturated fat/low carbohydrate diet, and omega-3 fatty acids (fish oil) 3846-9705 mg daily are helpful to improve this. Current guidelines from the American Heart Association support starting a cholesterol lowering medication to lower your heart and stroke disease risk.  Consider taking atorvastatin(Lipitor) 10 mg each evening.    -Liver and gallbladder tests are normal (ALT,AST, Alk phos, bilirubin), kidney function is normal (Cr, GFR), sodium is normal, potassium is normal, calcium is normal, glucose is normal.  -TSH (thyroid stimulating hormone) level is normal which indicates normal thyroid function.  -Magnesium is normal  If you have any further concerns please do not hesitate to contact us by message, phone or making an appointment.  Have a good day   Dr Caleb SKINNER

## 2022-09-26 LAB
CCP AB SER IA-ACNC: 0.5 U/ML
DSDNA AB SER-ACNC: 6.7 IU/ML
ENA SS-A AB SER IA-ACNC: <0.5 U/ML
ENA SS-A AB SER IA-ACNC: NEGATIVE
ENA SS-B IGG SER IA-ACNC: <0.6 U/ML
ENA SS-B IGG SER IA-ACNC: NEGATIVE
RHEUMATOID FACT SER NEPH-ACNC: <6 IU/ML

## 2022-09-26 NOTE — RESULT ENCOUNTER NOTE
Val Whitehead Napoleon Sabra,  Your results came back negative for rheumatoid arthritis   If you have any further concerns please do not hesitate to contact us by message, phone or making an appointment.  Have a good day   Dr Caleb SKINNER

## 2022-09-26 NOTE — RESULT ENCOUNTER NOTE
Val Whitehead Napoleon Sabra,  Your results came back and no sign of Sjogren or rheumatoid arthritis   . If you have any further concerns please do not hesitate to contact us by message, phone or making an appointment.  Have a good day   Dr Caleb SKINNER

## 2022-09-27 LAB
ANA PAT SER IF-IMP: ABNORMAL
ANA SER QL IF: POSITIVE
ANA TITR SER IF: ABNORMAL {TITER}

## 2022-09-27 NOTE — RESULT ENCOUNTER NOTE
Val Ms. Bender Sabra,  Your results came back and the JUDY remains positive as discussed it would be our last visit.  Follow-up with the rheumatologist as advised by her PCP in January this year regarding the same issue.  So far testing and exam does not suggest an autoimmune condition but may need further evaluation with a rheumatologist.  If you have any further concerns please do not hesitate to contact us by message, phone or making an appointment.  Have a good day   Dr Caleb SKINNER

## 2022-09-28 ENCOUNTER — TELEPHONE (OUTPATIENT)
Dept: SLEEP MEDICINE | Facility: CLINIC | Age: 72
End: 2022-09-28

## 2022-09-28 NOTE — TELEPHONE ENCOUNTER
Reason for Call:  Other appointment     Detailed comments: Would like a consultation for sleep study. Has had a previous sleep study done before and had a cpap but it stopped working. She really does not want to have to wait until January to come in and go that long without her adjusting cpap machine. She can do virtual or in person    Phone Number Patient can be reached at: Home number on file [phone] (home)    Best Time: anytime    Can we leave a detailed message on this number? YES    Call taken on 9/28/2022 at 3:08 PM by Brenda Aguilar

## 2022-09-29 NOTE — TELEPHONE ENCOUNTER
Patient was called and notified that she is on the wait list to be seen sooner. The patient has been told to reach out to PCP or former sleep provider. Patient has been told to reach out to [employer] company to discuss options.     Martha Porter RN on 9/29/2022 at 8:43 AM

## 2022-10-02 PROBLEM — R76.8 ELEVATED ANTINUCLEAR ANTIBODY (ANA) LEVEL: Status: ACTIVE | Noted: 2022-10-02

## 2022-10-02 PROBLEM — Z20.89: Status: ACTIVE | Noted: 2022-10-02

## 2022-10-02 PROBLEM — R53.81 PHYSICAL DECONDITIONING: Status: ACTIVE | Noted: 2022-10-02

## 2022-10-02 PROBLEM — G47.00 INSOMNIA, UNSPECIFIED TYPE: Status: ACTIVE | Noted: 2022-10-02

## 2022-10-12 ENCOUNTER — OFFICE VISIT (OUTPATIENT)
Dept: FAMILY MEDICINE | Facility: CLINIC | Age: 72
End: 2022-10-12
Payer: COMMERCIAL

## 2022-10-12 VITALS
DIASTOLIC BLOOD PRESSURE: 75 MMHG | BODY MASS INDEX: 31.76 KG/M2 | TEMPERATURE: 98.1 F | WEIGHT: 185 LBS | OXYGEN SATURATION: 97 % | SYSTOLIC BLOOD PRESSURE: 115 MMHG | RESPIRATION RATE: 18 BRPM | HEART RATE: 103 BPM

## 2022-10-12 DIAGNOSIS — I10 HYPERTENSION GOAL BP (BLOOD PRESSURE) < 140/90: ICD-10-CM

## 2022-10-12 DIAGNOSIS — F32.1 MODERATE MAJOR DEPRESSION (H): ICD-10-CM

## 2022-10-12 DIAGNOSIS — G47.00 INSOMNIA, UNSPECIFIED TYPE: ICD-10-CM

## 2022-10-12 DIAGNOSIS — E11.9 TYPE 2 DIABETES MELLITUS WITHOUT COMPLICATION, WITHOUT LONG-TERM CURRENT USE OF INSULIN (H): Primary | ICD-10-CM

## 2022-10-12 DIAGNOSIS — E03.9 ACQUIRED HYPOTHYROIDISM: ICD-10-CM

## 2022-10-12 DIAGNOSIS — Z23 HIGH PRIORITY FOR 2019-NCOV VACCINE: ICD-10-CM

## 2022-10-12 PROCEDURE — 91312 COVID-19,PF,PFIZER BOOSTER BIVALENT: CPT | Performed by: NURSE PRACTITIONER

## 2022-10-12 PROCEDURE — 99214 OFFICE O/P EST MOD 30 MIN: CPT | Performed by: NURSE PRACTITIONER

## 2022-10-12 PROCEDURE — 0124A COVID-19,PF,PFIZER BOOSTER BIVALENT: CPT | Performed by: NURSE PRACTITIONER

## 2022-10-12 RX ORDER — DULOXETIN HYDROCHLORIDE 60 MG/1
60 CAPSULE, DELAYED RELEASE ORAL DAILY
Qty: 90 CAPSULE | Refills: 1 | Status: SHIPPED | OUTPATIENT
Start: 2022-10-12 | End: 2023-05-15

## 2022-10-12 RX ORDER — TRAZODONE HYDROCHLORIDE 50 MG/1
75-100 TABLET, FILM COATED ORAL AT BEDTIME
Qty: 60 TABLET | Refills: 12 | Status: SHIPPED | OUTPATIENT
Start: 2022-10-12 | End: 2023-10-30

## 2022-10-12 RX ORDER — LEVOTHYROXINE SODIUM 75 UG/1
75 TABLET ORAL DAILY
Qty: 90 TABLET | Refills: 2 | Status: SHIPPED | OUTPATIENT
Start: 2022-10-12 | End: 2023-07-16

## 2022-10-12 RX ORDER — DULOXETIN HYDROCHLORIDE 30 MG/1
30 CAPSULE, DELAYED RELEASE ORAL DAILY
Qty: 90 CAPSULE | Refills: 1 | Status: SHIPPED | OUTPATIENT
Start: 2022-10-12 | End: 2023-05-15

## 2022-10-12 RX ORDER — HYDROCHLOROTHIAZIDE 12.5 MG/1
12.5 TABLET ORAL DAILY
Qty: 90 TABLET | Refills: 3 | Status: CANCELLED | OUTPATIENT
Start: 2022-10-12

## 2022-10-12 ASSESSMENT — PAIN SCALES - GENERAL: PAINLEVEL: MODERATE PAIN (4)

## 2022-10-12 NOTE — PROGRESS NOTES
Assessment & Plan     (E11.9) Type 2 diabetes mellitus without complication, without long-term current use of insulin (H)  (primary encounter diagnosis)  Comment: at goal  Plan: CT Coronary Calcium Scan, Nutrition Referral        Continue working on diet.  Discussed recommendation for statin.  CAC scan ordered as she may be interested in this to assess risk.     (Z23) High priority for 2019-nCoV vaccine  Comment:   Plan:     (I10) Hypertension goal BP (blood pressure) < 140/90  Comment: at goal  Plan: The current medical regimen is effective;  continue present plan and medications.     (G47.00) Insomnia, unspecified type  Comment:   Plan: traZODone (DESYREL) 50 MG tablet        Can try increasing to 75 mg and can go up to 100 mg if needed.     (E03.9) Acquired hypothyroidism  Comment: stable  Plan: levothyroxine (SYNTHROID/LEVOTHROID) 75 MCG         tablet        The current medical regimen is effective;  continue present plan and medications.     (F32.1) Moderate major depression (H)  Comment: not in remission  Plan: DULoxetine (CYMBALTA) 60 MG capsule, DULoxetine        (CYMBALTA) 30 MG capsule        She will consider ketamine.          36 minutes spent on the date of the encounter doing chart review, patient visit and documentation            No follow-ups on file.    Jory Fragoso NP  [facility]    Leann Chakraborty is a 72 year old, presenting for the following health issues:  CPAP Follow Up, Hypertension, and Imm/Inj (COVID-19 VACCINE)      History of Present Illness       Reason for visit:  Broken cpap  Symptoms include:  Fatigue  Symptom intensity:  Moderate  Symptom progression:  Worsening  Had these symptoms before:  No  What makes it worse:  No  What makes it better:  No    She eats 2-3 servings of fruits and vegetables daily.She exercises with enough effort to increase her heart rate 10 to 19 minutes per day.  She exercises with enough effort to increase her heart rate  3 or less days per week.   She is taking medications regularly.       She is no longer seeing her psychiatrist, is seeing her therapist.  She was approved for Ketamine, is deciding whether to proceed.  Recently lost a dog.  Not sleeping as well - waking up early.    Is reluctant to take a statin.    Her CPAP stopped working.  Long wait for sleep clinic, supply company doesn't have any in stock.            Review of Systems         Objective    /75   Pulse 103   Temp 98.1  F (36.7  C) (Temporal)   Resp 18   Wt 83.9 kg (185 lb)   LMP  (LMP Unknown)   SpO2 97%   BMI 31.76 kg/m    Body mass index is 31.76 kg/m .  Physical Exam   GENERAL: healthy, alert and no distress  MS: no gross musculoskeletal defects noted, no edema  PSYCH: mentation appears normal, affect normal/bright  Diabetic foot exam: normal DP and PT pulses, no trophic changes or ulcerative lesions and normal sensory exam

## 2022-10-29 DIAGNOSIS — I10 HYPERTENSION GOAL BP (BLOOD PRESSURE) < 140/90: ICD-10-CM

## 2022-10-31 RX ORDER — LOSARTAN POTASSIUM 100 MG/1
TABLET ORAL
Qty: 90 TABLET | Refills: 3 | Status: SHIPPED | OUTPATIENT
Start: 2022-10-31 | End: 2024-02-02

## 2022-10-31 NOTE — TELEPHONE ENCOUNTER
---Prescription approved per [facility].       Sarahi Garcia RN BSN     [facility]        --Last visit:  10/12/2022

## 2023-01-20 ENCOUNTER — TELEPHONE (OUTPATIENT)
Dept: SLEEP MEDICINE | Facility: CLINIC | Age: 73
End: 2023-01-20

## 2023-01-20 ENCOUNTER — VIRTUAL VISIT (OUTPATIENT)
Dept: SLEEP MEDICINE | Facility: CLINIC | Age: 73
End: 2023-01-20
Attending: FAMILY MEDICINE
Payer: COMMERCIAL

## 2023-01-20 VITALS — BODY MASS INDEX: 32.44 KG/M2 | WEIGHT: 190 LBS | HEIGHT: 64 IN

## 2023-01-20 DIAGNOSIS — F51.04 CHRONIC INSOMNIA: ICD-10-CM

## 2023-01-20 DIAGNOSIS — G47.33 OSA (OBSTRUCTIVE SLEEP APNEA): Primary | ICD-10-CM

## 2023-01-20 DIAGNOSIS — I10 HYPERTENSION GOAL BP (BLOOD PRESSURE) < 140/90: ICD-10-CM

## 2023-01-20 PROCEDURE — 99204 OFFICE O/P NEW MOD 45 MIN: CPT | Mod: 95 | Performed by: INTERNAL MEDICINE

## 2023-01-20 ASSESSMENT — SLEEP AND FATIGUE QUESTIONNAIRES
HOW LIKELY ARE YOU TO NOD OFF OR FALL ASLEEP WHILE WATCHING TV: SLIGHT CHANCE OF DOZING
HOW LIKELY ARE YOU TO NOD OFF OR FALL ASLEEP IN A CAR, WHILE STOPPED FOR A FEW MINUTES IN TRAFFIC: WOULD NEVER DOZE
HOW LIKELY ARE YOU TO NOD OFF OR FALL ASLEEP WHILE SITTING AND READING: MODERATE CHANCE OF DOZING
HOW LIKELY ARE YOU TO NOD OFF OR FALL ASLEEP WHILE SITTING AND TALKING TO SOMEONE: WOULD NEVER DOZE
HOW LIKELY ARE YOU TO NOD OFF OR FALL ASLEEP WHEN YOU ARE A PASSENGER IN A CAR FOR AN HOUR WITHOUT A BREAK: WOULD NEVER DOZE
HOW LIKELY ARE YOU TO NOD OFF OR FALL ASLEEP WHILE SITTING INACTIVE IN A PUBLIC PLACE: WOULD NEVER DOZE
HOW LIKELY ARE YOU TO NOD OFF OR FALL ASLEEP WHILE SITTING QUIETLY AFTER LUNCH WITHOUT ALCOHOL: WOULD NEVER DOZE
HOW LIKELY ARE YOU TO NOD OFF OR FALL ASLEEP WHILE LYING DOWN TO REST IN THE AFTERNOON WHEN CIRCUMSTANCES PERMIT: WOULD NEVER DOZE

## 2023-01-20 ASSESSMENT — PAIN SCALES - GENERAL: PAINLEVEL: NO PAIN (0)

## 2023-01-20 NOTE — PROGRESS NOTES
Mylene is a 72 year old who is being evaluated via a billable video visit.      How would you like to obtain your AVS? MyChart  If the video visit is dropped, the invitation should be resent by: Send to e-mail at: [email or web address]  Will anyone else be joining your video visit? Stephany    Inocente Payne      Video-Visit Details    Type of service:  Video Visit   Video Start Time: 9:16 AM  Video End Time:9:48 AM    Originating Location (pt. Location): Home    Distant Location (provider location):  On-site  Platform used for Video Visit: [facility]     Sleep Consultation:    Date on this visit: 1/20/2023    Leena Montaño  is referred by Jazmyn Ellis for a sleep consultation.     Primary Physician: Jory Fragoso       Leena Bender presents to clinic for evaluation and management of obstructive sleep apnea.     Her medical history is significant for hypertension, diabetes mellitus type 2 and depression.    Patient was diagnosed with sleep apnea more than 20 years ago.  Details of diagnostic testing is not available and baseline severity of sleep apnea is not clear. She has been on CPAP therapy for last 20 years.  Patient responded well to CPAP therapy which resolved her sleep apnea symptoms.  She has been on auto titrating CPAP therapy with a pressure range of 5 to 20 cm H2O. Her CPAP device started malfunctioning last year, and was delivering high and erratic pressures.  Due to malfunction of the device, she stopped CPAP, and has been using a borrowed device from a friend.    If she does not have CPAP, she reports having loud snoring, poor sleep quality and daytime fatigue.    Patient has a chronic history of sleep initiation and maintenance insomnia.  In the past, she was treated with temazepam.  Due to concern about potential complications from benzodiazepines, she has been transitioned to trazodone.  Currently, she takes trazodone 50 to 75 mg at bedtime.    Leena goes to sleep at 12:00 AM during the week. She  wakes up at 6:30- 7:00  AM. She falls asleep in 10 minutes with Trazodone.      She denies no morning headaches and restless legs. Leena denies any sleep walking, dream enactment, sleep paralysis and hypnogogic/hypnopompic hallucinations.    Leena has difficulty breathing through her nose.       Patient's [place] Sleepiness score 3/24 consistent with no daytime sleepiness.      Leena naps 0 times per week.  She takes no inadvertant naps.  She denies closing eyes, dozing and falling asleep while driving. Patient was counseled on the importance of driving while alert, to pull over if drowsy, or nap before getting into the vehicle if sleepy.        Problem List:  Patient Active Problem List    Diagnosis Date Noted     Exposure to blastomycosis 10/02/2022     Priority: Medium     Elevated antinuclear antibody (JUDY) level 10/02/2022     Priority: Medium     Physical deconditioning 10/02/2022     Priority: Medium     Insomnia, unspecified type 10/02/2022     Priority: Medium     Class 1 obesity due to excess calories with serious comorbidity and body mass index (BMI) of 31.0 to 31.9 in adult 06/06/2017     Priority: Medium     Type 2 diabetes mellitus without complication (H) 03/28/2016     Priority: Medium     Moderate major depression (H) 10/31/2012     Priority: Medium     Anxiety 09/11/2012     Priority: Medium     Advanced directives, counseling/discussion 01/31/2012     Priority: Medium     Advance Directive Problem List Overview:   Name Relationship Phone    Primary Health Care Agent            Alternative Health Care Agent          Discussed advance care planning with patient; however, patient declined at this time. 1/31/2012          Hypertension goal BP (blood pressure) < 140/90 05/05/2011     Priority: Medium     Hyperlipidemia LDL goal <130 10/31/2010     Priority: Medium     Provider agrees       Peripheral neuropathy      Priority: Medium     non-diabetic       Hypothyroidism 03/19/2010     Priority: Medium  "    Osteoarthritis 07/22/2004     Priority: Medium     Degenerative joint disease of the knees bilaterally  Problem list name updated by automated process. Provider to review       Sleep apnea 07/22/2004     Priority: Medium     Problem list name updated by automated process. Provider to review       Allergic rhinitis 07/22/2004     Priority: Medium     Problem list name updated by automated process. Provider to review          Past Medical/Surgical History:  Past Medical History:   Diagnosis Date     Allergic rhinitis, cause unspecified      CKD (chronic kidney disease) stage 2, GFR 60-89 ml/min 12/31/2010     Depressive disorder, not elsewhere classified      Disorder of uterus 3/5/2007    Problem list name updated by automated process. Provider to review     Hyperlipidemia LDL goal <130 10/31/2010     Hypertension goal BP (blood pressure) < 130/80 12/31/2010     Lumbago     degenerative disc disease and disc protrusion     Neck pain 12/13/2011     OA (osteoarthritis) of knee 10/28/2008     Osteoarthrosis, unspecified whether generalized or localized, unspecified site      Peripheral neuropathy     non-diabetic     Thyroid disease      Type 2 diabetes mellitus without complication (H) 3/28/2016     Unspecified essential hypertension      Unspecified sleep apnea      Social History     Tobacco Use     Smoking status: Never     Smokeless tobacco: Never     Tobacco comments:     am using sm. amt. of marijuana for sleep/pain   Vaping Use     Vaping Use: Never used   Substance Use Topics     Alcohol use: Yes     Comment: really managed/not my drug to enjoy. marijuana 3 times per week      Drug use: Yes     Types: IV, Marijuana       Physical Examination:  Vitals: Ht 1.626 m (5' 4\")   Wt 86.2 kg (190 lb)   LMP  (LMP Unknown)   BMI 32.61 kg/m    BMI= Body mass index is 32.61 kg/m .  GENERAL APPEARANCE: healthy, alert and no distress  NEURO: mentation intact and speech normal  PSYCH: anxious    Impression/Plan:    1.  " Obstructive sleep apnea    Patient has been on CPAP therapy for more than 20 years and reports good response.  Her CPAP device malfunctioned and she has been without regular treatment for the last year.  Details of her diagnostic test 20 years ago is not available and baseline severity of sleep apnea is not clear.  At this time I recommend a reevaluation with an overnight sleep study to assess severity of her sleep apnea and plan therapy options.  Patient wants home sleep testing.  Once we have reestablish the diagnosis, I can consider prescribing replacement CPAP device with auto titration settings.  Patient was advised to have 3 nights without CPAP immediately prior to repeating sleep study.    Plan:     -Home sleep apnea testing    2.  Chronic insomnia    Patient has chronic insomnia that seems to be multifactorial, related to comorbid depression and conditioned hyperarousal.  Currently, she is managing insomnia with trazodone 50 to 75 mg taken at bedtime.  She seems to have a satisfactory response to trazodone which is not contributing to any adverse effects.  We reviewed optimization of sleep hygiene and optimal stimulus control practices.  Patient can continue trazodone, prescribed through primary care.      She will follow up with me in approximately two weeks after her sleep study has been competed to review the results and discuss plan of care.       Polysomnography & HST reviewed.  Obstructive sleep apnea reviewed.  Complications of untreated sleep apnea were reviewed.    I spent a total of 45 minutes for this appointment on this date of service which include time spent before, during and after the visit for chart review, patient care, counseling and coordination of care.    Dr. Lisandro Cade       CC: Jazmyn Ellis

## 2023-01-21 DIAGNOSIS — E11.9 TYPE 2 DIABETES MELLITUS WITHOUT COMPLICATION, WITHOUT LONG-TERM CURRENT USE OF INSULIN (H): ICD-10-CM

## 2023-01-25 ENCOUNTER — OFFICE VISIT (OUTPATIENT)
Dept: URGENT CARE | Facility: URGENT CARE | Age: 73
End: 2023-01-25
Payer: COMMERCIAL

## 2023-01-25 VITALS
WEIGHT: 190 LBS | SYSTOLIC BLOOD PRESSURE: 121 MMHG | BODY MASS INDEX: 32.61 KG/M2 | DIASTOLIC BLOOD PRESSURE: 78 MMHG | OXYGEN SATURATION: 96 % | HEART RATE: 100 BPM | RESPIRATION RATE: 16 BRPM | TEMPERATURE: 97.5 F

## 2023-01-25 DIAGNOSIS — J01.90 ACUTE SINUSITIS WITH SYMPTOMS > 10 DAYS: Primary | ICD-10-CM

## 2023-01-25 DIAGNOSIS — E11.9 TYPE 2 DIABETES MELLITUS WITHOUT COMPLICATION, WITHOUT LONG-TERM CURRENT USE OF INSULIN (H): ICD-10-CM

## 2023-01-25 PROCEDURE — 99214 OFFICE O/P EST MOD 30 MIN: CPT | Performed by: NURSE PRACTITIONER

## 2023-01-25 RX ORDER — LOSARTAN POTASSIUM 100 MG/1
TABLET ORAL
Qty: 90 TABLET | Refills: 0 | OUTPATIENT
Start: 2023-01-25

## 2023-01-25 RX ORDER — LANCETS
EACH MISCELLANEOUS
Refills: 0 | OUTPATIENT
Start: 2023-01-25

## 2023-01-25 NOTE — TELEPHONE ENCOUNTER
Message sent to pharmacy - Refusal reason: Should already have refills on file (ORDER SENT 10/31/22 FOR 1 YR SUPLY TO REQUESTING [facility].).  Jessica PAULINO

## 2023-01-25 NOTE — PROGRESS NOTES
Chief Complaint   Patient presents with     Urgent Care     Sinus Problem     Congestion x1 month, covid neg.          ICD-10-CM    1. Acute sinusitis with symptoms > 10 days  J01.90 amoxicillin-clavulanate (AUGMENTIN) 875-125 MG tablet      2. Type 2 diabetes mellitus without complication, without long-term current use of insulin (H)  E11.9       Medications as prescribed.  Check blood sugars to be sure they are not getting too high while you are ill.  If symptoms persist beyond 10 days please follow-up with primary care provider.    Reviewed diabetes in the diet she is following.  Last A1c was in the prediabetic range.  She understands her blood sugar may increase when she is ill and will check her blood sugars for the next week or so.  She will follow-up with primary care provider regarding her diabetes.    Subjective     Leena Montaño is an 72 year old female who presents to clinic today for nasal congestion and facial pressure for the last month.  She started with a cough which has now resolved but she is left with this severe nasal congestion and facial pressure.  She is also allergic to her pets and has not been taking any allergy medication.      ROS: 10 point ROS neg other than the symptoms noted above in the HPI.       Objective    /78   Pulse 100   Temp 97.5  F (36.4  C) (Temporal)   Resp 16   Wt 86.2 kg (190 lb)   LMP  (LMP Unknown)   SpO2 96%   BMI 32.61 kg/m    Nurses notes and VS have been reviewed.    Physical Exam       GENERAL APPEARANCE: alert, mild distress and over weight     EYES: PERRL, EOMI, sclera non-icteric     HENT: ear canals and TM's normal, nose and mouth without ulcers or lesions and sinuses are tender to percussion     NECK: no adenopathy or asymmetry, thyroid normal to palpation     RESP: lungs clear to auscultation - no rales, rhonchi or wheezes     CV: regular rates and rhythm, no murmurs, rubs, or gallop     SKIN: no suspicious lesions or rashes    Patient  Instructions   Cetirizine daily for allergies.            REYNALDO Hidalgo, CNP  [facility] Provider    The use of Dragon/Foodzai dictation services may have been used to construct the content in this note; any grammatical or spelling errors are non-intentional. Please contact the author of this note directly if you are in need of any clarification.

## 2023-01-25 NOTE — TELEPHONE ENCOUNTER
--Last visit:  10/12/2022       ---Prescription approved per [facility] Refill Protocol.       Sarahi Garcia RN BSN     [facility]

## 2023-01-31 ENCOUNTER — TELEPHONE (OUTPATIENT)
Dept: FAMILY MEDICINE | Facility: CLINIC | Age: 73
End: 2023-01-31
Payer: COMMERCIAL

## 2023-01-31 DIAGNOSIS — G47.30 SLEEP APNEA, UNSPECIFIED TYPE: Primary | ICD-10-CM

## 2023-01-31 DIAGNOSIS — G47.33 OBSTRUCTIVE SLEEP APNEA SYNDROME: ICD-10-CM

## 2023-01-31 NOTE — TELEPHONE ENCOUNTER
"Pema-Please review and sign if agree.     Call received from patient:  1. Sleep study next week at home  2. Scheduled to evaluate results in early May with Sleep Medicine  3. Machine not working since Summer 2022 and cannot wait until May 2023 for follow up  4. Wants PCP clinic to tell Sleep Medicine she needs earlier appt  5. Needs new CPAP machine and would like to have it for home sleep study   A. Unable to use current CPAP machine because it is broken.  This was last ordered about 5 years ago by PCP    Writer informed patient:  1. Patient is welcome to request to be placed on wait list for follow up visit and also notify sleep clinic Dr. Cade wanted to follow up with patient about 2 weeks after completion of home sleep study  2. If knows CPAP settings, writer can ask PCP for new order    Patient verbalized understanding and stated CPAP settings are:  1. Maximum: 20  2. Minimum: 4    Per 1/20/23 Sleep Medicine visit:    \"She has been on auto titrating CPAP therapy with a pressure range of 5 to 20 cm H2O. Her CPAP device started malfunctioning last year, and was delivering high and erratic pressures.  Due to malfunction of the device, she stopped CPAP, and has been using a borrowed device from a friend.\"        Thank you!  ARCHANA Espino, RN-[facility]            "

## 2023-01-31 NOTE — TELEPHONE ENCOUNTER
I think I will have to wait to sign this until after her sleep study, as we don't know her Apnea-Hypopnea Index until after the study.

## 2023-02-01 NOTE — TELEPHONE ENCOUNTER
Writer called patient and reviewed message per KENDAL Fragoso CNP.    Asked patient to call back once sleep study is completed and in chart or if unavailable to view in chart please have sleep study sent to PCP.    Patient verbalized understanding and in agreement with plan.    JAYESH EspinoN, RN-[facility]

## 2023-02-06 ENCOUNTER — OFFICE VISIT (OUTPATIENT)
Dept: SLEEP MEDICINE | Facility: CLINIC | Age: 73
End: 2023-02-06
Attending: INTERNAL MEDICINE
Payer: COMMERCIAL

## 2023-02-06 DIAGNOSIS — G47.33 OSA (OBSTRUCTIVE SLEEP APNEA): ICD-10-CM

## 2023-02-06 DIAGNOSIS — F51.04 CHRONIC INSOMNIA: ICD-10-CM

## 2023-02-06 PROCEDURE — G0399 HOME SLEEP TEST/TYPE 3 PORTA: HCPCS | Performed by: INTERNAL MEDICINE

## 2023-02-06 NOTE — PROGRESS NOTES
Pt is completing a home sleep test. Pt was instructed on how to put on the Noxturnal T3 device and associated equipment before going to bed and given the opportunity to practice putting it on before leaving the sleep center. Pt was reminded to bring the home sleep test kit back to the center tomorrow, at agreed upon time for download and reporting.     Neck circumference: 39 cm / 15.5 inches.    Teresa Hyman CMA, HST Specialist  [place] [facility]

## 2023-02-07 ENCOUNTER — DOCUMENTATION ONLY (OUTPATIENT)
Dept: SLEEP MEDICINE | Facility: CLINIC | Age: 73
End: 2023-02-07
Attending: INTERNAL MEDICINE
Payer: COMMERCIAL

## 2023-02-07 NOTE — PROGRESS NOTES
Pt returned HST device. It was downloaded and forwarded data to the clinical specialist for scoring.    Teresa Hyman CMA, HST Specialist  [place] [facility]

## 2023-02-08 NOTE — PROCEDURES
"HOME SLEEP STUDY INTERPRETATION        Patient: Leena Montaño  MRN: [id number]  YOB: 1950  Study Date: 2023  PCP/Referring Provider: Jory Fragoso;   Ordering Provider: Lisandro Cade MD         Indications for Home Study: Leena Montaño is a 72 year old female  who presents with symptoms suggestive of obstructive sleep apnea.    Estimated body mass index is 32.61 kg/m  as calculated from the following:    Height as of 23: 1.626 m (5' 4\").    Weight as of 23: 86.2 kg (190 lb).  Total score - [place]: 3 (2023  8:39 AM)  STOP-BAN/8        Data: A full night home sleep study was performed recording the standard physiologic parameters including body position, movement, sound, nasal pressure, thermal oral airflow, chest and abdominal movements with respiratory inductance plethysmography, and oxygen saturation by pulse oximetry. Pulse rate was estimated by oximetry recording. This study was considered adequate based on > 4 hours of quality oximetry and respiratory recording. As specified by the AASM Manual for the Scoring of Sleep and Associated events, version 2.3, Rule VIII.D 1B, 4% oxygen desaturation scoring for hypopneas is used as a standard of care on all home sleep apnea testing.        Analysis Time:  478 minutes        Respiration:   Sleep Associated Hypoxemia: sustained hypoxemia was not present. Baseline oxygen saturation was 92%.  Time with saturation less than or equal to 88% was 0.5 minutes. The lowest oxygen saturation was 84%.   Snoring: Snoring was present.  Respiratory events: The home study revealed a presence of 4 obstructive apneas and 0 mixed and central apneas. There were 23 hypopneas resulting in a combined apnea/hypopnea index [AHI] of 3.4 events per hour.  AHI was N/A per hour supine, 17.6 per hour prone, 0.3 per hour on left side, and 2.8 per hour on right side.   Pattern: Excluding events noted above, respiratory rate and pattern " was Normal.      Position: Percent of time spent: supine - 0.1%, prone - 10.7%, on left - 40.4%, on right - 48.8%.      Heart Rate: By pulse oximetry normal rate was noted.       Assessment:     Negative for clinically significant sleep apnea.    Sleep associated hypoxemia was not present.    Of note, there was no supine sleep during this test.     Recommendations:    Due to low negative predictive value of a nondiagnostic home sleep test, consider in lab PSG for accurate assessment of sleep apnea.        Diagnosis Code(s): Snoring R06.83    Lisandro Cade MD, February 8, 2023   Diplomate, American Board of Psychiatry and Neurology, Sleep Medicine

## 2023-02-08 NOTE — PROGRESS NOTES
This HSAT was performed using a Noxturnal T3 device which recorded snore, sound, movement activity, body position, nasal pressure, oronasal thermal airflow, pulse, oximetry and both chest and abdominal respiratory effort. HSAT data was restricted to the time patient states they were in bed.     HSAT was scored using 1B 4% hypopnea rule.     HST AHI (Non-PAT): 3.4  Snoring was reported as moderate and loud.  Time with SpO2 below 89% was 3.2 minutes.   Overall signal quality was good     Pt will follow up with sleep provider to determine appropriate therapy.

## 2023-02-09 NOTE — PROGRESS NOTES
HST POST-STUDY QUESTIONNAIRE    1. What time did you go to bed?  23:30  2. How long do you think it took to fall asleep?  20 MIN  3. What time did you wake up to start the day?  08:00  4. Did you get up during the night at all?  NO  5. If you woke up, do you remember approximately what time(s)? MAYBE EVERY 2 HOURS  6. Did you have any difficulty with the equipYment?  Yes IRRITATION ON THE LEFT INDEX FINGER - BOTHERED ME DURING THE NIGHT  7. Did you us any type of treatment with this study?  OTHER - TRAZADONE 75 MG  8. Was the head of the bed elevated? No  9. Did you sleep in a recliner?  No  10. Did you stop using CPAP at least 3 days before this test?  NA  11. Any other information you'd like us to know?

## 2023-02-14 ENCOUNTER — OFFICE VISIT (OUTPATIENT)
Dept: SLEEP MEDICINE | Facility: CLINIC | Age: 73
End: 2023-02-14
Payer: COMMERCIAL

## 2023-02-14 VITALS
HEART RATE: 115 BPM | SYSTOLIC BLOOD PRESSURE: 129 MMHG | BODY MASS INDEX: 32.95 KG/M2 | DIASTOLIC BLOOD PRESSURE: 81 MMHG | OXYGEN SATURATION: 98 % | HEIGHT: 64 IN | WEIGHT: 193 LBS

## 2023-02-14 DIAGNOSIS — G47.8 NON-RESTORATIVE SLEEP: ICD-10-CM

## 2023-02-14 DIAGNOSIS — R06.83 SNORING: ICD-10-CM

## 2023-02-14 DIAGNOSIS — R29.818 SUSPECTED SLEEP APNEA: Primary | ICD-10-CM

## 2023-02-14 DIAGNOSIS — R53.82 CHRONIC FATIGUE: ICD-10-CM

## 2023-02-14 PROCEDURE — 99213 OFFICE O/P EST LOW 20 MIN: CPT | Performed by: INTERNAL MEDICINE

## 2023-02-14 NOTE — NURSING NOTE
"Chief Complaint   Patient presents with     Sleep Problem     Follow up sleep study results        Initial /81   Pulse 115   Ht 1.626 m (5' 4\")   Wt 87.5 kg (193 lb)   LMP  (LMP Unknown)   SpO2 98%   BMI 33.13 kg/m   Estimated body mass index is 33.13 kg/m  as calculated from the following:    Height as of this encounter: 1.626 m (5' 4\").    Weight as of this encounter: 87.5 kg (193 lb).    Medication Reconciliation: complete  ESS 2  JAMEE 15  Scarlet Newell MA      "

## 2023-02-14 NOTE — PROGRESS NOTES
Sleep Study Follow-Up Visit:    Date on this visit: 2/14/2023    Leena Montaño comes in today for follow-up of her home sleep study done on 2/6/23 at the [facility] for possible sleep apnea.    Analysis Time:  478 minutes        Respiration:   Sleep Associated Hypoxemia: sustained hypoxemia was not present. Baseline oxygen saturation was 92%.  Time with saturation less than or equal to 88% was 0.5 minutes. The lowest oxygen saturation was 84%.   Snoring: Snoring was present.  Respiratory events: The home study revealed a presence of 4 obstructive apneas and 0 mixed and central apneas. There were 23 hypopneas resulting in a combined apnea/hypopnea index [AHI] of 3.4 events per hour.  AHI was N/A per hour supine, 17.6 per hour prone, 0.3 per hour on left side, and 2.8 per hour on right side.   Pattern: Excluding events noted above, respiratory rate and pattern was Normal.        Position: Percent of time spent: supine - 0.1%, prone - 10.7%, on left - 40.4%, on right - 48.8%.        Heart Rate: By pulse oximetry normal rate was noted.         Assessment:     Negative for clinically significant sleep apnea.    Sleep associated hypoxemia was not present.    Of note, there was no supine sleep during this test.     These findings were reviewed with patient.     Past medical/surgical history, family history, social history, medications and allergies were reviewed.      Impression/Plan:    1.  Negative home sleep apnea test    Home sleep test result was reviewed in detail.  There was no supine sleep, and patient confirms that she normally does not sleep supine.  Overall apnea-hypopnea index is in the normal range at 3.4/h.  Analysis time was 478 minutes, but patient thinks that this is overestimated.  The result is surprising to the patient considering poor sleep quality, daytime fatigue, and previous diagnosis of obstructive sleep apnea.  As noted before, she has been on CPAP therapy for  approximately 20 years with a good response.  Her previous device has malfunctioned and she is using a borrowed device.    At this time, I recommended performing an in lab PSG for accurate assessment of sleep apnea.  I advised her to withhold CPAP therapy for a minimum of 3 nights prior to repeat testing.     Plan:     1. PSG for assessment of sleep apnea   2. Follow up after sleep study     I spent a total of 27 minutes for this appointment on this date of service which include time spent before, during and after the visit for chart review, patient care, counseling and coordination of care.      Dr. Lisandro Cade     CC: Jory Fragoso

## 2023-02-27 ENCOUNTER — NURSE TRIAGE (OUTPATIENT)
Dept: NURSING | Facility: CLINIC | Age: 73
End: 2023-02-27
Payer: COMMERCIAL

## 2023-02-27 ENCOUNTER — OFFICE VISIT (OUTPATIENT)
Dept: URGENT CARE | Facility: URGENT CARE | Age: 73
End: 2023-02-27
Payer: COMMERCIAL

## 2023-02-27 VITALS
SYSTOLIC BLOOD PRESSURE: 118 MMHG | HEART RATE: 90 BPM | OXYGEN SATURATION: 96 % | TEMPERATURE: 97.3 F | RESPIRATION RATE: 17 BRPM | BODY MASS INDEX: 33.13 KG/M2 | DIASTOLIC BLOOD PRESSURE: 75 MMHG | WEIGHT: 193 LBS

## 2023-02-27 DIAGNOSIS — S00.03XA CONTUSION OF SCALP, INITIAL ENCOUNTER: Primary | ICD-10-CM

## 2023-02-27 PROCEDURE — 99213 OFFICE O/P EST LOW 20 MIN: CPT | Performed by: PHYSICIAN ASSISTANT

## 2023-02-27 ASSESSMENT — ENCOUNTER SYMPTOMS
HEADACHES: 1
DIZZINESS: 0

## 2023-02-27 NOTE — TELEPHONE ENCOUNTER
"Nurse Triage SBAR    Is this a 2nd Level Triage? YES, LICENSED PRACTITIONER REVIEW IS REQUIRED  Please call patient [phone] (home)   Disposition to see in ED/UCC or clinic with PCP approval. Please advise where you prefer patient to be seen.    Situation:     Patient reporting falling hitting back of head around 8 a.m. this morning.     Background:       Assessment:     Patient reporting falling on concrete this morning after slipping on ice, hitting tailbone then falling backward hitting head.    Denies loss of consciousness.     Swelling on back of head \"3-4 inches.\" \"My palm does not fit around it.\"     Rating headache \"4\" on 0-10 pain scale.    Patient denies nausea, vomiting, weakness, vision or speech changesFell on concrete outside garage. Around 8 a.m..    Jaw muscles are sore.    Protocol Recommended Disposition:   Go To ED/UCC Now (Or To Office With PCP Approval)    Recommendation:     Please advise where you prefer patient to be seen.     Routed to provider    Does the patient meet one of the following criteria for ADS visit consideration? 16+ years old, with an [facility] PCP     TIP  Providers, please consider if this condition is appropriate for management at one of our Acute and Diagnostic Services sites.     If patient is a good candidate, please use dotphrase <dot>triageresponse and select Refer to ADS to document.  Reason for Disposition    Large swelling or bruise (> 2 inches or 5 cm)    Additional Information    Negative: ACUTE NEUROLOGIC SYMPTOM and symptom present now    Negative: Knocked out (unconscious) > 1 minute    Negative: Seizure (convulsion) occurred  (Exception: Prior history of seizures and now alert and without Acute Neurologic Symptoms.)    Negative: Neck pain after dangerous injury (e.g., MVA, diving, trampoline, contact sports, fall > 10 feet or 3 meters)  (Exception: Neck pain began > 1 hour after injury.)    Negative: Major bleeding (actively dripping or spurting) that can't be " stopped    Negative: Penetrating head injury (e.g., knife, gunshot wound, metal object)    Negative: Sounds like a life-threatening emergency to the triager    Negative: Diagnosed with a concussion within last 14 days    Negative: Can't remember what happened (amnesia)    Negative: Vomiting once or more    Negative: Watery or blood-tinged fluid dripping from the nose or ears    Negative: ACUTE NEUROLOGIC SYMPTOM and now fine    Negative: Knocked out (unconscious) < 1 minute and now fine    Negative: Severe headache    Negative: Dangerous injury (e.g., MVA, diving, trampoline, contact sports, fall > 10 feet or 3 meters) or severe blow from hard object (e.g., golf club or baseball bat)    Protocols used: HEAD INJURY-A-[place]

## 2023-02-27 NOTE — TELEPHONE ENCOUNTER
I called patient    Clinic is not open today, only urgent care    She will come into urgent care today for evaluation    JAYESH MoranN RN  [facility]

## 2023-02-27 NOTE — PROGRESS NOTES
SUBJECTIVE:   Leena Montaño is a 72 year old female presenting with a chief complaint of   Chief Complaint   Patient presents with     Urgent Care     This morning pt fell and hit back of head and left a bump, headache went down, fatigue         She is an established patient of [facility].  Patient presents with fall on ice this morning hitting the back of her head and tailbone.  No LOC, no n/v.  No back pain.  No blood thinners.  Rates pain 1-2/10.  No blood.  No problems speaking or using arms/legs.      Treatment:      Review of Systems   Cardiovascular: Negative for chest pain.   Neurological: Positive for headaches. Negative for dizziness.   All other systems reviewed and are negative.      Past Medical History:   Diagnosis Date     Allergic rhinitis, cause unspecified      CKD (chronic kidney disease) stage 2, GFR 60-89 ml/min 12/31/2010     Depressive disorder, not elsewhere classified      Disorder of uterus 3/5/2007    Problem list name updated by automated process. Provider to review     Hyperlipidemia LDL goal <130 10/31/2010     Hypertension goal BP (blood pressure) < 130/80 12/31/2010     Lumbago     degenerative disc disease and disc protrusion     Neck pain 12/13/2011     OA (osteoarthritis) of knee 10/28/2008     Osteoarthrosis, unspecified whether generalized or localized, unspecified site      Peripheral neuropathy     non-diabetic     Thyroid disease      Type 2 diabetes mellitus without complication (H) 3/28/2016     Unspecified essential hypertension      Unspecified sleep apnea      Family History   Problem Relation Age of Onset     Hypertension Father      Alcohol/Drug Father      Coronary Artery Disease Father         stroke/ carotid blockage     Cerebrovascular Disease Father         did not manage his blood pressure with meds smoked     Depression Father         came along after his first big stroke     Substance Abuse Father         alcohol     Hypertension Mother       Alcohol/Drug Mother      Depression Mother      Suicide Mother      Coronary Artery Disease Mother         surgery for carotid/surgery for femoral artery     Depression/Anxiety Mother      Cerebrovascular Disease Mother         small ones, smoked     Thyroid Disease Mother      Substance Abuse Mother         alcohol     Cerebrovascular Disease Paternal Grandfather         early age onset...60's     C.A.D. Maternal Grandfather         massive heart blow out     Depression/Anxiety Maternal Grandmother      Breast Cancer Maternal Grandmother         had a radical mastectomy....no recurrence  at     Substance Abuse Brother      Suicide Other      Depression Other      Diabetes Sister      Alcohol/Drug Brother      Substance Abuse Brother      Hypertension Brother         smoker     Cancer - colorectal No family hx of      Prostate Cancer No family hx of      Current Outpatient Medications   Medication Sig Dispense Refill     amoxicillin-clavulanate (AUGMENTIN) 875-125 MG tablet Take 1 tablet by mouth 2 times daily 20 tablet 0     blood glucose (NO BRAND SPECIFIED) lancets standard Use to test blood sugar TWO times daily or as directed. 200 each 0     blood glucose (NO BRAND SPECIFIED) test strip Use to test blood sugar 2 times daily or as directed. 200 strip 0     blood glucose monitoring (NO BRAND SPECIFIED) meter device kit Use to test blood sugar 2  times daily or as directed. Contour next ez 1 kit 0     DULoxetine (CYMBALTA) 30 MG capsule Take 1 capsule (30 mg) by mouth daily 90 capsule 1     DULoxetine (CYMBALTA) 60 MG capsule Take 1 capsule (60 mg) by mouth daily 90 capsule 1     levothyroxine (SYNTHROID/LEVOTHROID) 75 MCG tablet Take 1 tablet (75 mcg) by mouth daily 90 tablet 2     losartan (COZAAR) 100 MG tablet TAKE ONE TABLET BY MOUTH ONCE DAILY 90 tablet 3     MULTIVITAMIN TABS   OR        order for DME Equipment being ordered: light box 1 Units 0     ORDER FOR DME Equipment being ordered: CPAP mask and  tubing 1 Units 0     traZODone (DESYREL) 50 MG tablet Take 1.5-2 tablets ( mg) by mouth At Bedtime 60 tablet 12     Social History     Tobacco Use     Smoking status: Never     Smokeless tobacco: Never     Tobacco comments:     am using sm. amt. of marijuana for sleep/pain   Substance Use Topics     Alcohol use: Yes     Comment: really managed/not my drug to enjoy. marijuana 3 times per week        OBJECTIVE  /75 (BP Location: Right arm, Patient Position: Sitting, Cuff Size: Adult Large)   Pulse 90   Temp 97.3  F (36.3  C) (Temporal)   Resp 17   Wt 87.5 kg (193 lb)   LMP  (LMP Unknown)   SpO2 96%   BMI 33.13 kg/m      Physical Exam  Vitals and nursing note reviewed.   Constitutional:       Appearance: Normal appearance. She is obese.   HENT:      Head:      Comments: Occiput area with tenderness to palpation.  Mild edema about 8 cm in diameter.    Eyes:      Extraocular Movements: Extraocular movements intact.      Conjunctiva/sclera: Conjunctivae normal.   Cardiovascular:      Rate and Rhythm: Normal rate.   Musculoskeletal:         General: Normal range of motion.      Cervical back: Normal range of motion. No rigidity or tenderness.      Comments: Full ROM at shoulders.  Neck ROM reduced at all planes (patient had a fusion).   Neurological:      Mental Status: She is alert.      Motor: No weakness.      Gait: Gait normal.   Psychiatric:         Mood and Affect: Mood normal.         Behavior: Behavior normal.         Labs:  No results found for this or any previous visit (from the past 24 hour(s)).    X-Ray was not done.    ASSESSMENT:      ICD-10-CM    1. Contusion of scalp, initial encounter  S00.03XA            Medical Decision Making:    Differential Diagnosis:  Head InjuryMild head injury, Contusion    Serious Comorbid Conditions:  Adult:  reviewed    PLAN:    Head Injury: Discussed head injury, its evaluation, treatment and possible sequelae, OTC analgesics PRN.  Discussed reasons to seek  immediate medical attention.        Followup:    If not improving or if condition worsens, follow up with your Primary Care Provider, If not improving or if conditions worsens over the next 12-24 hours, go to the Emergency Department    There are no Patient Instructions on file for this visit.

## 2023-03-26 ENCOUNTER — HEALTH MAINTENANCE LETTER (OUTPATIENT)
Age: 73
End: 2023-03-26

## 2023-04-09 ENCOUNTER — NURSE TRIAGE (OUTPATIENT)
Dept: NURSING | Facility: CLINIC | Age: 73
End: 2023-04-09
Payer: COMMERCIAL

## 2023-04-09 NOTE — TELEPHONE ENCOUNTER
Pt calling that she has breakdown of skin in the 'triangle area' below waist line.  Developed rash yesterday.  Pt states there is an odor.  Non itchy.  Pt had this happen in the past and given Nystatin cream of which she stills has however .  Pt intends to be seen by [facility] today.  - [place], [place] tomorrow.  Maddi Barksdale RN  [facility] Nurse Advisor    Additional Information    Negative: [1] Sudden onset of rash (within last 2 hours) AND [2] difficulty breathing or swallowing    Negative: Sounds like a life-threatening emergency to the triager    Negative: Athlete's Foot suspected (i.e., itchy rash between the toes)    Negative: Impetigo suspected (i.e., painless infected superficial small sores, less than 1 inch or 2.5 cm, often covered by a soft, yellow-brown scab or crust; sometimes occurring near nasal openings)    Negative: Insect bite(s) suspected    Negative: Jock Itch suspected (i.e., itchy rash on inner thighs near genital area)    Negative: Localized lump (or swelling) without redness or rash    Negative: Poison ivy, oak, or sumac contact suspected    Negative: Rash of female genital area (vulva)    Negative: Rash of male genital area (penis or scrotum)    Negative: Redness of immunization site    Negative: Ringworm suspected (i.e., round pink patch, sometimes looks like ring, usually 1/2 to 1 inch [12-25 mm],  in size, slowly increasing in size)    Negative: Shingles suspected (i.e., painful rash, multiple small blisters grouped together in one area of body; dermatomal distribution)    Negative: Small spot, skin growth, or mole    Negative: Sores or skin ulcer, not a rash    Negative: Wound infection suspected (i.e., pain, spreading redness, or pus; in a cut, puncture, scrape or sutured wound)    Negative: [1] Localized purple or blood-colored spots or dots AND [2] not from injury or friction AND [3] fever    Negative: Patient sounds very sick or weak to the triager    Negative: [1] Red  area or streak AND [2] fever    Negative: [1] Rash is painful to touch AND [2] fever    Negative: [1] Looks infected (spreading redness, pus) AND [2] large red area (> 2 in. or 5 cm)    Negative: [1] Looks infected (spreading redness, pus) AND [2] diabetes mellitus or weak immune system (e.g., HIV positive, cancer chemo, splenectomy, organ transplant, chronic steroids)    Negative: [1] Localized purple or blood-colored spots or dots AND [2] not from injury or friction AND [3] no fever    Protocols used: RASH OR REDNESS - [id number]

## 2023-04-24 ENCOUNTER — ANCILLARY PROCEDURE (OUTPATIENT)
Dept: GENERAL RADIOLOGY | Facility: CLINIC | Age: 73
End: 2023-04-24
Attending: PHYSICIAN ASSISTANT
Payer: COMMERCIAL

## 2023-04-24 ENCOUNTER — OFFICE VISIT (OUTPATIENT)
Dept: URGENT CARE | Facility: URGENT CARE | Age: 73
End: 2023-04-24
Payer: COMMERCIAL

## 2023-04-24 VITALS
BODY MASS INDEX: 33.13 KG/M2 | HEART RATE: 87 BPM | SYSTOLIC BLOOD PRESSURE: 118 MMHG | TEMPERATURE: 97.2 F | HEIGHT: 64 IN | OXYGEN SATURATION: 97 % | RESPIRATION RATE: 16 BRPM | DIASTOLIC BLOOD PRESSURE: 58 MMHG

## 2023-04-24 DIAGNOSIS — R07.0 THROAT PAIN: Primary | ICD-10-CM

## 2023-04-24 DIAGNOSIS — J40 BRONCHITIS: ICD-10-CM

## 2023-04-24 LAB
BASOPHILS # BLD AUTO: 0 10E3/UL (ref 0–0.2)
BASOPHILS NFR BLD AUTO: 0 %
DEPRECATED S PYO AG THROAT QL EIA: NEGATIVE
EOSINOPHIL # BLD AUTO: 0.1 10E3/UL (ref 0–0.7)
EOSINOPHIL NFR BLD AUTO: 1 %
ERYTHROCYTE [DISTWIDTH] IN BLOOD BY AUTOMATED COUNT: 12.4 % (ref 10–15)
GROUP A STREP BY PCR: NOT DETECTED
HCT VFR BLD AUTO: 40.5 % (ref 35–47)
HGB BLD-MCNC: 13.5 G/DL (ref 11.7–15.7)
IMM GRANULOCYTES # BLD: 0.1 10E3/UL
IMM GRANULOCYTES NFR BLD: 0 %
LYMPHOCYTES # BLD AUTO: 3.8 10E3/UL (ref 0.8–5.3)
LYMPHOCYTES NFR BLD AUTO: 32 %
MCH RBC QN AUTO: 30 PG (ref 26.5–33)
MCHC RBC AUTO-ENTMCNC: 33.3 G/DL (ref 31.5–36.5)
MCV RBC AUTO: 90 FL (ref 78–100)
MONOCYTES # BLD AUTO: 1.2 10E3/UL (ref 0–1.3)
MONOCYTES NFR BLD AUTO: 10 %
NEUTROPHILS # BLD AUTO: 6.8 10E3/UL (ref 1.6–8.3)
NEUTROPHILS NFR BLD AUTO: 57 %
PLATELET # BLD AUTO: 347 10E3/UL (ref 150–450)
RBC # BLD AUTO: 4.5 10E6/UL (ref 3.8–5.2)
WBC # BLD AUTO: 12 10E3/UL (ref 4–11)

## 2023-04-24 PROCEDURE — 71046 X-RAY EXAM CHEST 2 VIEWS: CPT | Mod: TC | Performed by: RADIOLOGY

## 2023-04-24 PROCEDURE — 87651 STREP A DNA AMP PROBE: CPT | Performed by: PHYSICIAN ASSISTANT

## 2023-04-24 PROCEDURE — 99214 OFFICE O/P EST MOD 30 MIN: CPT | Performed by: PHYSICIAN ASSISTANT

## 2023-04-24 PROCEDURE — 36415 COLL VENOUS BLD VENIPUNCTURE: CPT | Performed by: PHYSICIAN ASSISTANT

## 2023-04-24 PROCEDURE — 85025 COMPLETE CBC W/AUTO DIFF WBC: CPT | Performed by: PHYSICIAN ASSISTANT

## 2023-04-24 RX ORDER — BENZONATATE 200 MG/1
200 CAPSULE ORAL 3 TIMES DAILY PRN
Qty: 30 CAPSULE | Refills: 0 | Status: SHIPPED | OUTPATIENT
Start: 2023-04-24 | End: 2023-10-18

## 2023-04-24 ASSESSMENT — ENCOUNTER SYMPTOMS
FEVER: 0
SHORTNESS OF BREATH: 0
DIARRHEA: 0
SORE THROAT: 1
VOMITING: 0
HEADACHES: 0
RHINORRHEA: 1
COUGH: 1
MYALGIAS: 0
NAUSEA: 0

## 2023-04-24 NOTE — PROGRESS NOTES
SUBJECTIVE:   Leena Montaño is a 72 year old female presenting with a chief complaint of   Chief Complaint   Patient presents with     Urgent Care     Throat Pain     Pt in clinic c/o sore throat, rattling in chest. Green phlegm       She is an established patient of [facility].  Patient presents with ST x 5 days.   Yellow productive cough on occasion.   Tested negative for covid.      Treatment:  Tylenol - last took at 8 am today.      Review of Systems   Constitutional: Negative for fever.   HENT: Positive for rhinorrhea and sore throat. Negative for congestion and ear pain.    Respiratory: Positive for cough. Negative for shortness of breath.    Gastrointestinal: Negative for diarrhea, nausea and vomiting.   Musculoskeletal: Negative for myalgias.   Skin: Negative for rash.   Neurological: Negative for headaches.   All other systems reviewed and are negative.      Past Medical History:   Diagnosis Date     Allergic rhinitis, cause unspecified      CKD (chronic kidney disease) stage 2, GFR 60-89 ml/min 12/31/2010     Depressive disorder, not elsewhere classified      Disorder of uterus 3/5/2007    Problem list name updated by automated process. Provider to review     Hyperlipidemia LDL goal <130 10/31/2010     Hypertension goal BP (blood pressure) < 130/80 12/31/2010     Lumbago     degenerative disc disease and disc protrusion     Neck pain 12/13/2011     OA (osteoarthritis) of knee 10/28/2008     Osteoarthrosis, unspecified whether generalized or localized, unspecified site      Peripheral neuropathy     non-diabetic     Thyroid disease      Type 2 diabetes mellitus without complication (H) 3/28/2016     Unspecified essential hypertension      Unspecified sleep apnea      Family History   Problem Relation Age of Onset     Hypertension Father      Alcohol/Drug Father      Coronary Artery Disease Father         stroke/ carotid blockage     Cerebrovascular Disease Father         did not manage his blood  pressure with meds smoked     Depression Father         came along after his first big stroke     Substance Abuse Father         alcohol     Hypertension Mother      Alcohol/Drug Mother      Depression Mother      Suicide Mother      Coronary Artery Disease Mother         surgery for carotid/surgery for femoral artery     Depression/Anxiety Mother      Cerebrovascular Disease Mother         small ones, smoked     Thyroid Disease Mother      Substance Abuse Mother         alcohol     Cerebrovascular Disease Paternal Grandfather         early age onset...60's     C.A.D. Maternal Grandfather         massive heart blow out     Depression/Anxiety Maternal Grandmother      Breast Cancer Maternal Grandmother         had a radical mastectomy....no recurrence  at     Substance Abuse Brother      Suicide Other      Depression Other      Diabetes Sister      Alcohol/Drug Brother      Substance Abuse Brother      Hypertension Brother         smoker     Cancer - colorectal No family hx of      Prostate Cancer No family hx of      Current Outpatient Medications   Medication Sig Dispense Refill     benzonatate (TESSALON) 200 MG capsule Take 1 capsule (200 mg) by mouth 3 times daily as needed for cough 30 capsule 0     DULoxetine (CYMBALTA) 30 MG capsule Take 1 capsule (30 mg) by mouth daily 90 capsule 1     levothyroxine (SYNTHROID/LEVOTHROID) 75 MCG tablet Take 1 tablet (75 mcg) by mouth daily 90 tablet 2     losartan (COZAAR) 100 MG tablet TAKE ONE TABLET BY MOUTH ONCE DAILY 90 tablet 3     traZODone (DESYREL) 50 MG tablet Take 1.5-2 tablets ( mg) by mouth At Bedtime 60 tablet 12     amoxicillin-clavulanate (AUGMENTIN) 875-125 MG tablet Take 1 tablet by mouth 2 times daily 20 tablet 0     blood glucose (NO BRAND SPECIFIED) lancets standard Use to test blood sugar TWO times daily or as directed. 200 each 0     blood glucose (NO BRAND SPECIFIED) test strip Use to test blood sugar 2 times daily or as directed. 200 strip 0  "    blood glucose monitoring (NO BRAND SPECIFIED) meter device kit Use to test blood sugar 2  times daily or as directed. Contour next ez 1 kit 0     DULoxetine (CYMBALTA) 60 MG capsule Take 1 capsule (60 mg) by mouth daily 90 capsule 1     MULTIVITAMIN TABS   OR        order for DME Equipment being ordered: light box 1 Units 0     ORDER FOR DME Equipment being ordered: CPAP mask and tubing 1 Units 0     Social History     Tobacco Use     Smoking status: Never     Smokeless tobacco: Never     Tobacco comments:     am using sm. amt. of marijuana for sleep/pain   Vaping Use     Vaping status: Never Used   Substance Use Topics     Alcohol use: Yes     Comment: really managed/not my drug to enjoy. marijuana 3 times per week        OBJECTIVE  /58   Pulse 87   Temp 97.2  F (36.2  C) (Temporal)   Resp 16   Ht 1.626 m (5' 4\")   LMP  (LMP Unknown)   SpO2 97%   BMI 33.13 kg/m      Physical Exam  Vitals and nursing note reviewed.   Constitutional:       Appearance: Normal appearance. She is normal weight.   HENT:      Head: Normocephalic and atraumatic.      Right Ear: Tympanic membrane, ear canal and external ear normal.      Left Ear: Tympanic membrane, ear canal and external ear normal.      Nose: Nose normal.      Mouth/Throat:      Mouth: Mucous membranes are moist.      Pharynx: Oropharynx is clear. Posterior oropharyngeal erythema present.   Eyes:      Extraocular Movements: Extraocular movements intact.      Conjunctiva/sclera: Conjunctivae normal.   Cardiovascular:      Rate and Rhythm: Normal rate and regular rhythm.      Pulses: Normal pulses.      Heart sounds: Normal heart sounds.   Pulmonary:      Effort: Pulmonary effort is normal.      Breath sounds: Rhonchi present.      Comments: Coughs often  Musculoskeletal:      Cervical back: Normal range of motion.   Skin:     General: Skin is warm and dry.      Findings: No rash.   Neurological:      General: No focal deficit present.      Mental Status: " She is alert.   Psychiatric:         Mood and Affect: Mood normal.         Behavior: Behavior normal.         Labs:  Results for orders placed or performed in visit on 04/24/23 (from the past 24 hour(s))   Streptococcus A Rapid Screen w/Reflex to PCR - Clinic Collect    Specimen: Throat; Swab   Result Value Ref Range    Group A Strep antigen Negative Negative   CBC with platelets and differential    Narrative    The following orders were created for panel order CBC with platelets and differential.  Procedure                               Abnormality         Status                     ---------                               -----------         ------                     CBC with platelets and d...[[id number]]  Abnormal            Final result                 Please view results for these tests on the individual orders.   CBC with platelets and differential   Result Value Ref Range    WBC Count 12.0 (H) 4.0 - 11.0 10e3/uL    RBC Count 4.50 3.80 - 5.20 10e6/uL    Hemoglobin 13.5 11.7 - 15.7 g/dL    Hematocrit 40.5 35.0 - 47.0 %    MCV 90 78 - 100 fL    MCH 30.0 26.5 - 33.0 pg    MCHC 33.3 31.5 - 36.5 g/dL    RDW 12.4 10.0 - 15.0 %    Platelet Count 347 150 - 450 10e3/uL    % Neutrophils 57 %    % Lymphocytes 32 %    % Monocytes 10 %    % Eosinophils 1 %    % Basophils 0 %    % Immature Granulocytes 0 %    Absolute Neutrophils 6.8 1.6 - 8.3 10e3/uL    Absolute Lymphocytes 3.8 0.8 - 5.3 10e3/uL    Absolute Monocytes 1.2 0.0 - 1.3 10e3/uL    Absolute Eosinophils 0.1 0.0 - 0.7 10e3/uL    Absolute Basophils 0.0 0.0 - 0.2 10e3/uL    Absolute Immature Granulocytes 0.1 <=0.4 10e3/uL   XR Chest 2 Views    Narrative    CHEST TWO VIEWS 4/24/2023 3:28 PM     HISTORY: Bronchitis    COMPARISON: March 6, 2016       Impression    IMPRESSION: There are no acute infiltrates. The cardiac silhouette is  not enlarged. Pulmonary vasculature is unremarkable.       X-Ray was not done.    ASSESSMENT:      ICD-10-CM    1. Throat pain  R07.0  Streptococcus A Rapid Screen w/Reflex to PCR - Clinic Collect     Group A Streptococcus PCR Throat Swab      2. Bronchitis  J40 CBC with platelets and differential     XR Chest 2 Views     CBC with platelets and differential     benzonatate (TESSALON) 200 MG capsule           Medical Decision Making:    Differential Diagnosis:  URI Adult/Peds:  Bronchitis-viral, Pneumonia, Strep pharyngitis, Tonsilitis and Viral syndrome    Serious Comorbid Conditions:  Adult:  reviewed    PLAN:    Rx for tessalon perls.  Push fluids.  Tylenol/motrin prn.  Discussed reasons to seek immediate medical attention.  Additionally if no improvement or worsening in one week, may follow up with PCP and/or UC.        Followup:    If not improving or if condition worsens, follow up with your Primary Care Provider, If not improving or if conditions worsens over the next 12-24 hours, go to the Emergency Department    There are no Patient Instructions on file for this visit.

## 2023-05-03 ENCOUNTER — THERAPY VISIT (OUTPATIENT)
Dept: SLEEP MEDICINE | Facility: CLINIC | Age: 73
End: 2023-05-03
Payer: COMMERCIAL

## 2023-05-03 DIAGNOSIS — R06.83 SNORING: ICD-10-CM

## 2023-05-03 DIAGNOSIS — G47.8 NON-RESTORATIVE SLEEP: ICD-10-CM

## 2023-05-03 DIAGNOSIS — R53.82 CHRONIC FATIGUE: ICD-10-CM

## 2023-05-03 DIAGNOSIS — R29.818 SUSPECTED SLEEP APNEA: ICD-10-CM

## 2023-05-03 PROCEDURE — 95810 POLYSOM 6/> YRS 4/> PARAM: CPT | Performed by: INTERNAL MEDICINE

## 2023-05-04 LAB — SLPCOMP: NORMAL

## 2023-05-04 NOTE — PROCEDURES
" SLEEP STUDY INTERPRETATION  DIAGNOSTIC POLYSOMNOGRAPHY REPORT      Patient: RODERICK LINN  YOB: 1950  Study Date: 5/3/2023  MRN: [id number]  Referring Provider: -  Ordering Provider: ZOILA Cade MD    Indications for Polysomnography: The patient is a 72 year old Female who is 5' 4\" and weighs 193.0 lbs. Her BMI is 33.4, [place] sleepiness scale 3 and neck circumference is 39.5 cm cm. A diagnostic polysomnogram was performed to evaluate for sleep apnea.    Polysomnogram Data: A full night polysomnogram recorded the standard physiologic parameters including EEG, EOG, EMG, ECG, nasal and oral airflow. Respiratory parameters of chest and abdominal movements were recorded with respiratory inductance plethysmography. Oxygen saturation was recorded by pulse oximetry. Hypopnea scoring rule used: 1B 4%.    Sleep Architecture: Fragmented sleep. Percentages of stage N3 and REM were low.   The total recording time of the polysomnogram was 448.6 minutes. The total sleep time was 397.0 minutes. Sleep latency was decreased at 4.3 minutes with the use of Trazodone as a sleep aid. REM latency was 379.5 minutes. Arousal index was increased at 42.9 arousals per hour. Sleep efficiency was normal at 88.5%. Wake after sleep onset was 47.0 minutes. The patient spent 1.1% of total sleep time in Stage N1, 85.9% in Stage N2, 9.3% in Stage N3, and 3.7% in REM. Time in REM supine was - minutes.    Respiration: Events ? The polysomnogram revealed a presence of 2 obstructive, 0 central, and 1 mixed apneas resulting in an apnea index of 0.5 events per hour. There were 10 obstructive hypopneas and - central hypopneas resulting in an obstructive hypopnea index of 1.5 and central hypopnea index of - events per hour. The combined apnea/hypopnea index was 2.0 events per hour (central apnea/hypopnea index was - events per hour). The REM AHI was 8.3 events per hour. The supine AHI was 3.8 events per hour. The RERA index was 4.5 events " per hour.  The RDI was 6.5 events per hour.    Snoring - was reported as mild.    Respiratory rate and pattern - was notable for normal respiratory rate and pattern.    Sustained Sleep Associated Hypoventilation - Transcutaneous carbon dioxide monitoring was not used, however significant hypoventilation was not suggested by oximetry.    Sleep Associated Hypoxemia - (Greater than 5 minutes O2 sat at or below 88%) was not present. Baseline oxygen saturation was 91.9%. Lowest oxygen saturation was 88.0%. Time spent less than or equal to 88% was 0.1 minutes. Time spent less than or equal to 89% was 7.2 minutes.    Movement Activity: Negative for significant movement abnormalities.     Periodic Limb Activity - There were 14 PLMs during the entire study. The PLM index was 2.1 movements per hour. The PLM Arousal Index was 0.8 per hour.    REM EMG Activity - Excessive transient/sustained muscle activity was not present.    Nocturnal Behavior - Abnormal sleep related behaviors were not noted during/arising out of NREM / REM sleep.     Bruxism - None apparent.    Cardiac Summary: Sinus rhythm.   The average pulse rate was 83.4 bpm. The minimum pulse rate was 71.0 bpm while the maximum pulse rate was 113.0 bpm.  Arrhythmias were not noted.    Assessment:     This sleep study is negative for clinically significant sleep apnea or hypoxemia. A rather mild degree of respiratory effort related arousals was noted with an overall RDI of 6.5 per hour. It must be noted that comprehensiveness of testing is limited by reduced REM and limited supine sleep. Of note, patient had indicated that she normally does not sleep in the supine position.     Sleep was fragmented with increased spontaneous arousals. Percentages of stage N3 and REM were low.     Recommendations:    If management of mild upper airway resistance is clinically indicated, patient may be a candidate for dental appliance through referral to Sleep Dentistry.    Suggest  optimizing sleep schedule and avoiding sleep deprivation.    Weight management (if BMI > 30).    Diagnostic Codes:   Repetitive Intrusions Into Sleep F51.8    5/3/2023 [place] [facility] Sleep Study (193.0 lbs) - AHI 2.0, RDI 6.5, Supine AHI 3.8, REM AHI 8.3, Low O2 88.0%, Time Spent ?88% 0.1 minutes / Time Spent ?89% 7.2 minutes.     _____________________________________   Electronically Signed By: Lisandro Cade MD 5/4/2023

## 2023-05-15 DIAGNOSIS — F32.1 MODERATE MAJOR DEPRESSION (H): ICD-10-CM

## 2023-05-15 RX ORDER — DULOXETIN HYDROCHLORIDE 30 MG/1
CAPSULE, DELAYED RELEASE ORAL
Qty: 90 CAPSULE | Refills: 1 | Status: SHIPPED | OUTPATIENT
Start: 2023-05-15 | End: 2023-10-30

## 2023-05-15 RX ORDER — DULOXETIN HYDROCHLORIDE 60 MG/1
CAPSULE, DELAYED RELEASE ORAL
Qty: 90 CAPSULE | Refills: 1 | Status: SHIPPED | OUTPATIENT
Start: 2023-05-15 | End: 2023-10-30

## 2023-05-15 NOTE — TELEPHONE ENCOUNTER
Pema-Please review, sign if agree and may close encounter.    Routing refill request to provider for review/approval because:  PHQ-9 > 4      6/7/2021     4:29 PM 2/22/2022     5:11 PM 9/23/2022     2:25 PM   PHQ   PHQ-9 Total Score 15 3 7   Q9: Thoughts of better off dead/self-harm past 2 weeks Not at all Not at all Not at all     Last Written Prescription Date:  10/12/22  Last Fill Quantity: 90,  # refills: 1   Last office visit: 10/12/2022 ; last virtual visit: 6/7/2021 with prescribing provider:  KENDAL Fragoso CNP   Future Office Visit:          Thank you!  JAYESH EspinoN, RN-BC  [facility]

## 2023-05-17 ENCOUNTER — OFFICE VISIT (OUTPATIENT)
Dept: FAMILY MEDICINE | Facility: CLINIC | Age: 73
End: 2023-05-17
Payer: COMMERCIAL

## 2023-05-17 ENCOUNTER — NURSE TRIAGE (OUTPATIENT)
Dept: NURSING | Facility: CLINIC | Age: 73
End: 2023-05-17

## 2023-05-17 ENCOUNTER — TELEPHONE (OUTPATIENT)
Dept: FAMILY MEDICINE | Facility: CLINIC | Age: 73
End: 2023-05-17
Payer: COMMERCIAL

## 2023-05-17 VITALS
WEIGHT: 191 LBS | HEIGHT: 64 IN | TEMPERATURE: 97.9 F | SYSTOLIC BLOOD PRESSURE: 122 MMHG | DIASTOLIC BLOOD PRESSURE: 78 MMHG | RESPIRATION RATE: 20 BRPM | HEART RATE: 91 BPM | OXYGEN SATURATION: 96 % | BODY MASS INDEX: 32.61 KG/M2

## 2023-05-17 DIAGNOSIS — E55.9 VITAMIN D DEFICIENCY: ICD-10-CM

## 2023-05-17 DIAGNOSIS — E11.9 TYPE 2 DIABETES MELLITUS WITHOUT COMPLICATION, WITHOUT LONG-TERM CURRENT USE OF INSULIN (H): ICD-10-CM

## 2023-05-17 DIAGNOSIS — L98.9 SKIN LESION: ICD-10-CM

## 2023-05-17 DIAGNOSIS — G62.9 NEUROPATHY: ICD-10-CM

## 2023-05-17 DIAGNOSIS — R53.83 OTHER FATIGUE: ICD-10-CM

## 2023-05-17 DIAGNOSIS — E03.9 HYPOTHYROIDISM, UNSPECIFIED TYPE: ICD-10-CM

## 2023-05-17 DIAGNOSIS — J06.9 UPPER RESPIRATORY TRACT INFECTION, UNSPECIFIED TYPE: Primary | ICD-10-CM

## 2023-05-17 LAB
FOLATE SERPL-MCNC: 13.5 NG/ML (ref 4.6–34.8)
HBA1C MFR BLD: 5.9 % (ref 0–5.6)

## 2023-05-17 PROCEDURE — 82746 ASSAY OF FOLIC ACID SERUM: CPT | Performed by: NURSE PRACTITIONER

## 2023-05-17 PROCEDURE — 84207 ASSAY OF VITAMIN B-6: CPT | Mod: 90 | Performed by: NURSE PRACTITIONER

## 2023-05-17 PROCEDURE — 99000 SPECIMEN HANDLING OFFICE-LAB: CPT | Performed by: NURSE PRACTITIONER

## 2023-05-17 PROCEDURE — 80053 COMPREHEN METABOLIC PANEL: CPT | Performed by: NURSE PRACTITIONER

## 2023-05-17 PROCEDURE — 36415 COLL VENOUS BLD VENIPUNCTURE: CPT | Performed by: NURSE PRACTITIONER

## 2023-05-17 PROCEDURE — 82306 VITAMIN D 25 HYDROXY: CPT | Performed by: NURSE PRACTITIONER

## 2023-05-17 PROCEDURE — 83036 HEMOGLOBIN GLYCOSYLATED A1C: CPT | Performed by: NURSE PRACTITIONER

## 2023-05-17 PROCEDURE — 99214 OFFICE O/P EST MOD 30 MIN: CPT | Performed by: NURSE PRACTITIONER

## 2023-05-17 PROCEDURE — 84443 ASSAY THYROID STIM HORMONE: CPT | Performed by: NURSE PRACTITIONER

## 2023-05-17 ASSESSMENT — PATIENT HEALTH QUESTIONNAIRE - PHQ9
10. IF YOU CHECKED OFF ANY PROBLEMS, HOW DIFFICULT HAVE THESE PROBLEMS MADE IT FOR YOU TO DO YOUR WORK, TAKE CARE OF THINGS AT HOME, OR GET ALONG WITH OTHER PEOPLE: VERY DIFFICULT
SUM OF ALL RESPONSES TO PHQ QUESTIONS 1-9: 9
SUM OF ALL RESPONSES TO PHQ QUESTIONS 1-9: 9

## 2023-05-17 ASSESSMENT — PAIN SCALES - GENERAL: PAINLEVEL: NO PAIN (0)

## 2023-05-17 NOTE — TELEPHONE ENCOUNTER
Called patient and offered appt at 1310 today with KENDAL Fragoso CNP.    Patient agreed to appt.  Visit date, time and location confirmed with patient.    Patient stated she has to drop her dog off at home and will be on her way over; might be a little late.  Informed patient depending on how late she is, she may be asked to reschedule.    JAYESH EspinoN, RN-BC  [facility]

## 2023-05-17 NOTE — TELEPHONE ENCOUNTER
Reason for Call:  Appointment Request    Patient requesting this type of appt:  cough not sleeping well at night nose clogged up and mucus is yellow  discoloration by neck-purplish since Friday declined triage-declined urgent    Requested provider: any    Reason patient unable to be scheduled: Not within requested timeframe    When does patient want to be seen/preferred time: today or tomorrow    Comments: patient wondering if she could be worked in f    Could we send this information to you in [employer] or would you prefer to receive a phone call?:   Patient would prefer a phone call   Okay to leave a detailed message?: Yes at Home number on file [phone] (home)    Call taken on 5/17/2023 at 11:23 AM by Yun Garcia

## 2023-05-17 NOTE — TELEPHONE ENCOUNTER
"Triage Call:     Pt is calling to see if her PCP can \"fit her in\" for an appt. She was seen at the [facility] on 4/24/2023 for throat pain and bronchitis. She thought that she was gettng better, but she her sx are lingering.     Every time she takes a deep breath she coughs  She thinks that she has bronchitis still but is wondering if she needs additional treatment. She takes the Benzonatate at night sometimes, but not during the day as she believes it is good for her to be coughing and not suppressing her cough  Other sx: Sore throat    No fever, no wheezing, no shortness of breath, no chest pain    She also has an area of bruising on her neck area that she is unsure of how she got it. She noticed it on Saturday and it is not getting bigger.   Description: Deep blue-brown; \"like there were some capillaries that broke causing bruising\"  Size: Inch and a half on top and then there is a space, then connects again and \"goes horizontal\" then vertical again 2 inches  Not tender to the touch  No itching  \"Looks like a design\"  Not puffy  No open area  Skin is a little dried out on the area    Disposition: See in office today or tomorrow. Care advice given. Pt is asking for this message to be routed to patient's PCP to see if they can see patient.   Reason for Disposition    Continuous (nonstop) coughing interferes with work or school and no improvement using cough treatment per Care Advice    Additional Information    Negative: Bluish (or gray) lips or face    Negative: SEVERE difficulty breathing (e.g., struggling for each breath, speaks in single words)    Negative: Rapid onset of cough and has hives    Negative: Coughing started suddenly after medicine, an allergic food or bee sting    Negative: Difficulty breathing after exposure to flames, smoke, or fumes    Negative: Sounds like a life-threatening emergency to the triager    Negative: Dry cough (non-productive; no sputum or minimal clear sputum) and within 14 days of " COVID-19 Exposure    Negative: Previous asthma attacks and this feels like asthma attack    Negative: Chest pain present when not coughing    Negative: MODERATE difficulty breathing (e.g., speaks in phrases, SOB even at rest, pulse 100-120) and still present when not coughing    Negative: Passed out (i.e., fainted, collapsed and was not responding)    Negative: Patient sounds very sick or weak to the triager    Negative: MILD difficulty breathing (e.g., minimal/no SOB at rest, SOB with walking, pulse <100) and still present when not coughing    Negative: Coughed up > 1 tablespoon (15 ml) blood (Exception: Blood-tinged sputum.)    Negative: Fever > 103 F (39.4 C)    Negative: Fever > 101 F (38.3 C) and over 60 years of age    Negative: Increasing ankle swelling    Negative: Fever > 100.0 F (37.8 C) and has diabetes mellitus or a weak immune system (e.g., HIV positive, cancer chemotherapy, organ transplant, splenectomy, chronic steroids)    Negative: Fever > 100.0 F (37.8 C) and bedridden (e.g., nursing home patient, stroke, chronic illness, recovering from surgery)    Negative: Wheezing is present    Negative: Coughing up tao-colored (reddish-brown) or blood-tinged sputum    Negative: SEVERE coughing spells (e.g., whooping sound after coughing, vomiting after coughing)    Negative: Fever present > 3 days (72 hours)    Negative: Fever returns after gone for over 24 hours and symptoms worse or not improved    Negative: Using nasal washes and pain medicine > 24 hours and sinus pain persists    Negative: Known COPD or other severe lung disease (i.e., bronchiectasis, cystic fibrosis, lung surgery) and worsening symptoms (i.e., increased sputum purulence or amount, increased breathing difficulty)    Protocols used: COUGH-A-OH    Sofi Carbone RN  [facility] Nurse Advisor 12:41 PM 5/17/2023

## 2023-05-18 LAB
ALBUMIN SERPL BCG-MCNC: 4.3 G/DL (ref 3.5–5.2)
ALP SERPL-CCNC: 74 U/L (ref 35–104)
ALT SERPL W P-5'-P-CCNC: 13 U/L (ref 10–35)
ANION GAP SERPL CALCULATED.3IONS-SCNC: 12 MMOL/L (ref 7–15)
AST SERPL W P-5'-P-CCNC: 18 U/L (ref 10–35)
BILIRUB SERPL-MCNC: 1.3 MG/DL
BUN SERPL-MCNC: 18.4 MG/DL (ref 8–23)
CALCIUM SERPL-MCNC: 9.2 MG/DL (ref 8.8–10.2)
CHLORIDE SERPL-SCNC: 97 MMOL/L (ref 98–107)
CREAT SERPL-MCNC: 0.77 MG/DL (ref 0.51–0.95)
DEPRECATED CALCIDIOL+CALCIFEROL SERPL-MC: 43 UG/L (ref 20–75)
DEPRECATED HCO3 PLAS-SCNC: 27 MMOL/L (ref 22–29)
GFR SERPL CREATININE-BSD FRML MDRD: 82 ML/MIN/1.73M2
GLUCOSE SERPL-MCNC: 100 MG/DL (ref 70–99)
POTASSIUM SERPL-SCNC: 4.1 MMOL/L (ref 3.4–5.3)
PROT SERPL-MCNC: 7 G/DL (ref 6.4–8.3)
SODIUM SERPL-SCNC: 136 MMOL/L (ref 136–145)
TSH SERPL DL<=0.005 MIU/L-ACNC: 1.59 UIU/ML (ref 0.3–4.2)

## 2023-05-19 ENCOUNTER — TELEPHONE (OUTPATIENT)
Dept: SLEEP MEDICINE | Facility: CLINIC | Age: 73
End: 2023-05-19
Payer: COMMERCIAL

## 2023-05-19 NOTE — TELEPHONE ENCOUNTER
Patient Returning Call    Reason for call:  PT would like to switch her appt on 5.25.23 to a virtual appt instead of in person    Information relayed to patient:  Advised that I am not sure if this will work in the Dr's schedule or not but either way the care team will callback either way    Patient has additional questions:  Yes    What are your questions/concerns:  Frustrated with the response time frame on getting the results    Who does the patient want to speak with:  whomever can switch appt to virtual    Is an  needed?:  No      Could we send this information to you in [employer] or would you prefer to receive a phone call?:   Patient would prefer a phone call   Okay to leave a detailed message?: Yes at Home number on file [phone] (home)

## 2023-05-20 LAB — PYRIDOXAL PHOS SERPL-SCNC: 82.9 NMOL/L

## 2023-05-24 NOTE — TELEPHONE ENCOUNTER
Patient Returning Call    Reason for call:  Patient calling back to follow up on request to switch appointment to virtual.    I've informed patient that certain times are for in-person only, and that I was unable to switch appointment to virtual without pushing her appointment out further.    Information relayed to patient:  Message will be sent for call back.    Patient has additional questions:  No      Could we send this information to you in [employer] or would you prefer to receive a phone call?:   Patient would prefer a phone call   Okay to leave a detailed message?: Yes at Cell number on file:    Telephone Information:   Mobile [phone]

## 2023-05-25 ENCOUNTER — OFFICE VISIT (OUTPATIENT)
Dept: SLEEP MEDICINE | Facility: CLINIC | Age: 73
End: 2023-05-25
Payer: COMMERCIAL

## 2023-05-25 VITALS
WEIGHT: 194 LBS | SYSTOLIC BLOOD PRESSURE: 111 MMHG | HEART RATE: 93 BPM | BODY MASS INDEX: 33.12 KG/M2 | HEIGHT: 64 IN | DIASTOLIC BLOOD PRESSURE: 73 MMHG | OXYGEN SATURATION: 99 %

## 2023-05-25 DIAGNOSIS — G47.33 OSA (OBSTRUCTIVE SLEEP APNEA): Primary | ICD-10-CM

## 2023-05-25 PROCEDURE — 99213 OFFICE O/P EST LOW 20 MIN: CPT | Performed by: INTERNAL MEDICINE

## 2023-05-25 RX ORDER — ARIPIPRAZOLE 5 MG/1
TABLET ORAL
Status: ON HOLD | COMMUNITY
Start: 2023-05-17 | End: 2024-03-26

## 2023-05-25 NOTE — PROGRESS NOTES
Sleep Study Follow-Up Visit:    Date on this visit: 5/25/2023    Leena Montaño comes in today for follow-up of her sleep study done on 5/3/2023  at the [facility] for possible sleep apnea.    Sleep latency 4.3 minutes with Trazodone.  REM achieved.   REM latency 379.5 minutes.  Sleep efficiency 88.5%. Total sleep time 397 minutes.    Sleep architecture:  Stage 1, 1.1% (5%), stage 2, 85.9% (45-55%), stage 3, 9.3% (15-20%), stage REM, 3.7% (20-25%).  AHI was 2, without significant desaturations. RDI 6.5.  REM AHI 8.3, consistent with mild REM CATIE.  Supine AHI 3.8, consistent with no SUPINE CATIE.  Periodic Limb Movement Index 2.1/hour.       These findings were reviewed with patient.     Past medical/surgical history, family history, social history, medications and allergies were reviewed.      Impression/Plan:    1. Obstructive sleep apnea   2. Hypertension   3. Chronic Insomnia     Patient's PSG shows mild degree of respiratory effort related arousals with an AHI of 2/h and RDI of 6.5/h.  There was limited supine sleep and patient reports that she normally does not sleep supine.  Percentage of REM was low at 3.7%.    Patient complains of poor sleep quality, nonrefreshing sleep and excessive daytime fatigue.  She had used CPAP for about 20 years and reports that CPAP therapy was beneficial for her.  Her medical history significant for hypertension, diabetes mellitus type 2, depression and chronic insomnia.  She is currently on trazodone for treatment of insomnia.    We reviewed borderline nature of her sleep apnea and limitations of testing.  Patient wants to restart CPAP therapy which was previously beneficial for her.  Prescription for CPAP was placed.  We also discussed oral appliance therapy and details of dental sleep clinic was provided for her.    Plan:     1. Auto PAP therapy 5-15 cm H2O    I spent a total of 22 minutes for this appointment on this date of service which include time  spent before, during and after the visit for chart review, patient care, counseling and coordination of care.    Dr. Lisandro Cade     CC: Jory Fragoso

## 2023-06-16 ENCOUNTER — CARE COORDINATION (OUTPATIENT)
Dept: SLEEP MEDICINE | Facility: CLINIC | Age: 73
End: 2023-06-16
Payer: COMMERCIAL

## 2023-07-15 DIAGNOSIS — E03.9 ACQUIRED HYPOTHYROIDISM: ICD-10-CM

## 2023-07-16 RX ORDER — LEVOTHYROXINE SODIUM 75 UG/1
TABLET ORAL
Qty: 90 TABLET | Refills: 2 | Status: SHIPPED | OUTPATIENT
Start: 2023-07-16 | End: 2024-04-10

## 2023-07-17 NOTE — TELEPHONE ENCOUNTER
"Last Written Prescription Date:  10/12/22  Last Fill Quantity: 90,  # refills: 2   Last office visit provider:  5/17/23     Requested Prescriptions   Pending Prescriptions Disp Refills     levothyroxine (SYNTHROID/LEVOTHROID) 75 MCG tablet [Pharmacy Med Name: LEVOTHYROXINE SODIUM 75MCG TABS] 90 tablet 2     Sig: TAKE ONE TABLET BY MOUTH ONCE DAILY       Thyroid Protocol Passed - 7/15/2023 10:54 AM        Passed - Patient is 12 years or older        Passed - Recent (12 mo) or future (30 days) visit within the authorizing provider's specialty     Patient has had an office visit with the authorizing provider or a provider within the authorizing providers department within the previous 12 mos or has a future within next 30 days. See \"Patient Info\" tab in inbasket, or \"Choose Columns\" in Meds & Orders section of the refill encounter.              Passed - Medication is active on med list        Passed - Normal TSH on file in past 12 months     Recent Labs   Lab Test 05/17/23  1425   TSH 1.59              Passed - No active pregnancy on record     If patient is pregnant or has had a positive pregnancy test, please check TSH.          Passed - No positive pregnancy test in past 12 months     If patient is pregnant or has had a positive pregnancy test, please check TSH.               Robyn Ellington RN 07/16/23 7:04 PM  "

## 2023-08-17 ENCOUNTER — TELEPHONE (OUTPATIENT)
Dept: FAMILY MEDICINE | Facility: CLINIC | Age: 73
End: 2023-08-17
Payer: COMMERCIAL

## 2023-08-17 ENCOUNTER — NURSE TRIAGE (OUTPATIENT)
Dept: FAMILY MEDICINE | Facility: CLINIC | Age: 73
End: 2023-08-17
Payer: COMMERCIAL

## 2023-08-17 NOTE — TELEPHONE ENCOUNTER
RN COVID TREATMENT VISIT  08/17/23      The patient has been triaged and does not require a higher level of care.    Leena Hernandezunt  73 year old  Current weight? 194lb    Has the patient been seen by a primary care provider at an [facility] or [facility] within the past two years? Yes.   Have you been in close proximity to/do you have a known exposure to a person with a confirmed case of influenza? No.     General treatment eligibility:  Date of positive COVID test (PCR or at home)?  8/17/2023    Are you or have you been hospitalized for this COVID-19 infection? No.   Have you received monoclonal antibodies or antiviral treatment for COVID-19 since this positive test? No.   Do you have any of the following conditions that place you at risk of being very sick from COVID-19?   - Age 50 years or older  Yes, patient has at least one high risk condition as noted above.     Current COVID symptoms:   - cough  - muscle or body aches  - headache  - sore throat  Yes. Patient has at least one symptom as selected.     How many days since symptoms started? 5 days or less. Established patient, 12 years or older weighing at least 88.2 lbs, who has symptoms that started in the past 5 days, has not been hospitalized nor received treatment already, and is at risk for being very sick from COVID-19.     Treatment eligibility by RN:  Are you currently pregnant or nursing? No  Do you have a clinically significant hypersensitivity to nirmatrelvir or ritonavir, or toxic epidermal necrolysis (TEN) or Hampton-Dez Syndrome? No  Do you have a history of hepatitis, any hepatic impairment on the Problem List (such as Child-Mckeon Class C, cirrhosis, fatty liver disease, alcoholic liver disease), or was the last liver lab (hepatic panel, ALT, AST, ALK Phos, bilirubin) elevated in the past 6 months? No  Do you have any history of severe renal impairment (eGFR < 30mL/min)? No    Is patient eligible to  continue? Yes, patient meets all eligibility requirements for the RN COVID treatment (as denoted by all no responses above).     Current Outpatient Medications   Medication Sig Dispense Refill    ARIPiprazole (ABILIFY) 5 MG tablet       benzonatate (TESSALON) 200 MG capsule Take 1 capsule (200 mg) by mouth 3 times daily as needed for cough (Patient not taking: Reported on 5/25/2023) 30 capsule 0    blood glucose (NO BRAND SPECIFIED) lancets standard Use to test blood sugar TWO times daily or as directed. 200 each 0    blood glucose (NO BRAND SPECIFIED) test strip Use to test blood sugar 2 times daily or as directed. 200 strip 0    blood glucose monitoring (NO BRAND SPECIFIED) meter device kit Use to test blood sugar 2  times daily or as directed. Contour next ez 1 kit 0    DULoxetine (CYMBALTA) 30 MG capsule TAKE ONE CAPSULE BY MOUTH ONCE DAILY 90 capsule 1    DULoxetine (CYMBALTA) 60 MG capsule TAKE ONE CAPSULE BY MOUTH ONCE DAILY 90 capsule 1    levothyroxine (SYNTHROID/LEVOTHROID) 75 MCG tablet TAKE ONE TABLET BY MOUTH ONCE DAILY 90 tablet 2    losartan (COZAAR) 100 MG tablet TAKE ONE TABLET BY MOUTH ONCE DAILY 90 tablet 3    MULTIVITAMIN TABS   OR  (Patient not taking: Reported on 5/17/2023)      order for DME Equipment being ordered: light box 1 Units 0    ORDER FOR DME Equipment being ordered: CPAP mask and tubing 1 Units 0    traZODone (DESYREL) 50 MG tablet Take 1.5-2 tablets ( mg) by mouth At Bedtime 60 tablet 12       Medications from List 1 of the standing order (on medications that exclude the use of Paxlovid) that patient is taking: NONE. Is patient taking Longville's Wort? No  Is patient taking Teena's Wort or any meds from List 1? No.   Medications from List 2 of the standing order (on meds that provider needs to adjust) that patient is taking: aripiprazole (Abilify, Aristada), explained a provider visit is necessary to discuss medication adjustments while taking Paxlovid. Is patient on any of  the meds from List 2? Yes. Patient will be scheduled or transferred to a  at the end of this call.   Inocente Willard RN       Reason for Disposition   [1] COVID-19 diagnosed by positive lab test (e.g., PCR, rapid self-test kit) AND [2] mild symptoms (e.g., cough, fever, others) AND [3] no complications or SOB    Additional Information   Negative: SEVERE difficulty breathing (e.g., struggling for each breath, speaks in single words)   Negative: Difficult to awaken or acting confused (e.g., disoriented, slurred speech)   Negative: Bluish (or gray) lips or face now   Negative: Shock suspected (e.g., cold/pale/clammy skin, too weak to stand, low BP, rapid pulse)   Negative: Sounds like a life-threatening emergency to the triager   Negative: SEVERE or constant chest pain or pressure  (Exception: Mild central chest pain, present only when coughing.)   Negative: MODERATE difficulty breathing (e.g., speaks in phrases, SOB even at rest, pulse 100-120)   Negative: [1] Headache AND [2] stiff neck (can't touch chin to chest)   Negative: Oxygen level (e.g., pulse oximetry) 90 percent or lower   Negative: Chest pain or pressure   Negative: Patient sounds very sick or weak to the triager   Negative: MILD difficulty breathing (e.g., minimal/no SOB at rest, SOB with walking, pulse <100)   Negative: Fever > 103 F (39.4 C)   Negative: [1] Fever > 101 F (38.3 C) AND [2] age > 60 years   Negative: [1] Fever > 100.0 F (37.8 C) AND [2] bedridden (e.g., nursing home patient, CVA, chronic illness, recovering from surgery)    Protocols used: Coronavirus (COVID-19) Diagnosed or [id number]

## 2023-08-17 NOTE — TELEPHONE ENCOUNTER
Patient unsure if home test was positive and if she wanted to take the paxlovid.     Instructed patient to take another test and call us back if she decides she wants to take the paxlovid.     Possible Side effects:   altered or impaired sense of taste.  diarrhea.  increased blood pressure.  muscle aches.      JAYESH CollazoN LORA  [facility]

## 2023-08-17 NOTE — TELEPHONE ENCOUNTER
COVID Positive/Requesting COVID treatment    Patient is positive for COVID and requesting treatment options.    Date of positive COVID test (PCR or at home)? 8/17/23 at home test  Current COVID symptoms: fever or chills, cough, fatigue, muscle or body aches, headache, sore throat, congestion or runny nose, and diarrhea  Date COVID symptoms began: 8/14/23    Message should be routed to clinic RN pool. Best phone number to use for call back: [phone]

## 2023-08-17 NOTE — TELEPHONE ENCOUNTER
COVID Hub RN please call patient regarding RX for COVID.  Joselyn Polo, RN  [facility]

## 2023-08-18 ENCOUNTER — VIRTUAL VISIT (OUTPATIENT)
Dept: INTERNAL MEDICINE | Facility: CLINIC | Age: 73
End: 2023-08-18
Payer: COMMERCIAL

## 2023-08-18 DIAGNOSIS — F32.1 MODERATE MAJOR DEPRESSION (H): ICD-10-CM

## 2023-08-18 DIAGNOSIS — E66.01 MORBID OBESITY (H): ICD-10-CM

## 2023-08-18 DIAGNOSIS — E11.69 TYPE 2 DIABETES MELLITUS WITH OTHER SPECIFIED COMPLICATION, WITHOUT LONG-TERM CURRENT USE OF INSULIN (H): ICD-10-CM

## 2023-08-18 DIAGNOSIS — U07.1 INFECTION DUE TO 2019 NOVEL CORONAVIRUS: Primary | ICD-10-CM

## 2023-08-18 PROCEDURE — 99214 OFFICE O/P EST MOD 30 MIN: CPT | Mod: 95 | Performed by: NURSE PRACTITIONER

## 2023-08-18 NOTE — PROGRESS NOTES
"Mylene is a 73 year old who is being evaluated via a billable telephone visit.      What phone number would you like to be contacted at? [phone]  How would you like to obtain your AVS? Odilon    Distant Location (provider location):  On-site    Assessment & Plan     Infection due to 2019 novel coronavirus  Discussed   - nirmatrelvir and ritonavir (PAXLOVID) 300 mg/100 mg therapy pack; Take 3 tablets by mouth 2 times daily for 5 days (Take 2 Nirmatrelvir tablets and 1 Ritonavir tablet twice daily for 5 days)    Type 2 diabetes mellitus without complication (H)  Stable - recent glucose 111     Moderate major depression (H)  Discussed decrease abilify while on paxlovid     Type 2 diabetes mellitus with other specified complication, without long-term current use of insulin (H)  As above     Morbid obesity (H)  Unchanged       12 minutes spent by me on the date of the encounter doing chart review, history and exam, documentation and further activities per the note       BMI:   Estimated body mass index is 33.3 kg/m  as calculated from the following:    Height as of 5/25/23: 1.626 m (5' 4\").    Weight as of 5/25/23: 88 kg (194 lb).       Patient Instructions   Paxlovid sent in     Medication adjustments while on Paxlovid:      Decrease dose, while on Paxlovid,of the following medication   Abilify take 2.5 mg daily while on the medication     May return to public after 5 days if symptoms improved and no fever, or use of fever reducing medication   Wear a mask for 5 more days when in the presence of others       REYNALDO Scruggs CNP  [facility]    Subjective   Mylene is a 73 year old, presenting for the following health issues:  Chief Complaint   Patient presents with    Covid 19 Testing     Pt took a COVID test either yesterday or the day before and it was positive. Monday noc had trouble sleeping and then Tuesday afternoon staring having diarrhea and headache, body aches, chills. Pt would " like treatment. Feels like fever            8/18/2023     9:32 AM   Additional Questions   Roomed by Pam BROWN       HPI   Symptoms are as above     She is just started abilify a week ago - she is taking 5 mg currently   She will be taking 2.5 mg while taking medication paxlovid           Review of Systems   Constitutional, HEENT, cardiovascular, pulmonary, GI, , musculoskeletal, neuro, skin, endocrine and psych systems are negative, except as otherwise noted.      Objective           Vitals:  No vitals were obtained today due to virtual visit.    Physical Exam   alert and no distress  PSYCH: Alert and oriented times 3; coherent speech, normal   rate and volume, able to articulate logical thoughts, able   to abstract reason, no tangential thoughts, no hallucinations   or delusions  Her affect is normal  RESP: No cough, no audible wheezing, able to talk in full sentences  Remainder of exam unable to be completed due to telephone visits                Phone call duration: 12 minutes

## 2023-08-18 NOTE — NURSING NOTE
"Chief Complaint   Patient presents with    Covid 19 Testing     Pt took a COVID test either yesterday or the day before and it was positive. Monday noc had trouble sleeping and then Tuesday afternoon staring having diarrhea and headache, body aches, chills. Pt would like treatment.     initial LMP  (LMP Unknown)  Estimated body mass index is 33.3 kg/m  as calculated from the following:    Height as of 5/25/23: 1.626 m (5' 4\").    Weight as of 5/25/23: 88 kg (194 lb)..  bp completed using cuff size NA (Not Taken)  CHAMP WEATHERS LPN  "

## 2023-09-22 ENCOUNTER — CARE COORDINATION (OUTPATIENT)
Dept: SLEEP MEDICINE | Facility: CLINIC | Age: 73
End: 2023-09-22
Payer: COMMERCIAL

## 2023-10-18 ENCOUNTER — VIRTUAL VISIT (OUTPATIENT)
Dept: FAMILY MEDICINE | Facility: CLINIC | Age: 73
End: 2023-10-18
Payer: COMMERCIAL

## 2023-10-18 DIAGNOSIS — Z13.9 ENCOUNTER FOR SCREENING INVOLVING SOCIAL DETERMINANTS OF HEALTH (SDOH): ICD-10-CM

## 2023-10-18 DIAGNOSIS — Z12.11 SCREEN FOR COLON CANCER: ICD-10-CM

## 2023-10-18 DIAGNOSIS — R25.3 TONGUE FASCICULATION: Primary | ICD-10-CM

## 2023-10-18 PROCEDURE — 99214 OFFICE O/P EST MOD 30 MIN: CPT | Mod: VID | Performed by: FAMILY MEDICINE

## 2023-10-18 ASSESSMENT — PATIENT HEALTH QUESTIONNAIRE - PHQ9
SUM OF ALL RESPONSES TO PHQ QUESTIONS 1-9: 6
10. IF YOU CHECKED OFF ANY PROBLEMS, HOW DIFFICULT HAVE THESE PROBLEMS MADE IT FOR YOU TO DO YOUR WORK, TAKE CARE OF THINGS AT HOME, OR GET ALONG WITH OTHER PEOPLE: EXTREMELY DIFFICULT
SUM OF ALL RESPONSES TO PHQ QUESTIONS 1-9: 6

## 2023-10-18 NOTE — COMMUNITY RESOURCES LIST (ENGLISH)
10/18/2023   [facility]  N/A  For questions about this resource list or additional care needs, please contact your primary care clinic or care manager.  Phone: [phone]   Email: N/A   Address: [address] [place] [id number]   Hours: N/A        Financial Stability       Utility payment assistance  1  [facility] Distance: 1.73 miles      In-Person   [address] [place] [address]  Language: English  Hours: Mon - Fri 8:00 AM - 3:00 PM  Fees: Free   Phone: (100) 684-4109 Ext.14 Email: neighborhood@[facility] Website: http://www.[employer]     2  [employer] - Payment Plan Credit Program Distance: 2.7 miles      Phone/Virtual   [address] [place] [address]  Language: English, Tunisian  Hours: Mon - Fri 7:00 AM - 7:00 PM  Fees: Free   Phone: (930) 199-4340 Email: inquire@[employer] Website: https://www.[employer]/          Important Numbers & Websites       Emergency Services   [address]  [place] [address]  Poison Control   (214) 878-2233  Suicide Prevention Lifeline   (169) 733-2562 (TALK)  Child Abuse Hotline   (240) 942-3040 (4-A-Child)  Sexual Assault Hotline   (147) 301-2996 (HOPE)  National Runaway Safeline   (162) 675-2112 (RUNAWAY)  All-Options Talkline   (716) 137-4434  Substance Abuse Referral   (297) 388-5633 (HELP)

## 2023-10-18 NOTE — PROGRESS NOTES
Mylene is a 73 year old who is being evaluated via a billable video visit.      How would you like to obtain your AVS? MyChart  If the video visit is dropped, the invitation should be resent by: Text to cell phone: [phone]  Will anyone else be joining your video visit? No        Assessment & Plan     (R25.3) Tongue fasciculation  (primary encounter diagnosis)  Comment: I cannot observe this directly since this is a video visit, and the symptom is intermittent. In general I would expect somatic motor fasciculations to be due to nerve irritation or general increased nerve activity (such as from stimulants). Since this involves the tongue, the patient may want to get an MRI so we can look at the brainstem and/or meet with a neurologist. She would like to have electrolyte levels etc, available for review with her regular provider at her diabetes check next month.   Plan: Adult Neurology  Referral, MR Brain         w/o & w Contrast, Hemoglobin A1c, Lipid panel         reflex to direct LDL Non-fasting, Albumin         Random Urine Quantitative with Creat Ratio,         Basic metabolic panel, ALT, Magnesium, Vitamin         D Deficiency, CBC with platelets        Return in about 30 days (around 11/17/2023) for diabetes and review of test results.      (Z12.11) Screen for colon cancer  Comment:   Plan: Colonoscopy Screening  Referral            (Z13.9) Encounter for screening involving social determinants of health (SDoH)  Comment:   Plan: Primary Care - Care Coordination Referral              38 minutes spent by me on the date of the encounter doing chart review, review of test results, patient visit, and documentation        Alonzo Pichardo MD  [facility]   Mylene is a 73 year old, presenting for the following health issues:  tongue problem        10/18/2023     8:48 AM   Additional Questions   Roomed by tshia       History of Present Illness       Reason for  "visit:  Shaking tongue    She eats 0-1 servings of fruits and vegetables daily.She consumes 0 sweetened beverage(s) daily.        Concern - Shaking tongue  Onset: about a month ago  Description: Pt states tongue gets twitchy, causes gargling sounds when vibrating throughout the day.  Intensity: moderate  Progression of Symptoms:  same  Accompanying Signs & Symptoms: Hot flashes  Previous history of similar problem: No  Precipitating factors:        Worsened by: N/A  Alleviating factors:        Improved by: N/A  Therapies tried and outcome: None    Patient reports she had tremors in her hands off and on. She started having the tongue twitching and vibration about a month ago and to get it to stop she states she pushes her tongue against her teeth. Within the last month Abilify was reduced from 10 mg to 5 mg due to hand twitching. She feels with the dose change, her hand twitching symptom is \"the same\". Patient denies any increase in her caffeine intake.         Review of Systems         Objective           Vitals:  No vitals were obtained today due to virtual visit.    Physical Exam   GENERAL: Healthy, alert and no distress  EYES: Eyes grossly normal to inspection.  No discharge or erythema, or obvious scleral/conjunctival abnormalities.  RESP: No audible wheeze, cough, or visible cyanosis.  No visible retractions or increased work of breathing.    SKIN: Visible skin clear. No significant rash, abnormal pigmentation or lesions.  NEURO: Cranial nerves grossly intact.  Mentation and speech appropriate for age.  PSYCH: Mentation appears normal, affect normal/bright, judgement and insight intact, normal speech and appearance well-groomed.    Office Visit on 05/17/2023   Component Date Value Ref Range Status    Vitamin B6 05/17/2023 82.9  20.0 - 125.0 nmol/L Final    INTERPRETIVE INFORMATION: Vitamin B6 (Pyridoxal 5-Phosphate)    Pyridoxal 5'-phosphate measured in a specimen collected   following an 8-hour or overnight " fast accurately indicates   vitamin B6 nutritional status. Non-fasting specimen   concentration reflects recent vitamin intake.    This test was developed and its performance characteristics   determined by [facility]. It has not been cleared or   approved by the US Food and Drug Administration. This test   was performed in a CLIA certified laboratory and is   intended for clinical purposes.  Performed By: [facility]  [address] [place] [address]  : Alonzo Nagel MD, PhD    Folic Acid 05/17/2023 13.5  4.6 - 34.8 ng/mL Final    Hemoglobin A1C 05/17/2023 5.9 (H)  0.0 - 5.6 % Final    Normal <5.7%   Prediabetes 5.7-6.4%    Diabetes 6.5% or higher     Note: Adopted from ADA consensus guidelines.    TSH 05/17/2023 1.59  0.30 - 4.20 uIU/mL Final    Sodium 05/17/2023 136  136 - 145 mmol/L Final    Potassium 05/17/2023 4.1  3.4 - 5.3 mmol/L Final    Chloride 05/17/2023 97 (L)  98 - 107 mmol/L Final    Carbon Dioxide (CO2) 05/17/2023 27  22 - 29 mmol/L Final    Anion Gap 05/17/2023 12  7 - 15 mmol/L Final    Urea Nitrogen 05/17/2023 18.4  8.0 - 23.0 mg/dL Final    Creatinine 05/17/2023 0.77  0.51 - 0.95 mg/dL Final    Calcium 05/17/2023 9.2  8.8 - 10.2 mg/dL Final    Glucose 05/17/2023 100 (H)  70 - 99 mg/dL Final    Alkaline Phosphatase 05/17/2023 74  35 - 104 U/L Final    AST 05/17/2023 18  10 - 35 U/L Final    ALT 05/17/2023 13  10 - 35 U/L Final    Protein Total 05/17/2023 7.0  6.4 - 8.3 g/dL Final    Albumin 05/17/2023 4.3  3.5 - 5.2 g/dL Final    Bilirubin Total 05/17/2023 1.3 (H)  <=1.2 mg/dL Final    GFR Estimate 05/17/2023 82  >60 mL/min/1.73m2 Final    eGFR calculated using 2021 CKD-EPI equation.    Vitamin D, Total (25-Hydroxy) 05/17/2023 43  20 - 75 ug/L Final     Lab Results   Component Value Date    A1C 5.9 05/17/2023    A1C 5.8 09/23/2022    A1C 6.3 05/03/2022    A1C 6.5 12/22/2021    A1C 6.2 07/21/2021    A1C 6.7 04/15/2021    A1C 6.0 10/06/2020    A1C 5.8  03/04/2020    A1C 6.2 09/25/2019    A1C 6.4 04/30/2019            Video-Visit Details    Type of service:  Video Visit   Video Start Time: 1:40 PM  Video End Time:2:00 PM    Originating Location (pt. Location): Home  Distant Location (provider location):  On-site  Platform used for Video Visit: [employer]    This document serves as a record of the services and decisions personally performed and made by Dr. Pichardo. It was created on his behalf by Dayo Concepcion, a trained medical scribe. The creation of this document is based the provider's statements to the medical scribe.  Dayo Concepcion,  4:15 PM

## 2023-10-20 ENCOUNTER — PATIENT OUTREACH (OUTPATIENT)
Dept: CARE COORDINATION | Facility: CLINIC | Age: 73
End: 2023-10-20
Payer: COMMERCIAL

## 2023-10-20 NOTE — PROGRESS NOTES
"Clinic Care Coordination Contact  Community Health Worker Initial Outreach    CHW Initial Information Gathering:  Referral Source: PCP  Current living arrangement:: I live alone  Type of residence:: Private home - stairs  Community Resources: None  Supplies Currently Used at Home: None (Pt states she should have compression socks)  Equipment Currently Used at Home: none  Informal Support system:: Other (I do. I have a small group of people I can reach out to for support, though she doesn't specify.)  No PCP office visit in Past Year: No  Transportation means:: Regular car  CHW Additional Questions  If ED/Hospital discharge, follow-up appointment scheduled as recommended?: N/A  Medication changes made following ED/Hospital discharge?: N/A  MyChart active?: Yes  Patient sent Social Determinants of Health questionnaire?: Yes (Filled out SDOH questionnaire at recent VV with Alonzo Pichardo on 10/18/23.)    Patient accepts CC: Yes. Patient scheduled for assessment with ARELI Hart RN, on 10/23/23 at 10:00 am. Patient noted desire to discuss resources for decluttering and deep cleaning her house, discuss food resources as pt volunteers she goes to the [employer].       Spoke with pt this afternoon:  Pt answered yes to the following SDOH question at VV on 10/18/23: Within the past 12 months, have you or your family members you live with been unable to get utilities (heat, electricity) when it was really needed? Pt denies having difficulty paying her utility bills in the last 12 months at time of this phone conversation  House cleaning - Pt would be interested in decluttering and a deep clean services for her home. \"I have cats and I have a \"cat litter problem\".   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 Pt may be interested in the [employer] program.    Dunia Aguillon  Community Health Worker  [facility]  [phone]  "

## 2023-10-23 ENCOUNTER — PATIENT OUTREACH (OUTPATIENT)
Dept: NURSING | Facility: CLINIC | Age: 73
End: 2023-10-23
Payer: COMMERCIAL

## 2023-10-23 ASSESSMENT — ACTIVITIES OF DAILY LIVING (ADL): DEPENDENT_IADLS:: COOKING;SHOPPING;MEAL PREPARATION

## 2023-10-23 NOTE — LETTER
[facility] CARE COORDINATION  [address]  [place] [address]    October 23, 2023    Leena Meaghan Bender Churchill  [address]  [facility] [address]      Dear Leena,        I am a  clinic care coordinator who works with Jory Fragoso NP with the [facility]. I wanted to thank you for spending the time to talk with me.  Below is a description of clinic care coordination and how I can further assist you.       The clinic care coordination team is made up of a registered nurse, , financial resource worker and community health worker who understand the health care system. The goal of clinic care coordination is to help you manage your health and improve access to the health care system. Our team works alongside your provider to assist you in determining your health and social needs. We can help you obtain health care and community resources, providing you with necessary information and education. We can work with you through any barriers and develop a care plan that helps coordinate and strengthen the communication between you and your care team.  Our services are voluntary and are offered without charge to you personally.    Please feel free to contact me with any questions or concerns regarding care coordination and what we can offer.      Here are some resources we spoke about today.     Help at Your Door   https://helpatyourdoor.org/   Help At Your Door is a nonprofit serving seniors and individuals with disabilities across [place].     Our?Store To Door grocery assistance,?home support?and?transportation services?provide help with in-home tasks and chores.     Senior Community Services     https://seniorcommunity.org/services/home-program/    Indoor and Outdoor Chore & Home Maintenance     ?The HOME program takes care of the person by taking care of the home.     Our dedicated staff and volunteers  provide indoor and outdoor chores. As a nonprofit, we offer affordable services to [place] age 60+, regardless of income.    Attila Resourcesarent?    5-654-648-2426?   https://Manzuo.com.UCWeb/    Order rides, grocery delivery, pharmacy delivery, meals, home chores and more by calling our convenient phone number. We intercept GPS issues,  communication troubles and more to oversee trips from request to fulfillment with 100% reliability.?     3. I will review and research resources provided for Food.     Meals on Wheels     https://orders.meals-on-wheels.UCWeb/?btkbis=81763    Phone [phone]    Mobile Market at [facility](2.17 miles away)  [address] [place] [address]  One time $80 voucher    10/26, 11/2, 11/9, 11/16, 11/30, 12/7, 12/21t Thursday from 3:30- 4:30 pm    Please note- all sales are subject to last minute change. Please check in with us at Power Analytics Corporation.org, [email or web address], or at [phone]    [facility] Fishes at [facility](1.78 miles away)  [address] [place] [address]  Grab-and-go meals    Service Location: [facility] on [address] [place] [address]. S on the east side of the building    Special Notice: No meal service on Indigenous People s Day (10/9/23), Thanksgiving (11/23/23), and 12/12/23.    Monday through Thursday, 5:15 to 6:15 pm  [phone]    Visit [facility] Website    Get Directions to [facility]      We are focused on providing you with the highest-quality healthcare experience possible.    Sincerely,     Pam Villalobos, JAYESHN, RN, PHN   Care Coordinator-[facility] and [facility]  [phone]      Enclosed: I have enclosed a copy of a 24 Hour Access Plan. This has helpful phone numbers for you to call when needed. Please keep this in an easy to access place to use as needed., I have  enclosed a copy of the Patient Centered Plan of Care. This has helpful information and goals that we have talked about. Please keep this in an easy to access place to use as needed., and I have enclosed helpful educational material. Please review and call me with any questions.

## 2023-10-23 NOTE — LETTER
[facility]  Patient Centered Plan of Care  About Me:        Patient Name:  Leena Montaño    YOB: 1950  Age:         73 year old   Carlos MRN:    [id number] Telephone Information:  Home Phone [phone]   Mobile [phone]       Address:  [address] [facility] [address] Email address:  [email or web address]      Emergency Contact(s)    Name Relationship Lgl Grd Work Phone Home Phone Mobile Phone   1. MARTÍN MONTAÑO Son   [phone]    2. MEME MONTAÑO Daughter   [phone]    3. DANIE,CAROLINA Friend    [phone]   4. MARTÍN WRIGHT Other    [phone]   5. IMELDA BEGUM Sister-in-Law   [phone]           Primary language:  English     needed? No   [facility]:  [phone] op. 1  Other communication barriers:None    Preferred Method of Communication:  Mail  Current living arrangement: I live alone    Mobility Status/ Medical Equipment: Independent        Health Maintenance  Health Maintenance Reviewed: Due/Overdue   Health Maintenance Due   Topic Date Due    ZOSTER IMMUNIZATION (1 of 2) Never done    HEPATITIS B IMMUNIZATION (1 of 3 - Risk 3-dose series) Never done    RSV VACCINE 60+ (1 - 1-dose 60+ series) Never done    COLORECTAL CANCER SCREENING  10/11/2022    INFLUENZA VACCINE (1) 09/01/2023    COVID-19 Vaccine (6 - 2023-24 season) 09/01/2023    MEDICARE ANNUAL WELLNESS VISIT  09/23/2023    LIPID  09/23/2023    MICROALBUMIN  09/23/2023    DIABETIC FOOT EXAM  10/12/2023           My Access Plan  Medical Emergency 911   Primary Clinic Line [facility] - [phone]   24 Hour Appointment Line [phone] or  [phone]) (toll-free)   24 Hour Nurse Line [phone] (toll-free)   Preferred Urgent Care [facility], [phone]     Preferred Hospital [facility]  [phone]     Preferred [facility]acy [facility] [place], [place] - [address] [place]     Behavioral Health Crisis Line The National Suicide Prevention Lifeline at 1-683.323.8991 or Text/Call 988             My Care Team Members  Patient Care Team         Relationship Specialty Notifications Start End    Jory Fragoso, NP PCP - General   2/21/03     Phone: [phone] Fax: [phone] [address] [place] [address]    Karen Regalado MD MD Neurology  1/12/22     Phone: [phone] Fax: [phone]         [address] [place] [address]    Lisandro Cade MD Assigned Sleep Provider   1/28/23     Phone: [phone] Fax: [phone] [address] [facility] [address]    Jory Fragoso NP Assigned PCP   8/26/23     Phone: [phone] Fax: [phone] [address], [facility] [address] [place] [address]    Pam Villalobos, RN Lead Care Coordinator  Admissions 10/20/23               My Care Plans  Self Management and Treatment Plan  Care Plan  Care Plan: Help At Home       Problem: Insufficient In-home support       Goal: Establish adequate home support       Start Date: 10/23/2023    This Visit's Progress: 10%    Priority: High    Note:     Barriers: Depression sx: Limited support system; diagnoses of multiple, chronic, complex medical conditions  Strengths: Motivated; agreeable to Care Coordination; financial stability  Patient expressed understanding of goal: Yes  Action steps to achieve this goal:  1. I will review and research resources provided for assistance with Home making, organization.  Help at Your Door   https://helpatyourdoor.org/   Help At Your Door is a nonprofit serving seniors and individuals with disabilities across [place].     Our?Store To Door grocery assistance,?home support?and?transportation services?provide help with in-home tasks and chores.     Senior Community Services      https://seniorcommunity.org/services/home-program/    Indoor and Outdoor Chore & Home Maintenance     ?The HOME program takes care of the person by taking care of the home.     Our dedicated staff and volunteers provide indoor and outdoor chores. As a nonprofit, we offer affordable services to [place] age 60+, regardless of income.    "[employer]Diana?    0-033-220-9704?   https://BusinessElite/    Order rides, grocery delivery, pharmacy delivery, meals, home chores and more by calling our convenient phone number. We intercept GPS issues,  communication troubles and more to oversee trips from request to fulfillment with 100% reliability.?     3. I will review and research resources provided for Food.     Meals on Wheels     https://orders.meals-on-wheels.Frogdice/?tlvmtc=80280    Phone [phone]    Mobile Market at [facility](2.17 miles away)  [address] [place] [address]  One time $80 voucher    10/26, 11/2, 11/9, 11/16, 11/30, 12/7, 12/21t Thursday from 3:30- 4:30 pm    Please note- all sales are subject to last minute change. Please check in with us at [employer].org, [email or web address], or at [phone]    Loaves & Fishes at [facility](1.78 miles away)  [address] [place] [address]  Grab-and-go meals    Service Location: [facility] on [address] [facility]. S on the east side of the building    Special Notice: No meal service on Indigenous People s Day (10/9/23), Thanksgiving (11/23/23), and 12/12/23.    Monday through Thursday, 5:15 to 6:15 pm  [phone]    Visit [facility] at [facility] Website    Get Directions to [facility] at [facility]        4. I will contact Care Coordinator for additional resources if resources provided do not work for my lifestyle/situation.     5. I will contact my care team with questions, concerns or support needs. I will use the clinic as a resource  and I understand I can contact my clinic with 24/7 after hours services available. Care Coordinator will remain available as needed.                                Action Plans on File:            Depression          Advance Care Plans/Directives Type:   No data recorded    My Medical and Care Information  Problem List   Patient Active Problem List   Diagnosis    Osteoarthritis    Sleep apnea    Allergic rhinitis    Hypothyroidism    Peripheral neuropathy    Hyperlipidemia LDL goal <130    Hypertension goal BP (blood pressure) < 140/90    Advanced directives, counseling/discussion    Anxiety    Moderate major depression (H)    Type 2 diabetes mellitus with other specified complication, without long-term current use of insulin (H)    Morbid obesity (H)    Exposure to blastomycosis    Elevated antinuclear antibody (JUDY) level    Physical deconditioning    Insomnia, unspecified type      Current Medications and Allergies:  See printed Medication Report.    Care Coordination Start Date: 10/18/2023   Frequency of Care Coordination: monthly     Form Last Updated: 10/23/2023

## 2023-10-23 NOTE — PROGRESS NOTES
Clinic Care Coordination Contact  Clinic Care Coordination Contact  OUTREACH    Referral Information:  Referral Source: PCP         Chief Complaint   Patient presents with    Clinic Care Coordination - Initial        Universal Utilization: 22.9% Admission or ED risk  Clinic Utilization  Difficulty keeping appointments:: No  Compliance Concerns: No  No-Show Concerns: No  No PCP office visit in Past Year: No  Utilization      No Show Count (past year)  0             ED Visits  0             Hospital Admissions  0                    Current as of: 10/21/2023  9:11 PM                Clinical Concerns:  Current Medical Concerns:  Depression, new neurological sx.     Current Behavioral Concerns: Concerns for managing homemaking, cleaning, food prep.     Education Provided to patient: Educated on Care Coordination-enrolled, educated on community services for homemaking, organization, cleaning, food resources.    Pain  Pain (GOAL):: No  Health Maintenance Reviewed: Due/Overdue   Health Maintenance Due   Topic Date Due    ZOSTER IMMUNIZATION (1 of 2) Never done    HEPATITIS B IMMUNIZATION (1 of 3 - Risk 3-dose series) Never done    RSV VACCINE 60+ (1 - 1-dose 60+ series) Never done    COLORECTAL CANCER SCREENING  10/11/2022    INFLUENZA VACCINE (1) 09/01/2023    COVID-19 Vaccine (6 - 2023-24 season) 09/01/2023    MEDICARE ANNUAL WELLNESS VISIT  09/23/2023    LIPID  09/23/2023    MICROALBUMIN  09/23/2023    DIABETIC FOOT EXAM  10/12/2023         Medication Management:  Medication review status: Medications reviewed and no changes reported per patient.          Current Outpatient Medications:     ARIPiprazole (ABILIFY) 5 MG tablet, , Disp: , Rfl:     blood glucose (NO BRAND SPECIFIED) lancets standard, Use to test blood sugar TWO times daily or as directed., Disp: 200 each, Rfl: 0    blood glucose (NO BRAND SPECIFIED) test strip, Use to test blood sugar 2 times daily or as directed., Disp: 200 strip, Rfl: 0    blood glucose  monitoring (NO BRAND SPECIFIED) meter device kit, Use to test blood sugar 2  times daily or as directed. Contour next ez, Disp: 1 kit, Rfl: 0    DULoxetine (CYMBALTA) 30 MG capsule, TAKE ONE CAPSULE BY MOUTH ONCE DAILY, Disp: 90 capsule, Rfl: 1    DULoxetine (CYMBALTA) 60 MG capsule, TAKE ONE CAPSULE BY MOUTH ONCE DAILY, Disp: 90 capsule, Rfl: 1    levothyroxine (SYNTHROID/LEVOTHROID) 75 MCG tablet, TAKE ONE TABLET BY MOUTH ONCE DAILY, Disp: 90 tablet, Rfl: 2    losartan (COZAAR) 100 MG tablet, TAKE ONE TABLET BY MOUTH ONCE DAILY, Disp: 90 tablet, Rfl: 3    order for DME, Equipment being ordered: light box, Disp: 1 Units, Rfl: 0    ORDER FOR DME, Equipment being ordered: CPAP mask and tubing, Disp: 1 Units, Rfl: 0    traZODone (DESYREL) 50 MG tablet, Take 1.5-2 tablets ( mg) by mouth At Bedtime, Disp: 60 tablet, Rfl: 12     Allergies   Allergen Reactions    Cats     Dogs           Functional Status:  Dependent ADLs:: Independent  Dependent IADLs:: Cooking, Shopping, Meal Preparation  Bed or wheelchair confined:: No  Mobility Status: Independent  Fallen 2 or more times in the past year?: Yes  Any fall with injury in the past year?: Yes (Hit head)    Living Situation:  Current living arrangement:: I live alone  Type of residence:: Private home - stairs    Lifestyle & Psychosocial Needs:    Social Determinants of Health     Food Insecurity: Low Risk  (10/18/2023)    Food Insecurity     Within the past 12 months, did you worry that your food would run out before you got money to buy more?: No     Within the past 12 months, did the food you bought just not last and you didn t have money to get more?: No   Depression: Not at risk (10/18/2023)    PHQ-2     PHQ-2 Score: 2   Housing Stability: Low Risk  (10/18/2023)    Housing Stability     Do you have housing? : Yes     Are you worried about losing your housing?: No   Tobacco Use: Low Risk  (10/18/2023)    Patient History     Smoking Tobacco Use: Never     Smokeless  Tobacco Use: Never     Passive Exposure: Not on file   Financial Resource Strain: High Risk (10/18/2023)    Financial Resource Strain     Within the past 12 months, have you or your family members you live with been unable to get utilities (heat, electricity) when it was really needed?: Yes   Alcohol Use: Not on file   Transportation Needs: Low Risk  (10/18/2023)    Transportation Needs     Within the past 12 months, has lack of transportation kept you from medical appointments, getting your medicines, non-medical meetings or appointments, work, or from getting things that you need?: No   Physical Activity: Not on file   Interpersonal Safety: Not on file   Stress: Not on file   Social Connections: Not on file     Diet:: Diabetic diet  Inadequate nutrition (GOAL):: No  Tube Feeding: No  Inadequate activity/exercise (GOAL):: No  Significant changes in sleep pattern (GOAL): No  Transportation means:: Regular car     Sabianist or spiritual beliefs that impact treatment::  (High power)  Chemical Dependency Status: No Current Concerns  Informal Support system:: Other, Family (I do. I have a small group of people I can reach out to for support, though she doesn't specify.)      Resources and Interventions:  Current Resources:      Community Resources: None  Supplies Currently Used at Home: None (Pt states she should have compression socks)  Equipment Currently Used at Home: none  Employment Status: retired         Advance Care Plan/Directive  Advanced Care Plans/Directives on file:: No  Advanced Care Plan/Directive Status: Declined Further Information         Care Plan:  Care Plan: Help At Home       Problem: Insufficient In-home support       Goal: Establish adequate home support       Start Date: 10/23/2023    This Visit's Progress: 10%    Priority: High    Note:     Barriers: Depression sx: Limited support system; diagnoses of multiple, chronic, complex medical conditions  Strengths: Motivated; agreeable to Care  Coordination; financial stability  Patient expressed understanding of goal: Yes  Action steps to achieve this goal:  1. I will review and research resources provided for assistance with Home making, organization.-sent via Progressive Dealer Tools  Help at Your Door   https://Face++.CritiSense/   Help At Your Door is a nonprofit serving seniors and individuals with disabilities across [place].     Our?Store To Door grocery assistance,?home support?and?transportation services?provide help with in-home tasks and chores.     Senior Community Services     https://seniorcommunity.org/services/home-program/    Indoor and Outdoor Chore & Home Maintenance     ?The HOME program takes care of the person by taking care of the home.     Our dedicated staff and volunteers provide indoor and outdoor chores. As a nonprofit, we offer affordable services to [place]ans age 60+, regardless of income.    Ignyta?    9-722-741-3275?   https://ToyTalk/    Order rides, grocery delivery, pharmacy delivery, meals, home chores and more by calling our convenient phone number. We intercept GPS issues,  communication troubles and more to oversee trips from request to fulfillment with 100% reliability.?     3. I will review and research resources provided for Food.     Meals on Wheels     https://orders.meals-onAlleantiawheels.Vasopharm/?xycarn=64127    Phone [phone]    Mobile Market at [facility](2.17 miles away)  [address] [place] [address]  One time $80 voucher    10/26, 11/2, 11/9, 11/16, 11/30, 12/7, 12/21t Thursday from 3:30- 4:30 pm    Please note- all sales are subject to last minute change. Please check in with us at MarkaVIP.org, [email or web address], or at [phone]    [facility]ishes at [facility](1.78 miles away)  [address] [place] [address]  Grab-and-go meals    Service Location: [facility] on [address] on the east side of the building    Special Notice: No meal service on Indigenous People s Day (10/9/23), Thanksgiving (11/23/23), and 12/12/23.    Monday through Thursday, 5:15 to 6:15 pm  [phone]    Visit [facility] Website    Get Directions to [facility]        4. I will contact Care Coordinator for additional resources if resources provided do not work for my lifestyle/situation.     5. I will contact my care team with questions, concerns or support needs. I will use the clinic as a resource and I understand I can contact my clinic with 24/7 after hours services available. Care Coordinator will remain available as needed.                               Patient/Caregiver understanding: Patient/caregiver verbalized understanding and denies any additional questions or concerns at this time. RNCC engaged in AIDET communications during encounter.       Outreach Frequency: monthly  Future Appointments                In 1 week EIC[facility]1 [facility]    In 3 weeks Davin Hernandez DO [facility]            Plan: Patient/caregiver will call RNCC with questions, concerns, support needs. RNCC will be available as needed. . Next out reach 1 month.     JAYESH BrownN, RN, PHN   Care Coordinator-[facility] and [facility]  [phone]

## 2023-10-24 ENCOUNTER — TELEPHONE (OUTPATIENT)
Dept: NURSING | Facility: CLINIC | Age: 73
End: 2023-10-24
Payer: COMMERCIAL

## 2023-10-24 NOTE — TELEPHONE ENCOUNTER
Telephone call  Patient calling the she was seen in a virtual visit 10/18/60644 she describes that her tongue is vibrating her whole lower jaw. She states the  vibrating has been getting more pronounced.  She is scheduled for a  MRI  on 11/3/2023.  She is taking abilify that can cause tardive dyskinesia.  She is wondering if she should stop taking the med.  Routing to PCP.    Trish Pierce RN   [facility] Nurse Advisor  10:46 AM 10/24/2023

## 2023-10-24 NOTE — TELEPHONE ENCOUNTER
"Per pt Abiliby  rx'd by  provider Luis M at [facility] about 3 months ago for depression and anxiety.     Hand tremor started one year agp and the tongue shaking about 2 weeks ago . Not affecting her speech and eating but \"quite advanced\"  People would  now notice her neck and lower jaw shaking. Worse than when she had the video visit on 10/18     She cut back Abilify from 5 mg to 2.5 mg on her own about 2 weeks ago and sx are getting worse. .  \"It is almost like that tardive dyskinesia\"    Pt already has a phone call out to [place] today about taking the half dose or stopping    Pt was  given appt in person with you on 10/30 . Wants to know if you want labwork before appt and if you have any more advice for her.     Her MRI is scheduled for 11/3    (Patient has given permission to leave detailed message on voice mail if no answer on phone.)    Cira Chandler RN, BSN  [facility]                    "

## 2023-10-25 ENCOUNTER — TELEPHONE (OUTPATIENT)
Dept: GASTROENTEROLOGY | Facility: CLINIC | Age: 73
End: 2023-10-25
Payer: COMMERCIAL

## 2023-10-25 NOTE — TELEPHONE ENCOUNTER
"Endoscopy Scheduling Screen    Have you had a positive Covid test in the last 14 days?  No    Are you active on MyChart?   Yes    What insurance is in the chart?  Other:  BCBS    Ordering/Referring Provider: CARISSA CASTAÑEDA    (If ordering provider performs procedure, schedule with ordering provider unless otherwise instructed. )    BMI: Estimated body mass index is 33.3 kg/m  as calculated from the following:    Height as of 5/25/23: 1.626 m (5' 4\").    Weight as of 5/25/23: 88 kg (194 lb).     Sedation Ordered  moderate sedation.   If patient BMI > 50 do not schedule in [facility].    If patient BMI > 45 do not schedule at [facility].    Are you taking methadone or Suboxone?  No    Are you taking any prescription medications for pain 3 or more times per week?   No    Do you have a history of malignant hyperthermia or adverse reaction to anesthesia?  No    (Females) Are you currently pregnant?   No     Have you been diagnosed or told you have pulmonary hypertension?   No    Do you have an LVAD?  No    Have you been told you have moderate to severe sleep apnea?  Yes (RN Review required for scheduling unless scheduling in Hospital.)    Have you been told you have COPD, asthma, or any other lung disease?  No    Do you have any heart conditions?  No     Have you ever had an organ transplant?   No    Have you ever had or are you awaiting a heart or lung transplant?   No    Have you had a stroke or transient ischemic attack (TIA aka \"mini stroke\" in the last 6 months?   No    Have you been diagnosed with or been told you have cirrhosis of the liver?   No    Are you currently on dialysis?   No    Do you need assistance transferring?   No    BMI: Estimated body mass index is 33.3 kg/m  as calculated from the following:    Height as of 5/25/23: 1.626 m (5' 4\").    Weight as of 5/25/23: 88 kg (194 lb).     Is patients BMI > 40 and scheduling location [facility]?  No    Do you take an injectable medication for weight loss or diabetes " (excluding insulin)?  No    Do you take the medication Naltrexone?  No    Do you take blood thinners?  No       Prep   Are you currently on dialysis or do you have chronic kidney disease?  No    Do you have a diagnosis of diabetes?  Yes (Golytely Prep)    Do you have a diagnosis of cystic fibrosis (CF)?  No    On a regular basis do you go 3 -5 days between bowel movements?  No    BMI > 40?  No    Preferred Pharmacy:    [facility] - [place], [place] - [address] [place] [address]  Phone: [phone] Fax: [phone]      Final Scheduling Details   Colonoscopy prep sent?  Standard Golytely    Procedure scheduled  Colonoscopy    Surgeon:       Date of procedure:  01/15/24     Pre-OP / PAC:   No - Not required for this site.    Location  [facility] - Per order.    Sedation   Moderate Sedation - Per order.      Patient Reminders:   You will receive a call from a Nurse to review instructions and health history.  This assessment must be completed prior to your procedure.  Failure to complete the Nurse assessment may result in the procedure being cancelled.      On the day of your procedure, please designate an adult(s) who can drive you home stay with you for the next 24 hours. The medicines used in the exam will make you sleepy. You will not be able to drive.      You cannot take public transportation, ride share services, or non-medical taxi service without a responsible caregiver.  Medical transport services are allowed with the requirement that a responsible caregiver will receive you at your destination.  We require that drivers and caregivers are confirmed prior to your procedure.

## 2023-10-25 NOTE — TELEPHONE ENCOUNTER
TD can worsen during a taper of the offending drug and can also take weeks or months to resolve (sometimes doesn't resolve).  I think it would be best to follow up with psychiatrist.

## 2023-10-25 NOTE — TELEPHONE ENCOUNTER
"Pema's comments relayed to pt    Pt states that \"we don't know for sure it is from the medication\" \"I can't say they are totally linked because my hands were shaking\" long before starting the medication. But the tongue shaking started after starting the Abilify about a couple months of use. She has never spoken to anyone about the hand tremors before. This has been going on for months, long before the Abilify    Pt cancelled appt on 10/30 with Pema and will get labs, MRI and then appt with DR Hernandez (this all set aready)    If symptoms worsen or change to call back.     Cira Chandler RN    "

## 2023-10-30 DIAGNOSIS — F32.1 MODERATE MAJOR DEPRESSION (H): ICD-10-CM

## 2023-10-30 DIAGNOSIS — G47.00 INSOMNIA, UNSPECIFIED TYPE: ICD-10-CM

## 2023-10-30 RX ORDER — DULOXETIN HYDROCHLORIDE 30 MG/1
CAPSULE, DELAYED RELEASE ORAL
Qty: 90 CAPSULE | Refills: 1 | Status: ON HOLD | OUTPATIENT
Start: 2023-10-30 | End: 2024-03-26

## 2023-10-30 RX ORDER — DULOXETIN HYDROCHLORIDE 60 MG/1
CAPSULE, DELAYED RELEASE ORAL
Qty: 90 CAPSULE | Refills: 1 | Status: SHIPPED | OUTPATIENT
Start: 2023-10-30 | End: 2024-05-12

## 2023-10-30 RX ORDER — TRAZODONE HYDROCHLORIDE 50 MG/1
TABLET, FILM COATED ORAL
Qty: 60 TABLET | Refills: 3 | Status: ON HOLD | OUTPATIENT
Start: 2023-10-30 | End: 2024-03-26

## 2023-11-03 ENCOUNTER — ANCILLARY PROCEDURE (OUTPATIENT)
Dept: MRI IMAGING | Facility: CLINIC | Age: 73
End: 2023-11-03
Attending: FAMILY MEDICINE
Payer: COMMERCIAL

## 2023-11-03 DIAGNOSIS — R25.3 TONGUE FASCICULATION: ICD-10-CM

## 2023-11-03 PROCEDURE — A9585 GADOBUTROL INJECTION: HCPCS | Mod: JZ | Performed by: FAMILY MEDICINE

## 2023-11-03 PROCEDURE — 70553 MRI BRAIN STEM W/O & W/DYE: CPT

## 2023-11-03 PROCEDURE — 255N000002 HC RX 255 OP 636: Mod: JZ | Performed by: FAMILY MEDICINE

## 2023-11-03 RX ORDER — GADOBUTROL 604.72 MG/ML
9 INJECTION INTRAVENOUS ONCE
Status: COMPLETED | OUTPATIENT
Start: 2023-11-03 | End: 2023-11-03

## 2023-11-03 RX ADMIN — GADOBUTROL 9 ML: 604.72 INJECTION INTRAVENOUS at 11:42

## 2023-11-04 ENCOUNTER — HEALTH MAINTENANCE LETTER (OUTPATIENT)
Age: 73
End: 2023-11-04

## 2023-11-06 ENCOUNTER — LAB (OUTPATIENT)
Dept: LAB | Facility: CLINIC | Age: 73
End: 2023-11-06
Payer: COMMERCIAL

## 2023-11-06 DIAGNOSIS — R25.3 TONGUE FASCICULATION: ICD-10-CM

## 2023-11-06 LAB
ALT SERPL W P-5'-P-CCNC: 12 U/L (ref 0–50)
ANION GAP SERPL CALCULATED.3IONS-SCNC: 9 MMOL/L (ref 7–15)
BUN SERPL-MCNC: 22 MG/DL (ref 8–23)
CALCIUM SERPL-MCNC: 8.9 MG/DL (ref 8.8–10.2)
CHLORIDE SERPL-SCNC: 99 MMOL/L (ref 98–107)
CHOLEST SERPL-MCNC: 229 MG/DL
CREAT SERPL-MCNC: 0.74 MG/DL (ref 0.51–0.95)
CREAT UR-MCNC: 156 MG/DL
DEPRECATED HCO3 PLAS-SCNC: 26 MMOL/L (ref 22–29)
EGFRCR SERPLBLD CKD-EPI 2021: 85 ML/MIN/1.73M2
ERYTHROCYTE [DISTWIDTH] IN BLOOD BY AUTOMATED COUNT: 13 % (ref 10–15)
GLUCOSE SERPL-MCNC: 98 MG/DL (ref 70–99)
HBA1C MFR BLD: 5.8 % (ref 0–5.6)
HCT VFR BLD AUTO: 38.9 % (ref 35–47)
HDLC SERPL-MCNC: 48 MG/DL
HGB BLD-MCNC: 13 G/DL (ref 11.7–15.7)
LDLC SERPL CALC-MCNC: 153 MG/DL
MAGNESIUM SERPL-MCNC: 2.1 MG/DL (ref 1.7–2.3)
MCH RBC QN AUTO: 30 PG (ref 26.5–33)
MCHC RBC AUTO-ENTMCNC: 33.4 G/DL (ref 31.5–36.5)
MCV RBC AUTO: 90 FL (ref 78–100)
MICROALBUMIN UR-MCNC: <12 MG/L
MICROALBUMIN/CREAT UR: NORMAL MG/G{CREAT}
NONHDLC SERPL-MCNC: 181 MG/DL
PLATELET # BLD AUTO: 316 10E3/UL (ref 150–450)
POTASSIUM SERPL-SCNC: 4.2 MMOL/L (ref 3.4–5.3)
RBC # BLD AUTO: 4.33 10E6/UL (ref 3.8–5.2)
SODIUM SERPL-SCNC: 134 MMOL/L (ref 135–145)
TRIGL SERPL-MCNC: 138 MG/DL
VIT D+METAB SERPL-MCNC: 27 NG/ML (ref 20–50)
WBC # BLD AUTO: 8.7 10E3/UL (ref 4–11)

## 2023-11-06 PROCEDURE — 36415 COLL VENOUS BLD VENIPUNCTURE: CPT

## 2023-11-06 PROCEDURE — 80048 BASIC METABOLIC PNL TOTAL CA: CPT

## 2023-11-06 PROCEDURE — 82043 UR ALBUMIN QUANTITATIVE: CPT

## 2023-11-06 PROCEDURE — 82570 ASSAY OF URINE CREATININE: CPT

## 2023-11-06 PROCEDURE — 82306 VITAMIN D 25 HYDROXY: CPT

## 2023-11-06 PROCEDURE — 83735 ASSAY OF MAGNESIUM: CPT

## 2023-11-06 PROCEDURE — 83036 HEMOGLOBIN GLYCOSYLATED A1C: CPT

## 2023-11-06 PROCEDURE — 85027 COMPLETE CBC AUTOMATED: CPT

## 2023-11-06 PROCEDURE — 80061 LIPID PANEL: CPT

## 2023-11-06 PROCEDURE — 84460 ALANINE AMINO (ALT) (SGPT): CPT

## 2023-11-15 ENCOUNTER — OFFICE VISIT (OUTPATIENT)
Dept: NEUROLOGY | Facility: CLINIC | Age: 73
End: 2023-11-15
Payer: COMMERCIAL

## 2023-11-15 VITALS
WEIGHT: 201.3 LBS | SYSTOLIC BLOOD PRESSURE: 129 MMHG | DIASTOLIC BLOOD PRESSURE: 82 MMHG | HEIGHT: 64 IN | HEART RATE: 88 BPM | BODY MASS INDEX: 34.37 KG/M2 | OXYGEN SATURATION: 97 %

## 2023-11-15 DIAGNOSIS — R25.1 TREMOR: Primary | ICD-10-CM

## 2023-11-15 PROCEDURE — 99205 OFFICE O/P NEW HI 60 MIN: CPT | Performed by: PSYCHIATRY & NEUROLOGY

## 2023-11-15 NOTE — PATIENT INSTRUCTIONS
AFTER VISIT SUMMARY (AVS):    At today's visit we thoroughly discussed various diagnostic possibilities for your symptoms, necessary evaluation, and the plan, which includes:  Orders Placed This Encounter   Procedures    NM Brain Imaging Tomographic (Spect) Datscan     No new medications.    Next follow-up appointment is in the next 2 months or earlier if needed.    Please do not hesitate to call me with any questions or concerns.    Thanks.

## 2023-11-15 NOTE — LETTER
11/15/2023         RE: Leena Montaño  [address]  [facility] [address]        Dear Colleague,    Thank you for referring your patient, Leena Montaño, to the [facility]. Please see a copy of my visit note below.    INITIAL NEUROLOGY CONSULTATION    DATE OF VISIT: 11/15/2023  CLINIC LOCATION: [facility]  MRN: [id number]  PATIENT NAME: Leena Montaño  YOB: 1950    REASON FOR VISIT:   Chief Complaint   Patient presents with     Consult For     Tongue fasciculations and jaw      HISTORY OF PRESENT ILLNESS:                                                    Ms. Leena Montaño is 73 year old right handed female patient with past medical history of hypertension, hyperlipidemia, anxiety, diabetes mellitus type 2, peripheral neuropathy, depression, obesity, and insomnia, who was seen today for bilateral hand and chin tremor.    Per patient's report, several years ago she developed bilateral hand tremor, resting more than positional, that seems to be worsening over time, though it does not affect her daily functioning yet.  Approximately a month or two ago, she also noticed tongue tremor, which evolved into lower jaw tremor that she is able to stop if she pushes her tongue against her teeth.  She is on duloxetine and trazodone.  About a month ago she stopped Abilify, but so far did not notice any improvement.    She has history of peripheral polyneuropathy that affects her sensation up to her knees.  She also developed hammertoes bilaterally over time.  Her mother also had hammertoes, but her neuropathy was not as advanced the patient's one.  Otherwise, she denies anosmia, bradykinesia, acting out in sleep, changes in her gait and balance.    Laboratory evaluation from November 2023 includes slightly low sodium of 134 on otherwise unrevealing CMP, elevated hemoglobin A1C (5.8), elevated LDL (153), normal  "vitamin D (27), and unremarkable CBC.    Brain MRI with and without contrast from 11/3/2023 demonstrated mild non-specific T2 hyperintensities, felt consistent with chronic small vessel ischemic disease without additional abnormalities or pathologic contrast-enhancement.  Images were personally reviewed and independently interpreted.    No additional useful information is available in Care Everywhere, which was reviewed.  PAST MEDICAL/SURGICAL HISTORY:                                                    I personally reviewed patient's past medical and surgical history with the patient at today's visit.  MEDICATIONS:                                                    I personally reviewed patient's medications and allergies with the patient at today's visit.  ALLERGIES:                                                      Allergies   Allergen Reactions     Cats      Dogs      EXAM:                                                    VITAL SIGNS:   /82   Pulse 88   Ht 1.626 m (5' 4\")   Wt 91.3 kg (201 lb 4.8 oz)   LMP  (LMP Unknown)   SpO2 97%   BMI 34.55 kg/m    Mini-Cog Assessment:  Mini Cog Assessment  Clock Draw Score: 2 Normal  3 Item Recall: 3 objects recalled  Mini Cog Total Score: 5  Administered by: : Massiel baxter    General: pt is in NAD, cooperative.  Skin: normal turgor, moist mucous membranes, no lesions/rashes noticed.  HEENT: ATNC, EOMI, PERRL, white sclera, normal conjunctiva, no nystagmus or ptosis. No carotid bruits bilaterally.  Respiratory: lung sounds clear to auscultation bilaterally, no crackles, wheezes, rhonchi. Symmetric lung excursion, no accessory respiratory muscle use.  Cardiovascular: normal S1/S2, no murmurs/rubs/gallops.   Abdomen: Not distended.  : deferred.    Neurological:  Mental: alert, follows commands, Mini Cog Total Score: 5/5 with 3/3 on memory recall, no aphasia or dysarthria. Fund of knowledge is appropriate for age.  Cranial Nerves:  CN II: visual acuity - " able to accurately count fingers with each eye. Visual fields intact, fundi: discs sharp, no papilledema and normal vessels bilaterally.  CN III, IV, VI: EOM intact, pupils equal and reactive  CN V: facial sensation nl  CN VII: face symmetric, no facial droop  CN VIII: hearing normal  CN IX: palate elevation symmetric, uvula at midline  CN XI SCM normal, shoulder shrug nl  CN XII: tongue midline  Motor: Strength: 5/5 in all major groups of all extremities. Normal tone at rest, but slightly increases with provoking maneuvers in the right upper extremity.  Has mild positional bilateral hand tremor, and intermittent, but more pronounced, bilateral resting hand tremor.  Also noticed to have lower jaw tremor.  No pronator drift b/l.  Reflexes: Triceps, biceps, brachioradialis, and patellar reflexes somewhat brisk, but symmetric bilaterally, achilles reflexes are diminished bilaterally. No clonus noted. Toes are down-going b/l.   Sensory: light touch, pinprick, and vibration reduced from the knees down, including both feet. Romberg: negative.  Coordination: FNF and heel-shin tests intact b/l.   Gait: Preserved stride length and arm swings, but hand tremor worsens bilaterally while walking.  Has bilateral hammertoes deformities.  DATA:   LABS/EEG/IMAGING/OTHER STUDIES: I reviewed pertinent medical records, as detailed in the history of present illness.  ASSESSMENT AND PLAN:      ASSESSMENT: Leena Montaño is a 73 year old female patient with listed above past medical history, who presents with bilateral hand and chin tremor.    We had a detailed discussion with the patient regarding her presenting complaints.  The neurological exam today is noticeable for greater resting than positional bilateral hand tremor and lower jaw tremor along with mild rigidity with provoking maneuvers in addition to known, possibly hereditary, peripheral polyneuropathy.  We had a detailed discussion with the patient regarding her  symptoms.  I reviewed with the patient that they might be signs of beginning of Parkinson's disease versus parkinsonism, potentially drug-induced, though latter possibility is less likely given that she stopped her Abilify approximately a month ago without noticeable improvement.  Drug-induced tremor and essential tremor are also included in the differential.  For that reason, we decided to pursue DaTSCAN to clarify her diagnosis.    DIAGNOSES:    ICD-10-CM    1. Tremor  R25.1 NM Brain Imaging Tomographic (Spect) Datscan        PLAN: At today's visit we thoroughly discussed various diagnostic possibilities for patient's symptoms, necessary evaluation, and the plan, which includes:  Orders Placed This Encounter   Procedures     NM Brain Imaging Tomographic (Spect) Datscan     No new medications.    Next follow-up appointment is in the next 2 months or earlier if needed.    Total Time: 69 minutes spent on the date of the encounter doing chart review, history and exam, documentation and further activities per the note.    Nehemias Gao MD  [facility]  (Chart documentation was completed in part with Dragon voice-recognition software. Even though reviewed, some grammatical, spelling, and word errors may remain.)            Again, thank you for allowing me to participate in the care of your patient.        Sincerely,        Nehemias Gao MD

## 2023-11-15 NOTE — PROGRESS NOTES
INITIAL NEUROLOGY CONSULTATION    DATE OF VISIT: 11/15/2023  CLINIC LOCATION: [facility]  MRN: [id number]  PATIENT NAME: Leena Montaño  YOB: 1950    REASON FOR VISIT:   Chief Complaint   Patient presents with    Consult For     Tongue fasciculations and jaw      HISTORY OF PRESENT ILLNESS:                                                    Ms. Leena Montaño is 73 year old right handed female patient with past medical history of hypertension, hyperlipidemia, anxiety, diabetes mellitus type 2, peripheral neuropathy, depression, obesity, and insomnia, who was seen today for bilateral hand and chin tremor.    Per patient's report, several years ago she developed bilateral hand tremor, resting more than positional, that seems to be worsening over time, though it does not affect her daily functioning yet.  Approximately a month or two ago, she also noticed tongue tremor, which evolved into lower jaw tremor that she is able to stop if she pushes her tongue against her teeth.  She is on duloxetine and trazodone.  About a month ago she stopped Abilify, but so far did not notice any improvement.    She has history of peripheral polyneuropathy that affects her sensation up to her knees.  She also developed hammertoes bilaterally over time.  Her mother also had hammertoes, but her neuropathy was not as advanced the patient's one.  Otherwise, she denies anosmia, bradykinesia, acting out in sleep, changes in her gait and balance.    Laboratory evaluation from November 2023 includes slightly low sodium of 134 on otherwise unrevealing CMP, elevated hemoglobin A1C (5.8), elevated LDL (153), normal vitamin D (27), and unremarkable CBC.    Brain MRI with and without contrast from 11/3/2023 demonstrated mild non-specific T2 hyperintensities, felt consistent with chronic small vessel ischemic disease without additional abnormalities or pathologic contrast-enhancement.  Images were  "personally reviewed and independently interpreted.    No additional useful information is available in Care Everywhere, which was reviewed.  PAST MEDICAL/SURGICAL HISTORY:                                                    I personally reviewed patient's past medical and surgical history with the patient at today's visit.  MEDICATIONS:                                                    I personally reviewed patient's medications and allergies with the patient at today's visit.  ALLERGIES:                                                      Allergies   Allergen Reactions    Cats     Dogs      EXAM:                                                    VITAL SIGNS:   /82   Pulse 88   Ht 1.626 m (5' 4\")   Wt 91.3 kg (201 lb 4.8 oz)   LMP  (LMP Unknown)   SpO2 97%   BMI 34.55 kg/m    Mini-Cog Assessment:  Mini Cog Assessment  Clock Draw Score: 2 Normal  3 Item Recall: 3 objects recalled  Mini Cog Total Score: 5  Administered by: : Massiel baxter    General: pt is in NAD, cooperative.  Skin: normal turgor, moist mucous membranes, no lesions/rashes noticed.  HEENT: ATNC, EOMI, PERRL, white sclera, normal conjunctiva, no nystagmus or ptosis. No carotid bruits bilaterally.  Respiratory: lung sounds clear to auscultation bilaterally, no crackles, wheezes, rhonchi. Symmetric lung excursion, no accessory respiratory muscle use.  Cardiovascular: normal S1/S2, no murmurs/rubs/gallops.   Abdomen: Not distended.  : deferred.    Neurological:  Mental: alert, follows commands, Mini Cog Total Score: 5/5 with 3/3 on memory recall, no aphasia or dysarthria. Fund of knowledge is appropriate for age.  Cranial Nerves:  CN II: visual acuity - able to accurately count fingers with each eye. Visual fields intact, fundi: discs sharp, no papilledema and normal vessels bilaterally.  CN III, IV, VI: EOM intact, pupils equal and reactive  CN V: facial sensation nl  CN VII: face symmetric, no facial droop  CN VIII: hearing " normal  CN IX: palate elevation symmetric, uvula at midline  CN XI SCM normal, shoulder shrug nl  CN XII: tongue midline  Motor: Strength: 5/5 in all major groups of all extremities. Normal tone at rest, but slightly increases with provoking maneuvers in the right upper extremity.  Has mild positional bilateral hand tremor, and intermittent, but more pronounced, bilateral resting hand tremor.  Also noticed to have lower jaw tremor.  No pronator drift b/l.  Reflexes: Triceps, biceps, brachioradialis, and patellar reflexes somewhat brisk, but symmetric bilaterally, achilles reflexes are diminished bilaterally. No clonus noted. Toes are down-going b/l.   Sensory: light touch, pinprick, and vibration reduced from the knees down, including both feet. Romberg: negative.  Coordination: FNF and heel-shin tests intact b/l.   Gait: Preserved stride length and arm swings, but hand tremor worsens bilaterally while walking.  Has bilateral hammertoes deformities.  DATA:   LABS/EEG/IMAGING/OTHER STUDIES: I reviewed pertinent medical records, as detailed in the history of present illness.  ASSESSMENT AND PLAN:      ASSESSMENT: Leena Montaño is a 73 year old female patient with listed above past medical history, who presents with bilateral hand and chin tremor.    We had a detailed discussion with the patient regarding her presenting complaints.  The neurological exam today is noticeable for greater resting than positional bilateral hand tremor and lower jaw tremor along with mild rigidity with provoking maneuvers in addition to known, possibly hereditary, peripheral polyneuropathy.  We had a detailed discussion with the patient regarding her symptoms.  I reviewed with the patient that they might be signs of beginning of Parkinson's disease versus parkinsonism, potentially drug-induced, though latter possibility is less likely given that she stopped her Abilify approximately a month ago without noticeable improvement.   Drug-induced tremor and essential tremor are also included in the differential.  For that reason, we decided to pursue DaTSCAN to clarify her diagnosis.    DIAGNOSES:    ICD-10-CM    1. Tremor  R25.1 NM Brain Imaging Tomographic (Spect) Datscan        PLAN: At today's visit we thoroughly discussed various diagnostic possibilities for patient's symptoms, necessary evaluation, and the plan, which includes:  Orders Placed This Encounter   Procedures    NM Brain Imaging Tomographic (Spect) Datscan     No new medications.    Next follow-up appointment is in the next 2 months or earlier if needed.    Total Time: 69 minutes spent on the date of the encounter doing chart review, history and exam, documentation and further activities per the note.    Nehemias Gao MD  [facility]  (Chart documentation was completed in part with Dragon voice-recognition software. Even though reviewed, some grammatical, spelling, and word errors may remain.)

## 2023-12-05 ENCOUNTER — PATIENT OUTREACH (OUTPATIENT)
Dept: CARE COORDINATION | Facility: CLINIC | Age: 73
End: 2023-12-05
Payer: COMMERCIAL

## 2023-12-19 ENCOUNTER — PATIENT OUTREACH (OUTPATIENT)
Dept: CARE COORDINATION | Facility: CLINIC | Age: 73
End: 2023-12-19
Payer: COMMERCIAL

## 2023-12-19 NOTE — PROGRESS NOTES
Clinic Care Coordination Contact  Community Health Worker Follow Up    Care Gaps:     Health Maintenance Due   Topic Date Due    ZOSTER IMMUNIZATION (1 of 2) Never done    RSV VACCINE (Pregnancy & 60+) (1 - 1-dose 60+ series) Never done    COLORECTAL CANCER SCREENING  10/11/2022    INFLUENZA VACCINE (1) 09/01/2023    COVID-19 Vaccine (6 - 2023-24 season) 09/01/2023    MEDICARE ANNUAL WELLNESS VISIT  09/23/2023    DIABETIC FOOT EXAM  10/12/2023       Patient was made aware of care gaps and states that she would like to hold off on scheduling these at this time.    Care Plan:   Care Plan: Help At Home       Problem: Insufficient In-home support       Goal: Establish adequate home support       Start Date: 10/23/2023    This Visit's Progress: 20% Recent Progress: 10%    Priority: High    Note:     Barriers: Depression sx: Limited support system; diagnoses of multiple, chronic, complex medical conditions  Strengths: Motivated; agreeable to Care Coordination; financial stability  Patient expressed understanding of goal: Yes  Action steps to achieve this goal:  1. I will review and research resources provided for assistance with Home making, organization.-sent via [employer]  [employer]   https://SureSpeak.RetiDiag/   [employer] is a nonprofit serving seniors and individuals with disabilities across [place].     Our?Store To Door grocery assistance,?home support?and?transportation services?provide help with in-home tasks and chores.     [employer] Community Services     https://seniorcommunity.org/services/home-program/    Indoor and Outdoor Chore & Home Maintenance     ?The HOME program takes care of the person by taking care of the home.     Our dedicated staff and volunteers provide indoor and outdoor chores. As a nonprofit, we offer affordable services to [place]ans age 60+, regardless of income.    FabioGoGrandparent?    3-205-434-3388?    https://Alkymos/    Order rides, grocery delivery, pharmacy delivery, meals, home chores and more by calling our convenient phone number. We intercept GPS issues,  communication troubles and more to oversee trips from request to fulfillment with 100% reliability.?     3. I will review and research resources provided for Food.     Meals on Wheels     https://orders.meals-on-wheels.com/?[email or web address]    Phone [phone]    Mobile Market at [facility](2.17 miles away)  [address] [place] [address]  One time $80 voucher    10/26, 11/2, 11/9, 11/16, 11/30, 12/7, 12/21t Thursday from 3:30- 4:30 pm    Please note- all sales are subject to last minute change. Please check in with us at [email or web address], [email or web address], or at [phone]    [employer] at [facility](1.78 miles away)  [address] [place] [phone]  Grab-and-go meals    Service Location: [facility] on [address] [place] [address]. S on the east side of the building    Special Notice: No meal service on Indigenous People s Day (10/9/23), Thanksgiving (11/23/23), and 12/12/23.    Monday through Thursday, 5:15 to 6:15 pm  [phone]    Visit [employer] [facility] Website    Get Directions to [employer] at [facility]        4. I will contact Care Coordinator for additional resources if resources provided do not work for my lifestyle/situation.     5. I will contact my care team with questions, concerns or support needs. I will use the clinic as a resource and I understand I can contact my clinic with 24/7 after hours services available. Care Coordinator will remain available as needed.                               Intervention and Education during outreach:   Patient states that she would like CHW to re-send the list of resources that LORA Orozco had provided her.  Patient states that she has been utilizing meals on  wheels.    CHW Plan: CHW will send patient a letter in the mail with resources. CHW will do next patient outreach in one month.    TRACE Stallworth, B.A. Critical access hospital Care Coordination  [facility]:   [facility]  [phone]

## 2023-12-19 NOTE — LETTER
[facility] COORD[facility]  [address] [facility] [address]  [place] [address]    December 19, 2023    Leena Meaghan Napoleon Maury  [address]  [facility] [address]      Dear Leena,    Thank you for taking the time to speak with me today. Here are the resources that Pam, the nurse, had provided.    Help at Your Door   https://helpatyourdoor.org/   Help At Your Door is a nonprofit serving seniors and individuals with disabilities across [place].      Our?Store To Door grocery assistance,?home support?and?transportation services?provide help with in-home tasks and chores.      Senior Community Services   https://seniorcommunity.org/services/home-program/     Indoor and Outdoor Chore & Home Maintenance      ?The HOME program takes care of the person by taking care of the home.      Our dedicated staff and volunteers provide indoor and outdoor chores. As a nonprofit, we offer affordable services to [place]ans age 60+, regardless of income.     PWRF?    8-790-794-6507?   https://Luv Rink/     Order rides, grocery delivery, pharmacy delivery, meals, home chores and more by calling our convenient phone number. We intercept GPS issues,  communication troubles and more to oversee trips from request to fulfillment with 100% reliability.?      3. I will review and research resources provided for Food.      Meals on Wheels     https://orders.meals-on-wheels.com/?lirbwp=66779     Phone [phone]     Mobile Market at [facility](2.17 miles away)  [address] [place] [address]  One time $80 voucher    10/26, 11/2, 11/9, 11/16, 11/30, 12/7, 12/21t Thursday from 3:30- 4:30 pm     Please note- all sales are subject to last minute change. Please check in with us at [email or web address], [email or web address], or at [phone]     [employer] at [facility](1.78 miles  away)  [address] [place] [address]  Grab-and-go meals     Service Location: [facility] on [address] [facility]. S on the east side of the building     Special Notice: No meal service on Indigenous People s Day (10/9/23), Thanksgiving (11/23/23), and 12/12/23.     Monday through Thursday, 5:15 to 6:15 pm  [phone]    Visit [employer] at [facility] Website    Get Directions to [employer] [facility]    Please let me know if you have any questions or concerns.    Kitty Camargo, CHW, B.A. [facility]  Clinic Care Coordination  [facility] Clinics:   [facility] [place]  [phone]

## 2024-01-03 ENCOUNTER — TELEPHONE (OUTPATIENT)
Dept: GASTROENTEROLOGY | Facility: CLINIC | Age: 74
End: 2024-01-03
Payer: COMMERCIAL

## 2024-01-03 DIAGNOSIS — Z86.0100 HISTORY OF COLONIC POLYPS: Primary | ICD-10-CM

## 2024-01-03 RX ORDER — BISACODYL 5 MG/1
TABLET, DELAYED RELEASE ORAL
Qty: 4 TABLET | Refills: 0 | Status: ON HOLD | OUTPATIENT
Start: 2024-01-03 | End: 2024-03-26

## 2024-01-03 NOTE — TELEPHONE ENCOUNTER
Attempted to contact patient in order to complete pre assessment questions.     No answer. Left message to return call to [phone] option 4      Antoinette Leo RN  Endoscopy Procedure Pre Assessment RN

## 2024-01-03 NOTE — TELEPHONE ENCOUNTER
Pre visit planning completed.      Procedure details:    Patient scheduled for Colonoscopy  on 1/15/24.     Arrival time: 0900. Procedure time 0945    Pre op exam needed? N/A    Facility location: [facility]; [facility] [place] [address] [place] [address]    Sedation type: Conscious sedation     Indication for procedure: History of colon polyps      Chart review:     Electronic implanted devices? No    Recent diagnosis of diverticulitis within the last 6 weeks? No    Diabetic? Yes. Not on diabetic medication    Diabetic medication HOLDING recommendations: (if applicable)  Oral diabetic medications: No  Diabetic injectables: No  Insulin: No      Medication review:    Anticoagulants? No    NSAIDS? No NSAID medications per patient's medication list.  RN will verify with pre-assessment call.    Other medication HOLDING recommendations:  N/A      Prep for procedure:     Bowel prep recommendation: Standard Golytely    Due to:  CKD noted.  and diabetes.     Prep instructions sent via [employer]     Bowel prep script sent to    [facility] - [place], [place] - [address] Leo RN  Endoscopy Procedure Pre Assessment RN  [phone] option 4

## 2024-01-08 NOTE — TELEPHONE ENCOUNTER
Pre assessment completed for upcoming procedure.   (Please see previous telephone encounter notes for complete details)    Patient  returned call.       Procedure details:    Arrival time and facility location reviewed.    Pre op exam needed? N/A    Designated  policy reviewed. Instructed to have someone stay 6 hours post procedure.     Pt is working on finding someone to stay with her 6 hours after. She will call back and reschedule if she can't find someone.     COVID policy reviewed.      Medication review:    Medications reviewed. Please see supporting documentation below. Holding recommendations discussed (if applicable).       Prep for procedure:     Procedure prep instructions reviewed.        Additional information needed?  N/A      Patient  verbalized understanding and had no questions or concerns at this time.      Jory Ness RN  Endoscopy Procedure Pre Assessment RN  [phone] option 4

## 2024-01-08 NOTE — TELEPHONE ENCOUNTER
Second call attempt to complete pre assessment.     No answer.  Left message to return call to [phone] #4 by next business day prior to 4PM or procedure will be sent to cancel.     Additional information needed?  N/A      Sheryl Stanley RN  Endoscopy Procedure Pre Assessment RN

## 2024-01-10 ENCOUNTER — PATIENT OUTREACH (OUTPATIENT)
Dept: GASTROENTEROLOGY | Facility: CLINIC | Age: 74
End: 2024-01-10
Payer: COMMERCIAL

## 2024-01-12 ENCOUNTER — TELEPHONE (OUTPATIENT)
Dept: GASTROENTEROLOGY | Facility: CLINIC | Age: 74
End: 2024-01-12
Payer: COMMERCIAL

## 2024-01-12 NOTE — TELEPHONE ENCOUNTER
Caller:   Reason for Reschedule/Cancellation (please be detailed, any staff messages or encounters to note?): NO       Prior to reschedule please review:  Ordering Provider:     CARISSA CASTAÑEDA     Sedation per order: CS  Does patient have any ASC Exclusions, please identify?:       Notes on Cancelled Procedure:  Procedure: Lower Endoscopy [Colonoscopy]   Date: 1/15  Location: [facility]; [address] [place] [address]  Surgeon:       Rescheduled: Yes  Procedure: Lower Endoscopy [Colonoscopy]   Date: 3/26  Location: [facility]; [address] [place] [address]  Surgeon: PRIMO  Sedation Level Scheduled  CS,  Reason for Sedation Level ORDER  Prep/Instructions updated and sent: Y       Send In - basket message to Panc - John Pool if EUS  procedure is canceled or rescheduled: [ N/A, YES or NO]

## 2024-01-17 ENCOUNTER — DOCUMENTATION ONLY (OUTPATIENT)
Dept: CARDIOLOGY | Facility: CLINIC | Age: 74
End: 2024-01-17

## 2024-01-18 ENCOUNTER — PATIENT OUTREACH (OUTPATIENT)
Dept: CARE COORDINATION | Facility: CLINIC | Age: 74
End: 2024-01-18
Payer: COMMERCIAL

## 2024-01-18 NOTE — PROGRESS NOTES
Clinic Care Coordination Contact  Community Health Worker Follow Up    Care Gaps:     Health Maintenance Due   Topic Date Due    ZOSTER IMMUNIZATION (1 of 2) Never done    RSV VACCINE (Pregnancy & 60+) (1 - 1-dose 60+ series) Never done    COLORECTAL CANCER SCREENING  10/11/2022    INFLUENZA VACCINE (1) 09/01/2023    COVID-19 Vaccine (6 - 2023-24 season) 09/01/2023    MEDICARE ANNUAL WELLNESS VISIT  09/23/2023    DIABETIC FOOT EXAM  10/12/2023       Postponed to next CHW outreach.     Care Plan:   Care Plan: Help At Home       Problem: Insufficient In-home support       Goal: Establish adequate home support       Start Date: 10/23/2023    This Visit's Progress: 20% Recent Progress: 20%    Priority: High    Note:     Barriers: Depression sx: Limited support system; diagnoses of multiple, chronic, complex medical conditions  Strengths: Motivated; agreeable to Care Coordination; financial stability  Patient expressed understanding of goal: Yes  Action steps to achieve this goal:  1. I will review and research resources provided for assistance with Home making, organization.-sent via [employer]  Help at Your Door   https://XYDO/   Help At Your Door is a nonprofit serving seniors and individuals with disabilities across [place].     Our?Store To Door grocery assistance,?home support?and?transportation services?provide help with in-home tasks and chores.     Senior Community Services     https://seniorcommunity.org/services/home-program/    Indoor and Outdoor Chore & Home Maintenance     ?The HOME program takes care of the person by taking care of the home.     Our dedicated staff and volunteers provide indoor and outdoor chores. As a nonprofit, we offer affordable services to [place] age 60+, regardless of income.    [employer] Young [employer]?    1-638-413-0384?   https://Polymita Technologies.Webcom/    Order rides, grocery delivery, pharmacy delivery, meals, home chores and  more by calling our convenient phone number. We intercept GPS issues,  communication troubles and more to oversee trips from request to fulfillment with 100% reliability.?     3. I will review and research resources provided for Food.     Meals on Wheels     https://orders.meals-on-wheels.com/?rntdln=32148    Phone [phone]    Mobile Market at [facility](2.17 miles away)  [address] [place] [address]  One time $80 voucher    10/26, 11/2, 11/9, 11/16, 11/30, 12/7, 12/21t Thursday from 3:30- 4:30 pm    Please note- all sales are subject to last minute change. Please check in with us at [email or web address], [email or web address], or at [phone]    [employer]aves [employer] Fishes at [facility](1.78 miles away)  [address] [place] [address]  Grab-and-go meals    Service Location: [facility] on [address] [place] [address]. S on the east side of the building    Special Notice: No meal service on Indigenous People s Day (10/9/23), Thanksgiving (11/23/23), and 12/12/23.    Monday through Thursday, 5:15 to 6:15 pm  [phone]    Visit [facility] [employer] at [facility] Website    Get Directions to [facility]        4. I will contact Care Coordinator for additional resources if resources provided do not work for my lifestyle/situation.     5. I will contact my care team with questions, concerns or support needs. I will use the clinic as a resource and I understand I can contact my clinic with 24/7 after hours services available. Care Coordinator will remain available as needed.                               Intervention and Education during outreach:   Patient states that her boiler went out, so she is hanging in there. Patient states that she is in the process of getting it fixed and has had a worker come and check it out.   Patient states that she hasn't opened her mail yet but states that she probably received CHW's letter.  Patient requested to be called back in a week so that she has time to review the resources in the letter.     CHW Plan: CHW will do next patient outreach in one week.    Kitty Camargo, RAVIW, B.A. [facility] Care Coordination  [facility]:   [facility]  [phone]

## 2024-01-19 ENCOUNTER — OFFICE VISIT (OUTPATIENT)
Dept: NEUROLOGY | Facility: CLINIC | Age: 74
End: 2024-01-19
Payer: COMMERCIAL

## 2024-01-19 ENCOUNTER — PATIENT OUTREACH (OUTPATIENT)
Dept: CARE COORDINATION | Facility: CLINIC | Age: 74
End: 2024-01-19

## 2024-01-19 VITALS
DIASTOLIC BLOOD PRESSURE: 78 MMHG | OXYGEN SATURATION: 96 % | HEIGHT: 64 IN | BODY MASS INDEX: 33.73 KG/M2 | HEART RATE: 100 BPM | WEIGHT: 197.6 LBS | SYSTOLIC BLOOD PRESSURE: 124 MMHG

## 2024-01-19 DIAGNOSIS — R25.1 TREMOR: Primary | ICD-10-CM

## 2024-01-19 PROCEDURE — 99213 OFFICE O/P EST LOW 20 MIN: CPT | Performed by: PSYCHIATRY & NEUROLOGY

## 2024-01-19 ASSESSMENT — PATIENT HEALTH QUESTIONNAIRE - PHQ9
10. IF YOU CHECKED OFF ANY PROBLEMS, HOW DIFFICULT HAVE THESE PROBLEMS MADE IT FOR YOU TO DO YOUR WORK, TAKE CARE OF THINGS AT HOME, OR GET ALONG WITH OTHER PEOPLE: SOMEWHAT DIFFICULT
SUM OF ALL RESPONSES TO PHQ QUESTIONS 1-9: 10
SUM OF ALL RESPONSES TO PHQ QUESTIONS 1-9: 10

## 2024-01-19 NOTE — LETTER
"    1/19/2024         RE: Leena Montaño  [address]  [facility] [address]        Dear Colleague,    Thank you for referring your patient, Leena Montaño, to the [facility] NEUROLOGY CL[facility]ICS [facility]. Please see a copy of my visit note below.    ESTABLISHED PATIENT NEUROLOGY NOTE    DATE OF VISIT: 1/19/2024  CLINIC LOCATION: [facility]  MRN: [id number]  PATIENT NAME: Leena Montaño  YOB: 1950    REASON FOR VISIT:   Chief Complaint   Patient presents with     Follow Up     Tremor- neck tremor is almost gone      SUBJECTIVE:                                                      HISTORY OF PRESENT ILLNESS: Patient is here to follow up regarding tremor. Please refer to my initial note from 11/15/2023 for further information.    Since the last visit, the patient reports that her chin tremor is almost resolved, and hand tremor is slightly better.  She denies interval development of new focal neurological symptoms.  DaTSCAN is scheduled on 2/8/2024.  She wonders if this is still needed.  EXAM:                                                    Physical Exam:   Vitals: /78 (BP Location: Left arm, Patient Position: Sitting, Cuff Size: Adult Large)   Pulse 100   Ht 1.626 m (5' 4\")   Wt 89.6 kg (197 lb 9.6 oz)   LMP  (LMP Unknown)   SpO2 96%   BMI 33.92 kg/m      General: pt is in NAD, cooperative.  Skin: normal turgor, moist mucous membranes, no lesions/rashes noticed.  HEENT: ATNC, white sclera, normal conjunctiva.  Respiratory: Symmetric lung excursion, no accessory respiratory muscle use.  Abdomen: Non distended.  Neurological: awake, cooperative, follows commands, compared to the initial visit, I do not appreciate any lower jaw tremor, but she continues to have bilateral resting hand tremor, which increases when she walks.  ASSESSMENT AND PLAN:                                                    Assessment: 73 year old female " "patient presents for follow-up of bilateral hand and chin tremor.  Her chin tremor resolved, but bilateral hand tremor continues.  She wonders if she needs DaTSCAN to clarify her diagnosis, which in my opinion is still necessary.  We discussed that it be helpful in determining whether or not she has dopaminergic deficit.  We decided to follow-up after that.  If the scan is negative, we might postpone her follow-up for 6 to 12 months.    Diagnoses:    ICD-10-CM    1. Tremor  R25.1         Plan: At today's visit we thoroughly discussed current symptoms and the plan.    We decided to pursue CAT scan is currently scheduled on 2/8/2024.    Next follow-up appointment is on 2/13/2024 or sooner if needed.  We also discussed that this appointment could be rescheduled to a later time (6 to 12-month interval) if the scan is normal.    Total Time: 25 minutes spent on the date of the encounter doing chart review, history and exam, documentation and further activities per the note.    Nehemias Gao MD  [facility] Neurology  (Chart documentation was completed in part with Dragon voice-recognition software. Even though reviewed, some grammatical, spelling, and word errors may remain.)    Leena Montaño is a 73 year old female who presents for:  Chief Complaint   Patient presents with     Follow Up     Tremor- neck tremor is almost gone         Initial Vitals:  /78 (BP Location: Left arm, Patient Position: Sitting, Cuff Size: Adult Large)   Pulse 100   Ht 1.626 m (5' 4\")   Wt 89.6 kg (197 lb 9.6 oz)   LMP  (LMP Unknown)   SpO2 96%   BMI 33.92 kg/m   Estimated body mass index is 33.92 kg/m  as calculated from the following:    Height as of this encounter: 1.626 m (5' 4\").    Weight as of this encounter: 89.6 kg (197 lb 9.6 oz).. Body surface area is 2.01 meters squared. BP completed using cuff size: large    Answers submitted by the patient for this visit:  Patient Health Questionnaire " (Submitted on 1/19/2024)  If you checked off any problems, how difficult have these problems made it for you to do your work, take care of things at home, or get along with other people?: Somewhat difficult  PHQ9 TOTAL SCORE: 10  Depression Response    Patient completed the PHQ-9 assessment for depression and scored >9? No  Question 9 on the PHQ-9 was positive for suicidality? Yes  Does patient have current mental health provider? Yes    Is this a virtual visit? No    I personally notified the following: visit provider    Karen Naqvi Clinic Assistant       Again, thank you for allowing me to participate in the care of your patient.        Sincerely,        Nehemias Gao MD

## 2024-01-19 NOTE — PROGRESS NOTES
"ESTABLISHED PATIENT NEUROLOGY NOTE    DATE OF VISIT: 1/19/2024  CLINIC LOCATION: [facility]  MRN: [id number]  PATIENT NAME: Leena Montaño  YOB: 1950    REASON FOR VISIT:   Chief Complaint   Patient presents with    Follow Up     Tremor- neck tremor is almost gone      SUBJECTIVE:                                                      HISTORY OF PRESENT ILLNESS: Patient is here to follow up regarding tremor. Please refer to my initial note from 11/15/2023 for further information.    Since the last visit, the patient reports that her chin tremor is almost resolved, and hand tremor is slightly better.  She denies interval development of new focal neurological symptoms.  DaTSCAN is scheduled on 2/8/2024.  She wonders if this is still needed.  EXAM:                                                    Physical Exam:   Vitals: /78 (BP Location: Left arm, Patient Position: Sitting, Cuff Size: Adult Large)   Pulse 100   Ht 1.626 m (5' 4\")   Wt 89.6 kg (197 lb 9.6 oz)   LMP  (LMP Unknown)   SpO2 96%   BMI 33.92 kg/m      General: pt is in NAD, cooperative.  Skin: normal turgor, moist mucous membranes, no lesions/rashes noticed.  HEENT: ATNC, white sclera, normal conjunctiva.  Respiratory: Symmetric lung excursion, no accessory respiratory muscle use.  Abdomen: Non distended.  Neurological: awake, cooperative, follows commands, compared to the initial visit, I do not appreciate any lower jaw tremor, but she continues to have bilateral resting hand tremor, which increases when she walks.  ASSESSMENT AND PLAN:                                                    Assessment: 73 year old female patient presents for follow-up of bilateral hand and chin tremor.  Her chin tremor resolved, but bilateral hand tremor continues.  She wonders if she needs DaTSCAN to clarify her diagnosis, which in my opinion is still necessary.  We discussed that it be helpful in determining whether or " not she has dopaminergic deficit.  We decided to follow-up after that.  If the scan is negative, we might postpone her follow-up for 6 to 12 months.    Diagnoses:    ICD-10-CM    1. Tremor  R25.1         Plan: At today's visit we thoroughly discussed current symptoms and the plan.    We decided to pursue CAT scan is currently scheduled on 2/8/2024.    Next follow-up appointment is on 2/13/2024 or sooner if needed.  We also discussed that this appointment could be rescheduled to a later time (6 to 12-month interval) if the scan is normal.    Total Time: 25 minutes spent on the date of the encounter doing chart review, history and exam, documentation and further activities per the note.    Nehemias Gao MD  [facility] Neurology  (Chart documentation was completed in part with Dragon voice-recognition software. Even though reviewed, some grammatical, spelling, and word errors may remain.)

## 2024-01-19 NOTE — PATIENT INSTRUCTIONS
AFTER VISIT SUMMARY (AVS):    At today's visit we thoroughly discussed current symptoms and the plan.    We decided to pursue CAT scan is currently scheduled on 2/8/2024.    Next follow-up appointment is on 2/13/2024 or sooner if needed.  We also discussed that this appointment could be rescheduled to a later time (6 to 12-month interval) if the scan is normal.    Please do not hesitate to call me with any questions or concerns.    Thanks.

## 2024-01-19 NOTE — PROGRESS NOTES
Clinic Care Coordination Contact  Care Coordination Clinician Chart Review    Situation: Patient chart reviewed by Care Coordinator.       Background: Care Coordination Program started: 10/18/2023. Initial assessment completed and patient-centered care plan(s) were developed with participation from patient. Lead CC handed patient off to CHW for continued outreaches.       Assessment: Per chart review, patient outreach completed by CC CHW on 1/18/24.  Patient is actively working to accomplish goal(s). Patient's goal(s) appropriate and relevant at this time. Patient is due for updated Plan of Care.  Assessments will be completed annually or as needed/with change of patient status.      Care Plan: Help At Home       Problem: Insufficient In-home support       Goal: Establish adequate home support       Start Date: 10/23/2023    This Visit's Progress: 20% Recent Progress: 20%    Priority: High    Note:     Barriers: Depression sx: Limited support system; diagnoses of multiple, chronic, complex medical conditions  Strengths: Motivated; agreeable to Care Coordination; financial stability  Patient expressed understanding of goal: Yes  Action steps to achieve this goal:  1. I will review and research resources provided for assistance with Home making, organization.-sent via [employer]  Help at Your Door   https://Modavanti.com.iWOPI/   Help At Your Door is a nonprofit serving seniors and individuals with disabilities across [place].     Our?Store To Door grocery assistance,?home support?and?transportation services?provide help with in-home tasks and chores.     Senior Community Services     https://seniorcommunity.org/services/home-program/    Indoor and Outdoor Chore & Home Maintenance     ?The HOME program takes care of the person by taking care of the home.     Our dedicated staff and volunteers provide indoor and outdoor chores. As a nonprofit, we offer affordable services to  [place]ans age 60+, regardless of income.    Alfred?    0-331-501-4649?   https://NewCloud Networks/    Order rides, grocery delivery, pharmacy delivery, meals, home chores and more by calling our convenient phone number. We intercept GPS issues,  communication troubles and more to oversee trips from request to fulfillment with 100% reliability.?     3. I will review and research resources provided for Food.     Meals on Wheels     https://orders.meals-onNurseGridwheels.Kadient/?yeoten=53513    Phone [phone]    Mobile Market at [facility](2.17 miles away)  [address] [place] [address]  One time $80 voucher    10/26, 11/2, 11/9, 11/16, 11/30, 12/7, 12/21t Thursday from 3:30- 4:30 pm    Please note- all sales are subject to last minute change. Please check in with us at [employer]im[employer]Q [employer], [email or web address], or at [phone]    [facility]aves [facility] Fishes at [facility](1.78 miles away)  [address] [place] [address]  Grab-and-go meals    Service Location: [facility] on [address] [place] [address]. S on the east side of the building    Special Notice: No meal service on Indigenous People s Day (10/9/23), Thanksgiving (11/23/23), and 12/12/23.    Monday through Thursday, 5:15 to 6:15 pm  [phone]    Visit [employer] at [facility] Website    Get Directions to [facility] [employer] [facility] at [facility]        4. I will contact Care Coordinator for additional resources if resources provided do not work for my lifestyle/situation.     5. I will contact my care team with questions, concerns or support needs. I will use the clinic as a resource and I understand I can contact my clinic with 24/7 after hours services available. Care Coordinator will remain available as needed.                                  Plan/Recommendations: The patient will continue working with Care Coordination to achieve goal(s) as above. CHW will continue  outreaches at minimum every 30 days and will involve Lead CC as needed or if patient is ready to move to Maintenance. Lead CC will continue to monitor CHW outreaches and patient's progress to goal(s) every 6 weeks.     Plan of Care updated and sent to patient: Yes, via JAYESH RamirezN, RN, PHN   Care Coordinator-Ambulatory Care Management  [facility]  [phone]

## 2024-01-19 NOTE — LETTER
[facility]  Patient Centered Plan of Care  About Me:        Patient Name:  Leena Montaño    YOB: 1950  Age:         73 year old   Carlos MRN:    [id number] Telephone Information:  Home Phone [phone]   Mobile [phone]       Address:  [address] [facility] [address] Email address:  [email or web address]      Emergency Contact(s)    Name Relationship Lgl Grd Work Phone Home Phone Mobile Phone   1. MARTÍN MONTAÑO Son   [phone]    2. MMEE MONTAÑO Daughter   [phone]    3. DANIE,CAROLINA Friend    [phone]   4. IMELDA BEGUM Sister-in-Law   [phone]           Primary language:  English     needed? No   [place] [facility]:  [phone] op. 1  Other communication barriers:None    Preferred Method of Communication:  Mail  Current living arrangement: I live alone    Mobility Status/ Medical Equipment: Independent        Health Maintenance  Health Maintenance Reviewed: Due/Overdue   Health Maintenance Due   Topic Date Due    ZOSTER IMMUNIZATION (1 of 2) Never done    RSV VACCINE (Pregnancy & 60+) (1 - 1-dose 60+ series) Never done    COLORECTAL CANCER SCREENING  10/11/2022    INFLUENZA VACCINE (1) 09/01/2023    COVID-19 Vaccine (6 - 2023-24 season) 09/01/2023    MEDICARE ANNUAL WELLNESS VISIT  09/23/2023    DIABETIC FOOT EXAM  10/12/2023           My Access Plan  Medical Emergency 911   Primary Clinic Line [facility] [phone]   24 Hour Appointment Line [phone] or  [phone] ([phone]) (toll-free)   24 Hour Nurse Line [phone] (toll-free)   Preferred Urgent Care [facility], [phone]     Preferred Hospital [facility]  [phone]     Preferred Pharmacy [facility] [place], [place] - [address]     Behavioral Health Crisis Line The National Suicide Prevention Lifeline at  1-423.311.7010 or Text/Call 988           My Care Team Members  Patient Care Team         Relationship Specialty Notifications Start End    Jory Fragoso, NP PCP - General   2/21/03     Phone: [phone] Fax: [phone]         [address] [facility] [address] [place] [address]    Karen Regalado MD MD Neurology  1/12/22     Phone: [phone] Fax: [phone]         [address] [place] [address]    Lisandro Cade MD Assigned Sleep Provider   1/28/23     Phone: [phone] Fax: [phone] [address] [facility] [address]    Jory Fragoso NP Assigned PCP   8/26/23     Phone: [phone] Fax: [phone] [address] [facility] [address] [place] [address]    Pam Villalobos RN Lead Care Coordinator  Admissions 10/20/23     Nehemias Gao MD MD Neurology  11/1/23     Phone: [phone] Fax: [phone] [address] [place] [address]    Nehemias Gao MD Assigned Neuroscience Provider   11/18/23     Phone: [phone] Fax: [phone] [address] [place] [address]    Kitty Camargo CHW Community Health Worker Primary Care - CC Admissions 12/7/23                 My Care Plans  Self Management and Treatment Plan    Care Plan  Care Plan: Help At Home       Problem: Insufficient In-home support       Goal: Establish adequate home support       Start Date: 10/23/2023    This Visit's Progress: 20% Recent Progress: 20%    Priority: High    Note:     Barriers: Depression sx: Limited support system; diagnoses of multiple, chronic, complex medical conditions  Strengths: Motivated; agreeable to Care Coordination; financial stability  Patient expressed understanding of goal: Yes  Action steps to achieve this goal:  1. I will review and research resources provided for assistance with Home making, organization.-sent via [place] [facility] [employer]   https://helpatyourdoor.org/   [employer] is a  nonprofit serving seniors and individuals with disabilities across [place].     Our?Store To Door grocery assistance,?home support?and?transportation services?provide help with in-home tasks and chores.     [employer]     https://seniorcommunity.org/services/home-program/    Indoor and Outdoor Chore & Home Maintenance     ?The HOME program takes care of the person by taking care of the home.     Our dedicated staff and volunteers provide indoor and outdoor chores. As a nonprofit, we offer affordable services to [place]ans age 60+, regardless of income.    [employer]?    8-882-423-0922?   https://Brightfish/    Order rides, grocery delivery, pharmacy delivery, meals, home chores and more by calling our convenient phone number. We intercept GPS issues,  communication troubles and more to oversee trips from request to fulfillment with 100% reliability.?     3. I will review and research resources provided for Food.     Meals on Wheels     https://orders.meals-onSnipshotwheels.EquipRent.com/?syidws=72497    Phone [phone]    Mobile TextbookTime.com Textbook Time at [facility](2.17 miles away)  [address] [place] [address]  One time $80 voucher    10/26, 11/2, 11/9, 11/16, 11/30, 12/7, 12/21t Thursday from 3:30- 4:30 pm    Please note- all sales are subject to last minute change. Please check in with us at [employer].org, [email or web address], or at [phone]    [employer] [facility](1.78 miles away)  [address] [place] [address]  Grab-and-go meals    Service Location: Doorway on [address] on the east side of the building    Special Notice: No meal service on Indigenous People s Day (10/9/23), Thanksgiving (11/23/23), and 12/12/23.    Monday through Thursday, 5:15 to 6:15 pm  [phone]    Visit [employer] [facility] Website    Get Directions to Sayra Galicia  [facility]ishes at [facility]        4. I will contact Care Coordinator for additional resources if resources provided do not work for my lifestyle/situation.     5. I will contact my care team with questions, concerns or support needs. I will use the clinic as a resource and I understand I can contact my clinic with 24/7 after hours services available. Care Coordinator will remain available as needed.                               Action Plans on File:            Depression          Advance Care Plans/Directives:   Advanced Care Plan/Directives on file:   No    Discussed with patient/caregiver(s): No data recorded           My Medical and Care Information  Problem List   Patient Active Problem List   Diagnosis    Osteoarthritis    Sleep apnea    Allergic rhinitis    Hypothyroidism    Peripheral neuropathy    Hyperlipidemia LDL goal <130    Hypertension goal BP (blood pressure) < 140/90    Advanced directives, counseling/discussion    Anxiety    Moderate major depression (H)    Type 2 diabetes mellitus with other specified complication, without long-term current use of insulin (H)    Morbid obesity (H)    Exposure to blastomycosis    Elevated antinuclear antibody (JUDY) level    Physical deconditioning    Insomnia, unspecified type      Current Medications and Allergies:  See printed Medication Report.    Care Coordination Start Date: 10/18/2023   Frequency of Care Coordination: monthly, more frequently as needed     Form Last Updated: 01/19/2024

## 2024-01-19 NOTE — PROGRESS NOTES
"Leena Montaño is a 73 year old female who presents for:  Chief Complaint   Patient presents with    Follow Up     Tremor- neck tremor is almost gone         Initial Vitals:  /78 (BP Location: Left arm, Patient Position: Sitting, Cuff Size: Adult Large)   Pulse 100   Ht 1.626 m (5' 4\")   Wt 89.6 kg (197 lb 9.6 oz)   LMP  (LMP Unknown)   SpO2 96%   BMI 33.92 kg/m   Estimated body mass index is 33.92 kg/m  as calculated from the following:    Height as of this encounter: 1.626 m (5' 4\").    Weight as of this encounter: 89.6 kg (197 lb 9.6 oz).. Body surface area is 2.01 meters squared. BP completed using cuff size: large    Answers submitted by the patient for this visit:  Patient Health Questionnaire (Submitted on 1/19/2024)  If you checked off any problems, how difficult have these problems made it for you to do your work, take care of things at home, or get along with other people?: Somewhat difficult  PHQ9 TOTAL SCORE: 10  Depression Response    Patient completed the PHQ-9 assessment for depression and scored >9? No  Question 9 on the PHQ-9 was positive for suicidality? Yes  Does patient have current mental health provider? Yes    Is this a virtual visit? No    I personally notified the following: visit provider    Karen Naqvi Clinic Assistant   "

## 2024-01-25 ENCOUNTER — PATIENT OUTREACH (OUTPATIENT)
Dept: CARE COORDINATION | Facility: CLINIC | Age: 74
End: 2024-01-25
Payer: COMMERCIAL

## 2024-01-25 NOTE — PROGRESS NOTES
Clinic Care Coordination Contact  [facility]/Voicemail    Clinical Data: Care Coordinator Outreach    Outreach Documentation Number of Outreach Attempt   12/5/2023   4:01 PM 1   1/25/2024   1:31 PM 1       Left message on patient's voicemail with call back information and requested return call.    Plan: Care Coordinator will try to reach patient again in 10 business days.    Kitty Camargo, CHW, B.A. [facility] Care Coordination  [facility]:   [facility]  [phone]

## 2024-02-02 ENCOUNTER — TRANSFERRED RECORDS (OUTPATIENT)
Dept: MULTI SPECIALTY CLINIC | Facility: CLINIC | Age: 74
End: 2024-02-02

## 2024-02-02 DIAGNOSIS — I10 HYPERTENSION GOAL BP (BLOOD PRESSURE) < 140/90: ICD-10-CM

## 2024-02-02 LAB — RETINOPATHY: NORMAL

## 2024-02-02 RX ORDER — LOSARTAN POTASSIUM 100 MG/1
TABLET ORAL
Qty: 90 TABLET | Refills: 3 | Status: SHIPPED | OUTPATIENT
Start: 2024-02-02 | End: 2024-06-19

## 2024-02-07 ENCOUNTER — TELEPHONE (OUTPATIENT)
Dept: NEUROLOGY | Facility: CLINIC | Age: 74
End: 2024-02-07
Payer: COMMERCIAL

## 2024-02-07 NOTE — TELEPHONE ENCOUNTER
Patient is on provider schedule for Tuesday February 13 to review test results from 2-8-24.  Patient was called to see if able to come in at 3:30 on 2-9-24 since there was an open appointment.    Patient stated she didn't want to come in at all if the testing didn't warrant a full discussion. Patient is concerned about having to pay a 30.00 co-pay for no real need.    Please advise if you want the appointment on 2-13-24 to be cancelled.

## 2024-02-08 ENCOUNTER — ANCILLARY PROCEDURE (OUTPATIENT)
Dept: NUCLEAR MEDICINE | Facility: CLINIC | Age: 74
End: 2024-02-08
Attending: PSYCHIATRY & NEUROLOGY
Payer: COMMERCIAL

## 2024-02-08 DIAGNOSIS — R25.1 TREMOR: ICD-10-CM

## 2024-02-08 PROCEDURE — 78803 RP LOCLZJ TUM SPECT 1 AREA: CPT | Performed by: RADIOLOGY

## 2024-02-08 PROCEDURE — A9584 IODINE I-123 IOFLUPANE: HCPCS | Performed by: RADIOLOGY

## 2024-02-08 RX ORDER — IOFLUPANE I-123 2 MCI/ML
4-6 INJECTION, SOLUTION INTRAVENOUS ONCE
Status: COMPLETED | OUTPATIENT
Start: 2024-02-08 | End: 2024-02-08

## 2024-02-08 RX ADMIN — Medication 130 MG: at 10:56

## 2024-02-08 RX ADMIN — IOFLUPANE I-123 4.7 MILLICURIE: 2 INJECTION, SOLUTION INTRAVENOUS at 11:56

## 2024-02-23 ENCOUNTER — PATIENT OUTREACH (OUTPATIENT)
Dept: CARE COORDINATION | Facility: CLINIC | Age: 74
End: 2024-02-23
Payer: COMMERCIAL

## 2024-02-23 NOTE — PROGRESS NOTES
Clinic Care Coordination Contact  [facility]/Voicemail    Clinical Data: Care Coordinator Outreach    Outreach Documentation Number of Outreach Attempt   1/25/2024   1:31 PM 1   2/23/2024   1:38 PM 2       Patient's phone number did not give CHW an option to leave a voice message.    Plan: Care Coordinator will route patient to RN CC to determine if further outreaches should be made.     Kitty Camargo, RAVIW, B.A. [facility] Care Coordination  [facility]:   [facility]  [phone]

## 2024-02-26 ENCOUNTER — PATIENT OUTREACH (OUTPATIENT)
Dept: CARE COORDINATION | Facility: CLINIC | Age: 74
End: 2024-02-26
Payer: COMMERCIAL

## 2024-02-26 NOTE — LETTER
[facility] CARE COORDINATION  [address]  [place] [address]    February 26, 2024    Leena Meaghanleila Calerozonia Sabra  [address]  [facility] [address]      Dear Leena,    I have been attempting to reach you since our last contact. I would like to continue to work with you and provide any additional support you may need on achieving your health care related goals. I would appreciate if you would give me a call at [phone] to let me know if you would like to continue working together. I know that there are many things that can affect our ability to communicate and I hope we can continue to work together.    All of us at the [facility] are invested in your health and are here to assist you in meeting your goals.     Sincerely,  JAYESH BrownN, RN, PHN   Care Coordinator-Ambulatory Care Management  [facility], [facility] and [facility]  [phone]

## 2024-02-26 NOTE — PROGRESS NOTES
Clinic Care Coordination Contact    Situation: Patient chart reviewed by care coordinator.    Background: Patient was open to Care Coordination.     Assessment: CHW has tried to reach Patient x2 LM on VM no return call at this time.     Plan/Recommendations: Care Coordination episode will be closed. Sent Unable to contact letter to the Patient via Make YES! Happen. No further outreaches planned at this time. If a need were to arise in the future please make a new referral.     JAYESH BrownN, RN, PHN   Care Coordinator-Ambulatory Care Management  [facility] and [facility]  [phone]

## 2024-03-13 NOTE — TELEPHONE ENCOUNTER
Pre assessment completed for upcoming procedure.      Procedure details:    Patient scheduled for Colonoscopy  on 3/26/2024 .     Arrival time: 0945. Procedure time 1030    Pre op exam needed? N/A    Facility location: [facility]; [facility] [place] [address]    Sedation type: Conscious sedation     Indication for procedure: Screening     Designated  policy reviewed. Instructed to have someone stay 6 hours post procedure.       Chart review:     Electronic implanted devices? No    Recent diagnosis of diverticulitis within the last 6 weeks?  No    Diabetic? Yes. See medication holding recommendations.     Diabetic medication HOLDING recommendations: (if applicable)  Oral diabetic medications: No  Diabetic injectables: No  Insulin: No    Medication review:    Anticoagulants? No    NSAIDS? Yes.  Naproxen (Naprosyn, Aleve).  Holding interval of 4 days.    Other medication HOLDING recommendations:  N/A      Prep for procedure:     Bowel prep recommendation: Standard Golytely. Bowel prep prescription was already sent in. Patient picked up prescription.   Due to: diabetes.     Prep instructions sent via Lango     Reviewed procedure prep instructions.     Patient verbalized understanding and had no questions or concerns at this time.        Sheryl Stanley RN  Endoscopy Procedure Pre Assessment RN  [phone] option 4

## 2024-03-26 ENCOUNTER — HOSPITAL ENCOUNTER (OUTPATIENT)
Facility: CLINIC | Age: 74
Discharge: HOME | End: 2024-03-26
Attending: COLON & RECTAL SURGERY | Admitting: COLON & RECTAL SURGERY
Payer: COMMERCIAL

## 2024-03-26 VITALS
SYSTOLIC BLOOD PRESSURE: 118 MMHG | BODY MASS INDEX: 34.15 KG/M2 | OXYGEN SATURATION: 96 % | DIASTOLIC BLOOD PRESSURE: 73 MMHG | RESPIRATION RATE: 16 BRPM | HEART RATE: 69 BPM | HEIGHT: 64 IN | WEIGHT: 200 LBS

## 2024-03-26 LAB — COLONOSCOPY: NORMAL

## 2024-03-26 PROCEDURE — 45385 COLONOSCOPY W/LESION REMOVAL: CPT | Mod: PT | Performed by: COLON & RECTAL SURGERY

## 2024-03-26 PROCEDURE — 88305 TISSUE EXAM BY PATHOLOGIST: CPT | Mod: TC | Performed by: COLON & RECTAL SURGERY

## 2024-03-26 PROCEDURE — 88305 TISSUE EXAM BY PATHOLOGIST: CPT | Mod: 26 | Performed by: PATHOLOGY

## 2024-03-26 PROCEDURE — G0500 MOD SEDAT ENDO SERVICE >5YRS: HCPCS | Performed by: COLON & RECTAL SURGERY

## 2024-03-26 PROCEDURE — 250N000011 HC RX IP 250 OP 636: Performed by: COLON & RECTAL SURGERY

## 2024-03-26 RX ORDER — NALOXONE HYDROCHLORIDE 0.4 MG/ML
0.4 INJECTION, SOLUTION INTRAMUSCULAR; INTRAVENOUS; SUBCUTANEOUS
Status: DISCONTINUED | OUTPATIENT
Start: 2024-03-26 | End: 2024-03-26 | Stop reason: HOSPADM

## 2024-03-26 RX ORDER — FLUMAZENIL 0.1 MG/ML
0.2 INJECTION, SOLUTION INTRAVENOUS
Status: DISCONTINUED | OUTPATIENT
Start: 2024-03-26 | End: 2024-03-26 | Stop reason: HOSPADM

## 2024-03-26 RX ORDER — ONDANSETRON 2 MG/ML
4 INJECTION INTRAMUSCULAR; INTRAVENOUS EVERY 6 HOURS PRN
Status: DISCONTINUED | OUTPATIENT
Start: 2024-03-26 | End: 2024-03-26 | Stop reason: HOSPADM

## 2024-03-26 RX ORDER — NALOXONE HYDROCHLORIDE 0.4 MG/ML
0.2 INJECTION, SOLUTION INTRAMUSCULAR; INTRAVENOUS; SUBCUTANEOUS
Status: DISCONTINUED | OUTPATIENT
Start: 2024-03-26 | End: 2024-03-26 | Stop reason: HOSPADM

## 2024-03-26 RX ORDER — FENTANYL CITRATE 50 UG/ML
INJECTION, SOLUTION INTRAMUSCULAR; INTRAVENOUS PRN
Status: DISCONTINUED | OUTPATIENT
Start: 2024-03-26 | End: 2024-03-26 | Stop reason: HOSPADM

## 2024-03-26 RX ORDER — LIDOCAINE 40 MG/G
CREAM TOPICAL
Status: DISCONTINUED | OUTPATIENT
Start: 2024-03-26 | End: 2024-03-26 | Stop reason: HOSPADM

## 2024-03-26 RX ORDER — ONDANSETRON 2 MG/ML
4 INJECTION INTRAMUSCULAR; INTRAVENOUS
Status: DISCONTINUED | OUTPATIENT
Start: 2024-03-26 | End: 2024-03-26 | Stop reason: HOSPADM

## 2024-03-26 RX ORDER — PROCHLORPERAZINE MALEATE 5 MG
5 TABLET ORAL EVERY 6 HOURS PRN
Status: DISCONTINUED | OUTPATIENT
Start: 2024-03-26 | End: 2024-03-26 | Stop reason: HOSPADM

## 2024-03-26 RX ORDER — ONDANSETRON 4 MG/1
4 TABLET, ORALLY DISINTEGRATING ORAL EVERY 6 HOURS PRN
Status: DISCONTINUED | OUTPATIENT
Start: 2024-03-26 | End: 2024-03-26 | Stop reason: HOSPADM

## 2024-03-26 ASSESSMENT — ACTIVITIES OF DAILY LIVING (ADL)
ADLS_ACUITY_SCORE: 35
ADLS_ACUITY_SCORE: 33
ADLS_ACUITY_SCORE: 35

## 2024-03-27 LAB
PATH REPORT.COMMENTS IMP SPEC: NORMAL
PATH REPORT.COMMENTS IMP SPEC: NORMAL
PATH REPORT.FINAL DX SPEC: NORMAL
PATH REPORT.GROSS SPEC: NORMAL
PATH REPORT.MICROSCOPIC SPEC OTHER STN: NORMAL
PATH REPORT.RELEVANT HX SPEC: NORMAL
PHOTO IMAGE: NORMAL

## 2024-04-05 ENCOUNTER — OFFICE VISIT (OUTPATIENT)
Dept: URGENT CARE | Facility: URGENT CARE | Age: 74
End: 2024-04-05
Payer: COMMERCIAL

## 2024-04-05 ENCOUNTER — PATIENT OUTREACH (OUTPATIENT)
Dept: CARE COORDINATION | Facility: CLINIC | Age: 74
End: 2024-04-05

## 2024-04-05 VITALS
BODY MASS INDEX: 36.02 KG/M2 | TEMPERATURE: 99.1 F | DIASTOLIC BLOOD PRESSURE: 77 MMHG | HEIGHT: 64 IN | RESPIRATION RATE: 17 BRPM | OXYGEN SATURATION: 98 % | SYSTOLIC BLOOD PRESSURE: 125 MMHG | WEIGHT: 211 LBS | HEART RATE: 87 BPM

## 2024-04-05 DIAGNOSIS — J06.9 VIRAL URI WITH COUGH: Primary | ICD-10-CM

## 2024-04-05 LAB
FLUAV AG SPEC QL IA: NEGATIVE
FLUBV AG SPEC QL IA: NEGATIVE

## 2024-04-05 PROCEDURE — 87804 INFLUENZA ASSAY W/OPTIC: CPT | Performed by: STUDENT IN AN ORGANIZED HEALTH CARE EDUCATION/TRAINING PROGRAM

## 2024-04-05 PROCEDURE — 99213 OFFICE O/P EST LOW 20 MIN: CPT | Performed by: STUDENT IN AN ORGANIZED HEALTH CARE EDUCATION/TRAINING PROGRAM

## 2024-04-05 NOTE — PATIENT INSTRUCTIONS
-You can return to seeing folks when:   -it's been five days since symptoms started   -You're STARTING to feel better   -No fevers over 100.5 for 24 hours  -Ok to try tessalon or OTC cough syrup for symptoms  -Drink plenty of water and get plenty of sleep.  -Come back if symptoms get worse or aren't getting better by mid-next week

## 2024-04-05 NOTE — PROGRESS NOTES
"  Assessment & Plan     Viral URI with cough  Symptoms most consistent with viral URI, low suspicion for strep given predominant congestion and cough as well as resolution of sore throat.  No hypoxia or respiratory compromise to suggest pneumonia.  Discussed that specific viral testing probably would not  today, but through shared decision making opted to test rapid flu antigens today.  They came back negative.  Discussed return precautions and supportive care.  - Influenza A & B Antigen - Clinic Collect    Review of the result(s) of each unique test - rapid flu antigens  Ordering of each unique test    BMI  Estimated body mass index is 36.22 kg/m  as calculated from the following:    Height as of this encounter: 1.626 m (5' 4\").    Weight as of this encounter: 95.7 kg (211 lb).     Return to clinic if symptoms worsening or failing to improve.    No follow-ups on file.    Subjective   Mylene is a 73 year old, presenting for the following health issues:  Pharyngitis (Monday and tuesday had a sore throat ), Fatigue (X4 days of fatigue ), Cough, Headache (Headache Monday and Tuesday ), Nasal Congestion (X4 days of congestion and post nasal drip ), and Urgent Care    HPI   On Monday night, had sore scratchy throat. Resolved overnight, but then developed congestion and body aches. Congestion extended to sinuses, but now seems to be draining and causing a wet cough starting today. Not particularly productive. Slept midnight to 9am last night, but was initially hard to sleep due to the coughing. Has some tessalon perles but hasn't taken them. No SOB. Feels febrile in afternoons but hasn't checked a temp. No rashes. Also has body aches around her waist and lower back, wraps around both sides symmetrically.    A friend had pneumonia a few weeks ago, but no sick contacts recently.         Objective    /77   Pulse 87   Temp 99.1  F (37.3  C) (Oral)   Resp 17   Ht 1.626 m (5' 4\")   Wt 95.7 kg (211 lb)  "  LMP  (LMP Unknown)   SpO2 98%   BMI 36.22 kg/m    Body mass index is 36.22 kg/m .  Physical Exam   GENERAL: alert and no distress  EYES: Eyes grossly normal to inspection, PERRL and conjunctivae and sclerae normal  HENT: ear canals and TM's normal, nose and mouth without ulcers or lesions, mild pharyngeal erythema  NECK: Mild bilateral cervical adenopathy, no asymmetry, masses, or scars  RESP: lungs clear to auscultation - no rales, rhonchi or wheezes  CV: regular rate and rhythm, normal S1 S2, no S3 or S4, no murmur, click or rub, no peripheral edema  SKIN: no suspicious lesions or rashes  PSYCH: mentation appears normal, affect normal/bright    Results for orders placed or performed in visit on 04/05/24 (from the past 24 hour(s))   Influenza A & B Antigen - Clinic Collect    Specimen: Nose; Swab   Result Value Ref Range    Influenza A antigen Negative Negative    Influenza B antigen Negative Negative    Narrative    Test results must be correlated with clinical data. If necessary, results should be confirmed by a molecular assay or viral culture.           Signed Electronically by: Javier Beatty MD

## 2024-04-10 DIAGNOSIS — E03.9 ACQUIRED HYPOTHYROIDISM: ICD-10-CM

## 2024-04-10 RX ORDER — LEVOTHYROXINE SODIUM 75 UG/1
TABLET ORAL
Qty: 60 TABLET | Refills: 0 | Status: SHIPPED | OUTPATIENT
Start: 2024-04-10 | End: 2024-06-19

## 2024-05-03 ENCOUNTER — PATIENT OUTREACH (OUTPATIENT)
Dept: CARE COORDINATION | Facility: CLINIC | Age: 74
End: 2024-05-03
Payer: COMMERCIAL

## 2024-05-10 DIAGNOSIS — F32.1 MODERATE MAJOR DEPRESSION (H): ICD-10-CM

## 2024-05-12 RX ORDER — DULOXETIN HYDROCHLORIDE 60 MG/1
CAPSULE, DELAYED RELEASE ORAL
Qty: 90 CAPSULE | Refills: 0 | Status: SHIPPED | OUTPATIENT
Start: 2024-05-12 | End: 2024-06-19

## 2024-06-01 ENCOUNTER — HEALTH MAINTENANCE LETTER (OUTPATIENT)
Age: 74
End: 2024-06-01

## 2024-06-10 ENCOUNTER — OFFICE VISIT (OUTPATIENT)
Dept: URGENT CARE | Facility: URGENT CARE | Age: 74
End: 2024-06-10
Payer: COMMERCIAL

## 2024-06-10 VITALS
HEART RATE: 102 BPM | WEIGHT: 212 LBS | SYSTOLIC BLOOD PRESSURE: 136 MMHG | TEMPERATURE: 97.1 F | DIASTOLIC BLOOD PRESSURE: 82 MMHG | OXYGEN SATURATION: 97 % | HEIGHT: 64 IN | BODY MASS INDEX: 36.19 KG/M2

## 2024-06-10 DIAGNOSIS — J01.90 ACUTE NON-RECURRENT SINUSITIS, UNSPECIFIED LOCATION: Primary | ICD-10-CM

## 2024-06-10 PROCEDURE — 99213 OFFICE O/P EST LOW 20 MIN: CPT | Performed by: PHYSICIAN ASSISTANT

## 2024-06-10 ASSESSMENT — ENCOUNTER SYMPTOMS
SHORTNESS OF BREATH: 0
APPETITE CHANGE: 0
FATIGUE: 1
FEVER: 0
DYSURIA: 0
COUGH: 1
MYALGIAS: 1
HEADACHES: 0
SORE THROAT: 0

## 2024-06-10 NOTE — PROGRESS NOTES
SUBJECTIVE:   Leena Montaño is a 74 year old female presenting with a chief complaint of   Chief Complaint   Patient presents with    Urgent Care    Nasal Congestion     Pt in clinic to have eval for congestion, aches, and fatigue.       She is an established patient of [facility].  Patient presents with nasal congestion 2-3 weeks.  Bad taste in mouth.      Treatment:  ibuprofen, tessalon perles - helped    Review of Systems   Constitutional:  Positive for fatigue. Negative for appetite change and fever.   HENT:  Positive for congestion and postnasal drip. Negative for ear pain and sore throat.         Snoring   Respiratory:  Positive for cough. Negative for shortness of breath.    Genitourinary:  Negative for dysuria.   Musculoskeletal:  Positive for myalgias.   Neurological:  Negative for headaches.   All other systems reviewed and are negative.      Past Medical History:   Diagnosis Date    Allergic rhinitis, cause unspecified     CKD (chronic kidney disease) stage 2, GFR 60-89 ml/min 12/31/2010    Depressive disorder, not elsewhere classified     Disorder of uterus 3/5/2007    Problem list name updated by automated process. Provider to review    Hyperlipidemia LDL goal <130 10/31/2010    Hypertension goal BP (blood pressure) < 130/80 12/31/2010    Lumbago     degenerative disc disease and disc protrusion    Neck pain 12/13/2011    OA (osteoarthritis) of knee 10/28/2008    Osteoarthrosis, unspecified whether generalized or localized, unspecified site     Peripheral neuropathy     non-diabetic    Thyroid disease     Type 2 diabetes mellitus without complication (H) 3/28/2016    Unspecified essential hypertension     Unspecified sleep apnea      Family History   Problem Relation Age of Onset    Hypertension Father     Alcohol/Drug Father     Coronary Artery Disease Father         stroke/ carotid blockage    Cerebrovascular Disease Father         did not manage his blood pressure with meds smoked     Depression Father         came along after his first big stroke    Substance Abuse Father         alcohol    Hypertension Mother     Alcohol/Drug Mother     Depression Mother     Suicide Mother     Coronary Artery Disease Mother         surgery for carotid/surgery for femoral artery    Depression/Anxiety Mother     Cerebrovascular Disease Mother         small ones, smoked    Thyroid Disease Mother     Substance Abuse Mother         alcohol    Cerebrovascular Disease Paternal Grandfather         early age onset...60's    C.A.D. Maternal Grandfather         massive heart blow out    Depression/Anxiety Maternal Grandmother     Breast Cancer Maternal Grandmother         had a radical mastectomy....no recurrence  at    Substance Abuse Brother     Suicide Other     Depression Other     Diabetes Sister     Alcohol/Drug Brother     Substance Abuse Brother     Hypertension Brother         smoker    Cancer - colorectal No family hx of     Prostate Cancer No family hx of      Current Outpatient Medications   Medication Sig Dispense Refill    amoxicillin-clavulanate (AUGMENTIN) 875-125 MG tablet Take 1 tablet by mouth 2 times daily for 7 days 14 tablet 0    DULoxetine (CYMBALTA) 60 MG capsule TAKE ONE CAPSULE BY MOUTH ONCE DAILY 90 capsule 0    levothyroxine (SYNTHROID/LEVOTHROID) 75 MCG tablet TAKE ONE TABLET BY MOUTH ONCE DAILY 60 tablet 0    losartan (COZAAR) 100 MG tablet TAKE ONE TABLET BY MOUTH ONCE DAILY 90 tablet 3    blood glucose (NO BRAND SPECIFIED) lancets standard Use to test blood sugar TWO times daily or as directed. (Patient not taking: Reported on 6/10/2024) 200 each 0    blood glucose (NO BRAND SPECIFIED) test strip Use to test blood sugar 2 times daily or as directed. (Patient not taking: Reported on 6/10/2024) 200 strip 0    blood glucose monitoring (NO BRAND SPECIFIED) meter device kit Use to test blood sugar 2  times daily or as directed. Contour next ez (Patient not taking: Reported on 6/10/2024) 1  "kit 0    order for DME Equipment being ordered: light box (Patient not taking: Reported on 6/10/2024) 1 Units 0    ORDER FOR DME Equipment being ordered: CPAP mask and tubing (Patient not taking: Reported on 6/10/2024) 1 Units 0     Social History     Tobacco Use    Smoking status: Never    Smokeless tobacco: Never    Tobacco comments:     am using sm. amt. of marijuana for sleep/pain   Substance Use Topics    Alcohol use: Yes     Comment: really managed/not my drug to enjoy. marijuana 3 times per week        OBJECTIVE  /82   Pulse 102   Temp 97.1  F (36.2  C) (Temporal)   Ht 1.626 m (5' 4\")   Wt 96.2 kg (212 lb)   LMP  (LMP Unknown)   SpO2 97%   BMI 36.39 kg/m      Physical Exam  Vitals and nursing note reviewed.   Constitutional:       Appearance: Normal appearance. She is obese.   HENT:      Head: Normocephalic and atraumatic.      Right Ear: Ear canal and external ear normal.      Left Ear: Ear canal and external ear normal.      Ears:      Comments: White fluid behind TM, no TM erythema     Nose: Nose normal.      Mouth/Throat:      Mouth: Mucous membranes are moist.      Pharynx: Oropharynx is clear.      Comments: pnd  Eyes:      Extraocular Movements: Extraocular movements intact.      Conjunctiva/sclera: Conjunctivae normal.   Cardiovascular:      Rate and Rhythm: Normal rate and regular rhythm.      Pulses: Normal pulses.      Heart sounds: Normal heart sounds.   Pulmonary:      Effort: Pulmonary effort is normal.      Breath sounds: Normal breath sounds.   Musculoskeletal:      Cervical back: Normal range of motion.   Skin:     General: Skin is warm and dry.      Findings: No rash.   Neurological:      General: No focal deficit present.      Mental Status: She is alert.   Psychiatric:         Mood and Affect: Mood normal.         Behavior: Behavior normal.       Labs:  No results found for this or any previous visit (from the past 24 hour(s)).      ASSESSMENT:      ICD-10-CM    1. Acute " non-recurrent sinusitis, unspecified location  J01.90 amoxicillin-clavulanate (AUGMENTIN) 875-125 MG tablet           Medical Decision Making:    Differential Diagnosis:  URI Adult/Peds:  Sinusitis, Viral syndrome, and Viral upper respiratory illness    Serious Comorbid Conditions:  Adult:   reviewed    PLAN:    Rx for augmentin.  Recommended flonase.  Drink plenty of water.  Discussed reasons to seek immediate medical attention.  Additionally if no improvement or worsening in one week, may follow up with PCP and/or UC.        Followup:    If not improving or if condition worsens, follow up with your Primary Care Provider, If not improving or if conditions worsens over the next 12-24 hours, go to the Emergency Department    There are no Patient Instructions on file for this visit.

## 2024-06-19 ENCOUNTER — OFFICE VISIT (OUTPATIENT)
Dept: FAMILY MEDICINE | Facility: CLINIC | Age: 74
End: 2024-06-19
Payer: COMMERCIAL

## 2024-06-19 VITALS
HEIGHT: 64 IN | DIASTOLIC BLOOD PRESSURE: 79 MMHG | SYSTOLIC BLOOD PRESSURE: 115 MMHG | HEART RATE: 111 BPM | OXYGEN SATURATION: 94 % | BODY MASS INDEX: 36.19 KG/M2 | RESPIRATION RATE: 18 BRPM | TEMPERATURE: 97.8 F | WEIGHT: 212 LBS

## 2024-06-19 DIAGNOSIS — E66.01 MORBID OBESITY (H): ICD-10-CM

## 2024-06-19 DIAGNOSIS — E03.9 ACQUIRED HYPOTHYROIDISM: ICD-10-CM

## 2024-06-19 DIAGNOSIS — Z12.31 VISIT FOR SCREENING MAMMOGRAM: ICD-10-CM

## 2024-06-19 DIAGNOSIS — M54.50 BILATERAL LOW BACK PAIN WITHOUT SCIATICA, UNSPECIFIED CHRONICITY: ICD-10-CM

## 2024-06-19 DIAGNOSIS — B37.2 CUTANEOUS CANDIDIASIS: ICD-10-CM

## 2024-06-19 DIAGNOSIS — Z00.00 ENCOUNTER FOR MEDICARE ANNUAL WELLNESS EXAM: Primary | ICD-10-CM

## 2024-06-19 DIAGNOSIS — M79.10 MYALGIA: ICD-10-CM

## 2024-06-19 DIAGNOSIS — I10 HYPERTENSION GOAL BP (BLOOD PRESSURE) < 140/90: ICD-10-CM

## 2024-06-19 DIAGNOSIS — M54.2 NECK PAIN: ICD-10-CM

## 2024-06-19 DIAGNOSIS — M20.41 HAMMER TOE OF RIGHT FOOT: ICD-10-CM

## 2024-06-19 DIAGNOSIS — E11.69 TYPE 2 DIABETES MELLITUS WITH OTHER SPECIFIED COMPLICATION, WITHOUT LONG-TERM CURRENT USE OF INSULIN (H): ICD-10-CM

## 2024-06-19 DIAGNOSIS — M25.522 PAIN OF BOTH ELBOWS: ICD-10-CM

## 2024-06-19 DIAGNOSIS — F32.1 MODERATE MAJOR DEPRESSION (H): ICD-10-CM

## 2024-06-19 DIAGNOSIS — E55.9 VITAMIN D DEFICIENCY: ICD-10-CM

## 2024-06-19 DIAGNOSIS — E03.9 HYPOTHYROIDISM, UNSPECIFIED TYPE: ICD-10-CM

## 2024-06-19 DIAGNOSIS — M25.521 PAIN OF BOTH ELBOWS: ICD-10-CM

## 2024-06-19 LAB
ERYTHROCYTE [SEDIMENTATION RATE] IN BLOOD BY WESTERGREN METHOD: 17 MM/HR (ref 0–30)
HBA1C MFR BLD: 6.4 % (ref 0–5.6)

## 2024-06-19 PROCEDURE — 83036 HEMOGLOBIN GLYCOSYLATED A1C: CPT | Performed by: NURSE PRACTITIONER

## 2024-06-19 PROCEDURE — 82570 ASSAY OF URINE CREATININE: CPT | Performed by: NURSE PRACTITIONER

## 2024-06-19 PROCEDURE — 80061 LIPID PANEL: CPT | Performed by: NURSE PRACTITIONER

## 2024-06-19 PROCEDURE — 99214 OFFICE O/P EST MOD 30 MIN: CPT | Mod: 25 | Performed by: NURSE PRACTITIONER

## 2024-06-19 PROCEDURE — 85652 RBC SED RATE AUTOMATED: CPT | Performed by: NURSE PRACTITIONER

## 2024-06-19 PROCEDURE — G0439 PPPS, SUBSEQ VISIT: HCPCS | Performed by: NURSE PRACTITIONER

## 2024-06-19 PROCEDURE — 36415 COLL VENOUS BLD VENIPUNCTURE: CPT | Performed by: NURSE PRACTITIONER

## 2024-06-19 PROCEDURE — 82043 UR ALBUMIN QUANTITATIVE: CPT | Performed by: NURSE PRACTITIONER

## 2024-06-19 PROCEDURE — 84443 ASSAY THYROID STIM HORMONE: CPT | Performed by: NURSE PRACTITIONER

## 2024-06-19 PROCEDURE — 80053 COMPREHEN METABOLIC PANEL: CPT | Performed by: NURSE PRACTITIONER

## 2024-06-19 PROCEDURE — 83735 ASSAY OF MAGNESIUM: CPT | Performed by: NURSE PRACTITIONER

## 2024-06-19 PROCEDURE — 82306 VITAMIN D 25 HYDROXY: CPT | Performed by: NURSE PRACTITIONER

## 2024-06-19 RX ORDER — LEVOTHYROXINE SODIUM 75 UG/1
75 TABLET ORAL DAILY
Qty: 90 TABLET | Refills: 3 | Status: SHIPPED | OUTPATIENT
Start: 2024-06-19

## 2024-06-19 RX ORDER — LOSARTAN POTASSIUM 100 MG/1
100 TABLET ORAL DAILY
Qty: 90 TABLET | Refills: 3 | Status: SHIPPED | OUTPATIENT
Start: 2024-06-19

## 2024-06-19 RX ORDER — NYSTATIN 100000 U/G
CREAM TOPICAL 2 TIMES DAILY
Qty: 60 G | Status: SHIPPED | OUTPATIENT
Start: 2024-06-19

## 2024-06-19 RX ORDER — RESPIRATORY SYNCYTIAL VIRUS VACCINE 120MCG/0.5
0.5 KIT INTRAMUSCULAR ONCE
Qty: 1 EACH | Refills: 0 | Status: CANCELLED | OUTPATIENT
Start: 2024-06-19 | End: 2024-06-19

## 2024-06-19 RX ORDER — DULOXETIN HYDROCHLORIDE 60 MG/1
60 CAPSULE, DELAYED RELEASE ORAL DAILY
Qty: 90 CAPSULE | Refills: 3 | Status: SHIPPED | OUTPATIENT
Start: 2024-06-19

## 2024-06-19 SDOH — HEALTH STABILITY: PHYSICAL HEALTH: ON AVERAGE, HOW MANY DAYS PER WEEK DO YOU ENGAGE IN MODERATE TO STRENUOUS EXERCISE (LIKE A BRISK WALK)?: 0 DAYS

## 2024-06-19 SDOH — HEALTH STABILITY: PHYSICAL HEALTH: ON AVERAGE, HOW MANY MINUTES DO YOU ENGAGE IN EXERCISE AT THIS LEVEL?: 0 MIN

## 2024-06-19 ASSESSMENT — SOCIAL DETERMINANTS OF HEALTH (SDOH): HOW OFTEN DO YOU GET TOGETHER WITH FRIENDS OR RELATIVES?: NEVER

## 2024-06-19 ASSESSMENT — PATIENT HEALTH QUESTIONNAIRE - PHQ9
SUM OF ALL RESPONSES TO PHQ QUESTIONS 1-9: 10
10. IF YOU CHECKED OFF ANY PROBLEMS, HOW DIFFICULT HAVE THESE PROBLEMS MADE IT FOR YOU TO DO YOUR WORK, TAKE CARE OF THINGS AT HOME, OR GET ALONG WITH OTHER PEOPLE: VERY DIFFICULT
SUM OF ALL RESPONSES TO PHQ QUESTIONS 1-9: 10

## 2024-06-19 ASSESSMENT — PAIN SCALES - GENERAL: PAINLEVEL: MODERATE PAIN (4)

## 2024-06-19 NOTE — PATIENT INSTRUCTIONS
"Patient Education   Preventive Care Advice   This is general advice we often give to help people stay healthy. Your care team may have specific advice just for you. Please talk to your care team about your own preventive care needs.  Lifestyle  Exercise at least 150 minutes each week (30 minutes a day, 5 days a week).  Do muscle strengthening activities 2 days a week. These help control your weight and prevent disease.  No smoking.  Wear sunscreen to prevent skin cancer.  Have your home tested for radon every 2 to 5 years. Radon is a colorless, odorless gas that can harm your lungs. To learn more, go to www.health.[facility].[place].us and search for \"Radon in Homes.\"  Keep guns unloaded and locked up in a safe place like a safe or gun vault, or, use a gun lock and hide the keys. Always lock away bullets separately. To learn more, visit Nextcar.com.[place].gov and search for \"safe gun storage.\"  Nutrition  Eat 5 or more servings of fruits and vegetables each day.  Try wheat bread, brown rice and whole grain pasta (instead of white bread, rice, and pasta).  Get enough calcium and vitamin D. Check the label on foods and aim for 100% of the RDA (recommended daily allowance).  Regular exams  Have a dental exam and cleaning every 6 months.  See your health care team every year to talk about:  Any changes in your health.  Any medicines your care team has prescribed.  Preventive care, family planning, and ways to prevent chronic diseases.  Shots (vaccines)   HPV shots (up to age 26), if you've never had them before.  Hepatitis B shots (up to age 59), if you've never had them before.  COVID-19 shot: Get this shot when it's due.  Flu shot: Get a flu shot every year.  Tetanus shot: Get a tetanus shot every 10 years.  Pneumococcal, hepatitis A, and RSV shots: Ask your care team if you need these based on your risk.  Shingles shot (for age 50 and up).  General health tests  Diabetes screening:  Starting at age 35, Get screened for diabetes at least " every 3 years.  If you are younger than age 35, ask your care team if you should be screened for diabetes.  Cholesterol test: At age 39, start having a cholesterol test every 5 years, or more often if advised.  Bone density scan (DEXA): At age 50, ask your care team if you should have this scan for osteoporosis (brittle bones).  Hepatitis C: Get tested at least once in your life.  Abdominal aortic aneurysm screening: Talk to your doctor about having this screening if you:  Have ever smoked; and  Are biologically male; and  Are between the ages of 65 and 75.  STIs (sexually transmitted infections)  Before age 24: Ask your care team if you should be screened for STIs.  After age 24: Get screened for STIs if you're at risk. You are at risk for STIs (including HIV) if:  You are sexually active with more than one person.  You don't use condoms every time.  You or a partner was diagnosed with a sexually transmitted infection.  If you are at risk for HIV, ask about PrEP medicine to prevent HIV.  Get tested for HIV at least once in your life, whether you are at risk for HIV or not.  Cancer screening tests  Cervical cancer screening: If you have a cervix, begin getting regular cervical cancer screening tests at age 21. Most people who have regular screenings with normal results can stop after age 65. Talk about this with your provider.  Breast cancer scan (mammogram): If you've ever had breasts, begin having regular mammograms starting at age 40. This is a scan to check for breast cancer.  Colon cancer screening: It is important to start screening for colon cancer at age 45.  Have a colonoscopy test every 10 years (or more often if you're at risk) Or, ask your provider about stool tests like a FIT test every year or Cologuard test every 3 years.  To learn more about your testing options, visit: [email or web address].  For help making a decision, visit: [email or web address].  Prostate cancer screening test: If you have a  prostate and are age 55 to 69, ask your provider if you would benefit from a yearly prostate cancer screening test.  Lung cancer screening: If you are a current or former smoker age 50 to 80, ask your care team if ongoing lung cancer screenings are right for you.  For informational purposes only. Not to replace the advice of your health care provider. Copyright   2023 [facility]. All rights reserved. Clinically reviewed by the [facility]. [employer] 315623 - REV 04/24.  Learning About Activities of Daily Living  What are activities of daily living?     Activities of daily living (ADLs) are the basic self-care tasks you do every day. These include eating, bathing, dressing, and moving around.  As you age, and if you have health problems, you may find that it's harder to do some of these tasks. If so, your doctor can suggest ideas that may help.  To measure what kind of help you may need, your doctor will ask how well you are able to do ADLs. Let your doctor know if there are any tasks that you are having trouble doing. This is an important first step to getting help. And when you have the help you need, you can stay as independent as possible.  How will a doctor assess your ADLs?  Asking about ADLs is part of a routine health checkup your doctor will likely do as you age. Your health check might be done in a doctor's office, in your home, or at a hospital. The goal is to find out if you are having any problems that could make it hard to care for yourself or that make it unsafe for you to be on your own.  To measure your ADLs, your doctor will ask how hard it is for you to do routine tasks. Your doctor may also want to know if you have changed the way you do a task because of a health problem. Your doctor may watch how you:  Walk back and forth.  Keep your balance while you stand or walk.  Move from sitting to standing or from a bed to a chair.  Button or unbutton a shirt or  sweater.  Remove and put on your shoes.  It's common to feel a little worried or anxious if you find you can't do all the things you used to be able to do. Talking with your doctor about ADLs is a way to make sure you're as safe as possible and able to care for yourself as well as you can. You may want to bring a caregiver, friend, or family member to your checkup. They can help you talk to your doctor.  Follow-up care is a key part of your treatment and safety. Be sure to make and go to all appointments, and call your doctor if you are having problems. It's also a good idea to know your test results and keep a list of the medicines you take.  Current as of: October 24, 2023               Content Version: 14.0    1160-2870 [employer].   Care instructions adapted under license by your healthcare professional. If you have questions about a medical condition or this instruction, always ask your healthcare professional. [employer] disclaims any warranty or liability for your use of this information.      Preventing Falls: Care Instructions  Injuries and health problems such as trouble walking or poor eyesight can increase your risk of falling. So can some medicines. But there are things you can do to help prevent falls. You can exercise to get stronger. You can also arrange your home to make it safer.    Talk to your doctor about the medicines you take. Ask if any of them increase the risk of falls and whether they can be changed or stopped.   Try to exercise regularly. It can help improve your strength and balance. This can help lower your risk of falling.     Practice fall safety and prevention.    Wear low-heeled shoes that fit well and give your feet good support. Talk to your doctor if you have foot problems that make this hard.  Carry a cellphone or wear a medical alert device that you can use to call for help.  Use stepladders instead of chairs to reach high objects. Don't climb if  "you're at risk for falls. Ask for help, if needed.  Wear the correct eyeglasses, if you need them.    Make your home safer.    Remove rugs, cords, clutter, and furniture from walkways.  Keep your house well lit. Use night-lights in hallways and bathrooms.  Install and use sturdy handrails on stairways.  Wear nonskid footwear, even inside. Don't walk barefoot or in socks without shoes.    Be safe outside.    Use handrails, curb cuts, and ramps whenever possible.  Keep your hands free by using a shoulder bag or backpack.  Try to walk in well-lit areas. Watch out for uneven ground, changes in pavement, and debris.  Be careful in the winter. Walk on the grass or gravel when sidewalks are slippery. Use de-icer on steps and walkways. Add non-slip devices to shoes.    Put grab bars and nonskid mats in your shower or tub and near the toilet. Try to use a shower chair or bath bench when bathing.   Get into a tub or shower by putting in your weaker leg first. Get out with your strong side first. Have a phone or medical alert device in the bathroom with you.   Where can you learn more?  Go to https://www.[employer].net/patiented  Enter G117 in the search box to learn more about \"Preventing Falls: Care Instructions.\"  Current as of: July 17, 2023               Content Version: 14.0    4849-2760 [email or web address].   Care instructions adapted under license by your healthcare professional. If you have questions about a medical condition or this instruction, always ask your healthcare professional. [email or web address] disclaims any warranty or liability for your use of this information.      Hearing Loss: Care Instructions  Overview     Hearing loss is a sudden or slow decrease in how well you hear. It can range from slight to profound. Permanent hearing loss can occur with aging. It also can happen when you are exposed long-term to loud noise. Examples include listening to loud music, riding motorcycles, or being " around other loud machines.  Hearing loss can affect your work and home life. It can make you feel lonely or depressed. You may feel that you have lost your independence. But hearing aids and other devices can help you hear better and feel connected to others.  Follow-up care is a key part of your treatment and safety. Be sure to make and go to all appointments, and call your doctor if you are having problems. It's also a good idea to know your test results and keep a list of the medicines you take.  How can you care for yourself at home?  Avoid loud noises whenever possible. This helps keep your hearing from getting worse.  Always wear hearing protection around loud noises.  Wear a hearing aid as directed.  A professional can help you pick a hearing aid that will work best for you.  You can also get hearing aids over the counter for mild to moderate hearing loss.  Have hearing tests as your doctor suggests. They can show whether your hearing has changed. Your hearing aid may need to be adjusted.  Use other devices as needed. These may include:  Telephone amplifiers and hearing aids that can connect to a television, stereo, radio, or microphone.  Devices that use lights or vibrations. These alert you to the doorbell, a ringing telephone, or a baby monitor.  Television closed-captioning. This shows the words at the bottom of the screen. Most new TVs can do this.  TTY (text telephone). This lets you type messages back and forth on the telephone instead of talking or listening. These devices are also called TDD. When messages are typed on the keyboard, they are sent over the phone line to a receiving TTY. The message is shown on a monitor.  Use text messaging, social media, and email if it is hard for you to communicate by telephone.  Try to learn a listening technique called speechreading. It is not lipreading. You pay attention to people's gestures, expressions, posture, and tone of voice. These clues can help you  "understand what a person is saying. Face the person you are talking to, and have them face you. Make sure the lighting is good. You need to see the other person's face clearly.  Think about counseling if you need help to adjust to your hearing loss.  When should you call for help?  Watch closely for changes in your health, and be sure to contact your doctor if:    You think your hearing is getting worse.     You have new symptoms, such as dizziness or nausea.   Where can you learn more?  Go to https://www.[employer].net/patiented  Enter R798 in the search box to learn more about \"Hearing Loss: Care Instructions.\"  Current as of: September 27, 2023               Content Version: 14.0    9311-4531 [employer].   Care instructions adapted under license by your healthcare professional. If you have questions about a medical condition or this instruction, always ask your healthcare professional. [employer] disclaims any warranty or liability for your use of this information.      Relationships for Good Health  Relationships are important for our health and happiness. Social isolation, loneliness and lack of support are bad for your health. Studies show that loneliness can harm health and limit your life span as much as high blood pressure and smoking.   Take some time to reflect on your relationships. Then answer these questions:  Are there people in your life that cause you stress or drain your energy? What can you do to set limits?  ________________________________________________________________________________________________________________________________________________________________________________________________________________________________________________________________________________________________________________________________________________  Who do you enjoy spending time with? Who can you go to for " support?  ________________________________________________________________________________________________________________________________________________________________________________________________________________________________________________________________________________________________________________________________________________  What can you do to improve your relationships with others?  __________________________________________________________________________________________________________________________________________________________________________________________________________________  ______________________________________________________________________________________________________________________________  What do you like most about your relationships with others?  ________________________________________________________________________________________________________________________________________________________________________________________________________________________________________________________________________________________________________________________________________________  My goal: ______________________________________________________________________  I will ______________________________________________________________________________________________________________________________________________________________________________________________    For informational purposes only. Not to replace the advice of your health care provider. Copyright   2018 [facility]. All rights reserved. Clinically reviewed by Bariatric Health  Team. [employer] 768542 - Rev 04/21.    Learning About Sleeping Well  What does sleeping well mean?     Sleeping well means getting enough sleep to feel good and stay healthy. How much sleep is enough varies among people.  The number of hours you sleep and how you feel when you wake up are both important. If you do not  feel refreshed, you probably need more sleep. Another sign of not getting enough sleep is feeling tired during the day.  Experts recommend that adults get at least 7 or more hours of sleep per day. Children and older adults need more sleep.  Why is getting enough sleep important?  Getting enough quality sleep is a basic part of good health. When your sleep suffers, your physical health, mood, and your thoughts can suffer too. You may find yourself feeling more grumpy or stressed. Not getting enough sleep also can lead to serious problems, including injury, accidents, anxiety, and depression.  What might cause poor sleeping?  Many things can cause sleep problems, including:  Changes to your sleep schedule.  Stress. Stress can be caused by fear about a single event, such as giving a speech. Or you may have ongoing stress, such as worry about work or school.  Depression, anxiety, and other mental or emotional conditions.  Changes in your sleep habits or surroundings. This includes changes that happen where you sleep, such as noise, light, or sleeping in a different bed. It also includes changes in your sleep pattern, such as having jet lag or working a late shift.  Health problems, such as pain, breathing problems, and restless legs syndrome.  Lack of regular exercise.  Using alcohol, nicotine, or caffeine before bed.  How can you help yourself?  Here are some tips that may help you sleep more soundly and wake up feeling more refreshed.  Your sleeping area   Use your bedroom only for sleeping and sex. A bit of light reading may help you fall asleep. But if it doesn't, do your reading elsewhere in the house. Try not to use your TV, computer, smartphone, or tablet while you are in bed.  Be sure your bed is big enough to stretch out comfortably, especially if you have a sleep partner.  Keep your bedroom quiet, dark, and cool. Use curtains, blinds, or a sleep mask to block out light. To block out noise, use earplugs,  "soothing music, or a \"white noise\" machine.  Your evening and bedtime routine   Create a relaxing bedtime routine. You might want to take a warm shower or bath, or listen to soothing music.  Go to bed at the same time every night. And get up at the same time every morning, even if you feel tired.  What to avoid   Limit caffeine (coffee, tea, caffeinated sodas) during the day, and don't have any for at least 6 hours before bedtime.  Avoid drinking alcohol before bedtime. Alcohol can cause you to wake up more often during the night.  Try not to smoke or use tobacco, especially in the evening. Nicotine can keep you awake.  Limit naps during the day, especially close to bedtime.  Avoid lying in bed awake for too long. If you can't fall asleep or if you wake up in the middle of the night and can't get back to sleep within about 20 minutes, get out of bed and go to another room until you feel sleepy.  Avoid taking medicine right before bed that may keep you awake or make you feel hyper or energized. Your doctor can tell you if your medicine may do this and if you can take it earlier in the day.  If you can't sleep   Imagine yourself in a peaceful, pleasant scene. Focus on the details and feelings of being in a place that is relaxing.  Get up and do a quiet or boring activity until you feel sleepy.  Avoid drinking any liquids before going to bed to help prevent waking up often to use the bathroom.  Where can you learn more?  Go to https://www.[employer].net/patiented  Enter J942 in the search box to learn more about \"Learning About Sleeping Well.\"  Current as of: July 10, 2023               Content Version: 14.0    2475-6292 [employer]y.   Care instructions adapted under license by your healthcare professional. If you have questions about a medical condition or this instruction, always ask your healthcare professional. [employer] disclaims any warranty or liability for your use of this " information.      Bladder Training: Care Instructions  Your Care Instructions     Bladder training is used to treat urge incontinence and stress incontinence. Urge incontinence means that the need to urinate comes on so fast that you can't get to a toilet in time. Stress incontinence means that you leak urine because of pressure on your bladder. For example, it may happen when you laugh, cough, or lift something heavy.  Bladder training can increase how long you can wait before you have to urinate. It can also help your bladder hold more urine. And it can give you better control over the urge to urinate.  It is important to remember that bladder training takes a few weeks to a few months to make a difference. You may not see results right away, but don't give up.  Follow-up care is a key part of your treatment and safety. Be sure to make and go to all appointments, and call your doctor if you are having problems. It's also a good idea to know your test results and keep a list of the medicines you take.  How can you care for yourself at home?  Work with your doctor to come up with a bladder training program that is right for you. You may use one or more of the following methods.  Delayed urination  In the beginning, try to keep from urinating for 5 minutes after you first feel the need to go.  While you wait, take deep, slow breaths to relax. Kegel exercises can also help you delay the need to go to the bathroom.  After some practice, when you can easily wait 5 minutes to urinate, try to wait 10 minutes before you urinate.  Slowly increase the waiting period until you are able to control when you have to urinate.  Scheduled urination  Empty your bladder when you first wake up in the morning.  Schedule times throughout the day when you will urinate.  Start by going to the bathroom every hour, even if you don't need to go.  Slowly increase the time between trips to the bathroom.  When you have found a schedule that  "works well for you, keep doing it.  If you wake up during the night and have to urinate, do it. Apply your schedule to waking hours only.  Kegel exercises  These tighten and strengthen pelvic muscles, which can help you control the flow of urine. (If doing these exercises causes pain, stop doing them and talk with your doctor.) To do Kegel exercises:  Squeeze your muscles as if you were trying not to pass gas. Or squeeze your muscles as if you were stopping the flow of urine. Your belly, legs, and buttocks shouldn't move.  Hold the squeeze for 3 seconds, then relax for 5 to 10 seconds.  Start with 3 seconds, then add 1 second each week until you are able to squeeze for 10 seconds.  Repeat the exercise 10 times a session. Do 3 to 8 sessions a day.  When should you call for help?  Watch closely for changes in your health, and be sure to contact your doctor if:    Your incontinence is getting worse.     You do not get better as expected.   Where can you learn more?  Go to https://www.[employer].net/patiented  Enter V684 in the search box to learn more about \"Bladder Training: Care Instructions.\"  Current as of: November 15, 2023               Content Version: 14.0    9875-4237 [email or web address].   Care instructions adapted under license by your healthcare professional. If you have questions about a medical condition or this instruction, always ask your healthcare professional. [email or web address] disclaims any warranty or liability for your use of this information.      Learning About Depression Screening  What is depression screening?  Depression screening is a way to see if you have depression symptoms. It may be done by a doctor or counselor. It's often part of a routine checkup. That's because your mental health is just as important as your physical health.  Depression is a mental health condition that affects how you feel, think, and act. You may:  Have less energy.  Lose interest in your daily " "activities.  Feel sad and grouchy for a long time.  Depression is very common. It affects people of all ages.  Many things can lead to depression. Some people become depressed after they have a stroke or find out they have a major illness like cancer or heart disease. The death of a loved one or a breakup may lead to depression. It can run in families. Most experts believe that a combination of inherited genes and stressful life events can cause it.  What happens during screening?  You may be asked to fill out a form about your depression symptoms. You and the doctor will discuss your answers. The doctor may ask you more questions to learn more about how you think, act, and feel.  What happens after screening?  If you have symptoms of depression, your doctor will talk to you about your options.  Doctors usually treat depression with medicines or counseling. Often, combining the two works best. Many people don't get help because they think that they'll get over the depression on their own. But people with depression may not get better unless they get treatment.  The cause of depression is not well understood. There may be many factors involved. But if you have depression, it's not your fault.  A serious symptom of depression is thinking about death or suicide. If you or someone you care about talks about this or about feeling hopeless, get help right away.  It's important to know that depression can be treated. Medicine, counseling, and self-care may help.  Where can you learn more?  Go to https://www.[employer]wheel.net/patiented  Enter T185 in the search box to learn more about \"Learning About Depression Screening.\"  Current as of: June 24, 2023               Content Version: 14.0    4988-3189 [employer], [employer].   Care instructions adapted under license by your healthcare professional. If you have questions about a medical condition or this instruction, always ask your healthcare professional. [employer] " Incorporated disclaims any warranty or liability for your use of this information.      Substance Use Disorder: Care Instructions  Overview     You can improve your life and health by stopping your use of alcohol or drugs. When you don't drink or use drugs, you may feel and sleep better. You may get along better with your family, friends, and coworkers. There are medicines and programs that can help with substance use disorder.  How can you care for yourself at home?  Here are some ways to help you stay sober and prevent relapse.  If you have been given medicine to help keep you sober or reduce your cravings, be sure to take it exactly as prescribed.  Talk to your doctor about programs that can help you stop using drugs or drinking alcohol.  Do not keep alcohol or drugs in your home.  Plan ahead. Think about what you'll say if other people ask you to drink or use drugs. Try not to spend time with people who drink or use drugs.  Use the time and money spent on drinking or drugs to do something that's important to you.  Preventing a relapse  Have a plan to deal with relapse. Learn to recognize changes in your thinking that lead you to drink or use drugs. Get help before you start to drink or use drugs again.  Try to stay away from situations, friends, or places that may lead you to drink or use drugs.  If you feel the need to drink alcohol or use drugs again, seek help right away. Call a trusted friend or family member. Some people get support from organizations such as Narcotics Anonymous or [employer] or from treatment facilities.  If you relapse, get help as soon as you can. Some people make a plan with another person that outlines what they want that person to do for them if they relapse. The plan usually includes how to handle the relapse and who to notify in case of relapse.  Don't give up. Remember that a relapse doesn't mean that you have failed. Use the experience to learn the triggers that lead you to  "drink or use drugs. Then quit again. Recovery is a lifelong process. Many people have several relapses before they are able to quit for good.  Follow-up care is a key part of your treatment and safety. Be sure to make and go to all appointments, and call your doctor if you are having problems. It's also a good idea to know your test results and keep a list of the medicines you take.  When should you call for help?   Call 911  anytime you think you may need emergency care. For example, call if you or someone else:    Has overdosed or has withdrawal signs. Be sure to tell the emergency workers that you are or someone else is using or trying to quit using drugs. Overdose or withdrawal signs may include:  Losing consciousness.  Seizure.  Seeing or hearing things that aren't there (hallucinations).     Is thinking or talking about suicide or harming others.   Where to get help 24 hours a day, 7 days a week   If you or someone you know talks about suicide, self-harm, a mental health crisis, a substance use crisis, or any other kind of emotional distress, get help right away. You can:    Call the Suicide and Crisis Lifeline at 988.     Call 2-362-539-TALK (1-439.806.7287).     Text HOME to 344717 to access the Crisis Text Line.   Consider saving these numbers in your phone.  Go to [employer] for more information or to chat online.  Call your doctor now or seek immediate medical care if:    You are having withdrawal symptoms. These may include nausea or vomiting, sweating, shakiness, and anxiety.   Watch closely for changes in your health, and be sure to contact your doctor if:    You have a relapse.     You need more help or support to stop.   Where can you learn more?  Go to https://www.[employer].net/patiented  Enter H573 in the search box to learn more about \"Substance Use Disorder: Care Instructions.\"  Current as of: November 15, 2023               Content Version: 14.0    [id number] Healthwise, Incorporated. "   Care instructions adapted under license by your healthcare professional. If you have questions about a medical condition or this instruction, always ask your healthcare professional. Healthwise, Incorporated disclaims any warranty or liability for your use of this information.

## 2024-06-19 NOTE — PROGRESS NOTES
Preventive Care Visit  [facility]  Jory Fragoso, NP, Nurse Practitioner - Family  Jun 19, 2024      Assessment & Plan     (Z00.00) Encounter for Medicare annual wellness exam  (primary encounter diagnosis)  Comment:   Plan:     (E11.69) Type 2 diabetes mellitus with other specified complication, without long-term current use of insulin (H)  Comment: at goal  Plan: HEMOGLOBIN A1C, Orthopedic  Referral,         Albumin Random Urine Quantitative with Creat         Ratio, Lipid panel reflex to direct LDL         Non-fasting, Comprehensive metabolic panel (BMP        + Alb, Alk Phos, ALT, AST, Total. Bili, TP)        Continue working on diet, exercise and weight loss.   Refuses a statin.     (E03.9) Hypothyroidism, unspecified type  Comment: stable  Plan: TSH WITH FREE T4 REFLEX        The current medical regimen is effective;  continue present plan and medications.     (Z12.31) Visit for screening mammogram  Comment:   Plan: MA Screening Bilateral w/ David            (M20.41) Hammer toe of right foot  Comment:   Plan: Orthopedic  Referral        Referral to podiatry given.     (B37.2) Cutaneous candidiasis  Comment: chronic  Plan: nystatin (MYCOSTATIN) [id number] UNIT/GM external         cream        The current medical regimen is effective;  continue present plan and medications. Discussed diet, keeping area dry.     (F32.1) Moderate major depression (H)  Comment: stable  Plan: DULoxetine (CYMBALTA) 60 MG capsule        The current medical regimen is effective;  continue present plan and medications.     (E03.9) Acquired hypothyroidism  Comment:   Plan: levothyroxine (SYNTHROID/LEVOTHROID) 75 MCG         tablet            (I10) Hypertension goal BP (blood pressure) < 140/90  Comment: at goal  Plan: losartan (COZAAR) 100 MG tablet, Comprehensive         metabolic panel (BMP + Alb, Alk Phos, ALT, AST,        Total. Bili, TP)        The current medical regimen is effective;   "continue present plan and medications.     (M54.2) Neck pain  Comment:   Plan: Physical Therapy  Referral        Referral to PT given.     (M25.521,  M25.522) Pain of both elbows  Comment:   Plan: Physical Therapy  Referral        Referral to PT given.     (M79.10) Myalgia  Comment:   Plan: Magnesium, ESR: Erythrocyte sedimentation rate            (E55.9) Vitamin D deficiency  Comment:   Plan: Vitamin D Deficiency            (M54.50) Bilateral low back pain without sciatica, unspecified chronicity  Comment:   Plan: Physical Therapy  Referral        Referral to PT given.     (E66.01) Morbid obesity (H)  Comment: losing weight would help with hypertension and diabetes  Plan: Continue to work on weight loss.             BMI  Estimated body mass index is 36.39 kg/m  as calculated from the following:    Height as of this encounter: 1.626 m (5' 4\").    Weight as of this encounter: 96.2 kg (212 lb).       Counseling  Appropriate preventive services were discussed with this patient, including applicable screening as appropriate for fall prevention, nutrition, physical activity, Tobacco-use cessation, weight loss and cognition.  Checklist reviewing preventive services available has been given to the patient.  Reviewed patient's diet, addressing concerns and/or questions.   Patient is at risk for social isolation and has been provided with information about the benefit of social connection.   Discussed possible causes of fatigue. Updated plan of care.  Patient reported difficulty with activities of daily living were addressed today.The patient was provided with written information regarding signs of hearing loss.   Information on urinary incontinence and treatment options given to patient.   The patient's PHQ-9 score is consistent with moderate depression. She was provided with information regarding depression.           Subjective   Mylene is a 74 year old, presenting for the following:  Annual " Visit        6/19/2024     2:53 PM   Additional Questions   Roomed by Kayleigh PENNY   Accompanied by self         Via the Health Maintenance questionnaire, the patient has reported the following services have been completed -Eye Exam: trinh 2024-02-02, this information has been sent to the abstraction team.  Health Care Directive  Patient does not have a Health Care Directive or Living Will: Discussed advance care planning with patient; however, patient declined at this time.    HPI  She was using a CPAP, but this was taken away recently by the medical supply store (due to insurance because her CATIE was too mild?).  She is feeling very fatigued and feels not rested.    She has not been checking her blood sugars and has been eating more sugar.    She is having various joint pains, as well as neck pain, elbow pain, low back pain.                 6/19/2024   General Health   How would you rate your overall physical health? (!) FAIR   Feel stress (tense, anxious, or unable to sleep) To some extent      (!) STRESS CONCERN      6/19/2024   Nutrition   Diet: Low salt            6/19/2024   Exercise   Days per week of moderate/strenous exercise 0 days   Average minutes spent exercising at this level 0 min      (!) EXERCISE CONCERN      6/19/2024   Social Factors   Frequency of gathering with friends or relatives Never   Worry food won't last until get money to buy more Patient declined   Food not last or not have enough money for food? Patient declined   Do you have housing? (Housing is defined as stable permanent housing and does not include staying ouside in a car, in a tent, in an abandoned building, in an overnight shelter, or couch-surfing.) Yes   Are you worried about losing your housing? Patient declined   Lack of transportation? Patient declined   Unable to get utilities (heat,electricity)? Patient declined      (!) SOCIAL CONNECTIONS CONCERN      6/19/2024   Fall Risk   Fallen 2 or more times in the past year? No    Trouble with walking or balance? Yes   Reason Gait Speed Test Not Completed Patient declines   Reason for decline patient states only worried about falling if she steps on objects on the ground             6/19/2024   Activities of Daily Living- Home Safety   Needs help with the following daily activites Housework   Safety concerns in the home None of the above            6/19/2024   Dental   Dentist two times every year? Yes            6/19/2024   Hearing Screening   Hearing concerns? (!) IT'S HARD TO FOLLOW A CONVERSATION IN A NOISY RESTAURANT OR CROWDED ROOM.    (!) TROUBLE UNDERSTANDING SOFT OR WHISPERED SPEECH.       Multiple values from one day are sorted in reverse-chronological order         6/19/2024   Driving Risk Screening   Patient/family members have concerns about driving No            6/19/2024   General Alertness/Fatigue Screening   Have you been more tired than usual lately? (!) YES            6/19/2024   Urinary Incontinence Screening   Bothered by leaking urine in past 6 months Yes            6/19/2024   TB Screening   Were you born outside of the [place]? No          Today's PHQ-9 Score:       6/19/2024     2:42 PM   PHQ-9 SCORE   PHQ-9 Total Score MyChart 10 (Moderate depression)   PHQ-9 Total Score 10         6/19/2024   Substance Use   Alcohol more than 3/day or more than 7/wk Not Applicable   Do you have a current opioid prescription? No   How severe/bad is pain from 1 to 10? 4/10   Do you use any other substances recreationally? (!) CANNABIS PRODUCTS        Social History     Tobacco Use    Smoking status: Never    Smokeless tobacco: Never    Tobacco comments:     am using sm. amt. of marijuana for sleep/pain   Vaping Use    Vaping status: Never Used   Substance Use Topics    Alcohol use: Yes     Comment: really managed/not my drug to enjoy. marijuana 3 times per week     Drug use: Yes     Types: IV, Marijuana           5/5/2022   LAST FHS-7 RESULTS   1st degree relative breast or ovarian  cancer No   Any relative bilateral breast cancer No   Any male have breast cancer No   Any ONE woman have BOTH breast AND ovarian cancer No   Any woman with breast cancer before 50yrs No   2 or more relatives with breast AND/OR ovarian cancer No   2 or more relatives with breast AND/OR bowel cancer Yes               ASCVD Risk   The 10-year ASCVD risk score (Buddy ADAMS, et al., 2019) is: 26.2%    Values used to calculate the score:      Age: 74 years      Sex: Female      Is Non- : No      Diabetic: Yes      Tobacco smoker: No      Systolic Blood Pressure: 115 mmHg      Is BP treated: Yes      HDL Cholesterol: 48 mg/dL      Total Cholesterol: 229 mg/dL            Reviewed and updated as needed this visit by Provider                      Current providers sharing in care for this patient include:  Patient Care Team:  Jory Fragoso NP as PCP - Karen Stockton MD as MD (Neurology)  Lisandro Cade MD as Assigned Sleep Provider  Jory Fragoso NP as Assigned PCP  Nehemias Gao MD as MD (Neurology)  Nehemias Gao MD as Assigned Neuroscience Provider    The following health maintenance items are reviewed in Epic and correct as of today:  Health Maintenance   Topic Date Due    ZOSTER IMMUNIZATION (1 of 2) Never done    RSV VACCINE (Pregnancy & 60+) (1 - 1-dose 60+ series) Never done    MEDICARE ANNUAL WELLNESS VISIT  09/23/2023    DIABETIC FOOT EXAM  10/12/2023    A1C  05/06/2024    TSH W/FREE T4 REFLEX  05/17/2024    EYE EXAM  05/18/2024    MAMMO SCREENING  05/05/2024    ANNUAL REVIEW OF HM ORDERS  08/18/2024    INFLUENZA VACCINE (Season Ended) 09/01/2024    COVID-19 Vaccine (7 - 2023-24 season) 09/20/2024    BMP  11/06/2024    LIPID  11/06/2024    MICROALBUMIN  11/06/2024    PHQ-9  12/19/2024    FALL RISK ASSESSMENT  06/19/2025    ADVANCE CARE PLANNING  10/02/2027    DTAP/TDAP/TD IMMUNIZATION (2 - Td or Tdap) 07/03/2028  "   COLORECTAL CANCER SCREENING  03/26/2029    DEXA  05/05/2037    HEPATITIS C SCREENING  Completed    DEPRESSION ACTION PLAN  Completed    Pneumococcal Vaccine: 65+ Years  Completed    IPV IMMUNIZATION  Aged Out    HPV IMMUNIZATION  Aged Out    MENINGITIS IMMUNIZATION  Aged Out    RSV MONOCLONAL ANTIBODY  Aged Out            Objective    Exam  /79 (BP Location: Right arm, Patient Position: Sitting, Cuff Size: Adult Regular)   Pulse 111   Temp 97.8  F (36.6  C) (Temporal)   Resp 18   Ht 1.626 m (5' 4\")   Wt 96.2 kg (212 lb)   LMP  (LMP Unknown)   SpO2 94%   BMI 36.39 kg/m     Estimated body mass index is 36.39 kg/m  as calculated from the following:    Height as of this encounter: 1.626 m (5' 4\").    Weight as of this encounter: 96.2 kg (212 lb).    Physical Exam  GENERAL: alert and no distress  EYES: Eyes grossly normal to inspection, PERRL and conjunctivae and sclerae normal  HENT: ear canals and TM's normal, nose and mouth without ulcers or lesions  NECK: no adenopathy, no asymmetry, masses, or scars  RESP: lungs clear to auscultation - no rales, rhonchi or wheezes  CV: regular rate and rhythm, normal S1 S2, no S3 or S4, no murmur, click or rub, no peripheral edema  ABDOMEN: soft, nontender, no hepatosplenomegaly, no masses and bowel sounds normal  MS: no gross musculoskeletal defects noted, no edema  SKIN: no suspicious lesions or rashes  NEURO: Normal strength and tone, mentation intact and speech normal  PSYCH: mentation appears normal, affect normal/bright        6/19/2024   Mini Cog   Clock Draw Score 2 Normal   3 Item Recall 3 objects recalled   Mini Cog Total Score 5                 Signed Electronically by: Jory Fragoso NP    Answers submitted by the patient for this visit:  Patient Health Questionnaire (Submitted on 6/19/2024)  If you checked off any problems, how difficult have these problems made it for you to do your work, take care of things at home, or get along with other " people?: Very difficult  PHQ9 TOTAL SCORE: 10

## 2024-06-20 LAB
ALBUMIN SERPL BCG-MCNC: 4.1 G/DL (ref 3.5–5.2)
ALP SERPL-CCNC: 80 U/L (ref 40–150)
ALT SERPL W P-5'-P-CCNC: 14 U/L (ref 0–50)
ANION GAP SERPL CALCULATED.3IONS-SCNC: 8 MMOL/L (ref 7–15)
AST SERPL W P-5'-P-CCNC: 20 U/L (ref 0–45)
BILIRUB SERPL-MCNC: 0.9 MG/DL
BUN SERPL-MCNC: 14.9 MG/DL (ref 8–23)
CALCIUM SERPL-MCNC: 9.1 MG/DL (ref 8.8–10.2)
CHLORIDE SERPL-SCNC: 103 MMOL/L (ref 98–107)
CHOLEST SERPL-MCNC: 215 MG/DL
CREAT SERPL-MCNC: 0.82 MG/DL (ref 0.51–0.95)
CREAT UR-MCNC: 125 MG/DL
DEPRECATED HCO3 PLAS-SCNC: 28 MMOL/L (ref 22–29)
EGFRCR SERPLBLD CKD-EPI 2021: 75 ML/MIN/1.73M2
FASTING STATUS PATIENT QL REPORTED: NO
FASTING STATUS PATIENT QL REPORTED: NO
GLUCOSE SERPL-MCNC: 115 MG/DL (ref 70–99)
HDLC SERPL-MCNC: 36 MG/DL
LDLC SERPL CALC-MCNC: 114 MG/DL
MAGNESIUM SERPL-MCNC: 2.1 MG/DL (ref 1.7–2.3)
MICROALBUMIN UR-MCNC: <12 MG/L
MICROALBUMIN/CREAT UR: NORMAL MG/G{CREAT}
NONHDLC SERPL-MCNC: 179 MG/DL
POTASSIUM SERPL-SCNC: 4.1 MMOL/L (ref 3.4–5.3)
PROT SERPL-MCNC: 6.7 G/DL (ref 6.4–8.3)
SODIUM SERPL-SCNC: 139 MMOL/L (ref 135–145)
TRIGL SERPL-MCNC: 326 MG/DL
TSH SERPL DL<=0.005 MIU/L-ACNC: 1.75 UIU/ML (ref 0.3–4.2)
VIT D+METAB SERPL-MCNC: 26 NG/ML (ref 20–50)

## 2024-06-29 ENCOUNTER — APPOINTMENT (OUTPATIENT)
Dept: GENERAL RADIOLOGY | Facility: CLINIC | Age: 74
End: 2024-06-29
Attending: PHYSICIAN ASSISTANT
Payer: COMMERCIAL

## 2024-06-29 ENCOUNTER — HOSPITAL ENCOUNTER (EMERGENCY)
Facility: CLINIC | Age: 74
Discharge: HOME | End: 2024-06-29
Admitting: PHYSICIAN ASSISTANT
Payer: COMMERCIAL

## 2024-06-29 ENCOUNTER — APPOINTMENT (OUTPATIENT)
Dept: ULTRASOUND IMAGING | Facility: CLINIC | Age: 74
End: 2024-06-29
Attending: PHYSICIAN ASSISTANT
Payer: COMMERCIAL

## 2024-06-29 VITALS
TEMPERATURE: 98.2 F | OXYGEN SATURATION: 97 % | SYSTOLIC BLOOD PRESSURE: 129 MMHG | DIASTOLIC BLOOD PRESSURE: 75 MMHG | RESPIRATION RATE: 22 BRPM | HEART RATE: 116 BPM

## 2024-06-29 DIAGNOSIS — M25.462 BILATERAL KNEE EFFUSIONS: ICD-10-CM

## 2024-06-29 DIAGNOSIS — M25.461 BILATERAL KNEE EFFUSIONS: ICD-10-CM

## 2024-06-29 LAB
ALBUMIN SERPL BCG-MCNC: 4 G/DL (ref 3.5–5.2)
ALP SERPL-CCNC: 76 U/L (ref 40–150)
ALT SERPL W P-5'-P-CCNC: 6 U/L (ref 0–50)
ANION GAP SERPL CALCULATED.3IONS-SCNC: 11 MMOL/L (ref 7–15)
AST SERPL W P-5'-P-CCNC: 23 U/L (ref 0–45)
BASOPHILS # BLD AUTO: 0.1 10E3/UL (ref 0–0.2)
BASOPHILS NFR BLD AUTO: 1 %
BILIRUB SERPL-MCNC: 1.6 MG/DL
BUN SERPL-MCNC: 13.1 MG/DL (ref 8–23)
CALCIUM SERPL-MCNC: 9.2 MG/DL (ref 8.8–10.2)
CHLORIDE SERPL-SCNC: 101 MMOL/L (ref 98–107)
CREAT SERPL-MCNC: 0.75 MG/DL (ref 0.51–0.95)
DEPRECATED HCO3 PLAS-SCNC: 24 MMOL/L (ref 22–29)
EGFRCR SERPLBLD CKD-EPI 2021: 84 ML/MIN/1.73M2
EOSINOPHIL # BLD AUTO: 0.2 10E3/UL (ref 0–0.7)
EOSINOPHIL NFR BLD AUTO: 1 %
ERYTHROCYTE [DISTWIDTH] IN BLOOD BY AUTOMATED COUNT: 13.1 % (ref 10–15)
GLUCOSE SERPL-MCNC: 128 MG/DL (ref 70–99)
HCT VFR BLD AUTO: 41.2 % (ref 35–47)
HGB BLD-MCNC: 13.9 G/DL (ref 11.7–15.7)
IMM GRANULOCYTES # BLD: 0 10E3/UL
IMM GRANULOCYTES NFR BLD: 0 %
LYMPHOCYTES # BLD AUTO: 3.2 10E3/UL (ref 0.8–5.3)
LYMPHOCYTES NFR BLD AUTO: 31 %
MCH RBC QN AUTO: 30.1 PG (ref 26.5–33)
MCHC RBC AUTO-ENTMCNC: 33.7 G/DL (ref 31.5–36.5)
MCV RBC AUTO: 89 FL (ref 78–100)
MONOCYTES # BLD AUTO: 0.8 10E3/UL (ref 0–1.3)
MONOCYTES NFR BLD AUTO: 8 %
NEUTROPHILS # BLD AUTO: 6.2 10E3/UL (ref 1.6–8.3)
NEUTROPHILS NFR BLD AUTO: 59 %
NRBC # BLD AUTO: 0 10E3/UL
NRBC BLD AUTO-RTO: 0 /100
NT-PROBNP SERPL-MCNC: 92 PG/ML (ref 0–900)
PLATELET # BLD AUTO: 309 10E3/UL (ref 150–450)
POTASSIUM SERPL-SCNC: 4.6 MMOL/L (ref 3.4–5.3)
PROT SERPL-MCNC: 7.1 G/DL (ref 6.4–8.3)
RBC # BLD AUTO: 4.62 10E6/UL (ref 3.8–5.2)
SODIUM SERPL-SCNC: 136 MMOL/L (ref 135–145)
WBC # BLD AUTO: 10.5 10E3/UL (ref 4–11)

## 2024-06-29 PROCEDURE — 85025 COMPLETE CBC W/AUTO DIFF WBC: CPT | Performed by: PHYSICIAN ASSISTANT

## 2024-06-29 PROCEDURE — 93005 ELECTROCARDIOGRAM TRACING: CPT | Mod: RTG

## 2024-06-29 PROCEDURE — 99285 EMERGENCY DEPT VISIT HI MDM: CPT | Mod: 25 | Performed by: PHYSICIAN ASSISTANT

## 2024-06-29 PROCEDURE — 73560 X-RAY EXAM OF KNEE 1 OR 2: CPT | Mod: 50

## 2024-06-29 PROCEDURE — 93970 EXTREMITY STUDY: CPT | Mod: 26 | Performed by: RADIOLOGY

## 2024-06-29 PROCEDURE — 83880 ASSAY OF NATRIURETIC PEPTIDE: CPT | Mod: GZ | Performed by: PHYSICIAN ASSISTANT

## 2024-06-29 PROCEDURE — 99285 EMERGENCY DEPT VISIT HI MDM: CPT | Performed by: PHYSICIAN ASSISTANT

## 2024-06-29 PROCEDURE — 36415 COLL VENOUS BLD VENIPUNCTURE: CPT | Performed by: PHYSICIAN ASSISTANT

## 2024-06-29 PROCEDURE — 73560 X-RAY EXAM OF KNEE 1 OR 2: CPT | Mod: 26 | Performed by: RADIOLOGY

## 2024-06-29 PROCEDURE — 80053 COMPREHEN METABOLIC PANEL: CPT | Performed by: PHYSICIAN ASSISTANT

## 2024-06-29 PROCEDURE — 93970 EXTREMITY STUDY: CPT

## 2024-06-29 ASSESSMENT — ACTIVITIES OF DAILY LIVING (ADL)
ADLS_ACUITY_SCORE: 35

## 2024-06-29 ASSESSMENT — COLUMBIA-SUICIDE SEVERITY RATING SCALE - C-SSRS
2. HAVE YOU ACTUALLY HAD ANY THOUGHTS OF KILLING YOURSELF IN THE PAST MONTH?: NO
6. HAVE YOU EVER DONE ANYTHING, STARTED TO DO ANYTHING, OR PREPARED TO DO ANYTHING TO END YOUR LIFE?: NO
1. IN THE PAST MONTH, HAVE YOU WISHED YOU WERE DEAD OR WISHED YOU COULD GO TO SLEEP AND NOT WAKE UP?: NO

## 2024-06-29 NOTE — ED TRIAGE NOTES
Pt ambulatory to triage with left knee swelling, and some mild swelling to right knee as well. Pt has hx of bilateral knee replacements.

## 2024-06-29 NOTE — ED PROVIDER NOTES
[place] EMERGENCY DEPARTMENT ([facility])    6/29/24       ED PROVIDER NOTE       History     Chief Complaint   Patient presents with    Joint Swelling     HPI  Leena Montaño is a 74 year old female with a past medical history of type 2 DM, hypertension, CKD, osteoarthritis, and s/p right (2009) and left (2016) knee joint replacement, who presents to the ED with joint swelling.   Patient presents alone.    She notes over the last 1 to 2 days she has felt like she is experiencing more swelling to the lateral aspects of both knees.  She states that this is without any associated pain.  She denies any distal lower leg swelling.  No fevers, vomiting or abdominal pain.  No dyspnea or chest pain.  No history of CHF.  No history of PE or DVT recent surgeries or immobilizations, she is not anticoagulated she is not on hormones no hemoptysis.  She does note that she had a mechanical fall few days ago and fell onto her hands and knees although denies any injury from this or any open wound.  She has a history of bilateral knee replacements done 7+ years ago.  She has bilateral neuropathy in her lower extremities as well at baseline.    Patient states she is concerned about possible blood clot.    Past Medical History  Past Medical History:   Diagnosis Date    Allergic rhinitis, cause unspecified     CKD (chronic kidney disease) stage 2, GFR 60-89 ml/min 12/31/2010    Depressive disorder, not elsewhere classified     Disorder of uterus 3/5/2007    Problem list name updated by automated process. Provider to review    Hyperlipidemia LDL goal <130 10/31/2010    Hypertension goal BP (blood pressure) < 130/80 12/31/2010    Lumbago     degenerative disc disease and disc protrusion    Neck pain 12/13/2011    OA (osteoarthritis) of knee 10/28/2008    Osteoarthrosis, unspecified whether generalized or localized, unspecified site     Peripheral neuropathy     non-diabetic    Thyroid disease     Type 2 diabetes  mellitus without complication (H) 3/28/2016    Unspecified essential hypertension     Unspecified sleep apnea      Past Surgical History:   Procedure Laterality Date    BIOPSY  10/2015    during colonoscopy    CERVICAL FUSION N/A 10/18/2017    Procedure: BILATERAL C7-T1 ANTERIOR CERVICAL DECOMPRESSION FUSION;  Surgeon: Victor Hugo Fernández MD;  Location: [facility];  Service:     COLONOSCOPY  10/2015    COLONOSCOPY N/A 3/26/2024    Procedure: COLONOSCOPY, FLEXIBLE, WITH LESION REMOVAL USING SNARE;  Surgeon: Jimmie Denton MD;  Location:  GI    JOINT REPLACEMTN, KNEE RT/LT Right 2009    Joint Replacement knee RT    JOINT REPLACEMTN, KNEE RT/LT Left 2016    TONSILLECTOMY       blood glucose (NO BRAND SPECIFIED) lancets standard  blood glucose (NO BRAND SPECIFIED) test strip  blood glucose monitoring (NO BRAND SPECIFIED) meter device kit  DULoxetine (CYMBALTA) 60 MG capsule  levothyroxine (SYNTHROID/LEVOTHROID) 75 MCG tablet  losartan (COZAAR) 100 MG tablet  nystatin (MYCOSTATIN) 780078 UNIT/GM external cream  order for DME  ORDER FOR DME      Allergies   Allergen Reactions    Cats     Dogs     Statins Muscle Pain (Myalgia)     Family History  Family History   Problem Relation Age of Onset    Hypertension Father     Alcohol/Drug Father     Coronary Artery Disease Father         stroke/ carotid blockage    Cerebrovascular Disease Father         did not manage his blood pressure with meds smoked    Depression Father         came along after his first big stroke    Substance Abuse Father         alcohol    Hypertension Mother     Alcohol/Drug Mother     Depression Mother     Suicide Mother     Coronary Artery Disease Mother         surgery for carotid/surgery for femoral artery    Depression/Anxiety Mother     Cerebrovascular Disease Mother         small ones, smoked    Thyroid Disease Mother     Substance Abuse Mother         alcohol    Cerebrovascular Disease Paternal Grandfather         early age onset...60's     C.A.D. Maternal Grandfather         massive heart blow out    Depression/Anxiety Maternal Grandmother     Breast Cancer Maternal Grandmother         had a radical mastectomy....no recurrence  at    Substance Abuse Brother     Suicide Other     Depression Other     Diabetes Sister     Alcohol/Drug Brother     Substance Abuse Brother     Hypertension Brother         smoker    Cancer - colorectal No family hx of     Prostate Cancer No family hx of      Social History   Social History     Tobacco Use    Smoking status: Never    Smokeless tobacco: Never    Tobacco comments:     am using sm. amt. of marijuana for sleep/pain   Vaping Use    Vaping status: Never Used   Substance Use Topics    Alcohol use: Yes     Comment: really managed/not my drug to enjoy. marijuana 3 times per week     Drug use: Yes     Types: IV, Marijuana      A medically appropriate review of systems was performed with pertinent positives and negatives noted in the HPI, and all other systems negative.    Physical Exam   BP: (!) 153/97  Pulse: 116  Temp: 98.2  F (36.8  C)  Resp: 22  SpO2: 97 %  Physical Exam  GENERAL APPEARANCE: The patient is well developed, well appearing, and in no acute distress.  HEAD:  Normocephalic and atraumatic.   EENT: Voice normal.  NECK: Trachea is midline.No lymphadenopathy or tenderness.  LUNGS: Breath sounds are equal and clear bilaterally. No wheezes, rhonchi, or rales.  HEART: Regular rate and normal rhythm.    EXTREMITIES: Exam of the lower leg shows prior incisional scars of knee replacements.  Knees without erythema warmth or effusion noted.  Patient has full range of motion with flexion extension of the knees.  She is able to weight-bear without difficulty.  Palpation of the lower legs elicits no tenderness to palpation of the knee.  Homans' sign negative bilaterally, there is no unilateral calf swelling, erythema, question varicose veins bilaterally.  PT pulses 2+ bilaterally.  NEUROLOGIC: No focal sensory  or motor deficits are noted.  PSYCHIATRIC: The patient is awake, alert.  Appropriate mood and affect.  SKIN: Warm, dry, and well perfused. Good turgor.      ED Course, Procedures, & Data           Results for orders placed or performed during the hospital encounter of 06/29/24   XR Knee Bilateral 1/2 Views     Status: None    Narrative    EXAM: XR KNEE BILATERAL 1/2 VIEWS  LOCATION: [facility]  DATE: 06/29/2024    INDICATION: History of bilateral knee replacement, concern for new swelling.  COMPARISON: 09/07/2018.      Impression    IMPRESSION:   Bilateral total knee replacements. There is no evidence of an acute periprosthetic fracture on either side. Left greater than right knee joint effusions. Bones are demineralized. There is some mild periprosthetic lucency about the medial femoral condyle   bone/metallic interface on the right side, however, this is a chronic finding which was present on 09/07/2018.       US Lower Extremity Venous Duplex Bilateral     Status: None    Narrative    EXAM: Bilateral lower extremity venous duplex ultrasound, 6/29/2024  4:45 PM     HISTORY: Bilateral leg swelling.    COMPARISON: None.    TECHNIQUE: Gray-scale evaluation with compression, spectral flow, and  color Doppler assessment of the deep venous system of both lower  extremities from groin to knee, and then at the ankles was performed.    FINDINGS: In both lower extremities, the common femoral, femoral,  popliteal, peroneal, and posterior tibial veins demonstrate normal  compressibility and blood flow.      Impression    IMPRESSION: No deep venous thrombosis in either lower extremity.    I have personally reviewed the examination and initial interpretation  and I agree with the findings.    JAIME CARLTON MD         SYSTEM ID:  [id number]   Comprehensive metabolic panel     Status: Abnormal   Result Value Ref Range    Sodium 136 135 - 145 mmol/L    Potassium 4.6 3.4 - 5.3 mmol/L     Carbon Dioxide (CO2) 24 22 - 29 mmol/L    Anion Gap 11 7 - 15 mmol/L    Urea Nitrogen 13.1 8.0 - 23.0 mg/dL    Creatinine 0.75 0.51 - 0.95 mg/dL    GFR Estimate 84 >60 mL/min/1.73m2    Calcium 9.2 8.8 - 10.2 mg/dL    Chloride 101 98 - 107 mmol/L    Glucose 128 (H) 70 - 99 mg/dL    Alkaline Phosphatase 76 40 - 150 U/L    AST 23 0 - 45 U/L    ALT 6 0 - 50 U/L    Protein Total 7.1 6.4 - 8.3 g/dL    Albumin 4.0 3.5 - 5.2 g/dL    Bilirubin Total 1.6 (H) <=1.2 mg/dL   Nt probnp inpatient     Status: Normal   Result Value Ref Range    N terminal Pro BNP Inpatient 92 0 - 900 pg/mL   CBC with platelets and differential     Status: None   Result Value Ref Range    WBC Count 10.5 4.0 - 11.0 10e3/uL    RBC Count 4.62 3.80 - 5.20 10e6/uL    Hemoglobin 13.9 11.7 - 15.7 g/dL    Hematocrit 41.2 35.0 - 47.0 %    MCV 89 78 - 100 fL    MCH 30.1 26.5 - 33.0 pg    MCHC 33.7 31.5 - 36.5 g/dL    RDW 13.1 10.0 - 15.0 %    Platelet Count 309 150 - 450 10e3/uL    % Neutrophils 59 %    % Lymphocytes 31 %    % Monocytes 8 %    % Eosinophils 1 %    % Basophils 1 %    % Immature Granulocytes 0 %    NRBCs per 100 WBC 0 <1 /100    Absolute Neutrophils 6.2 1.6 - 8.3 10e3/uL    Absolute Lymphocytes 3.2 0.8 - 5.3 10e3/uL    Absolute Monocytes 0.8 0.0 - 1.3 10e3/uL    Absolute Eosinophils 0.2 0.0 - 0.7 10e3/uL    Absolute Basophils 0.1 0.0 - 0.2 10e3/uL    Absolute Immature Granulocytes 0.0 <=0.4 10e3/uL    Absolute NRBCs 0.0 10e3/uL   EKG 12 lead     Status: None (Preliminary result)   Result Value Ref Range    Systolic Blood Pressure  mmHg    Diastolic Blood Pressure  mmHg    Ventricular Rate 104 BPM    Atrial Rate 104 BPM    KS Interval 202 ms    QRS Duration 74 ms     ms    QTc 433 ms    P Axis 63 degrees    R AXIS 105 degrees    T Axis 50 degrees    Interpretation ECG       Sinus tachycardia with occasional Premature ventricular complexes  Low voltage QRS  Possible Anterolateral infarct , age undetermined  Abnormal ECG     CBC with  platelets differential     Status: None    Narrative    The following orders were created for panel order CBC with platelets differential.  Procedure                               Abnormality         Status                     ---------                               -----------         ------                     CBC with platelets and d...[[id number]]                      Final result                 Please view results for these tests on the individual orders.     Medications - No data to display  Labs Ordered and Resulted from Time of ED Arrival to Time of ED Departure   COMPREHENSIVE METABOLIC PANEL - Abnormal       Result Value    Sodium 136      Potassium 4.6      Carbon Dioxide (CO2) 24      Anion Gap 11      Urea Nitrogen 13.1      Creatinine 0.75      GFR Estimate 84      Calcium 9.2      Chloride 101      Glucose 128 (*)     Alkaline Phosphatase 76      AST 23      ALT 6      Protein Total 7.1      Albumin 4.0      Bilirubin Total 1.6 (*)    NT PROBNP INPATIENT - Normal    N terminal Pro BNP Inpatient 92     CBC WITH PLATELETS AND DIFFERENTIAL    WBC Count 10.5      RBC Count 4.62      Hemoglobin 13.9      Hematocrit 41.2      MCV 89      MCH 30.1      MCHC 33.7      RDW 13.1      Platelet Count 309      % Neutrophils 59      % Lymphocytes 31      % Monocytes 8      % Eosinophils 1      % Basophils 1      % Immature Granulocytes 0      NRBCs per 100 WBC 0      Absolute Neutrophils 6.2      Absolute Lymphocytes 3.2      Absolute Monocytes 0.8      Absolute Eosinophils 0.2      Absolute Basophils 0.1      Absolute Immature Granulocytes 0.0      Absolute NRBCs 0.0       US Lower Extremity Venous Duplex Bilateral   Final Result   IMPRESSION: No deep venous thrombosis in either lower extremity.      I have personally reviewed the examination and initial interpretation   and I agree with the findings.      JAIME CARLTON MD            SYSTEM ID:  [id number]      XR Knee Bilateral 1/2 Views   Final Result    IMPRESSION:    Bilateral total knee replacements. There is no evidence of an acute periprosthetic fracture on either side. Left greater than right knee joint effusions. Bones are demineralized. There is some mild periprosthetic lucency about the medial femoral condyle    bone/metallic interface on the right side, however, this is a chronic finding which was present on 09/07/2018.                   Critical care was not performed.     Medical Decision Making  The patient's presentation was of high complexity (a chronic illness severe exacerbation, progression, or side effect of treatment).    The patient's evaluation involved:  ordering and/or review of 3+ test(s) in this encounter (see separate area of note for details)    The patient's management necessitated only low risk treatment.    Assessment & Plan    This is a 74-year-old female with history of prior bilateral knee arthroplasty presenting with concerns for several days of sensation of knee and leg swelling with history of possible fall onto the knees a few days ago in the absence of pain, CHF history dyspnea PE risk factors DVT risk factors.  On presentation she is tachycardic heart rate 116, she has clear breath sounds soft abdomen.  On exam of the legs she has no findings for effusion, she is able to fully flex and extend the knees without difficulty, there is no redness or warmth noted to suggest septic joint.  She does have questionable varicose veins noted bilaterally in the lower legs without Homans' sign and has intact PT pulses.  Considered broad differential including possibility of disruption of prior hardware, DVT, septic joint, she has no history of gout to suggest this on the differential although this was also considered.  With patient's tachycardia will initiate EKG to further evaluate I do see she was recently tachycardic when she was seen at outside urgent care she is not having any chest pain dyspnea or other cardiac symptoms here in the  ER.  Initial EKG was obtained and shows sinus tachycardia with an occasional PVC without other findings for ischemia or other arrhythmias making emergent cardiac etiology of lesser likelihood.  Will initiate workup for the leg difficulties with bilateral knee x-rays, lower extremity ultrasound CBC chemistry and BNP as renal failure cirrhosis also on differential.  Patient is in agreement.      Ultrasound negative for DVT bilaterally.  X-rays of the knees show no periprosthetic fracture with bilateral knee effusion left greater than right.  Basic labs unremarkable including CBC without leukocytosis or anemia, normal creatinine electrolytes and LFTs.  Bilirubin elevated 1.6 however LFTs are normal patient having any abdominal pain to indicate emergent further workup.  I did discuss the bilirubin level with her.  BNP also obtained normal suggest against CHF.    Discussed these findings with the patient.  This point in time discussed with her this is likely associated with her prior hardware potentially overuse type injury with associated formation and swelling.  We discussed ice elevation and compression.  She has previously had [facility] do her knee surgeries in the past and I encouraged her to reach out to them if she is not having improvement within the next several weeks for follow-up.  We discussed return precautions.  Patient has no other questions or concerns at this time.  Red flag signs were addressed, and they were in agreement with the patient care plan provided.      I have reviewed the nursing notes. I have reviewed the findings, diagnosis, plan and need for follow up with the patient.    Discharge Medication List as of 6/29/2024  6:21 PM          Final diagnoses:   Bilateral knee effusions       ELIZABETH Aceves  [facility] EMERGENCY DEPARTMENT  6/29/2024

## 2024-06-29 NOTE — DISCHARGE INSTRUCTIONS
Here in the emergency room have reassuring evaluation.  Ultrasound of your leg shows no findings for blood clot.  Your basic lab work is reassuring.  X-rays of your knees show no disruption of your hardware does show some swelling in your knee joints.  Fortunately on your exam you do not have any findings for an infected joint.  You may have a component of arthritis in your knees causing the swelling and stiffness particularly if you have been doing more activity than normal over the last few days.  We discussed ibuprofen, 600 mg 4 times daily, ice elevation and return to activity as tolerated.  If her symptoms or not improving within the next 1 to 2 weeks recommend reaching out to orthopedics to schedule a follow-up visit.    If you develop any new or worsening symptoms, is important to return right away to the emergency department for further evaluation and management.

## 2024-06-30 LAB
ATRIAL RATE - MUSE: 104 BPM
DIASTOLIC BLOOD PRESSURE - MUSE: NORMAL MMHG
INTERPRETATION ECG - MUSE: NORMAL
P AXIS - MUSE: 63 DEGREES
PR INTERVAL - MUSE: 202 MS
QRS DURATION - MUSE: 74 MS
QT - MUSE: 330 MS
QTC - MUSE: 433 MS
R AXIS - MUSE: 105 DEGREES
SYSTOLIC BLOOD PRESSURE - MUSE: NORMAL MMHG
T AXIS - MUSE: 50 DEGREES
VENTRICULAR RATE- MUSE: 104 BPM

## 2024-07-01 ENCOUNTER — PATIENT OUTREACH (OUTPATIENT)
Dept: FAMILY MEDICINE | Facility: CLINIC | Age: 74
End: 2024-07-01
Payer: COMMERCIAL

## 2024-07-02 NOTE — TELEPHONE ENCOUNTER
ED / Discharge Outreach Protocol    Patient Contact    Attempt # 1    Was call answered?  No.  Call doesn't go thru. Tried numerous times.  LORA Heredia

## 2024-07-23 NOTE — TELEPHONE ENCOUNTER
REASON FOR VISIT: Type 2 diabetes mellitus with other specified complication, without long-term cu...  Hammer toe of right foot    DATE OF APPT: 7/31/2024   NOTES (FOR ALL VISITS) STATUS DETAILS   OFFICE NOTE from referring provider Internal [facility]  Jory Fragoso, NP   MEDICATION LIST Internal    IMAGING  (FOR ALL VISITS)     XR N/A    MRI (HEAD, NECK, SPINE) N/A    CT (HEAD, NECK, SPINE) N/A

## 2024-07-31 ENCOUNTER — OFFICE VISIT (OUTPATIENT)
Dept: PODIATRY | Facility: CLINIC | Age: 74
End: 2024-07-31
Attending: NURSE PRACTITIONER
Payer: COMMERCIAL

## 2024-07-31 ENCOUNTER — PRE VISIT (OUTPATIENT)
Dept: PODIATRY | Facility: CLINIC | Age: 74
End: 2024-07-31

## 2024-07-31 VITALS — HEART RATE: 110 BPM | DIASTOLIC BLOOD PRESSURE: 80 MMHG | SYSTOLIC BLOOD PRESSURE: 130 MMHG

## 2024-07-31 DIAGNOSIS — E11.69 TYPE 2 DIABETES MELLITUS WITH OTHER SPECIFIED COMPLICATION, WITHOUT LONG-TERM CURRENT USE OF INSULIN (H): ICD-10-CM

## 2024-07-31 DIAGNOSIS — G62.9 PERIPHERAL POLYNEUROPATHY: ICD-10-CM

## 2024-07-31 DIAGNOSIS — L60.0 INGROWING NAIL: Primary | ICD-10-CM

## 2024-07-31 DIAGNOSIS — M20.41 HAMMER TOE OF RIGHT FOOT: ICD-10-CM

## 2024-07-31 PROCEDURE — 99203 OFFICE O/P NEW LOW 30 MIN: CPT | Mod: 25 | Performed by: PODIATRIST

## 2024-07-31 PROCEDURE — 11730 AVULSION NAIL PLATE SIMPLE 1: CPT | Mod: T8 | Performed by: PODIATRIST

## 2024-07-31 NOTE — LETTER
7/31/2024      Leena Montaño  [address]  [facility] [address]      Dear Colleague,    Thank you for referring your patient, Leena Montaño, to the [facility]. Please see a copy of my visit note below.    Subjective:    Pt is seen today in consult from Jory Fragoso w/ the c/c of a painful ingrown right fourth nail lateral border.  This has been problematic recently.  negativehistory of drainage from the site. This is slowly getting worse.  Aggravated by activity and relieved by rest.  Has tried soaking which has not helped.   Patient has peripheral neuropathy.  She has diabetes.  She has chronic kidney disease stage II.  Also getting hammertoes on both feet.  Is wondering how to treat these.  She is retired.  Her mother had coronary artery disease.    ROS:  A 10-point review of systems was performed and is positive for that noted in the HPI and as seen above.  All other areas are negative.       Past Medical History:   Diagnosis Date     Allergic rhinitis, cause unspecified      CKD (chronic kidney disease) stage 2, GFR 60-89 ml/min 12/31/2010     Depressive disorder, not elsewhere classified      Disorder of uterus 3/5/2007    Problem list name updated by automated process. Provider to review     Hyperlipidemia LDL goal <130 10/31/2010     Hypertension goal BP (blood pressure) < 130/80 12/31/2010     Lumbago     degenerative disc disease and disc protrusion     Neck pain 12/13/2011     OA (osteoarthritis) of knee 10/28/2008     Osteoarthrosis, unspecified whether generalized or localized, unspecified site      Peripheral neuropathy     non-diabetic     Thyroid disease      Type 2 diabetes mellitus without complication (H) 3/28/2016     Unspecified essential hypertension      Unspecified sleep apnea        Past Surgical History:   Procedure Laterality Date     BIOPSY  10/2015    during colonoscopy     CERVICAL FUSION N/A 10/18/2017    Procedure: BILATERAL  C7-T1 ANTERIOR CERVICAL DECOMPRESSION FUSION;  Surgeon: Victor Hugo Fernández MD;  Location: [facility] OR;  Service:      COLONOSCOPY  10/2015     COLONOSCOPY N/A 3/26/2024    Procedure: COLONOSCOPY, FLEXIBLE, WITH LESION REMOVAL USING SNARE;  Surgeon: Jimmie Denton MD;  Location:  GI     JOINT REPLACEMTN, KNEE RT/LT Right 2009    Joint Replacement knee RT     JOINT REPLACEMTN, KNEE RT/LT Left      TONSILLECTOMY         Family History   Problem Relation Age of Onset     Hypertension Father      Alcohol/Drug Father      Coronary Artery Disease Father         stroke/ carotid blockage     Cerebrovascular Disease Father         did not manage his blood pressure with meds smoked     Depression Father         came along after his first big stroke     Substance Abuse Father         alcohol     Hypertension Mother      Alcohol/Drug Mother      Depression Mother      Suicide Mother      Coronary Artery Disease Mother         surgery for carotid/surgery for femoral artery     Depression/Anxiety Mother      Cerebrovascular Disease Mother         small ones, smoked     Thyroid Disease Mother      Substance Abuse Mother         alcohol     Cerebrovascular Disease Paternal Grandfather         early age onset...60's     C.A.D. Maternal Grandfather         massive heart blow out     Depression/Anxiety Maternal Grandmother      Breast Cancer Maternal Grandmother         had a radical mastectomy....no recurrence  at     Substance Abuse Brother      Suicide Other      Depression Other      Diabetes Sister      Alcohol/Drug Brother      Substance Abuse Brother      Hypertension Brother         smoker     Cancer - colorectal No family hx of      Prostate Cancer No family hx of        Social History     Tobacco Use     Smoking status: Never     Smokeless tobacco: Never     Tobacco comments:     am using sm. amt. of marijuana for sleep/pain   Substance Use Topics     Alcohol use: Yes     Comment: really managed/not my drug  to enjoy. marijuana 3 times per week          Current Outpatient Medications:      blood glucose (NO BRAND SPECIFIED) lancets standard, Use to test blood sugar TWO times daily or as directed. (Patient not taking: Reported on 6/10/2024), Disp: 200 each, Rfl: 0     blood glucose (NO BRAND SPECIFIED) test strip, Use to test blood sugar 2 times daily or as directed. (Patient not taking: Reported on 6/10/2024), Disp: 200 strip, Rfl: 0     blood glucose monitoring (NO BRAND SPECIFIED) meter device kit, Use to test blood sugar 2  times daily or as directed. Contour next ez (Patient not taking: Reported on 6/10/2024), Disp: 1 kit, Rfl: 0     DULoxetine (CYMBALTA) 60 MG capsule, Take 1 capsule (60 mg) by mouth daily, Disp: 90 capsule, Rfl: 3     levothyroxine (SYNTHROID/LEVOTHROID) 75 MCG tablet, Take 1 tablet (75 mcg) by mouth daily, Disp: 90 tablet, Rfl: 3     losartan (COZAAR) 100 MG tablet, Take 1 tablet (100 mg) by mouth daily, Disp: 90 tablet, Rfl: 3     nystatin (MYCOSTATIN) 049039 UNIT/GM external cream, Apply topically 2 times daily, Disp: 60 g, Rfl: PRN     order for DME, Equipment being ordered: light box (Patient not taking: Reported on 6/10/2024), Disp: 1 Units, Rfl: 0     ORDER FOR DME, Equipment being ordered: CPAP mask and tubing (Patient not taking: Reported on 6/10/2024), Disp: 1 Units, Rfl: 0       Allergies   Allergen Reactions     Cats      Dogs      Statins Muscle Pain (Myalgia)       /80   Pulse 110   LMP  (LMP Unknown) .      Constitutional/ general:  Pt is in no apparent distress, appears well-nourished.  Cooperative with history and physical exam.     Psych:  The patient answered questions appropriately.  Normal affect.  Seems to have reasonable expectations, in terms of treatment.     Eyes:  Visual scanning/ tracking without deficit.     Ears:  Response to auditory stimuli is normal.  negative hearing aid devices.  Auricles in proper alignment.     Lymphatic:  Popliteal lymph nodes not  enlarged.     Lungs:  Non labored breathing, non labored speech. No cough.  No audible wheezing. Even, quiet breathing.       Vascular:  Pedal pulses are palpable bilaterally for both the DP arteries.  CFT < 3 sec. bilateral ankle edema making it difficult to palpate posterior tibial arteries bilaterally.   Pedal hair growth noted.     Neuro:  Alert and oriented x 3. Coordinated gait.  Light touch sensation is diminished.  Monofilament absent in toes.    Derm: Thin and shiny.    Musculoskeletal:     Patient is ambulatory without an assistive device or brace.   right fourth toe nail lateral border shows soft tissue impingement with localized erythema.   negative active drainage/purulence at this time.  No sinus tracts.  No nailbed masses or exostosis.  No pain with range of motion of IPJ or MTPJ.  No ascending cellulitis.  Hammering of lesser digits bilaterally right greater than left.  Can reduce these.  No pain on the metatarsal heads.  No skin breakdown anywhere else.  Negative Lachman's test.  Negative Denver's click.      Component  Ref Range & Units 1 mo ago  (6/19/24) 8 mo ago  (11/6/23) 1 yr ago  (5/17/23) 1 yr ago  (9/23/22) 2 yr ago  (5/3/22) 2 yr ago  (12/22/21) 3 yr ago  (7/21/21)    Hemoglobin A1C  0.0 - 5.6 % 6.4 High  5.8 High  CM 5.9 High  CM 5.8 High  CM 6.3 High  CM 6.5 High  CM 6.2 High  CM       ASSESSMENT:    Diabetes melitis well-controlled  Peripheral neuropathy with loss of protective sensation in toes  Ingrown nail right fourth toe lateral border  Bilateral hammertoes right greater than left    Discussed etiology and treatment options in detail w/ the pt.  The potential causes and nature of an ingrown toenail were discussed with the patient.  We reviewed the natural history/prognosis of the condition and potential risks if no treatment is provided.      After thorough discussion and answering all questions, the patient elected to have nail avulsion.  Obtained consent, used 3cc of 1% lidocaine  plain to block right fourth toe.  Sterile prep, then avulsed the affected border(s).  No evidence of deep abscess noted.  Pt tolerated procedure well.  Sterile bandage placed, gave wound care instruction.  Instructed patient on trimming nails correctly.  They will avoid tight shoes.  Avoid pedicures.  Discussed permanent removal of border if chronic problem.  Would have to get arterial ultrasound first since cannot palpate posterior tibial artery.  We gave her a list of foot care nurses.    Discussed cause of hammertoes.  Discussed importance of good supportive shoes.  Wrote prescription for diabetic shoes and inserts.  She will manipulate these daily to keep supple.  We discussed surgery today.  Explained she would need fusions with pin fixation.  Discussed would have to get arterial ultrasound first.  A decision was made today to continue conservative treatment.    Discussed with patient she has loss of protective sensation in her toes.  Will watch these daily.  Will keep protected with shoes.  Will continue to keep blood sugars well-controlled.  RTC as needed.  Thank you for allowing me participate in the care of this patient.        Kobi De Anda DPM, FACFAS        Again, thank you for allowing me to participate in the care of your patient.        Sincerely,        Kobi De Anda DPM

## 2024-07-31 NOTE — PATIENT INSTRUCTIONS
INGROWN TOENAIL REMOVAL AFTERCARE     Go directly home and elevate the affected foot on one or two pillows for the remainder of the day/evening if possible. Your toe may stay numb anywhere from 2-8 hours.   Take Tylenol, ibuprofen or another anti-inflammatory as needed for pain.   That evening of the procedure,  you may remove the bandage.(you may soak it in warm soapy water ) After soaks, pat the area dry and then allow to airdry for a few minutes. Apply antibiotic ointment to the area and cover with  band-aid.  The following day. Find a shoe that is comfortable and minimizes the amount of rubbing on your toe, as this increases pain.  Dress with band-aids until no longer draining, typically 3 days.  Watch for any signs and symptoms of infection such as: redness, red streaks going up the foot/leg, swelling, pus or foul odor. Fevers > 101   Please call with questions.    Dr. Kobi WRIGHT   We wish you continued good healing. If you have any questions or concerns, please do not hesitate to contact us at  [phone]    [employer] (secure e-mail communication and access to your chart) to send a message or to make an appointment.    Please remember to call and schedule a follow up appointment if one was recommended at your earliest convenience.     PODIATRY CLINIC HOURS  TELEPHONE NUMBER    Dr. Kobi CERRATO        Clinics:  [facility]  Tuesday 1PM-6PM  [place]  Wednesday 745AM-330PM  [place]  Monday 2nd,4th  830AM-4PM  Thursday/Friday 745AM-230PM     [facility] APPOINTMENTS  [phone]    [place] APPOINTMENTS  [phone]          If you need a medication refill, please contact us you may need lab work and/or a follow up visit prior to your refill (i.e. Antifungal medications).  If MRI needed please call Imaging at [phone]   HOW DO I GET MY KNEE SCOOTER? Knee scooters can be picked up at ANY Medical Supply  "stores with your knee scooter Prescription.  OR  Bring your signed prescription to an [facility] showroom.   Set up an appointment for your custom Orthotics. Call any Orthotics locations call [phone]         [employer](out of pocket)........................[phone]    They travel to the following [facility].   Need to call [employer] to set up appointments.     Affordable Foot Care (in Home)    Kia Mustafa [phone]    The Foot Care Nurse    Rula Jansen RN, BSN, N [phone]    [facility]....[phone]    [facility]:    [place].......[phone]    [place]................[phone]    [place]-.....[phone]    [place]........................[phone]    [facility].................[phone]    [facility]-----------[phone]    ** YOU MUST CALL FOR INFORMATION ON DAYS, TIMES AND COST OF SERVICE**      For up to date list of Foot Care Rn's. Visit the website    [employer] Association website    Afcna.org    Click on the \"Find a foot care provider\" Link      Siria Mayfield RN,[facility] [phone] (Text or call) Has limited home visit.    Trudi RodríguezRN,[facility] [phone]    Madison Chin [phone]    Maile Mathews,RN,BSN,[facility] [phone]    Guerita Earl [phone]    Anel Stubbs [phone]    Maria E Trujillo [phone]    Dunia MCCONNELLN,-[phone]    **THIS IS A PRIVATE PAY SERVICE- NOT BILLABLE TO MEDICARE OR INSURANCE**   "

## 2024-07-31 NOTE — PROGRESS NOTES
Subjective:    Pt is seen today in consult from Jory Fragoso w/ the c/c of a painful ingrown right fourth nail lateral border.  This has been problematic recently.  negativehistory of drainage from the site. This is slowly getting worse.  Aggravated by activity and relieved by rest.  Has tried soaking which has not helped.   Patient has peripheral neuropathy.  She has diabetes.  She has chronic kidney disease stage II.  Also getting hammertoes on both feet.  Is wondering how to treat these.  She is retired.  Her mother had coronary artery disease.    ROS:  A 10-point review of systems was performed and is positive for that noted in the HPI and as seen above.  All other areas are negative.       Past Medical History:   Diagnosis Date    Allergic rhinitis, cause unspecified     CKD (chronic kidney disease) stage 2, GFR 60-89 ml/min 12/31/2010    Depressive disorder, not elsewhere classified     Disorder of uterus 3/5/2007    Problem list name updated by automated process. Provider to review    Hyperlipidemia LDL goal <130 10/31/2010    Hypertension goal BP (blood pressure) < 130/80 12/31/2010    Lumbago     degenerative disc disease and disc protrusion    Neck pain 12/13/2011    OA (osteoarthritis) of knee 10/28/2008    Osteoarthrosis, unspecified whether generalized or localized, unspecified site     Peripheral neuropathy     non-diabetic    Thyroid disease     Type 2 diabetes mellitus without complication (H) 3/28/2016    Unspecified essential hypertension     Unspecified sleep apnea        Past Surgical History:   Procedure Laterality Date    BIOPSY  10/2015    during colonoscopy    CERVICAL FUSION N/A 10/18/2017    Procedure: BILATERAL C7-T1 ANTERIOR CERVICAL DECOMPRESSION FUSION;  Surgeon: Victor Hugo Fernández MD;  Location: [facility];  Service:     COLONOSCOPY  10/2015    COLONOSCOPY N/A 3/26/2024    Procedure: COLONOSCOPY, FLEXIBLE, WITH LESION REMOVAL USING SNARE;  Surgeon: Jimmie Denton MD;   Location: [facility] GI    JOINT REPLACEMTN, KNEE RT/LT Right 2009    Joint Replacement knee RT    JOINT REPLACEMTN, KNEE RT/LT Left 2016    TONSILLECTOMY         Family History   Problem Relation Age of Onset    Hypertension Father     Alcohol/Drug Father     Coronary Artery Disease Father         stroke/ carotid blockage    Cerebrovascular Disease Father         did not manage his blood pressure with meds smoked    Depression Father         came along after his first big stroke    Substance Abuse Father         alcohol    Hypertension Mother     Alcohol/Drug Mother     Depression Mother     Suicide Mother     Coronary Artery Disease Mother         surgery for carotid/surgery for femoral artery    Depression/Anxiety Mother     Cerebrovascular Disease Mother         small ones, smoked    Thyroid Disease Mother     Substance Abuse Mother         alcohol    Cerebrovascular Disease Paternal Grandfather         early age onset...60's    C.A.D. Maternal Grandfather         massive heart blow out    Depression/Anxiety Maternal Grandmother     Breast Cancer Maternal Grandmother         had a radical mastectomy....no recurrence  at    Substance Abuse Brother     Suicide Other     Depression Other     Diabetes Sister     Alcohol/Drug Brother     Substance Abuse Brother     Hypertension Brother         smoker    Cancer - colorectal No family hx of     Prostate Cancer No family hx of        Social History     Tobacco Use    Smoking status: Never    Smokeless tobacco: Never    Tobacco comments:     am using sm. amt. of marijuana for sleep/pain   Substance Use Topics    Alcohol use: Yes     Comment: really managed/not my drug to enjoy. marijuana 3 times per week          Current Outpatient Medications:     blood glucose (NO BRAND SPECIFIED) lancets standard, Use to test blood sugar TWO times daily or as directed. (Patient not taking: Reported on 6/10/2024), Disp: 200 each, Rfl: 0    blood glucose (NO BRAND SPECIFIED) test strip, Use  to test blood sugar 2 times daily or as directed. (Patient not taking: Reported on 6/10/2024), Disp: 200 strip, Rfl: 0    blood glucose monitoring (NO BRAND SPECIFIED) meter device kit, Use to test blood sugar 2  times daily or as directed. Contour next ez (Patient not taking: Reported on 6/10/2024), Disp: 1 kit, Rfl: 0    DULoxetine (CYMBALTA) 60 MG capsule, Take 1 capsule (60 mg) by mouth daily, Disp: 90 capsule, Rfl: 3    levothyroxine (SYNTHROID/LEVOTHROID) 75 MCG tablet, Take 1 tablet (75 mcg) by mouth daily, Disp: 90 tablet, Rfl: 3    losartan (COZAAR) 100 MG tablet, Take 1 tablet (100 mg) by mouth daily, Disp: 90 tablet, Rfl: 3    nystatin (MYCOSTATIN) 428544 UNIT/GM external cream, Apply topically 2 times daily, Disp: 60 g, Rfl: PRN    order for DME, Equipment being ordered: light box (Patient not taking: Reported on 6/10/2024), Disp: 1 Units, Rfl: 0    ORDER FOR DME, Equipment being ordered: CPAP mask and tubing (Patient not taking: Reported on 6/10/2024), Disp: 1 Units, Rfl: 0       Allergies   Allergen Reactions    Cats     Dogs     Statins Muscle Pain (Myalgia)       /80   Pulse 110   LMP  (LMP Unknown) .      Constitutional/ general:  Pt is in no apparent distress, appears well-nourished.  Cooperative with history and physical exam.     Psych:  The patient answered questions appropriately.  Normal affect.  Seems to have reasonable expectations, in terms of treatment.     Eyes:  Visual scanning/ tracking without deficit.     Ears:  Response to auditory stimuli is normal.  negative hearing aid devices.  Auricles in proper alignment.     Lymphatic:  Popliteal lymph nodes not enlarged.     Lungs:  Non labored breathing, non labored speech. No cough.  No audible wheezing. Even, quiet breathing.       Vascular:  Pedal pulses are palpable bilaterally for both the DP arteries.  CFT < 3 sec. bilateral ankle edema making it difficult to palpate posterior tibial arteries bilaterally.   Pedal hair growth  noted.     Neuro:  Alert and oriented x 3. Coordinated gait.  Light touch sensation is diminished.  Monofilament absent in toes.    Derm: Thin and shiny.    Musculoskeletal:     Patient is ambulatory without an assistive device or brace.   right fourth toe nail lateral border shows soft tissue impingement with localized erythema.   negative active drainage/purulence at this time.  No sinus tracts.  No nailbed masses or exostosis.  No pain with range of motion of IPJ or MTPJ.  No ascending cellulitis.  Hammering of lesser digits bilaterally right greater than left.  Can reduce these.  No pain on the metatarsal heads.  No skin breakdown anywhere else.  Negative Lachman's test.  Negative Denver's click.      Component  Ref Range & Units 1 mo ago  (6/19/24) 8 mo ago  (11/6/23) 1 yr ago  (5/17/23) 1 yr ago  (9/23/22) 2 yr ago  (5/3/22) 2 yr ago  (12/22/21) 3 yr ago  (7/21/21)    Hemoglobin A1C  0.0 - 5.6 % 6.4 High  5.8 High  CM 5.9 High  CM 5.8 High  CM 6.3 High  CM 6.5 High  CM 6.2 High  CM       ASSESSMENT:    Diabetes melitis well-controlled  Peripheral neuropathy with loss of protective sensation in toes  Ingrown nail right fourth toe lateral border  Bilateral hammertoes right greater than left    Discussed etiology and treatment options in detail w/ the pt.  The potential causes and nature of an ingrown toenail were discussed with the patient.  We reviewed the natural history/prognosis of the condition and potential risks if no treatment is provided.      After thorough discussion and answering all questions, the patient elected to have nail avulsion.  Obtained consent, used 3cc of 1% lidocaine plain to block right fourth toe.  Sterile prep, then avulsed the affected border(s).  No evidence of deep abscess noted.  Pt tolerated procedure well.  Sterile bandage placed, gave wound care instruction.  Instructed patient on trimming nails correctly.  They will avoid tight shoes.  Avoid pedicures.  Discussed permanent  removal of border if chronic problem.  Would have to get arterial ultrasound first since cannot palpate posterior tibial artery.  We gave her a list of foot care nurses.    Discussed cause of hammertoes.  Discussed importance of good supportive shoes.  Wrote prescription for diabetic shoes and inserts.  She will manipulate these daily to keep supple.  We discussed surgery today.  Explained she would need fusions with pin fixation.  Discussed would have to get arterial ultrasound first.  A decision was made today to continue conservative treatment.    Discussed with patient she has loss of protective sensation in her toes.  Will watch these daily.  Will keep protected with shoes.  Will continue to keep blood sugars well-controlled.  RTC as needed.  Thank you for allowing me participate in the care of this patient.        Kobi De Anda, AARON, FACFAS

## 2024-08-08 NOTE — PATIENT INSTRUCTIONS

## 2024-08-10 ENCOUNTER — HEALTH MAINTENANCE LETTER (OUTPATIENT)
Age: 74
End: 2024-08-10

## 2024-08-19 ENCOUNTER — TRANSFERRED RECORDS (OUTPATIENT)
Dept: HEALTH INFORMATION MANAGEMENT | Facility: CLINIC | Age: 74
End: 2024-08-19
Payer: COMMERCIAL

## 2024-10-09 ENCOUNTER — TELEPHONE (OUTPATIENT)
Dept: FAMILY MEDICINE | Facility: CLINIC | Age: 74
End: 2024-10-09
Payer: COMMERCIAL

## 2024-10-09 ENCOUNTER — MYC MEDICAL ADVICE (OUTPATIENT)
Dept: FAMILY MEDICINE | Facility: CLINIC | Age: 74
End: 2024-10-09
Payer: COMMERCIAL

## 2024-10-09 DIAGNOSIS — F32.1 MODERATE MAJOR DEPRESSION (H): ICD-10-CM

## 2024-10-09 NOTE — TELEPHONE ENCOUNTER
Forms/Letter Request    Type of form/letter: OTHER: / [employer] therapeutic shoes for medicare       Do we have the form/letter: Yes: placed in care team 4 providers form folder    Who is the form from? [facility] (if other please explain)    Where did/will the form come from? form was faxed in    When is form/letter needed by: n/a    How would you like the form/letter returned: Fax : [phone]

## 2024-10-09 NOTE — TELEPHONE ENCOUNTER
Unable to dial number on file x 3.  [employer] message sent to patient.  Thea BROWN BSN, PHN, RN-[facility]  [phone]

## 2024-10-10 RX ORDER — DULOXETIN HYDROCHLORIDE 30 MG/1
CAPSULE, DELAYED RELEASE ORAL
Qty: 90 CAPSULE | Refills: 1 | Status: SHIPPED | OUTPATIENT
Start: 2024-10-10

## 2024-10-10 NOTE — TELEPHONE ENCOUNTER
Pema Fragoso --    Please review and advise: duloxetine     Patient states the following: I take 60 most of the time, but sometimes I take 90.....     Please advise.     ARCHANA Garza RN  [facility]

## 2024-10-10 NOTE — TELEPHONE ENCOUNTER
Patient did respond on MyChart 10/9/2024. Encounter was forwarded to Pema Fragoso. Forwarding this message to Pema Fragoso.   Robyn Ingram, BSN RN  [facility]

## 2024-10-16 ENCOUNTER — ANCILLARY PROCEDURE (OUTPATIENT)
Dept: MAMMOGRAPHY | Facility: CLINIC | Age: 74
End: 2024-10-16
Attending: NURSE PRACTITIONER
Payer: COMMERCIAL

## 2024-10-16 ENCOUNTER — TELEPHONE (OUTPATIENT)
Dept: FAMILY MEDICINE | Facility: CLINIC | Age: 74
End: 2024-10-16

## 2024-10-16 DIAGNOSIS — Z12.31 VISIT FOR SCREENING MAMMOGRAM: ICD-10-CM

## 2024-10-16 PROCEDURE — 77067 SCR MAMMO BI INCL CAD: CPT | Mod: TC | Performed by: RADIOLOGY

## 2024-10-16 PROCEDURE — 77063 BREAST TOMOSYNTHESIS BI: CPT | Mod: TC | Performed by: RADIOLOGY

## 2024-10-16 NOTE — TELEPHONE ENCOUNTER
Jayson from [facility] calling regarding a form that she faxed over on 10/9 (please see 10/9 TE)    Dr. De Anda is not an MD or DO so form cannot be filled out by Dr. De Anda.    Since Shelia is the PCP and oversees patient's diabetes, it needs to be filled out by PCP and need a Co-sign by MD or DO.    This is needed for Insurance purpose and to start a prior authorization     She will be faxing form back and need it ASAP.       If any questions, call Jayson at [phone], extension [phone].      Marino Baker, BSN RN  [facility]

## 2024-10-16 NOTE — TELEPHONE ENCOUNTER
That seems ridiculous, since he is a physician (a FOOT DOCTOR) and he placed the order.    There is nowhere for me to sign, if they are only accepting MD or DO signature, so I placed back in the basket and rome can ask Dr. Garces to sign as medical director upon her return.

## 2024-10-17 NOTE — TELEPHONE ENCOUNTER
PCP autograph needed on second page of form (writer added a flag it had just been starred by [facility]) and placed     Routing to DOD to sign as Dr. Garces will not be back on site until 10/23. Once signed please place on KENDAL Ewing desk for final autograph-thanks!

## 2024-10-17 NOTE — TELEPHONE ENCOUNTER
Jory Fragoso had specifically requested the medical director sign it, so I would defer to Jory Fragoso or Dr. Garces.    Dr. Hernandez

## 2024-10-23 ENCOUNTER — MEDICAL CORRESPONDENCE (OUTPATIENT)
Dept: HEALTH INFORMATION MANAGEMENT | Facility: CLINIC | Age: 74
End: 2024-10-23
Payer: COMMERCIAL

## 2024-10-28 ENCOUNTER — OFFICE VISIT (OUTPATIENT)
Dept: PODIATRY | Facility: CLINIC | Age: 74
End: 2024-10-28
Payer: COMMERCIAL

## 2024-10-28 VITALS — WEIGHT: 212 LBS | SYSTOLIC BLOOD PRESSURE: 121 MMHG | BODY MASS INDEX: 36.39 KG/M2 | DIASTOLIC BLOOD PRESSURE: 72 MMHG

## 2024-10-28 DIAGNOSIS — M20.41 HAMMER TOE OF RIGHT FOOT: ICD-10-CM

## 2024-10-28 DIAGNOSIS — L60.0 ONYCHOCRYPTOSIS: Primary | ICD-10-CM

## 2024-10-28 PROCEDURE — 11730 AVULSION NAIL PLATE SIMPLE 1: CPT | Mod: T5 | Performed by: PODIATRIST

## 2024-10-28 NOTE — PATIENT INSTRUCTIONS
DR SAULO JOSHI CLINIC:  [facility]   [id number]   [place] [address]      APPOINTMENTS/ TRIAGE [phone]  RADIOLOGY: [phone]  SET UP SURGERY: [phone]  PHYSICAL THERAPY: [phone]   BILLING QUESTIONS: [phone]  FAX: [phone]        - What is an ingrown toenail? An ingrown toenail is a medical condition usually caused by a nail edge irritating the neighboring soft tissue and skin. It can cause pain and lead to infection.  - What causes ingrown toenails? There are likely multiple causes of ingrown toenails, including nail shape and width, narrow shoes, a person s activity level, and likely family history of nail problems.  - Risks of not treating condition: If left untreated, some people endure ongoing tenderness and pain. The biggest concern is infection from bacteria entering through the damage soft tissue.  - How is it treated? Some people achieve relief by soaking their feet and trimming the edge of the nail. If an infection has developed, then an oral antibiotic can be prescribed, but the condition often returns after the course of the medication is completed. Often self treatment is not successful and treatment by a qualified medical provider is needed. This often involves numbing the toe with a local anesthetic and then either removing the edge of a nail or the entire nail.  - Risks of surgery? As with any form of surgery, infections is a risk. However, a person is probably at greater risk for infection if the ingrown toenail is left untreated. Nail removal is a common, simple, and fairly quick procedure that in most cases is done in the physician's office. If an infection exists, often the only treatment that is successful is removal.     Ingrown toenail Post-procedural instructions    - After the procedure, go home and elevate the involved foot for the remainder of the day/evening as able.  This is to minimize swelling, control pain, and limit  "post-procedural complications.  The pre-procedural injection may cause your toe to be numb anywhere from1-2 hours.    - You can take Tylenol, Ibuprofen, Advil, etc as needed for pain if tolerated.  Follow label instructions      - If you have been given a prescription for antibiotics, take them as instructed and complete the prescription.    - Keep dressing intact until the following morning.  At that point, you may remove the bandage (you may need to soak it in warm soapy water as the bandage will likely adhere to your skin).  Soaking in warm soapy water for 5-10 minutes will also facilitate healing.  Wash the toe thoroughly, dry the toe thoroughly.  Apply antibiotic wound ointment to base of wound and cover with 2x2 gauze pads and Coban dressing (not too tightly) until it stops draining(you'll need to purchase the 2x2 gauze pads and 1\" Coban rolls, especially if you had the permanent/chemical procedure performed).  This may take a few days to weeks, but at that point, you may continue with antibiotic ointment and a band-aid, or you may stop applying a dressing all together.  Dressing changes and soaks should be done twice daily(morning/evening) if you had the permanent/chemical procedure done.      -If you had the permanent procedure done, assess the toe at 4 weeks out from the procedure.  If the toe continues to heal/improve, continue twice-daily soaks and dressing changes, and on follow up is needed.  If the toe is no longer showing improvement at 4 weeks out from the procedure, follow up in clinic for a 30 minute appointment, and we'll clean out the procedure site.    - You may do activities to tolerance the following day.  Find a shoe that is comfortable and minimizes the amount of rubbing on your toe, as this may increase pain, swelling, etc.  Utilize resting, icing, elevating and anti-inflammatories/Tylenol as needed.    - Monitor for signs of infection.  With this procedure, it is common to have mild " surrounding redness and drainage.  If the redness involves the entire great toe or if you notice red streaks on top of your foot, or if you experience any nausea, vomiting, chills, fevers > 101 degrees, call clinic for a quick appointment.

## 2024-10-28 NOTE — LETTER
10/28/2024      Leena Montaño  [address] [facility] [address]      Dear Colleague,    Thank you for referring your patient, Leena Montaño, to the [facility]. Please see a copy of my visit note below.    Leena Montaño is a 74 year old female who presents with a chief complaint of a painful ingrown toenail to the right great toe.  The patient relates pain when wearing shoes.  The patient denies any redness extending up the big toe into the foot.  The patient relates the condition has been getting worse over the past several weeks ever since she had a foot soak and nail trim at a salon.  Denies drainage or signs ofinfectio.  Area is very painful.  Also curling of the toes on the right foot,  for many years.  No treatment for this.    She has already been fit for diabetic shoes and should receive them within the next few weeks.        Pertinent medical, surgical and family history was reviewed in the chart.    Vitals: /72   Wt 96.2 kg (212 lb)   LMP  (LMP Unknown)   BMI 36.39 kg/m    BMI= Body mass index is 36.39 kg/m .    LOWER EXTREMITY PHYSICAL EXAM    Dermatologic: One notes an inflamed nail border of the right medial border great toe.  There is noted erythema and edema located around the labial fold and does not extend past the interphalangeal joint.  There is no apparent purulent drainage noted.  There is pain on palpation to the proximal aspect of the nail border.  Otherwise, the skin is intact to both lower extremities without significant lesions, rash or abrasion.   Slight callus formation noted distal right 3rd digit.          Vascular: DP & PT pulses are intact & regular on the right.   CFT and skin temperature is normal to the right lower extremities.     Neurologic: Lower extremity sensation is slightly diminished to light touch.  No evidence of weakness in the both lower extremities.        Musculoskeletal: Patient is  ambulatory without assistive device or brace.  No gross ankle deformity noted.  Global contracture of lesser digits noted right more than left.  Partially reducible.      ASSESSMENT / PLAN:     ICD-10-CM    1. Onychocryptosis  L60.0       2. Hammer toe of right foot  M20.41           Plan:  I have explained to Leena about the condition.  The potential causes and nature of an ingrown toenail were discussed with the patient.  We reviewed the natural history and prognosis of the condition and potential risks if no treatment is provided.  Treatment options discussed included conservative management (oral antibiotics, soaking of foot, adequate width shoes)  as well as surgical management (partial or total nail removal).  The pros and cons of both forms as well as risks and benefits of treatment were reviewed.       At this point, I recommended having the offending nail border temporary removed from the right great toe.  I have explained to the patient all of the possible risks, benefits, alternatives to the procedure.  The patient consented to the proposed procedure.  The right hallux was swabbed with alcohol.  Next, approximately 3 cc of 1% lidocaine plain was injected around the right hallux.  The right hallux was then prepped with Betadine ointment.  A tourniquet was applied to the right hallux.  A [place] elevator was utilized to free up the eponychium of the offending nail border.  Next, the offending nail border was split using an English anvil back to the matrix.  Next, utilizing a straight hemostat, the offending nail border was avulsed in toto.  The wound bed was debrided on any remaining nail and hyperkeratotic skin.  The wound was then irrigated and dressed with bacitracin antibiotic ointment and a compressive  sterile dressing.  The tourniquet was removed and a prompt hyperemic response was noted.  The patient tolerated the procedure well with no complications.  The patient was given post procedure instructions  for the care of the wound.  The patient was informed that it is common to experience redness with watery drainage coming from the treated areas of the toe related to the phenol application.  The patient may return for reevaluation and was instructed to notify the office if any redness extending past the big toe joint or fever with chills are experienced before then.      Crest pad is dispensed for the hammertoes right foot.  Discussed treatment options as well as concerns about higher risk for ulceration tip of toes.    There is low risk of morbidity with the procedure.  There was no overlap in work associated with the evaluation/management and the work associated with the procedure.    Leena verbalized agreement with and understanding of the rational for the diagnosis and treatment plan.  All questions were answered to best of my ability and the patient's satisfaction. The patient was advised to contact the clinic with any questions that may arise after the clinic visit.      Disclaimer: This note consists of symbols derived from keyboarding, dictation and/or voice recognition software. As a result, there may be errors in the script that have gone undetected. Please consider this when interpreting information found in this chart.      Crystal Olson, D.P.M.      Again, thank you for allowing me to participate in the care of your patient.        Sincerely,        Crystal Olson DPM

## 2024-10-28 NOTE — PROGRESS NOTES
Leena Montaño is a 74 year old female who presents with a chief complaint of a painful ingrown toenail to the right great toe.  The patient relates pain when wearing shoes.  The patient denies any redness extending up the big toe into the foot.  The patient relates the condition has been getting worse over the past several weeks ever since she had a foot soak and nail trim at a salon.  Denies drainage or signs ofinfectio.  Area is very painful.  Also curling of the toes on the right foot,  for many years.  No treatment for this.    She has already been fit for diabetic shoes and should receive them within the next few weeks.        Pertinent medical, surgical and family history was reviewed in the chart.    Vitals: /72   Wt 96.2 kg (212 lb)   LMP  (LMP Unknown)   BMI 36.39 kg/m    BMI= Body mass index is 36.39 kg/m .    LOWER EXTREMITY PHYSICAL EXAM    Dermatologic: One notes an inflamed nail border of the right medial border great toe.  There is noted erythema and edema located around the labial fold and does not extend past the interphalangeal joint.  There is no apparent purulent drainage noted.  There is pain on palpation to the proximal aspect of the nail border.  Otherwise, the skin is intact to both lower extremities without significant lesions, rash or abrasion.   Slight callus formation noted distal right 3rd digit.          Vascular: DP & PT pulses are intact & regular on the right.   CFT and skin temperature is normal to the right lower extremities.     Neurologic: Lower extremity sensation is slightly diminished to light touch.  No evidence of weakness in the both lower extremities.        Musculoskeletal: Patient is ambulatory without assistive device or brace.  No gross ankle deformity noted.  Global contracture of lesser digits noted right more than left.  Partially reducible.      ASSESSMENT / PLAN:     ICD-10-CM    1. Onychocryptosis  L60.0       2. Hammer toe of right foot   M20.41           Plan:  I have explained to Leena about the condition.  The potential causes and nature of an ingrown toenail were discussed with the patient.  We reviewed the natural history and prognosis of the condition and potential risks if no treatment is provided.  Treatment options discussed included conservative management (oral antibiotics, soaking of foot, adequate width shoes)  as well as surgical management (partial or total nail removal).  The pros and cons of both forms as well as risks and benefits of treatment were reviewed.       At this point, I recommended having the offending nail border temporary removed from the right great toe.  I have explained to the patient all of the possible risks, benefits, alternatives to the procedure.  The patient consented to the proposed procedure.  The right hallux was swabbed with alcohol.  Next, approximately 3 cc of 1% lidocaine plain was injected around the right hallux.  The right hallux was then prepped with Betadine ointment.  A tourniquet was applied to the right hallux.  A [place] elevator was utilized to free up the eponychium of the offending nail border.  Next, the offending nail border was split using an English anvil back to the matrix.  Next, utilizing a straight hemostat, the offending nail border was avulsed in toto.  The wound bed was debrided on any remaining nail and hyperkeratotic skin.  The wound was then irrigated and dressed with bacitracin antibiotic ointment and a compressive  sterile dressing.  The tourniquet was removed and a prompt hyperemic response was noted.  The patient tolerated the procedure well with no complications.  The patient was given post procedure instructions for the care of the wound.  The patient was informed that it is common to experience redness with watery drainage coming from the treated areas of the toe related to the phenol application.  The patient may return for reevaluation and was instructed to notify the  office if any redness extending past the big toe joint or fever with chills are experienced before then.      Crest pad is dispensed for the hammertoes right foot.  Discussed treatment options as well as concerns about higher risk for ulceration tip of toes.    There is low risk of morbidity with the procedure.  There was no overlap in work associated with the evaluation/management and the work associated with the procedure.    Leena verbalized agreement with and understanding of the rational for the diagnosis and treatment plan.  All questions were answered to best of my ability and the patient's satisfaction. The patient was advised to contact the clinic with any questions that may arise after the clinic visit.      Disclaimer: This note consists of symbols derived from keyboarding, dictation and/or voice recognition software. As a result, there may be errors in the script that have gone undetected. Please consider this when interpreting information found in this chart.      Crystal Olson D.P.M.

## 2024-11-11 ENCOUNTER — NURSE TRIAGE (OUTPATIENT)
Dept: FAMILY MEDICINE | Facility: CLINIC | Age: 74
End: 2024-11-11
Payer: COMMERCIAL

## 2024-11-11 NOTE — TELEPHONE ENCOUNTER
S-(situation): fatigue and cough    B-(background): symptoms has been on-going for 6 days now    A-(assessment):   - cough  - fatigue  - muscle ache  - not sure of she has a fever because she does not have a thermometer  - did not test herself for covid    R-(recommendations): patient does not have a ride so she cannot be seen today. Appt scheduled 11/12.      ARCHANA Clark Cem, RN  [facility]        Reason for Disposition   MODERATE weakness (i.e., interferes with work, school, normal activities) and cause unknown (Exceptions: Weakness from acute minor illness or from poor fluid intake; weakness is chronic and not worse.)    Additional Information   Negative: SEVERE difficulty breathing (e.g., struggling for each breath, speaks in single words)   Negative: Shock suspected (e.g., cold/pale/clammy skin, too weak to stand, low BP, rapid pulse)   Negative: Difficult to awaken or acting confused (e.g., disoriented, slurred speech)   Negative: Fainted > 15 minutes ago and still feels too weak or dizzy to stand   Negative: SEVERE weakness (i.e., unable to walk or barely able to walk, requires support) and new-onset or worsening   Negative: Sounds like a life-threatening emergency to the triager   Negative: Weakness of the face, arm or leg on one side of the body   Negative: Has diabetes mellitus and weakness from low blood sugar (i.e., < 60 mg/dL or 3.5 mmol/L)   Negative: Recent heat exposure, suspected cause of weakness   Negative: Vomiting is main symptom   Negative: Diarrhea is main symptom   Negative: Difficulty breathing   Negative: Heart beating < 50 beats per minute OR > 140 beats per minute   Negative: Extra heartbeats, irregular heart beating, or heart is beating very fast (i.e., 'palpitations')   Negative: Follows large amount of bleeding (e.g., from vomiting, rectum, vagina) (Exception: Small transient weakness from sight of a small amount blood.)   Negative: Black or tarry bowel  "movements   Negative: MODERATE weakness or fatigue from poor fluid intake with no improvement after 2 hours of rest and fluids   Negative: Drinking very little and dehydration suspected (e.g., no urine > 12 hours, very dry mouth, very lightheaded)   Negative: Patient sounds very sick or weak to the triager    Answer Assessment - Initial Assessment Questions  1. DESCRIPTION: \"Describe how you are feeling.\"      Feeling tired, coughing    2. SEVERITY: \"How bad is it?\"  \"Can you stand and walk?\"    - MILD (0-3): Feels weak or tired, but does not interfere with work, school or normal activities.    - MODERATE (4-7): Able to stand and walk; weakness interferes with work, school, or normal activities.    - SEVERE (8-10): Unable to stand or walk; unable to do usual activities.      moderate    3. ONSET: \"When did these symptoms begin?\" (e.g., hours, days, weeks, months)      6 days     4. CAUSE: \"What do you think is causing the weakness or fatigue?\" (e.g., not drinking enough fluids, medical problem, trouble sleeping)      Not sure    5. NEW MEDICINES:  \"Have you started on any new medicines recently?\" (e.g., opioid pain medicines, benzodiazepines, muscle relaxants, antidepressants, antihistamines, neuroleptics, beta blockers)      No    6. OTHER SYMPTOMS: \"Do you have any other symptoms?\" (e.g., chest pain, fever, cough, SOB, vomiting, diarrhea, bleeding, other areas of pain)      Cough, body ache    7. PREGNANCY: \"Is there any chance you are pregnant?\" \"When was your last menstrual period?\"      no    Protocols used: Weakness (Generalized) and Fatigue-A-OH    "

## 2024-11-12 ENCOUNTER — ANCILLARY PROCEDURE (OUTPATIENT)
Dept: GENERAL RADIOLOGY | Facility: CLINIC | Age: 74
End: 2024-11-12
Payer: COMMERCIAL

## 2024-11-12 ENCOUNTER — OFFICE VISIT (OUTPATIENT)
Dept: FAMILY MEDICINE | Facility: CLINIC | Age: 74
End: 2024-11-12
Payer: COMMERCIAL

## 2024-11-12 VITALS
TEMPERATURE: 97.3 F | OXYGEN SATURATION: 97 % | HEART RATE: 104 BPM | DIASTOLIC BLOOD PRESSURE: 84 MMHG | RESPIRATION RATE: 16 BRPM | SYSTOLIC BLOOD PRESSURE: 128 MMHG

## 2024-11-12 DIAGNOSIS — R05.1 ACUTE COUGH: ICD-10-CM

## 2024-11-12 DIAGNOSIS — J18.9 PNEUMONIA OF LEFT LOWER LOBE DUE TO INFECTIOUS ORGANISM: Primary | ICD-10-CM

## 2024-11-12 PROCEDURE — 99214 OFFICE O/P EST MOD 30 MIN: CPT

## 2024-11-12 PROCEDURE — 71046 X-RAY EXAM CHEST 2 VIEWS: CPT | Mod: TC | Performed by: RADIOLOGY

## 2024-11-12 PROCEDURE — G2211 COMPLEX E/M VISIT ADD ON: HCPCS

## 2024-11-12 RX ORDER — AZITHROMYCIN 250 MG/1
TABLET, FILM COATED ORAL
Qty: 6 TABLET | Refills: 0 | Status: SHIPPED | OUTPATIENT
Start: 2024-11-12 | End: 2024-11-17

## 2024-11-12 RX ORDER — BENZONATATE 100 MG/1
100 CAPSULE ORAL 3 TIMES DAILY PRN
Qty: 30 CAPSULE | Refills: 1 | Status: SHIPPED | OUTPATIENT
Start: 2024-11-12

## 2024-11-12 ASSESSMENT — PATIENT HEALTH QUESTIONNAIRE - PHQ9
10. IF YOU CHECKED OFF ANY PROBLEMS, HOW DIFFICULT HAVE THESE PROBLEMS MADE IT FOR YOU TO DO YOUR WORK, TAKE CARE OF THINGS AT HOME, OR GET ALONG WITH OTHER PEOPLE: SOMEWHAT DIFFICULT
SUM OF ALL RESPONSES TO PHQ QUESTIONS 1-9: 9
SUM OF ALL RESPONSES TO PHQ QUESTIONS 1-9: 9

## 2024-11-12 ASSESSMENT — PAIN SCALES - GENERAL: PAINLEVEL_OUTOF10: MILD PAIN (2)

## 2024-11-12 ASSESSMENT — ENCOUNTER SYMPTOMS: COUGH: 1

## 2024-11-12 NOTE — PATIENT INSTRUCTIONS
Follow-up in 48 hours if no improvement or worsening symptoms in clinic  ER if developing severe shortness of breath or chest pain

## 2024-11-12 NOTE — PROGRESS NOTES
Assessment & Plan     Pneumonia of left lower lobe due to infectious organism  Left mid-lower lobe with infiltrate on CXR consistent with pneumonia which is also consistent with exam and history. Will treat with augmentin BID x 7 days and Zpak as below.   Discussed testing for influenza, covid, rsv and how these would not change treatment/management plan given she is >1 week of symptom onset, pt declines testing at this time.   Sent in benzonatate as below to use up to three  times daily as needed for cough, recommend plain Mucinex OTC for congestion as needed (follow bottle instructions). Tylenol as needed for pain or fevers.   Follow-up in clinic if no signs of clinical improvement or of course worsening after 48 hours on abx.  ER if severe shortness of breath, chest pain.   - XR Chest 2 Views  - benzonatate (TESSALON) 100 MG capsule  Dispense: 30 capsule; Refill: 1  - amoxicillin-clavulanate (AUGMENTIN) 875-125 MG tablet  Dispense: 14 tablet; Refill: 0  - azithromycin (ZITHROMAX) 250 MG tablet  Dispense: 6 tablet; Refill: 0            Subjective   Mylene is a 74 year old, presenting for the following health issues:  Cough (Cough and Fatigue )        11/12/2024    10:26 AM   Additional Questions   Roomed by Eboni Falcon     History of Present Illness       Reason for visit:  Respiratory infection  Symptom onset:  1-2 weeks ago  Symptoms include:  Cough lethargy headache body aches  Symptom intensity:  Moderate  Symptom progression:  Worsening  Had these symptoms before:  No  What makes it worse:  No  What makes it better:  No She is missing 1 dose(s) of medications per week.     Pt having cold symptoms that started a week ago  Was with her grandchild about 2 weeks ago who was sick at the time  Started out with a sore throat, then had an episode of diarrhea  Cough was productive at first, now more dry   Throat is no longer sore     Coughing is most bothersome to her at this point   Also experiencing body aches but  have been getting better.     Doesn't have a thermometer at home but has had some hot/cold chills   No vomiting   No shortness of breath or wheezing.     Has not tested for covid at home       Review of Systems  Constitutional, HEENT, cardiovascular, pulmonary, gi and gu systems are negative, except as otherwise noted.      Objective    /84 (BP Location: Right arm, Patient Position: Sitting, Cuff Size: Adult Large)   Pulse 104   Temp 97.3  F (36.3  C) (Temporal)   Resp 16   LMP  (LMP Unknown)   SpO2 97%   There is no height or weight on file to calculate BMI.  Physical Exam   GENERAL: alert and no distress  EYES: Eyes grossly normal to inspection  HENT: ear canals and TM's normal, nose and mouth without ulcers or lesions  RESP: rhonchi bilateral, no wheezing. No accessory muscle use with breathing  CV: regular rate and rhythm, normal S1 S2, no S3 or S4, no murmur, click or rub  PSYCH: mentation appears normal, affect normal/bright    CXR - Reviewed and interpreted by me Infiltrate seen in the left middle lobe        Signed Electronically by: REYNALDO Fleming CNP

## 2024-11-21 ENCOUNTER — OFFICE VISIT (OUTPATIENT)
Dept: FAMILY MEDICINE | Facility: CLINIC | Age: 74
End: 2024-11-21
Payer: COMMERCIAL

## 2024-11-21 VITALS
SYSTOLIC BLOOD PRESSURE: 144 MMHG | BODY MASS INDEX: 36.73 KG/M2 | OXYGEN SATURATION: 97 % | DIASTOLIC BLOOD PRESSURE: 87 MMHG | HEART RATE: 104 BPM | TEMPERATURE: 97 F | WEIGHT: 214 LBS

## 2024-11-21 DIAGNOSIS — F32.1 MODERATE MAJOR DEPRESSION (H): ICD-10-CM

## 2024-11-21 DIAGNOSIS — F33.8 SEASONAL AFFECTIVE DISORDER (H): ICD-10-CM

## 2024-11-21 DIAGNOSIS — G47.30 SLEEP APNEA, UNSPECIFIED TYPE: ICD-10-CM

## 2024-11-21 DIAGNOSIS — J18.9 PNEUMONIA OF LEFT LOWER LOBE DUE TO INFECTIOUS ORGANISM: Primary | ICD-10-CM

## 2024-11-21 ASSESSMENT — PAIN SCALES - GENERAL: PAINLEVEL_OUTOF10: NO PAIN (0)

## 2024-11-21 NOTE — PROGRESS NOTES
Assessment & Plan     Pneumonia of left lower lobe due to infectious organism  Reassuring that symptoms have greatly improved and lungs clear on examination, does still have mild intermittent cough that will improve with time. Advised to seek re-evaluation in clinic if any regression in symptoms or fevers.     Seasonal affective disorder (H)  Moderate major depression (H)  Provided SAD light to help with her SAD symptoms, gave her print out script to take to [facility].   Stable on duloxetine, currently only taking 60 mg capsules daily.   - SAD Light, 10,000 Lux Order for DME - ONLY FOR DME    Sleep apnea, unspecified type  Recommend contacting sleep medicine clinic to obtain a new CPAP and/or discuss results of last sleep study - phone number provided in AVS.         Subjective   Mylene is a 74 year old, presenting for the following health issues:  Follow Up (Not feeling well still ) and Mental Health Problem (Possible SAD )        11/21/2024    10:59 AM   Additional Questions   Roomed by Eboni Falcon     HPI     Here today to follow-up on pneumonia  She notes she is doing much better, coughing less but still coughing intermittently     Also wonders about sleep apnea - had a CPAP that she's used for 15 years, then had a sleep study in May 2023 and was told she no longer has sleep apnea/ doesn't need CPAP any longer.   Notes she has been having excessive daytime fatigue and wonders if this is playing a part, has no energy   Also notes to have seasonal affect disorder symptoms which could be playing a part in her low energy        Review of Systems  Constitutional, HEENT, cardiovascular, pulmonary, gi and gu systems are negative, except as otherwise noted.      Objective    BP (!) 144/87 (BP Location: Right arm, Patient Position: Sitting, Cuff Size: Adult Regular)   Pulse 104   Temp 97  F (36.1  C) (Temporal)   Wt 97.1 kg (214 lb)   LMP  (LMP Unknown)   SpO2 97%   BMI 36.73 kg/m    Body mass index is  36.73 kg/m .  Physical Exam   GENERAL: alert and no distress  RESP: mild cough. lungs clear to auscultation - no rales, rhonchi or wheezes  CV: regular rate and rhythm, normal S1 S2, no S3 or S4, no murmur, click or rub  PSYCH: mentation appears normal, affect normal/bright            Signed Electronically by: REYNALDO Fleming CNP

## 2024-12-22 ENCOUNTER — HEALTH MAINTENANCE LETTER (OUTPATIENT)
Age: 74
End: 2024-12-22

## 2025-02-10 ENCOUNTER — ANCILLARY PROCEDURE (OUTPATIENT)
Dept: GENERAL RADIOLOGY | Facility: CLINIC | Age: 75
End: 2025-02-10
Attending: NURSE PRACTITIONER
Payer: COMMERCIAL

## 2025-02-10 ENCOUNTER — OFFICE VISIT (OUTPATIENT)
Dept: FAMILY MEDICINE | Facility: CLINIC | Age: 75
End: 2025-02-10
Payer: COMMERCIAL

## 2025-02-10 VITALS
HEART RATE: 93 BPM | OXYGEN SATURATION: 96 % | WEIGHT: 215 LBS | HEIGHT: 63 IN | BODY MASS INDEX: 38.09 KG/M2 | SYSTOLIC BLOOD PRESSURE: 107 MMHG | TEMPERATURE: 97.2 F | RESPIRATION RATE: 16 BRPM | DIASTOLIC BLOOD PRESSURE: 76 MMHG

## 2025-02-10 DIAGNOSIS — E55.9 VITAMIN D DEFICIENCY: ICD-10-CM

## 2025-02-10 DIAGNOSIS — F32.1 MODERATE MAJOR DEPRESSION (H): ICD-10-CM

## 2025-02-10 DIAGNOSIS — J18.9 PNEUMONIA OF LEFT LOWER LOBE DUE TO INFECTIOUS ORGANISM: ICD-10-CM

## 2025-02-10 DIAGNOSIS — R10.9 FLANK PAIN: ICD-10-CM

## 2025-02-10 DIAGNOSIS — E11.69 TYPE 2 DIABETES MELLITUS WITH OTHER SPECIFIED COMPLICATION, WITHOUT LONG-TERM CURRENT USE OF INSULIN (H): ICD-10-CM

## 2025-02-10 DIAGNOSIS — R53.83 OTHER FATIGUE: Primary | ICD-10-CM

## 2025-02-10 DIAGNOSIS — E66.01 MORBID OBESITY (H): ICD-10-CM

## 2025-02-10 LAB
ALBUMIN UR-MCNC: NEGATIVE MG/DL
APPEARANCE UR: CLEAR
BILIRUB UR QL STRIP: NEGATIVE
COLOR UR AUTO: YELLOW
ERYTHROCYTE [DISTWIDTH] IN BLOOD BY AUTOMATED COUNT: 13.1 % (ref 10–15)
EST. AVERAGE GLUCOSE BLD GHB EST-MCNC: 146 MG/DL
GLUCOSE UR STRIP-MCNC: NEGATIVE MG/DL
HBA1C MFR BLD: 6.7 % (ref 0–5.6)
HCT VFR BLD AUTO: 42.2 % (ref 35–47)
HGB BLD-MCNC: 13.4 G/DL (ref 11.7–15.7)
HGB UR QL STRIP: NEGATIVE
KETONES UR STRIP-MCNC: NEGATIVE MG/DL
LEUKOCYTE ESTERASE UR QL STRIP: ABNORMAL
MCH RBC QN AUTO: 28.3 PG (ref 26.5–33)
MCHC RBC AUTO-ENTMCNC: 31.8 G/DL (ref 31.5–36.5)
MCV RBC AUTO: 89 FL (ref 78–100)
NITRATE UR QL: NEGATIVE
PH UR STRIP: 7 [PH] (ref 5–7)
PLATELET # BLD AUTO: 284 10E3/UL (ref 150–450)
RBC # BLD AUTO: 4.73 10E6/UL (ref 3.8–5.2)
RBC #/AREA URNS AUTO: NORMAL /HPF
SP GR UR STRIP: 1.01 (ref 1–1.03)
UROBILINOGEN UR STRIP-ACNC: 0.2 E.U./DL
WBC # BLD AUTO: 12.4 10E3/UL (ref 4–11)
WBC #/AREA URNS AUTO: NORMAL /HPF

## 2025-02-10 PROCEDURE — 82306 VITAMIN D 25 HYDROXY: CPT | Performed by: NURSE PRACTITIONER

## 2025-02-10 PROCEDURE — 84443 ASSAY THYROID STIM HORMONE: CPT | Performed by: NURSE PRACTITIONER

## 2025-02-10 PROCEDURE — G0008 ADMIN INFLUENZA VIRUS VAC: HCPCS | Performed by: NURSE PRACTITIONER

## 2025-02-10 PROCEDURE — 81001 URINALYSIS AUTO W/SCOPE: CPT | Performed by: NURSE PRACTITIONER

## 2025-02-10 PROCEDURE — 80053 COMPREHEN METABOLIC PANEL: CPT | Performed by: NURSE PRACTITIONER

## 2025-02-10 PROCEDURE — 71046 X-RAY EXAM CHEST 2 VIEWS: CPT | Mod: TC | Performed by: INTERNAL MEDICINE

## 2025-02-10 PROCEDURE — 36415 COLL VENOUS BLD VENIPUNCTURE: CPT | Performed by: NURSE PRACTITIONER

## 2025-02-10 PROCEDURE — 96127 BRIEF EMOTIONAL/BEHAV ASSMT: CPT | Performed by: NURSE PRACTITIONER

## 2025-02-10 PROCEDURE — 82607 VITAMIN B-12: CPT | Performed by: NURSE PRACTITIONER

## 2025-02-10 PROCEDURE — 83036 HEMOGLOBIN GLYCOSYLATED A1C: CPT | Performed by: NURSE PRACTITIONER

## 2025-02-10 PROCEDURE — 91320 SARSCV2 VAC 30MCG TRS-SUC IM: CPT | Performed by: NURSE PRACTITIONER

## 2025-02-10 PROCEDURE — 99214 OFFICE O/P EST MOD 30 MIN: CPT | Performed by: NURSE PRACTITIONER

## 2025-02-10 PROCEDURE — 85027 COMPLETE CBC AUTOMATED: CPT | Performed by: NURSE PRACTITIONER

## 2025-02-10 PROCEDURE — G2211 COMPLEX E/M VISIT ADD ON: HCPCS | Performed by: NURSE PRACTITIONER

## 2025-02-10 PROCEDURE — 90480 ADMN SARSCOV2 VAC 1/ONLY CMP: CPT | Performed by: NURSE PRACTITIONER

## 2025-02-10 PROCEDURE — 90662 IIV NO PRSV INCREASED AG IM: CPT | Performed by: NURSE PRACTITIONER

## 2025-02-10 ASSESSMENT — PATIENT HEALTH QUESTIONNAIRE - PHQ9
SUM OF ALL RESPONSES TO PHQ QUESTIONS 1-9: 8
10. IF YOU CHECKED OFF ANY PROBLEMS, HOW DIFFICULT HAVE THESE PROBLEMS MADE IT FOR YOU TO DO YOUR WORK, TAKE CARE OF THINGS AT HOME, OR GET ALONG WITH OTHER PEOPLE: SOMEWHAT DIFFICULT
SUM OF ALL RESPONSES TO PHQ QUESTIONS 1-9: 8

## 2025-02-10 ASSESSMENT — PAIN SCALES - GENERAL: PAINLEVEL_OUTOF10: NO PAIN (0)

## 2025-02-10 ASSESSMENT — ENCOUNTER SYMPTOMS: FATIGUE: 1

## 2025-02-10 NOTE — Clinical Note
You saw her about a year and half ago as she needed another sleep study to get her 20-year old CPAP replaced.  Her CATIE was mild and she got a new machine, but it sounds like Medicare wouldn't cover (maybe when they did an audit?) due to her low score.  She has since gained another 20# and feeling very fatigued.  Would it be reasonable to get another sleep study and would a home study be adequate or better to do in-lab?

## 2025-02-10 NOTE — PROGRESS NOTES
"  Assessment & Plan     (R53.83) Other fatigue  (primary encounter diagnosis)  Comment:   Plan: Vitamin B12, CBC with platelets, TSH with free         T4 reflex, Comprehensive metabolic panel (BMP +        Alb, Alk Phos, ALT, AST, Total. Bili, TP)        Will check labs to rule out other possible etiologies for her fatigue.  I will message her sleep doctor if it would reasonable to get another sleep study to  try and get a CPAP covered and if a home study would be adequate or not recommended since more likely to have a false negative.     (E11.69) Type 2 diabetes mellitus with other specified complication, without long-term current use of insulin (H)  Comment: at goal  Plan: HEMOGLOBIN A1C, Adult Eye  Referral            (R10.9) Flank pain  Comment: likely musculoskeletal  Plan: UA with Microscopic reflex to Culture - lab         collect, Comprehensive metabolic panel (BMP +         Alb, Alk Phos, ALT, AST, Total. Bili, TP), UA         Microscopic with Reflex to Culture        UA is negative.  Renewed her PT referral.     (E55.9) Vitamin D deficiency  Comment:   Plan: Vitamin D Deficiency            (J18.9) Pneumonia of left lower lobe due to infectious organism  Comment:   Plan: XR Chest 2 Views        CXR shows pneumonia is resolved.     (F32.1) Moderate major depression (H)  Comment: stable  Plan:     (E66.01) Morbid obesity (H)  Comment:   Plan: Weight gain/obesity likely contributing to her CATIE.     The longitudinal plan of care for the diagnosis(es)/condition(s) as documented were addressed during this visit. Due to the added complexity in care, I will continue to support Mylene in the subsequent management and with ongoing continuity of care.      BMI  Estimated body mass index is 37.79 kg/m  as calculated from the following:    Height as of this encounter: 1.607 m (5' 3.25\").    Weight as of this encounter: 97.5 kg (215 lb).             Subjective   Mylene is a 74 year old, presenting for the following " "health issues:  Fatigue, Vomit (Jan 11/Projectile like vomit/Almost blood like/Pt stated it doesn't seem normal), [place]/Small g[place]), Imm/Inj (Flu and covid), and Radiology Visit (Bone density)        2/10/2025     1:51 PM   Additional Questions   Roomed by Christel DOWNS     History of Present Illness       Reason for visit:  Extreme fatigue  Symptom onset:  More than a month  Symptoms include:  Tired even when awake  Symptom intensity:  Moderate  Symptom progression:  Staying the same  Had these symptoms before:  No  What makes it worse:  No  What makes it better:  No   She is taking medications regularly.     She has been feeling fatigued and lethargic for several months.  She was sleeping 7 hours a night and now sleeping 9-10.5 hours a night and does not feel well-rested when she wakes up.  Sleeping with her mouth open.  She had a CPAP for 20 years, but when it broke, she had another sleep study, which showed mild CATIE.  It sounds like there was a Medicare audit and they took her CPAP away.   Since her last sleep study in 2023, she has gained 20#.  She has not been checking her blood sugars.  Lately she has been craving and eating more sweets.    She had a day of vomiting on 1/11, resolved.  She feels her depression is \"fair\", she does try to get out with her dog.                    Objective    /76 (BP Location: Right arm, Patient Position: Sitting, Cuff Size: Adult Large)   Pulse 93   Temp 97.2  F (36.2  C) (Temporal)   Resp 16   Ht 1.607 m (5' 3.25\")   Wt 97.5 kg (215 lb)   LMP  (LMP Unknown)   SpO2 96%   BMI 37.79 kg/m    Body mass index is 37.79 kg/m .  Physical Exam   GENERAL: alert and no distress  RESP: lungs clear to auscultation - no rales, rhonchi or wheezes  CV: regular rate and rhythm, normal S1 S2, no S3 or S4, no murmur, click or rub, no peripheral edema  MS: no gross musculoskeletal defects noted, no edema  PSYCH: mentation appears normal, affect " normal/bright            Signed Electronically by: Jory Fragoso, NP

## 2025-02-11 DIAGNOSIS — G47.33 OSA (OBSTRUCTIVE SLEEP APNEA): Primary | ICD-10-CM

## 2025-02-11 LAB
ALBUMIN SERPL BCG-MCNC: 4 G/DL (ref 3.5–5.2)
ALP SERPL-CCNC: 80 U/L (ref 40–150)
ALT SERPL W P-5'-P-CCNC: 15 U/L (ref 0–50)
ANION GAP SERPL CALCULATED.3IONS-SCNC: 15 MMOL/L (ref 7–15)
AST SERPL W P-5'-P-CCNC: 27 U/L (ref 0–45)
BILIRUB SERPL-MCNC: 1.1 MG/DL
BUN SERPL-MCNC: 20 MG/DL (ref 8–23)
CALCIUM SERPL-MCNC: 8.8 MG/DL (ref 8.8–10.4)
CHLORIDE SERPL-SCNC: 102 MMOL/L (ref 98–107)
CREAT SERPL-MCNC: 0.7 MG/DL (ref 0.51–0.95)
EGFRCR SERPLBLD CKD-EPI 2021: >90 ML/MIN/1.73M2
GLUCOSE SERPL-MCNC: 105 MG/DL (ref 70–99)
HCO3 SERPL-SCNC: 19 MMOL/L (ref 22–29)
POTASSIUM SERPL-SCNC: 4.5 MMOL/L (ref 3.4–5.3)
PROT SERPL-MCNC: 6.8 G/DL (ref 6.4–8.3)
SODIUM SERPL-SCNC: 136 MMOL/L (ref 135–145)
TSH SERPL DL<=0.005 MIU/L-ACNC: 2.41 UIU/ML (ref 0.3–4.2)
VIT B12 SERPL-MCNC: 528 PG/ML (ref 232–1245)
VIT D+METAB SERPL-MCNC: 24 NG/ML (ref 20–50)

## 2025-04-28 ENCOUNTER — NURSE TRIAGE (OUTPATIENT)
Dept: FAMILY MEDICINE | Facility: CLINIC | Age: 75
End: 2025-04-28
Payer: COMMERCIAL

## 2025-04-28 NOTE — TELEPHONE ENCOUNTER
"Right middle toe pain/tenderness and two toes are \"curling up, kind of curved\"  Wore a different pair of shoes yesterday and feels this may have contributed  Rates pain while walking/flexing toes 6/10, at rest 2/10  Notes no injury    Discussed in-clinic visit vs walk-in ortho. Patient preference is for in-clinic visit tomorrow. Scheduled and reviewed care advice. The patient indicates understanding of these issues and agrees with the plan.    JAYESH ReynosoN, PHN, AMB-BC (she/her)  [facility] RN      Reason for Disposition   MODERATE pain (e.g., interferes with normal activities, limping) and present > 3 days   Patient wants to be seen    Protocols used: Toe Pain-A-OH    "

## 2025-04-29 ENCOUNTER — OFFICE VISIT (OUTPATIENT)
Dept: FAMILY MEDICINE | Facility: CLINIC | Age: 75
End: 2025-04-29
Payer: COMMERCIAL

## 2025-04-29 ENCOUNTER — ANCILLARY PROCEDURE (OUTPATIENT)
Dept: GENERAL RADIOLOGY | Facility: CLINIC | Age: 75
End: 2025-04-29
Payer: COMMERCIAL

## 2025-04-29 VITALS
HEIGHT: 62 IN | BODY MASS INDEX: 40.12 KG/M2 | RESPIRATION RATE: 16 BRPM | WEIGHT: 218 LBS | DIASTOLIC BLOOD PRESSURE: 76 MMHG | TEMPERATURE: 97.4 F | HEART RATE: 92 BPM | SYSTOLIC BLOOD PRESSURE: 109 MMHG | OXYGEN SATURATION: 97 %

## 2025-04-29 DIAGNOSIS — M79.675 PAIN IN TOES OF BOTH FEET: ICD-10-CM

## 2025-04-29 DIAGNOSIS — M79.674 PAIN IN TOES OF BOTH FEET: ICD-10-CM

## 2025-04-29 DIAGNOSIS — M79.674 TOE PAIN, RIGHT: ICD-10-CM

## 2025-04-29 DIAGNOSIS — M79.674 TOE PAIN, RIGHT: Primary | ICD-10-CM

## 2025-04-29 LAB — URATE SERPL-MCNC: 4.9 MG/DL (ref 2.4–5.7)

## 2025-04-29 PROCEDURE — 84550 ASSAY OF BLOOD/URIC ACID: CPT

## 2025-04-29 PROCEDURE — 1126F AMNT PAIN NOTED NONE PRSNT: CPT

## 2025-04-29 PROCEDURE — 73630 X-RAY EXAM OF FOOT: CPT | Mod: TC | Performed by: RADIOLOGY

## 2025-04-29 PROCEDURE — 3078F DIAST BP <80 MM HG: CPT

## 2025-04-29 PROCEDURE — 99214 OFFICE O/P EST MOD 30 MIN: CPT

## 2025-04-29 PROCEDURE — 3074F SYST BP LT 130 MM HG: CPT

## 2025-04-29 PROCEDURE — G2211 COMPLEX E/M VISIT ADD ON: HCPCS

## 2025-04-29 PROCEDURE — 36415 COLL VENOUS BLD VENIPUNCTURE: CPT

## 2025-04-29 ASSESSMENT — PATIENT HEALTH QUESTIONNAIRE - PHQ9
SUM OF ALL RESPONSES TO PHQ QUESTIONS 1-9: 6
10. IF YOU CHECKED OFF ANY PROBLEMS, HOW DIFFICULT HAVE THESE PROBLEMS MADE IT FOR YOU TO DO YOUR WORK, TAKE CARE OF THINGS AT HOME, OR GET ALONG WITH OTHER PEOPLE: SOMEWHAT DIFFICULT
SUM OF ALL RESPONSES TO PHQ QUESTIONS 1-9: 6

## 2025-04-29 ASSESSMENT — PAIN SCALES - GENERAL: PAINLEVEL_OUTOF10: NO PAIN (0)

## 2025-04-29 NOTE — PROGRESS NOTES
"  Assessment & Plan     Toe pain, right  Pain in toes of both feet  3rd toe pain at base of toe with aggressive flexion of toe, no pain at rest. Will obtain xray to rule out fracture.   Jaime taped 2nd and 3rd right toes together for added stability. Continue for 2-3 weeks. Will also check uric acid to rule out gout.  Follow-up with podiatry for any ongoing pain and curved toes for further evaluation.   I will follow-up with lab and xray results and discuss if any further management is needed based on results.  - XR Foot Right G/E 3 Views  - Orthopedic  Referral  - Uric acid          Subjective   Mylene is a 74 year old, presenting for the following health issues:  Toe Pain (Toe Pain Bilateral ) and Lab Only (Glucose test )      4/29/2025    12:51 PM   Additional Questions   Roomed by Eboni Falcon     Via the [employer] questionnaire, the patient has reported the following services have been completed -Eye Exam: trinh 2028-02-10, this information has been sent to the abstraction team.  HPI      Curved toes- right 3rd toe. This is not new  But has noticed some pain when she tries to straighten the toe out by aggressively straightening it  Left 3rd toe will randomly hurt too, but not like the right.     Some pain with lots of walking    No pain at rest, no swelling or redness or bruising.    No injury to either foot           Objective    Pulse 92   Temp 97.4  F (36.3  C) (Temporal)   Resp 16   Ht 1.575 m (5' 2\")   Wt 98.9 kg (218 lb)   LMP  (LMP Unknown)   SpO2 97%   BMI 39.87 kg/m    Body mass index is 39.87 kg/m .  Physical Exam   GENERAL: alert and no distress  MS: right third toe no pain with pointing or flexing the foot against resistance, full ROM. Curve to all of right toes. No bruising, swelling or redness.   SKIN: no suspicious lesions or rashes  PSYCH: mentation appears normal, affect normal/bright          Signed Electronically by: REYNALDO Fleming CNP    "

## 2025-04-30 ENCOUNTER — PATIENT OUTREACH (OUTPATIENT)
Dept: CARE COORDINATION | Facility: CLINIC | Age: 75
End: 2025-04-30
Payer: COMMERCIAL

## 2025-05-30 ENCOUNTER — OFFICE VISIT (OUTPATIENT)
Dept: PODIATRY | Facility: CLINIC | Age: 75
End: 2025-05-30
Payer: COMMERCIAL

## 2025-05-30 DIAGNOSIS — L89.893 PRESSURE INJURY OF TOE OF RIGHT FOOT, STAGE 3 (H): ICD-10-CM

## 2025-05-30 DIAGNOSIS — M20.41 HAMMER TOE OF RIGHT FOOT: Primary | ICD-10-CM

## 2025-05-30 DIAGNOSIS — M79.675 PAIN IN TOES OF BOTH FEET: ICD-10-CM

## 2025-05-30 DIAGNOSIS — L03.031 CELLULITIS OF RIGHT TOE: ICD-10-CM

## 2025-05-30 DIAGNOSIS — M79.674 PAIN IN TOES OF BOTH FEET: ICD-10-CM

## 2025-05-30 PROCEDURE — 99214 OFFICE O/P EST MOD 30 MIN: CPT | Mod: 25 | Performed by: STUDENT IN AN ORGANIZED HEALTH CARE EDUCATION/TRAINING PROGRAM

## 2025-05-30 PROCEDURE — 1125F AMNT PAIN NOTED PAIN PRSNT: CPT | Performed by: STUDENT IN AN ORGANIZED HEALTH CARE EDUCATION/TRAINING PROGRAM

## 2025-05-30 PROCEDURE — 28011 INCISION OF TOE TENDONS: CPT | Mod: RT | Performed by: STUDENT IN AN ORGANIZED HEALTH CARE EDUCATION/TRAINING PROGRAM

## 2025-05-30 PROCEDURE — 11042 DBRDMT SUBQ TIS 1ST 20SQCM/<: CPT | Mod: RT | Performed by: STUDENT IN AN ORGANIZED HEALTH CARE EDUCATION/TRAINING PROGRAM

## 2025-05-30 RX ORDER — CEPHALEXIN 500 MG/1
500 CAPSULE ORAL 2 TIMES DAILY
Qty: 14 CAPSULE | Refills: 0 | Status: SHIPPED | OUTPATIENT
Start: 2025-05-30 | End: 2025-06-06

## 2025-05-30 ASSESSMENT — PAIN SCALES - GENERAL: PAINLEVEL_OUTOF10: SEVERE PAIN (8)

## 2025-05-30 NOTE — PATIENT INSTRUCTIONS
WHAT YOU NEED TO KNOW:    What is a walking boot?  A walking boot is a type of medical shoe used to protect the foot and ankle after an injury or surgery. The boot can be used for broken bones, tendon injuries, severe sprains, or shin splints. A walking boot helps keep the foot stable so it can heal. It can keep your weight off an area, such as your toe, as it heals. Most boots have between 2 and 5 adjustable straps and go mid-way up your calf.              How do I put on the walking boot?  You may want to wear a large sock.  Sit down and place your heel all the way to the back of the boot.  Wrap the soft liner around your foot and leg.  Place the front piece over the liner.  Start to fasten the straps closest to your toes then move up your leg.  Tighten the straps so they are snug but not too tight. The boot should limit movement but not cut off your blood flow.  If your boot has one or more air chambers, pump them up until ankle feels a little pressure and is secure.  Stand up and take a few steps to practice walking.    What else do I need to know?  Check your foot and toes often. Check your foot and toes for redness and swelling. If your toes are red, swollen, numb, or tingly, loosen your straps or deflate the air chamber. Over time, the swelling from the injury or surgery will decrease. When this happens, you may need to tighten the straps.  Be careful when you walk on wet surfaces. The boot may be slippery.  Follow the instructions to wash the liner. Remove the liner and wash it by hand in cold water with a mild detergent. Do not use a washing machine or dryer. Place the liner flat to dry. Wash the plastic parts with a damp cloth and mild soap.  Ask about removing the boot to bathe or for motion exercises. You may need to leave the boot on when you bathe. Cover it with a plastic bag and tape the bag closed around your leg.

## 2025-05-30 NOTE — PROGRESS NOTES
CC: Pain R 3rd Toe     HPI: Leena Montaño is a 75 year old female who presents to clinic for evaluation of her painful toe to the right foot. She states that she has gone to 3 different doctors for this and no one can figure out her toe pain. She states that she is worried as she noticed blood on her foot and is worried something is going on or maybe that she broke it. Denies trauma to the foot or toes.    Past Surgical History:   Procedure Laterality Date    BIOPSY  10/2015    during colonoscopy    CERVICAL FUSION N/A 10/18/2017    Procedure: BILATERAL C7-T1 ANTERIOR CERVICAL DECOMPRESSION FUSION;  Surgeon: Victor Hugo Fernández MD;  Location: [facility];  Service:     COLONOSCOPY  10/2015    COLONOSCOPY N/A 3/26/2024    Procedure: COLONOSCOPY, FLEXIBLE, WITH LESION REMOVAL USING SNARE;  Surgeon: Jimmie Denton MD;  Location:  GI    JOINT REPLACEMTN, KNEE RT/LT Right 2009    Joint Replacement knee RT    JOINT REPLACEMTN, KNEE RT/LT Left 2016    TONSILLECTOMY       Past Medical History:   Diagnosis Date    Allergic rhinitis, cause unspecified     CKD (chronic kidney disease) stage 2, GFR 60-89 ml/min 12/31/2010    Depressive disorder, not elsewhere classified     Disorder of uterus 3/5/2007    Problem list name updated by automated process. Provider to review    Hyperlipidemia LDL goal <130 10/31/2010    Hypertension goal BP (blood pressure) < 130/80 12/31/2010    Lumbago     degenerative disc disease and disc protrusion    Neck pain 12/13/2011    OA (osteoarthritis) of knee 10/28/2008    Osteoarthrosis, unspecified whether generalized or localized, unspecified site     Peripheral neuropathy     non-diabetic    Thyroid disease     Type 2 diabetes mellitus without complication (H) 3/28/2016    Unspecified essential hypertension     Unspecified sleep apnea      Medications:  Current Outpatient Medications   Medication Sig Dispense Refill    benzonatate (TESSALON) 100 MG capsule Take 1 capsule (100  mg) by mouth 3 times daily as needed for cough. 30 capsule 1    blood glucose (NO BRAND SPECIFIED) lancets standard Use to test blood sugar TWO times daily or as directed. 200 each 0    blood glucose (NO BRAND SPECIFIED) test strip Use to test blood sugar 2 times daily or as directed. 200 strip 0    blood glucose monitoring (NO BRAND SPECIFIED) meter device kit Use to test blood sugar 2  times daily or as directed. Contour next ez 1 kit 0    cephALEXin (KEFLEX) 500 MG capsule Take 1 capsule (500 mg) by mouth 2 times daily for 7 days. 14 capsule 0    DULoxetine (CYMBALTA) 60 MG capsule Take 1 capsule (60 mg) by mouth daily 90 capsule 3    levothyroxine (SYNTHROID/LEVOTHROID) 75 MCG tablet Take 1 tablet (75 mcg) by mouth daily 90 tablet 3    losartan (COZAAR) 100 MG tablet Take 1 tablet (100 mg) by mouth daily 90 tablet 3    nystatin (MYCOSTATIN) 644809 UNIT/GM external cream Apply topically 2 times daily 60 g PRN     Allergies:  Cats, Dogs, and Statins  Social History     Socioeconomic History    Marital status:      Spouse name: Not on file    Number of children: Not on file    Years of education: Not on file    Highest education level: Not on file   Occupational History    Not on file   Tobacco Use    Smoking status: Never    Smokeless tobacco: Never    Tobacco comments:     am using sm. amt. of marijuana for sleep/pain   Vaping Use    Vaping status: Never Used   Substance and Sexual Activity    Alcohol use: Yes     Comment: really managed/not my drug to enjoy. marijuana 3 times per week     Drug use: Yes     Types: IV, Marijuana    Sexual activity: Not Currently     Partners: Male   Other Topics Concern    Parent/sibling w/ CABG, MI or angioplasty before 65F 55M? No   Social History Narrative    9/2022: lives alone,         Dairy/d  servings/d. Caffeine 0-1 servings/dExercise 4-5 x week-walking, kayak, without difficulty.  Sunscreen used - YesSeatbelts used - YesWorking smoke/CO detectors in the home -  YesGuns stored in the home - NoSelf Breast Exams - SometimesSelf Testicular Exam - NAEye Exam up to date - NoDental Exam up to date - YesPap Smear up to date - Yes 8/20/10Mammogram up to date -NoPSA up to date - NADexa Scan up to date - NOFlex Sig / Colonoscopy up to date - NoImmunizations up to date - 2006Abuse: Current or Past(Physical, Sexual or Emotional)- Past doctors and coworkers with PhD'sDo you feel safe in your environment - Yes8/20/10Esme LARES     Social Drivers of Health     Financial Resource Strain: Unknown (6/19/2024)    Financial Resource Strain     Within the past 12 months, have you or your family members you live with been unable to get utilities (heat, electricity) when it was really needed?: Patient declined   Food Insecurity: Unknown (6/19/2024)    Food Insecurity     Within the past 12 months, did you worry that your food would run out before you got money to buy more?: Patient declined     Within the past 12 months, did the food you bought just not last and you didn t have money to get more?: Patient declined   Transportation Needs: Unknown (6/19/2024)    Transportation Needs     Within the past 12 months, has lack of transportation kept you from medical appointments, getting your medicines, non-medical meetings or appointments, work, or from getting things that you need?: Patient declined   Physical Activity: Inactive (6/19/2024)    Exercise Vital Sign     Days of Exercise per Week: 0 days     Minutes of Exercise per Session: 0 min   Stress: Stress Concern Present (6/19/2024)    [place] [employer] - Occupational Stress Questionnaire     Feeling of Stress : To some extent   Social Connections: Unknown (6/19/2024)    Social Connection and Isolation Panel [NHANES]     Frequency of Communication with Friends and Family: Not on file     Frequency of Social Gatherings with Friends and Family: Never     Attends [employer] Services: Not on file     Active Member of Clubs  or Organizations: Not on file     Attends Club or Organization Meetings: Not on file     Marital Status: Not on file   Interpersonal Safety: Low Risk  (2/10/2025)    Interpersonal Safety     Do you feel physically and emotionally safe where you currently live?: Yes     Within the past 12 months, have you been hit, slapped, kicked or otherwise physically hurt by someone?: No     Within the past 12 months, have you been humiliated or emotionally abused in other ways by your partner or ex-partner?: No   Housing Stability: Low Risk  (2024)    Housing Stability     Do you have housing? : Yes     Are you worried about losing your housing?: Patient declined     Family History   Problem Relation Age of Onset    Hypertension Father     Alcohol/Drug Father     Coronary Artery Disease Father         stroke/ carotid blockage    Cerebrovascular Disease Father         did not manage his blood pressure with meds smoked    Depression Father         came along after his first big stroke    Substance Abuse Father         alcohol    Hypertension Mother     Alcohol/Drug Mother     Depression Mother     Suicide Mother     Coronary Artery Disease Mother         surgery for carotid/surgery for femoral artery    Depression/Anxiety Mother     Cerebrovascular Disease Mother         small ones, smoked    Thyroid Disease Mother     Substance Abuse Mother         alcohol    Cerebrovascular Disease Paternal Grandfather         early age onset...60's    C.A.D. Maternal Grandfather         massive heart blow out    Depression/Anxiety Maternal Grandmother     Breast Cancer Maternal Grandmother         had a radical mastectomy....no recurrence  at    Substance Abuse Brother     Suicide Other     Depression Other     Diabetes Sister     Alcohol/Drug Brother     Substance Abuse Brother     Hypertension Brother         smoker    Cancer - colorectal No family hx of     Prostate Cancer No family hx of        Medical records were reviewed and are  summarized above.    Review of Systems    PHYSICAL EXAM:  Focused lower extremity physical exam:     Derm: Skin is cool, thin, dry, and atropic. Full thickness ulceration noted to the distal tuft of the right 3rd digit measuring 0.4 x 0.3 x 0.2cm post-debridement. The edges are inveloped with fibrous wound bed and serous drainage. Mild erythema <2cm extending from the ulceration site.     Vasc: Dorsalis pedis pulses, 1/4 bilateral. Posterior tibial pulses 1/4 bilateral. Cap fill time < 3 seconds to the digits.  Mild nonpitting edema is present to the bilateral lower extremity. Hair growth absent to the toes.     Neuro: Protective sensation diminished via SWMF examination to the feet. Gross sensation intact.     MSK:   - Semi-Rigid yvonne toe contracture of the right foot lesser digits 2-4.  - Pain to palpation to the ulceration at the distal tuft of the right 3rd digit.  - Muscle strength 5/5 with dorsiflexion, plantarflexion, eversion, inversion of the feet. Compartments soft and compressible.    Imaging: 3 views of the right foot were evaluated from 04/29/25. No acute fracture, subluxation, nor dislocation noted. No erosive changes nor soft tissue emphysema noted to the right 3rd digit.     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     - Type 2 diabetes with peripheral neuropathy:  Discussed the patient's type 2 diabetes.  Discussed the patient's peripheral neuropathy.  Discussed hyperglycemia, A1c, and its effect on the peripheral nerves especially the nerves to the eyes and toes.  Discussed pedal foot checks and what to look for.  Discussed shear force first ground reactive forces.  Discussed that this is how ulcerations typically begin to start.  Discussed the importance with diabetic shoes and inserts.   Discussed the importance of an accommodative multilayered insert for this.    - Hammertoe, second, third, fourth digit right foot:  Discussed hammertoe deformity.  Discussed the 3 main etiologies of hammer digit deformities.  Discussed flexible of her semirigid versus rigid hammertoe deformity.  Discussed toe spacer, toe sleeve, toe.  Discussed crest padding.  Discussed surgical intervention including flexor tenotomy versus arthroplasty versus arthrodesis.  Discussed the ulceration versus amputation.    After consent was obtained, the 2nd, 3rd, and 4th digits of the right foot were cleaned with alcohol pad and anesthetized using 1% lidocaine plain.  3 cc were injected into each digit in a digital fashion to the right foot.  Anesthesia was tested.  The second toe of the right foot was cleaned with alcohol to the sulcus where the procedure sites will take place.  At the plantar aspect of the PIPJ an 18-gauge needle was inserted and with dorsiflexor he pressure the flexor tendon was trimmed with the 18-gauge needle until tenotomy was performed percutaneously.  This procedure was repeated to the third digit and again to the fourth digit of the right foot.    - Pressure ulcer stage III, distal tuft right third digit:  Discussed the area to the distal aspect of the right third digit.  Discussed that this is clearly a pressure issue.  Discussed excisional debridement, crest padding, offloading, toe, or hammer digit procedure first amputation.  Discussed the risk of this becoming infected as the skin is very thin here the bone is easily infected.      After consent was obtained, the ulceration to the distal aspect of the right third digit was excisionally debrided down to and including subcutaneous tissue utilizing a sterile 15 blade.  Hemostasis was controlled via manual compression.  Post excisional debridement measures 0.4 x 0.3 x 0.2 cm.    - Cellulitis, Right 3rd Digit:  Discussed the patient's cellulitis in detail.   Discussed its association with the offending nail edge.  Discussed oral versus topical treatment to this area.  Keflex was ordered for the patient today.    - Patient to follow-up in 1 to 2 weeks tenderness around her eyes.    Solomon Locke DPM

## 2025-05-30 NOTE — LETTER
5/30/2025      Leena Montaño  [address]  [facility] [address]      Dear Colleague,    Thank you for referring your patient, Leena Montaño, to the [facility]. Please see a copy of my visit note below.    CC: Pain R 3rd Toe     HPI: Leena Montaño is a 75 year old female who presents to clinic for evaluation of her painful toe to the right foot. She states that she has gone to 3 different doctors for this and no one can figure out her toe pain. She states that she is worried as she noticed blood on her foot and is worried something is going on or maybe that she broke it. Denies trauma to the foot or toes.    Past Surgical History:   Procedure Laterality Date     BIOPSY  10/2015    during colonoscopy     CERVICAL FUSION N/A 10/18/2017    Procedure: BILATERAL C7-T1 ANTERIOR CERVICAL DECOMPRESSION FUSION;  Surgeon: Victor Hugo Fernández MD;  Location: [facility];  Service:      COLONOSCOPY  10/2015     COLONOSCOPY N/A 3/26/2024    Procedure: COLONOSCOPY, FLEXIBLE, WITH LESION REMOVAL USING SNARE;  Surgeon: Jimmie Denton MD;  Location:  GI     JOINT REPLACEMTN, KNEE RT/LT Right 2009    Joint Replacement knee RT     JOINT REPLACEMTN, KNEE RT/LT Left 2016     TONSILLECTOMY       Past Medical History:   Diagnosis Date     Allergic rhinitis, cause unspecified      CKD (chronic kidney disease) stage 2, GFR 60-89 ml/min 12/31/2010     Depressive disorder, not elsewhere classified      Disorder of uterus 3/5/2007    Problem list name updated by automated process. Provider to review     Hyperlipidemia LDL goal <130 10/31/2010     Hypertension goal BP (blood pressure) < 130/80 12/31/2010     Lumbago     degenerative disc disease and disc protrusion     Neck pain 12/13/2011     OA (osteoarthritis) of knee 10/28/2008     Osteoarthrosis, unspecified whether generalized or localized, unspecified site      Peripheral neuropathy     non-diabetic     Thyroid  disease      Type 2 diabetes mellitus without complication (H) 3/28/2016     Unspecified essential hypertension      Unspecified sleep apnea      Medications:  Current Outpatient Medications   Medication Sig Dispense Refill     benzonatate (TESSALON) 100 MG capsule Take 1 capsule (100 mg) by mouth 3 times daily as needed for cough. 30 capsule 1     blood glucose (NO BRAND SPECIFIED) lancets standard Use to test blood sugar TWO times daily or as directed. 200 each 0     blood glucose (NO BRAND SPECIFIED) test strip Use to test blood sugar 2 times daily or as directed. 200 strip 0     blood glucose monitoring (NO BRAND SPECIFIED) meter device kit Use to test blood sugar 2  times daily or as directed. Contour next ez 1 kit 0     cephALEXin (KEFLEX) 500 MG capsule Take 1 capsule (500 mg) by mouth 2 times daily for 7 days. 14 capsule 0     DULoxetine (CYMBALTA) 60 MG capsule Take 1 capsule (60 mg) by mouth daily 90 capsule 3     levothyroxine (SYNTHROID/LEVOTHROID) 75 MCG tablet Take 1 tablet (75 mcg) by mouth daily 90 tablet 3     losartan (COZAAR) 100 MG tablet Take 1 tablet (100 mg) by mouth daily 90 tablet 3     nystatin (MYCOSTATIN) 355112 UNIT/GM external cream Apply topically 2 times daily 60 g PRN     Allergies:  Cats, Dogs, and Statins  Social History     Socioeconomic History     Marital status:      Spouse name: Not on file     Number of children: Not on file     Years of education: Not on file     Highest education level: Not on file   Occupational History     Not on file   Tobacco Use     Smoking status: Never     Smokeless tobacco: Never     Tobacco comments:     am using sm. amt. of marijuana for sleep/pain   Vaping Use     Vaping status: Never Used   Substance and Sexual Activity     Alcohol use: Yes     Comment: really managed/not my drug to enjoy. marijuana 3 times per week      Drug use: Yes     Types: IV, Marijuana     Sexual activity: Not Currently     Partners: Male   Other Topics Concern      Parent/sibling w/ CABG, MI or angioplasty before 65F 55M? No   Social History Narrative    9/2022: lives alone,         Dairy/d  servings/d. Caffeine 0-1 servings/dExercise 4-5 x week-walking, kayak, without difficulty.  Sunscreen used - YesSeatbelts used - YesWorking smoke/CO detectors in the home - YesGuns stored in the home - NoSelf Breast Exams - SometimesSelf Testicular Exam - NAEye Exam up to date - NoDental Exam up to date - YesPap Smear up to date - Yes 8/20/10Mammogram up to date -NoPSA up to date - NADexa Scan up to date - NOFlex Sig / Colonoscopy up to date - NoImmunizations up to date - 2006Abuse: Current or Past(Physical, Sexual or Emotional)- Past doctors and coworkers with PhD'sDo you feel safe in your environment - Yes8/20/10Esme LARES     Social Drivers of Health     Financial Resource Strain: Unknown (6/19/2024)    Financial Resource Strain      Within the past 12 months, have you or your family members you live with been unable to get utilities (heat, electricity) when it was really needed?: Patient declined   Food Insecurity: Unknown (6/19/2024)    Food Insecurity      Within the past 12 months, did you worry that your food would run out before you got money to buy more?: Patient declined      Within the past 12 months, did the food you bought just not last and you didn t have money to get more?: Patient declined   Transportation Needs: Unknown (6/19/2024)    Transportation Needs      Within the past 12 months, has lack of transportation kept you from medical appointments, getting your medicines, non-medical meetings or appointments, work, or from getting things that you need?: Patient declined   Physical Activity: Inactive (6/19/2024)    Exercise Vital Sign      Days of Exercise per Week: 0 days      Minutes of Exercise per Session: 0 min   Stress: Stress Concern Present (6/19/2024)    [place] citizen [place] [employer] - Occupational Stress Questionnaire      Feeling of Stress :  To some extent   Social Connections: Unknown (6/19/2024)    Social Connection and Isolation Panel [NHANES]      Frequency of Communication with Friends and Family: Not on file      Frequency of Social Gatherings with Friends and Family: Never      Attends Druze Services: Not on file      Active Member of Clubs or Organizations: Not on file      Attends Club or Organization Meetings: Not on file      Marital Status: Not on file   Interpersonal Safety: Low Risk  (2/10/2025)    Interpersonal Safety      Do you feel physically and emotionally safe where you currently live?: Yes      Within the past 12 months, have you been hit, slapped, kicked or otherwise physically hurt by someone?: No      Within the past 12 months, have you been humiliated or emotionally abused in other ways by your partner or ex-partner?: No   Housing Stability: Low Risk  (6/19/2024)    Housing Stability      Do you have housing? : Yes      Are you worried about losing your housing?: Patient declined     Family History   Problem Relation Age of Onset     Hypertension Father      Alcohol/Drug Father      Coronary Artery Disease Father         stroke/ carotid blockage     Cerebrovascular Disease Father         did not manage his blood pressure with meds smoked     Depression Father         came along after his first big stroke     Substance Abuse Father         alcohol     Hypertension Mother      Alcohol/Drug Mother      Depression Mother      Suicide Mother      Coronary Artery Disease Mother         surgery for carotid/surgery for femoral artery     Depression/Anxiety Mother      Cerebrovascular Disease Mother         small ones, smoked     Thyroid Disease Mother      Substance Abuse Mother         alcohol     Cerebrovascular Disease Paternal Grandfather         early age onset...60's     C.A.D. Maternal Grandfather         massive heart blow out     Depression/Anxiety Maternal Grandmother      Breast Cancer Maternal Grandmother         had a  radical mastectomy....no recurrence  at     Substance Abuse Brother      Suicide Other      Depression Other      Diabetes Sister      Alcohol/Drug Brother      Substance Abuse Brother      Hypertension Brother         smoker     Cancer - colorectal No family hx of      Prostate Cancer No family hx of        Medical records were reviewed and are summarized above.    Review of Systems    PHYSICAL EXAM:  Focused lower extremity physical exam:     Derm: Skin is cool, thin, dry, and atropic. Full thickness ulceration noted to the distal tuft of the right 3rd digit measuring 0.4 x 0.3 x 0.2cm post-debridement. The edges are inveloped with fibrous wound bed and serous drainage. Mild erythema <2cm extending from the ulceration site.     Vasc: Dorsalis pedis pulses, 1/4 bilateral. Posterior tibial pulses 1/4 bilateral. Cap fill time < 3 seconds to the digits.  Mild nonpitting edema is present to the bilateral lower extremity. Hair growth absent to the toes.     Neuro: Protective sensation diminished via SWMF examination to the feet. Gross sensation intact.     MSK:   - Semi-Rigid yvonne toe contracture of the right foot lesser digits 2-4.  - Pain to palpation to the ulceration at the distal tuft of the right 3rd digit.  - Muscle strength 5/5 with dorsiflexion, plantarflexion, eversion, inversion of the feet. Compartments soft and compressible.    Imaging: 3 views of the right foot were evaluated from 25. No acute fracture, subluxation, nor dislocation noted. No erosive changes nor soft tissue emphysema noted to the right 3rd digit.     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     - Type 2 diabetes with peripheral neuropathy:  Discussed the patient's type 2 diabetes.  Discussed the patient's peripheral  neuropathy.  Discussed hyperglycemia, A1c, and its effect on the peripheral nerves especially the nerves to the eyes and toes.  Discussed pedal foot checks and what to look for.  Discussed shear force first ground reactive forces.  Discussed that this is how ulcerations typically begin to start.  Discussed the importance with diabetic shoes and inserts.  Discussed the importance of an accommodative multilayered insert for this.    - Hammertoe, second, third, fourth digit right foot:  Discussed hammertoe deformity.  Discussed the 3 main etiologies of hammer digit deformities.  Discussed flexible of her semirigid versus rigid hammertoe deformity.  Discussed toe spacer, toe sleeve, toe.  Discussed crest padding.  Discussed surgical intervention including flexor tenotomy versus arthroplasty versus arthrodesis.  Discussed the ulceration versus amputation.    After consent was obtained, the 2nd, 3rd, and 4th digits of the right foot were cleaned with alcohol pad and anesthetized using 1% lidocaine plain.  3 cc were injected into each digit in a digital fashion to the right foot.  Anesthesia was tested.  The second toe of the right foot was cleaned with alcohol to the sulcus where the procedure sites will take place.  At the plantar aspect of the PIPJ an 18-gauge needle was inserted and with dorsiflexor he pressure the flexor tendon was trimmed with the 18-gauge needle until tenotomy was performed percutaneously.  This procedure was repeated to the third digit and again to the fourth digit of the right foot.    - Pressure ulcer stage III, distal tuft right third digit:  Discussed the area to the distal aspect of the right third digit.  Discussed that this is clearly a pressure issue.  Discussed excisional debridement, crest padding, offloading, toe, or hammer digit procedure first amputation.  Discussed the risk of this becoming infected as the skin is very thin here the bone is easily infected.      After consent was  obtained, the ulceration to the distal aspect of the right third digit was excisionally debrided down to and including subcutaneous tissue utilizing a sterile 15 blade.  Hemostasis was controlled via manual compression.  Post excisional debridement measures 0.4 x 0.3 x 0.2 cm.    - Cellulitis, Right 3rd Digit:  Discussed the patient's cellulitis in detail.  Discussed its association with the offending nail edge.  Discussed oral versus topical treatment to this area.  Keflex was ordered for the patient today.    - Patient to follow-up in 1 to 2 weeks tenderness around her eyes.    Solomon Locke DPM      Again, thank you for allowing me to participate in the care of your patient.        Sincerely,        Solomon Locke DPM    Electronically signed

## 2025-06-11 ENCOUNTER — OFFICE VISIT (OUTPATIENT)
Dept: PODIATRY | Facility: CLINIC | Age: 75
End: 2025-06-11
Payer: COMMERCIAL

## 2025-06-11 DIAGNOSIS — Z48.89 OTHER SPECIFIED AFTERCARE FOLLOWING SURGERY: ICD-10-CM

## 2025-06-11 DIAGNOSIS — L89.893 PRESSURE INJURY OF TOE OF RIGHT FOOT, STAGE 3 (H): Primary | ICD-10-CM

## 2025-06-11 PROCEDURE — 99213 OFFICE O/P EST LOW 20 MIN: CPT | Mod: 24 | Performed by: STUDENT IN AN ORGANIZED HEALTH CARE EDUCATION/TRAINING PROGRAM

## 2025-06-11 PROCEDURE — 1126F AMNT PAIN NOTED NONE PRSNT: CPT | Performed by: STUDENT IN AN ORGANIZED HEALTH CARE EDUCATION/TRAINING PROGRAM

## 2025-06-11 ASSESSMENT — PAIN SCALES - GENERAL: PAINLEVEL_OUTOF10: NO PAIN (0)

## 2025-06-11 NOTE — LETTER
6/11/2025      Leena Montaño  [address]  [facility] [address]      Dear Colleague,    Thank you for referring your patient, Leena Montaño, to the [facility]. Please see a copy of my visit note below.        FOOT AND ANKLE SURGERY/PODIATRY PROGRESS NOTE    ASSESSMENT:   - 2 weeks status post percutaneous tenotomy digits 2, 3, 4, right foot  - Stage I pressure ulcer, right third digit    PLAN:  - Stage I pressure ulcer, right third digit:  Discussed the pressure ulcer to the right third digit and that it has considerably shrunk and is no longer any concerns of infection.  Discussed that she is to continue offloading as needed but that her flexor tenotomy's appear to be successful and that she may continue to weight-bear as tolerated in normal shoe gear.    - Status post percutaneous tenotomy, multiple, right foot:  Dressings were removed.  Discussed with the patient that these are all healed with no remaining percutaneous incisions.  No acute signs of infection.  No drainage.  Discussed that she does not need any dressing to this area anymore and may go back to normal shoes as tolerated.    - Patient's questions invited and answered. Patient to return to clinic in 2 weeks for evaluation if there are any concerns in the wound is not closed. Patient was encouraged to call my office with any further questions or concerns.     20 minutes spent on the day of encounter doing chart review, history and exam, documentation, and further activities as noted.     Beny Locke, AARON  [facility]/Foot & Ankle Surgery      SUBJECTIVE: Leena Montaño was seen in clinic today for continued valuation of her right third digit pressure ulceration as well as a follow-up to her second, third, fourth percutaneous tenotomy procedures 2 weeks ago to the right foot.    Past Medical History:   Diagnosis Date     Allergic rhinitis, cause unspecified       CKD (chronic kidney disease) stage 2, GFR 60-89 ml/min 12/31/2010     Depressive disorder, not elsewhere classified      Disorder of uterus 3/5/2007    Problem list name updated by automated process. Provider to review     Hyperlipidemia LDL goal <130 10/31/2010     Hypertension goal BP (blood pressure) < 130/80 12/31/2010     Lumbago     degenerative disc disease and disc protrusion     Neck pain 12/13/2011     OA (osteoarthritis) of knee 10/28/2008     Osteoarthrosis, unspecified whether generalized or localized, unspecified site      Peripheral neuropathy     non-diabetic     Thyroid disease      Type 2 diabetes mellitus without complication (H) 3/28/2016     Unspecified essential hypertension      Unspecified sleep apnea        Past Surgical History:   Procedure Laterality Date     BIOPSY  10/2015    during colonoscopy     CERVICAL FUSION N/A 10/18/2017    Procedure: BILATERAL C7-T1 ANTERIOR CERVICAL DECOMPRESSION FUSION;  Surgeon: Victor Hugo Fernández MD;  Location: [facility];  Service:      COLONOSCOPY  10/2015     COLONOSCOPY N/A 3/26/2024    Procedure: COLONOSCOPY, FLEXIBLE, WITH LESION REMOVAL USING SNARE;  Surgeon: Jimmie Denton MD;  Location:  GI     JOINT REPLACEMTN, KNEE RT/LT Right 2009    Joint Replacement knee RT     JOINT REPLACEMTN, KNEE RT/LT Left 2016     TONSILLECTOMY         Allergies   Allergen Reactions     Cats      Dogs      Statins Muscle Pain (Myalgia)         Current Outpatient Medications:      benzonatate (TESSALON) 100 MG capsule, Take 1 capsule (100 mg) by mouth 3 times daily as needed for cough., Disp: 30 capsule, Rfl: 1     blood glucose (NO BRAND SPECIFIED) lancets standard, Use to test blood sugar TWO times daily or as directed., Disp: 200 each, Rfl: 0     blood glucose (NO BRAND SPECIFIED) test strip, Use to test blood sugar 2 times daily or as directed., Disp: 200 strip, Rfl: 0     blood glucose monitoring (NO BRAND SPECIFIED) meter device kit, Use to test blood sugar  2  times daily or as directed. Contour next ez, Disp: 1 kit, Rfl: 0     DULoxetine (CYMBALTA) 60 MG capsule, Take 1 capsule (60 mg) by mouth daily, Disp: 90 capsule, Rfl: 3     levothyroxine (SYNTHROID/LEVOTHROID) 75 MCG tablet, Take 1 tablet (75 mcg) by mouth daily, Disp: 90 tablet, Rfl: 3     losartan (COZAAR) 100 MG tablet, Take 1 tablet (100 mg) by mouth daily, Disp: 90 tablet, Rfl: 3     nystatin (MYCOSTATIN) 514979 UNIT/GM external cream, Apply topically 2 times daily, Disp: 60 g, Rfl: PRN    Family History   Problem Relation Age of Onset     Hypertension Father      Alcohol/Drug Father      Coronary Artery Disease Father         stroke/ carotid blockage     Cerebrovascular Disease Father         did not manage his blood pressure with meds smoked     Depression Father         came along after his first big stroke     Substance Abuse Father         alcohol     Hypertension Mother      Alcohol/Drug Mother      Depression Mother      Suicide Mother      Coronary Artery Disease Mother         surgery for carotid/surgery for femoral artery     Depression/Anxiety Mother      Cerebrovascular Disease Mother         small ones, smoked     Thyroid Disease Mother      Substance Abuse Mother         alcohol     Cerebrovascular Disease Paternal Grandfather         early age onset...60's     C.A.D. Maternal Grandfather         massive heart blow out     Depression/Anxiety Maternal Grandmother      Breast Cancer Maternal Grandmother         had a radical mastectomy....no recurrence  at     Substance Abuse Brother      Suicide Other      Depression Other      Diabetes Sister      Alcohol/Drug Brother      Substance Abuse Brother      Hypertension Brother         smoker     Cancer - colorectal No family hx of      Prostate Cancer No family hx of        Social History     Socioeconomic History     Marital status:      Spouse name: Not on file     Number of children: Not on file     Years of education: Not on file      Highest education level: Not on file   Occupational History     Not on file   Tobacco Use     Smoking status: Never     Smokeless tobacco: Never     Tobacco comments:     am using sm. amt. of marijuana for sleep/pain   Vaping Use     Vaping status: Never Used   Substance and Sexual Activity     Alcohol use: Yes     Comment: really managed/not my drug to enjoy. marijuana 3 times per week      Drug use: Yes     Types: IV, Marijuana     Sexual activity: Not Currently     Partners: Male   Other Topics Concern     Parent/sibling w/ CABG, MI or angioplasty before 65F 55M? No   Social History Narrative    9/2022: lives alone,         Dairy/d  servings/d. Caffeine 0-1 servings/dExercise 4-5 x week-walking, kayak, without difficulty.  Sunscreen used - YesSeatbelts used - YesWorking smoke/CO detectors in the home - YesGuns stored in the home - NoSelf Breast Exams - SometimesSelf Testicular Exam - NAEye Exam up to date - NoDental Exam up to date - YesPap Smear up to date - Yes 8/20/10Mammogram up to date -NoPSA up to date - NADexa Scan up to date - NOFlex Sig / Colonoscopy up to date - NoImmunizations up to date - 2006Abuse: Current or Past(Physical, Sexual or Emotional)- Past doctors and coworkers with PhD'sDo you feel safe in your environment - Yes8/20/10Esme LARES     Social Drivers of Health     Financial Resource Strain: Unknown (6/19/2024)    Financial Resource Strain      Within the past 12 months, have you or your family members you live with been unable to get utilities (heat, electricity) when it was really needed?: Patient declined   Food Insecurity: Unknown (6/19/2024)    Food Insecurity      Within the past 12 months, did you worry that your food would run out before you got money to buy more?: Patient declined      Within the past 12 months, did the food you bought just not last and you didn t have money to get more?: Patient declined   Transportation Needs: Unknown (6/19/2024)    Transportation Needs       Within the past 12 months, has lack of transportation kept you from medical appointments, getting your medicines, non-medical meetings or appointments, work, or from getting things that you need?: Patient declined   Physical Activity: Inactive (6/19/2024)    Exercise Vital Sign      Days of Exercise per Week: 0 days      Minutes of Exercise per Session: 0 min   Stress: Stress Concern Present (6/19/2024)    [place] [employer] - Occupational Stress Questionnaire      Feeling of Stress : To some extent   Social Connections: Unknown (6/19/2024)    Social Connection and Isolation Panel [NHANES]      Frequency of Communication with Friends and Family: Not on file      Frequency of Social Gatherings with Friends and Family: Never      Attends Sikhism Services: Not on file      Active Member of Clubs or Organizations: Not on file      Attends Club or Organization Meetings: Not on file      Marital Status: Not on file   Interpersonal Safety: Low Risk  (2/10/2025)    Interpersonal Safety      Do you feel physically and emotionally safe where you currently live?: Yes      Within the past 12 months, have you been hit, slapped, kicked or otherwise physically hurt by someone?: No      Within the past 12 months, have you been humiliated or emotionally abused in other ways by your partner or ex-partner?: No   Housing Stability: Low Risk  (6/19/2024)    Housing Stability      Do you have housing? : Yes      Are you worried about losing your housing?: Patient declined       10 point Review of Systems is negative except otherwise noted in HPI.    OBJECTIVE:  Derm: Skin is cool, thin, dry, and atropic. Superficial thickness ulceration noted to the distal tuft of the right 3rd digit measuring 0.1 x 0.1 x superficial.  Dry stable eschar appreciated over top the ulceration with dermis noted upon removal of the eschar.  No acute signs of infection, drainage, erythema.  Percutaneous incisions are closed and well  coapted.     Vasc: Dorsalis pedis pulses, 1/4 bilateral. Posterior tibial pulses 1/4 bilateral. Cap fill time < 3 seconds to the digits.  No edema to the digits. Hair growth absent to the toes.      Neuro: Protective sensation diminished via SWMF examination to the feet. Gross sensation intact.      MSK:   - Semi-Rigid yvonne toe contracture of the right foot lesser digits 2-4.  - No remaining pain to palpation to the ulceration at the distal tuft of the right 3rd digit.  - Muscle strength 5/5 with dorsiflexion, plantarflexion, eversion, inversion of the feet. Compartments soft and compressible.      Again, thank you for allowing me to participate in the care of your patient.        Sincerely,        Solomon Locke DPM    Electronically signed

## 2025-06-11 NOTE — PROGRESS NOTES
FOOT AND ANKLE SURGERY/PODIATRY PROGRESS NOTE    ASSESSMENT:   - 2 weeks status post percutaneous tenotomy digits 2, 3, 4, right foot  - Stage I pressure ulcer, right third digit    PLAN:  - Stage I pressure ulcer, right third digit:  Discussed the pressure ulcer to the right third digit and that it has considerably shrunk and is no longer any concerns of infection.  Discussed that she is to continue offloading as needed but that her flexor tenotomy's appear to be successful and that she may continue to weight-bear as tolerated in normal shoe gear.    - Status post percutaneous tenotomy, multiple, right foot:  Dressings were removed.  Discussed with the patient that these are all healed with no remaining percutaneous incisions.  No acute signs of infection.  No drainage.  Discussed that she does not need any dressing to this area anymore and may go back to normal shoes as tolerated.    - Patient's questions invited and answered. Patient to return to clinic in 2 weeks for evaluation if there are any concerns in the wound is not closed. Patient was encouraged to call my office with any further questions or concerns.     20 minutes spent on the day of encounter doing chart review, history and exam, documentation, and further activities as noted.     Beny Locke DPM  [facility] Podiatry/Foot & Ankle Surgery      SUBJECTIVE: Leena Montaño was seen in clinic today for continued valuation of her right third digit pressure ulceration as well as a follow-up to her second, third, fourth percutaneous tenotomy procedures 2 weeks ago to the right foot.    Past Medical History:   Diagnosis Date    Allergic rhinitis, cause unspecified     CKD (chronic kidney disease) stage 2, GFR 60-89 ml/min 12/31/2010    Depressive disorder, not elsewhere classified     Disorder of uterus 3/5/2007    Problem list name updated by automated process. Provider to review    Hyperlipidemia LDL goal <130 10/31/2010     Hypertension goal BP (blood pressure) < 130/80 12/31/2010    Lumbago     degenerative disc disease and disc protrusion    Neck pain 12/13/2011    OA (osteoarthritis) of knee 10/28/2008    Osteoarthrosis, unspecified whether generalized or localized, unspecified site     Peripheral neuropathy     non-diabetic    Thyroid disease     Type 2 diabetes mellitus without complication (H) 3/28/2016    Unspecified essential hypertension     Unspecified sleep apnea        Past Surgical History:   Procedure Laterality Date    BIOPSY  10/2015    during colonoscopy    CERVICAL FUSION N/A 10/18/2017    Procedure: BILATERAL C7-T1 ANTERIOR CERVICAL DECOMPRESSION FUSION;  Surgeon: Victor Hugo Fernández MD;  Location: [facility];  Service:     COLONOSCOPY  10/2015    COLONOSCOPY N/A 3/26/2024    Procedure: COLONOSCOPY, FLEXIBLE, WITH LESION REMOVAL USING SNARE;  Surgeon: Jimmie Denton MD;  Location:  GI    JOINT REPLACEMTN, KNEE RT/LT Right 2009    Joint Replacement knee RT    JOINT REPLACEMTN, KNEE RT/LT Left 2016    TONSILLECTOMY         Allergies   Allergen Reactions    Cats     Dogs     Statins Muscle Pain (Myalgia)         Current Outpatient Medications:     benzonatate (TESSALON) 100 MG capsule, Take 1 capsule (100 mg) by mouth 3 times daily as needed for cough., Disp: 30 capsule, Rfl: 1    blood glucose (NO BRAND SPECIFIED) lancets standard, Use to test blood sugar TWO times daily or as directed., Disp: 200 each, Rfl: 0    blood glucose (NO BRAND SPECIFIED) test strip, Use to test blood sugar 2 times daily or as directed., Disp: 200 strip, Rfl: 0    blood glucose monitoring (NO BRAND SPECIFIED) meter device kit, Use to test blood sugar 2  times daily or as directed. Contour next ez, Disp: 1 kit, Rfl: 0    DULoxetine (CYMBALTA) 60 MG capsule, Take 1 capsule (60 mg) by mouth daily, Disp: 90 capsule, Rfl: 3    levothyroxine (SYNTHROID/LEVOTHROID) 75 MCG tablet, Take 1 tablet (75 mcg) by mouth daily, Disp: 90 tablet, Rfl:  3    losartan (COZAAR) 100 MG tablet, Take 1 tablet (100 mg) by mouth daily, Disp: 90 tablet, Rfl: 3    nystatin (MYCOSTATIN) 818714 UNIT/GM external cream, Apply topically 2 times daily, Disp: 60 g, Rfl: PRN    Family History   Problem Relation Age of Onset    Hypertension Father     Alcohol/Drug Father     Coronary Artery Disease Father         stroke/ carotid blockage    Cerebrovascular Disease Father         did not manage his blood pressure with meds smoked    Depression Father         came along after his first big stroke    Substance Abuse Father         alcohol    Hypertension Mother     Alcohol/Drug Mother     Depression Mother     Suicide Mother     Coronary Artery Disease Mother         surgery for carotid/surgery for femoral artery    Depression/Anxiety Mother     Cerebrovascular Disease Mother         small ones, smoked    Thyroid Disease Mother     Substance Abuse Mother         alcohol    Cerebrovascular Disease Paternal Grandfather         early age onset...60's    C.A.D. Maternal Grandfather         massive heart blow out    Depression/Anxiety Maternal Grandmother     Breast Cancer Maternal Grandmother         had a radical mastectomy....no recurrence  at    Substance Abuse Brother     Suicide Other     Depression Other     Diabetes Sister     Alcohol/Drug Brother     Substance Abuse Brother     Hypertension Brother         smoker    Cancer - colorectal No family hx of     Prostate Cancer No family hx of        Social History     Socioeconomic History    Marital status:      Spouse name: Not on file    Number of children: Not on file    Years of education: Not on file    Highest education level: Not on file   Occupational History    Not on file   Tobacco Use    Smoking status: Never    Smokeless tobacco: Never    Tobacco comments:     am using sm. amt. of marijuana for sleep/pain   Vaping Use    Vaping status: Never Used   Substance and Sexual Activity    Alcohol use: Yes     Comment:  really managed/not my drug to enjoy. marijuana 3 times per week     Drug use: Yes     Types: IV, Marijuana    Sexual activity: Not Currently     Partners: Male   Other Topics Concern    Parent/sibling w/ CABG, MI or angioplasty before 65F 55M? No   Social History Narrative    9/2022: lives alone,         Dairy/d  servings/d. Caffeine 0-1 servings/dExercise 4-5 x week-walking, kayak, without difficulty.  Sunscreen used - YesSeatbelts used - YesWorking smoke/CO detectors in the home - YesGuns stored in the home - NoSelf Breast Exams - SometimesSelf Testicular Exam - NAEye Exam up to date - NoDental Exam up to date - YesPap Smear up to date - Yes 8/20/10Mammogram up to date -NoPSA up to date - NADexa Scan up to date - NOFlex Sig / Colonoscopy up to date - NoImmunizations up to date - 2006Abuse: Current or Past(Physical, Sexual or Emotional)- Past doctors and coworkers with PhD'sDo you feel safe in your environment - Yes8/20/10[facility]     Social Drivers of Health     Financial Resource Strain: Unknown (6/19/2024)    Financial Resource Strain     Within the past 12 months, have you or your family members you live with been unable to get utilities (heat, electricity) when it was really needed?: Patient declined   Food Insecurity: Unknown (6/19/2024)    Food Insecurity     Within the past 12 months, did you worry that your food would run out before you got money to buy more?: Patient declined     Within the past 12 months, did the food you bought just not last and you didn t have money to get more?: Patient declined   Transportation Needs: Unknown (6/19/2024)    Transportation Needs     Within the past 12 months, has lack of transportation kept you from medical appointments, getting your medicines, non-medical meetings or appointments, work, or from getting things that you need?: Patient declined   Physical Activity: Inactive (6/19/2024)    Exercise Vital Sign     Days of Exercise per Week: 0 days     Minutes of  Exercise per Session: 0 min   Stress: Stress Concern Present (6/19/2024)    [employer]ational [employer] - Occupational Stress Questionnaire     Feeling of Stress : To some extent   Social Connections: Unknown (6/19/2024)    Social Connection and Isolation Panel [NHANES]     Frequency of Communication with Friends and Family: Not on file     Frequency of Social Gatherings with Friends and Family: Never     Attends Alevism Services: Not on file     Active Member of Clubs or Organizations: Not on file     Attends Club or Organization Meetings: Not on file     Marital Status: Not on file   Interpersonal Safety: Low Risk  (2/10/2025)    Interpersonal Safety     Do you feel physically and emotionally safe where you currently live?: Yes     Within the past 12 months, have you been hit, slapped, kicked or otherwise physically hurt by someone?: No     Within the past 12 months, have you been humiliated or emotionally abused in other ways by your partner or ex-partner?: No   Housing Stability: Low Risk  (6/19/2024)    Housing Stability     Do you have housing? : Yes     Are you worried about losing your housing?: Patient declined       10 point Review of Systems is negative except otherwise noted in HPI.    OBJECTIVE:  Derm: Skin is cool, thin, dry, and atropic. Superficial thickness ulceration noted to the distal tuft of the right 3rd digit measuring 0.1 x 0.1 x superficial.  Dry stable eschar appreciated over top the ulceration with dermis noted upon removal of the eschar.  No acute signs of infection, drainage, erythema.  Percutaneous incisions are closed and well coapted.     Vasc: Dorsalis pedis pulses, 1/4 bilateral. Posterior tibial pulses 1/4 bilateral. Cap fill time < 3 seconds to the digits.  No edema to the digits. Hair growth absent to the toes.      Neuro: Protective sensation diminished via SWMF examination to the feet. Gross sensation intact.      MSK:   - Semi-Rigid yvonne toe contracture of the  right foot lesser digits 2-4.  - No remaining pain to palpation to the ulceration at the distal tuft of the right 3rd digit.  - Muscle strength 5/5 with dorsiflexion, plantarflexion, eversion, inversion of the feet. Compartments soft and compressible.

## 2025-06-16 DIAGNOSIS — E11.9 TYPE 2 DIABETES MELLITUS WITHOUT COMPLICATION, WITHOUT LONG-TERM CURRENT USE OF INSULIN (H): ICD-10-CM

## 2025-06-16 NOTE — TELEPHONE ENCOUNTER
Medication Question or Refill        What medication are you calling about (include dose and sig)?: blood glucose (NO BRAND SPECIFIED) lancets standard     Preferred Pharmacy:   [facility] [place], [place] [address] [place] [address]  Phone: [phone] Fax: [phone]    Controlled Substance Agreement on file:   CSA -- Patient Level:    CSA: None found at the patient level.       Who prescribed the medication?: Jory Fragoso NP    Do you need a refill? Yes    When did you use the medication last? Unknown    Patient offered an appointment? No, upcoming appt on 6/25    Do you have any questions or concerns?  Yes: pt notes concerns about higher blood sugar level readings than usual when testing with others' devices. Pt requesting pre-op physical for cataract surgery be completed with 6/25 AWV, but has yet to schedule operation      Could we send this information to you in [facility] or would you prefer to receive a phone call?:   Patient would prefer a phone call   Okay to leave a detailed message?: Yes at Cell number on file:    Telephone Information:   Mobile [phone]      HEART AND VASCULAR SUMMARY    1  Lipids: Total 232, , HDL 58,     2  ECG: Normal Sinus Rhythm    3  Echocardiogram: Normal Study    4  Stress ECHO: Normal Study    5  Coronary Calcium CT: 3    6  The 10-year ASCVD risk score (Deepak Parry et al , 2013) is: 1 4%    Values used to calculate the score:      Age: 49 years      Sex: Female      Is Non- : No      Diabetic: No      Tobacco smoker: No      Systolic Blood Pressure: 302 mmHg      Is BP treated: No      HDL Cholesterol: 58 mg/dL      Total Cholesterol: 232 mg/dL     7  HSCRP:   Lab Results   Component Value Date    HSCRP <0 90 04/26/2022       Impression and Recommendations:    1  Normal cardiovascular exam  2  Recommend heart healthy lifestyle  3  Work on decreasing dietary cholesterol intake and monitoring LDL cholesterol

## 2025-06-25 ENCOUNTER — TELEPHONE (OUTPATIENT)
Dept: FAMILY MEDICINE | Facility: CLINIC | Age: 75
End: 2025-06-25

## 2025-06-25 ENCOUNTER — OFFICE VISIT (OUTPATIENT)
Dept: FAMILY MEDICINE | Facility: CLINIC | Age: 75
End: 2025-06-25
Payer: COMMERCIAL

## 2025-06-25 VITALS
BODY MASS INDEX: 39.87 KG/M2 | DIASTOLIC BLOOD PRESSURE: 82 MMHG | TEMPERATURE: 97.5 F | HEART RATE: 81 BPM | HEIGHT: 62 IN | SYSTOLIC BLOOD PRESSURE: 130 MMHG | OXYGEN SATURATION: 95 % | RESPIRATION RATE: 18 BRPM

## 2025-06-25 DIAGNOSIS — R26.89 BALANCE PROBLEMS: ICD-10-CM

## 2025-06-25 DIAGNOSIS — I10 HYPERTENSION GOAL BP (BLOOD PRESSURE) < 140/90: ICD-10-CM

## 2025-06-25 DIAGNOSIS — E03.9 ACQUIRED HYPOTHYROIDISM: ICD-10-CM

## 2025-06-25 DIAGNOSIS — Z01.818 PRE-OPERATIVE EXAMINATION: ICD-10-CM

## 2025-06-25 DIAGNOSIS — G47.33 OBSTRUCTIVE SLEEP APNEA SYNDROME: ICD-10-CM

## 2025-06-25 DIAGNOSIS — B37.2 CUTANEOUS CANDIDIASIS: ICD-10-CM

## 2025-06-25 DIAGNOSIS — F32.1 MODERATE MAJOR DEPRESSION (H): ICD-10-CM

## 2025-06-25 DIAGNOSIS — M54.2 NECK PAIN: ICD-10-CM

## 2025-06-25 DIAGNOSIS — E11.40 TYPE 2 DIABETES MELLITUS WITH DIABETIC NEUROPATHY, WITHOUT LONG-TERM CURRENT USE OF INSULIN (H): ICD-10-CM

## 2025-06-25 DIAGNOSIS — M65.329 TRIGGER INDEX FINGER, UNSPECIFIED LATERALITY: ICD-10-CM

## 2025-06-25 DIAGNOSIS — Z00.00 ENCOUNTER FOR MEDICARE ANNUAL WELLNESS EXAM: Primary | ICD-10-CM

## 2025-06-25 DIAGNOSIS — H26.9 CATARACT OF BOTH EYES, UNSPECIFIED CATARACT TYPE: ICD-10-CM

## 2025-06-25 LAB
CHOLEST SERPL-MCNC: 205 MG/DL
CREAT UR-MCNC: 54.1 MG/DL
EST. AVERAGE GLUCOSE BLD GHB EST-MCNC: 163 MG/DL
FASTING STATUS PATIENT QL REPORTED: NO
HBA1C MFR BLD: 7.3 % (ref 0–5.6)
HDLC SERPL-MCNC: 40 MG/DL
LDLC SERPL CALC-MCNC: 130 MG/DL
MICROALBUMIN UR-MCNC: <12 MG/L
MICROALBUMIN/CREAT UR: NORMAL MG/G{CREAT}
NONHDLC SERPL-MCNC: 165 MG/DL
TRIGL SERPL-MCNC: 176 MG/DL

## 2025-06-25 PROCEDURE — 82043 UR ALBUMIN QUANTITATIVE: CPT | Performed by: NURSE PRACTITIONER

## 2025-06-25 PROCEDURE — 82570 ASSAY OF URINE CREATININE: CPT | Performed by: NURSE PRACTITIONER

## 2025-06-25 PROCEDURE — 36415 COLL VENOUS BLD VENIPUNCTURE: CPT | Performed by: NURSE PRACTITIONER

## 2025-06-25 PROCEDURE — 83036 HEMOGLOBIN GLYCOSYLATED A1C: CPT | Performed by: NURSE PRACTITIONER

## 2025-06-25 PROCEDURE — 80061 LIPID PANEL: CPT | Performed by: NURSE PRACTITIONER

## 2025-06-25 RX ORDER — LOSARTAN POTASSIUM 100 MG/1
100 TABLET ORAL DAILY
Qty: 90 TABLET | Refills: 3 | Status: SHIPPED | OUTPATIENT
Start: 2025-06-25

## 2025-06-25 RX ORDER — LEVOTHYROXINE SODIUM 75 UG/1
75 TABLET ORAL DAILY
Qty: 90 TABLET | Refills: 3 | Status: SHIPPED | OUTPATIENT
Start: 2025-06-25

## 2025-06-25 RX ORDER — DULOXETIN HYDROCHLORIDE 60 MG/1
60 CAPSULE, DELAYED RELEASE ORAL DAILY
Qty: 90 CAPSULE | Refills: 3 | Status: SHIPPED | OUTPATIENT
Start: 2025-06-25

## 2025-06-25 RX ORDER — NYSTATIN 100000 U/G
CREAM TOPICAL 2 TIMES DAILY
Qty: 60 G | Refills: 12 | Status: SHIPPED | OUTPATIENT
Start: 2025-06-25

## 2025-06-25 SDOH — HEALTH STABILITY: PHYSICAL HEALTH: ON AVERAGE, HOW MANY MINUTES DO YOU ENGAGE IN EXERCISE AT THIS LEVEL?: 0 MIN

## 2025-06-25 SDOH — HEALTH STABILITY: PHYSICAL HEALTH: ON AVERAGE, HOW MANY DAYS PER WEEK DO YOU ENGAGE IN MODERATE TO STRENUOUS EXERCISE (LIKE A BRISK WALK)?: 0 DAYS

## 2025-06-25 ASSESSMENT — COLUMBIA-SUICIDE SEVERITY RATING SCALE - C-SSRS
6. HAVE YOU EVER DONE ANYTHING, STARTED TO DO ANYTHING, OR PREPARED TO DO ANYTHING TO END YOUR LIFE?: NO
2. IN THE PAST MONTH, HAVE YOU ACTUALLY HAD ANY THOUGHTS OF KILLING YOURSELF?: NO
1. WITHIN THE PAST MONTH, HAVE YOU WISHED YOU WERE DEAD OR WISHED YOU COULD GO TO SLEEP AND NOT WAKE UP?: NO

## 2025-06-25 ASSESSMENT — SOCIAL DETERMINANTS OF HEALTH (SDOH): HOW OFTEN DO YOU GET TOGETHER WITH FRIENDS OR RELATIVES?: PATIENT DECLINED

## 2025-06-25 ASSESSMENT — PAIN SCALES - GENERAL: PAINLEVEL_OUTOF10: MODERATE PAIN (4)

## 2025-06-25 NOTE — PROGRESS NOTES
Preoperative Evaluation  [address]  [address]  [place] [address]  Phone: [phone]  Fax: [phone]  Primary Provider: Jory Fragoso NP  Pre-op Performing Provider: Jory Fragoso NP  Jun 25, 2025 6/25/2025   Surgical Information   What procedure is being done? Cataract    Facility or Hospital where procedure/surgery will be performed: [facility]?    Who is doing the procedure / surgery? Unknown    Date of surgery / procedure: 7/11/25 and 7/25/25    Time of surgery / procedure: TBD    Where do you plan to recover after surgery? at home with family        Proxy-reported     Fax number for surgical facility:     Assessment & Plan     The proposed surgical procedure is considered LOW risk.    (Z01.818) Pre-operative examination  Comment:   Plan:     (H26.9) Cataract of both eyes, unspecified cataract type  Comment:   Plan:         Depression Screening Follow Up        6/25/2025    10:39 AM   PHQ   PHQ-9 Total Score 11    Q9: Thoughts of better off dead/self-harm past 2 weeks Several days    F/U: Thoughts of suicide or self-harm Yes    F/U: Self harm-plan Yes    F/U: Self-harm action No    F/U: Safety concerns No        Proxy-reported                 6/25/2025    12:36 PM   C-SSRS (Brief [place])   Within the last month, have you wished you were dead or wished you could go to sleep and not wake up? No   Within the last month, have you had any actual thoughts of killing yourself? No   Within the last month, have you ever done anything, started to do anything, or prepared to do anything to end your life? No         Follow Up Actions Taken  See AWV note         - No identified additional risk factors other than previously addressed    Antiplatelet or Anticoagulation Medication Instructions   - We reviewed the medication list and the patient is not on an antiplatelet or anticoagulation medications.    Additional Medication  Instructions  Take all scheduled medications on the day of surgery    Recommendation  Approval given to proceed with proposed procedure, without further diagnostic evaluation.        Leann Chakraborty is a 75 year old, presenting for the following:  Annual Visit and Pre-Op Exam          6/25/2025    10:49 AM   Additional Questions   Roomed by Johnny Hightower   Accompanied by Self     HPI: Decreased vision secondary to cataracts.           6/25/2025   Pre-Op Questionnaire   Have you ever had a heart attack or stroke? No    Have you ever had surgery on your heart or blood vessels, such as a stent placement, a coronary artery bypass, or surgery on an artery in your head, neck, heart, or legs? No    Do you have chest pain with activity? No    Do you have a history of heart failure? No    Do you currently have a cold, bronchitis or symptoms of other infection? No    Do you have a cough, shortness of breath, or wheezing? No    Do you or anyone in your family have previous history of blood clots? (!) YES dad had a CVA    Do you or does anyone in your family have a serious bleeding problem such as prolonged bleeding following surgeries or cuts? No    Have you ever had problems with anemia or been told to take iron pills? No    Have you had any abnormal blood loss such as black, tarry or bloody stools, or abnormal vaginal bleeding? No    Have you ever had a blood transfusion? No    Are you willing to have a blood transfusion if it is medically needed before, during, or after your surgery? Yes    Have you or any of your relatives ever had problems with anesthesia? No    Do you have sleep apnea, excessive snoring or daytime drowsiness? (!) UNKNOWN    Do you have any artifical heart valves or other implanted medical devices like a pacemaker, defibrillator, or continuous glucose monitor? No    Do you have artificial joints? No    Are you allergic to latex? No        Proxy-reported     Advance Care Planning    Discussed advance care  planning with patient; informed AVS has link to [facility].    Preoperative Review of    reviewed - no record of controlled substances prescribed.          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 Provider to review      Sleep apnea 07/22/2004     Priority: Medium     Problem list name updated by automated process. Provider to review      Allergic rhinitis 07/22/2004     Priority: Medium     Problem list name updated by automated process. Provider to review        Past Medical History:   Diagnosis Date    Allergic rhinitis, cause unspecified     CKD (chronic kidney disease) stage 2, GFR 60-89 ml/min 12/31/2010    Depressive disorder, not elsewhere classified     Disorder of uterus 3/5/2007    Problem list name updated by automated process. Provider to review    Hyperlipidemia LDL goal <130 10/31/2010    Hypertension goal BP (blood pressure) < 130/80 12/31/2010    Lumbago     degenerative disc disease and disc protrusion    Neck pain  12/13/2011    OA (osteoarthritis) of knee 10/28/2008    Osteoarthrosis, unspecified whether generalized or localized, unspecified site     Peripheral neuropathy     non-diabetic    Thyroid disease     Type 2 diabetes mellitus without complication (H) 3/28/2016    Unspecified essential hypertension     Unspecified sleep apnea      Past Surgical History:   Procedure Laterality Date    BIOPSY  10/2015    during colonoscopy    CERVICAL FUSION N/A 10/18/2017    Procedure: BILATERAL C7-T1 ANTERIOR CERVICAL DECOMPRESSION FUSION;  Surgeon: Victor Hugo Fernández MD;  Location: [facility];  Service:     COLONOSCOPY  10/2015    COLONOSCOPY N/A 3/26/2024    Procedure: COLONOSCOPY, FLEXIBLE, WITH LESION REMOVAL USING SNARE;  Surgeon: Jimmie Denton MD;  Location:  GI    JOINT REPLACEMTN, KNEE RT/LT Right 2009    Joint Replacement knee RT    JOINT REPLACEMTN, KNEE RT/LT Left 2016    TONSILLECTOMY       Current Outpatient Medications   Medication Sig Dispense Refill    benzonatate (TESSALON) 100 MG capsule Take 1 capsule (100 mg) by mouth 3 times daily as needed for cough. 30 capsule 1    blood glucose (NO BRAND SPECIFIED) lancets standard Use to test blood sugar TWO times daily or as directed. 200 each 0    blood glucose (NO BRAND SPECIFIED) test strip Use to test blood sugar 2 times daily or as directed. 200 strip 0    blood glucose monitoring (NO BRAND SPECIFIED) meter device kit Use to test blood sugar 2  times daily or as directed. Contour next ez 1 kit 0    DULoxetine (CYMBALTA) 60 MG capsule Take 1 capsule (60 mg) by mouth daily 90 capsule 3    levothyroxine (SYNTHROID/LEVOTHROID) 75 MCG tablet Take 1 tablet (75 mcg) by mouth daily 90 tablet 3    losartan (COZAAR) 100 MG tablet Take 1 tablet (100 mg) by mouth daily 90 tablet 3    nystatin (MYCOSTATIN) 251388 UNIT/GM external cream Apply topically 2 times daily 60 g PRN       Allergies   Allergen Reactions    Cats     Dogs     Statins Muscle Pain (Myalgia)     "    Social History     Tobacco Use    Smoking status: Never    Smokeless tobacco: Never    Tobacco comments:     am using sm. amt. of marijuana for sleep/pain   Substance Use Topics    Alcohol use: Yes     Comment: really managed/not my drug to enjoy. marijuana 3 times per week        History   Drug Use    Types: IV, Marijuana               Objective    /82 (BP Location: Right arm, Patient Position: Sitting, Cuff Size: Adult Large)   Pulse 81   Temp 97.5  F (36.4  C) (Temporal)   Resp 18   Ht 1.575 m (5' 2\")   LMP  (LMP Unknown)   SpO2 95%   Breastfeeding No   BMI 39.87 kg/m     Estimated body mass index is 39.87 kg/m  as calculated from the following:    Height as of this encounter: 1.575 m (5' 2\").    Weight as of 4/29/25: 98.9 kg (218 lb).  Physical Exam  GENERAL: alert and no distress  EYES: Eyes grossly normal to inspection, PERRL and conjunctivae and sclerae normal  HENT: ear canals and TM's normal, nose and mouth without ulcers or lesions  NECK: no adenopathy, no asymmetry, masses, or scars  RESP: lungs clear to auscultation - no rales, rhonchi or wheezes  CV: regular rate and rhythm, normal S1 S2, no S3 or S4, no murmur, click or rub, no peripheral edema  ABDOMEN: soft, nontender, no hepatosplenomegaly, no masses and bowel sounds normal  MS: no gross musculoskeletal defects noted, no edema  SKIN: no suspicious lesions or rashes  NEURO: Normal strength and tone, mentation intact and speech normal  PSYCH: mentation appears normal, affect normal/bright    Recent Labs   Lab Test 02/10/25  1509 02/10/25  1508 06/29/24  1702   HGB 13.4  --  13.9     --  309   NA  --  136 136   POTASSIUM  --  4.5 4.6   CR  --  0.70 0.75   A1C 6.7*  --   --         Diagnostics  No labs were ordered during this visit.   No EKG required for low risk surgery (cataract, skin procedure, breast biopsy, etc).    Revised Cardiac Risk Index (RCRI)  The patient has the following serious cardiovascular risks for " perioperative complications:   - No serious cardiac risks = 0 points     RCRI Interpretation: 0 points: Class I (very low risk - 0.4% complication rate)         Signed Electronically by: Jory Fragoso NP  A copy of this evaluation report is provided to the requesting physician.         Answers submitted by the patient for this visit:  Patient Health Questionnaire (Submitted on 6/25/2025)  If you checked off any problems, how difficult have these problems made it for you to do your work, take care of things at home, or get along with other people?: Somewhat difficult  PHQ9 TOTAL SCORE: 11

## 2025-06-25 NOTE — PATIENT INSTRUCTIONS
Patient Education   Preventive Care Advice   This is general advice given by our system to help you stay healthy. However, your care team may have specific advice just for you. Please talk to your care team about your preventive care needs.  Nutrition  Eat 5 or more servings of fruits and vegetables each day.  Try wheat bread, brown rice and whole grain pasta (instead of white bread, rice, and pasta).  Get enough calcium and vitamin D. Check the label on foods and aim for 100% of the RDA (recommended daily allowance).  Lifestyle  Exercise at least 150 minutes each week  (30 minutes a day, 5 days a week).  Do muscle strengthening activities 2 days a week. These help control your weight and prevent disease.  No smoking.  Wear sunscreen to prevent skin cancer.  Have a dental exam and cleaning every 6 months.  Yearly exams  See your health care team every year to talk about:  Any changes in your health.  Any medicines your care team has prescribed.  Preventive care, family planning, and ways to prevent chronic diseases.  Shots (vaccines)   HPV shots (up to age 26), if you've never had them before.  Hepatitis B shots (up to age 59), if you've never had them before.  COVID-19 shot: Get this shot when it's due.  Flu shot: Get a flu shot every year.  Tetanus shot: Get a tetanus shot every 10 years.  Pneumococcal, hepatitis A, and RSV shots: Ask your care team if you need these based on your risk.  Shingles shot (for age 50 and up)  General health tests  Diabetes screening:  Starting at age 35, Get screened for diabetes at least every 3 years.  If you are younger than age 35, ask your care team if you should be screened for diabetes.  Cholesterol test: At age 39, start having a cholesterol test every 5 years, or more often if advised.  Bone density scan (DEXA): At age 50, ask your care team if you should have this scan for osteoporosis (brittle bones).  Hepatitis C: Get tested at least once in your life.  STIs (sexually  transmitted infections)  Before age 24: Ask your care team if you should be screened for STIs.  After age 24: Get screened for STIs if you're at risk. You are at risk for STIs (including HIV) if:  You are sexually active with more than one person.  You don't use condoms every time.  You or a partner was diagnosed with a sexually transmitted infection.  If you are at risk for HIV, ask about PrEP medicine to prevent HIV.  Get tested for HIV at least once in your life, whether you are at risk for HIV or not.  Cancer screening tests  Cervical cancer screening: If you have a cervix, begin getting regular cervical cancer screening tests starting at age 21.  Breast cancer scan (mammogram): If you've ever had breasts, begin having regular mammograms starting at age 40. This is a scan to check for breast cancer.  Colon cancer screening: It is important to start screening for colon cancer at age 45.  Have a colonoscopy test every 10 years (or more often if you're at risk) Or, ask your provider about stool tests like a FIT test every year or Cologuard test every 3 years.  To learn more about your testing options, visit:   .  For help making a decision, visit:   https://bit.ly/fe83914.  Prostate cancer screening test: If you have a prostate, ask your care team if a prostate cancer screening test (PSA) at age 55 is right for you.  Lung cancer screening: If you are a current or former smoker ages 50 to 80, ask your care team if ongoing lung cancer screenings are right for you.  For informational purposes only. Not to replace the advice of your health care provider. Copyright   2023 [facility]. All rights reserved. Clinically reviewed by the [facility]itions [facility]. [employer] 680335 - REV 01/24.  Learning About Activities of Daily Living  What are activities of daily living?     Activities of daily living (ADLs) are the basic self-care tasks you do every day. These include eating, bathing, dressing,  and moving around.  As you age, and if you have health problems, you may find that it's harder to do some of these tasks. If so, your doctor can suggest ideas that may help.  To measure what kind of help you may need, your doctor will ask how well you are able to do ADLs. Let your doctor know if there are any tasks that you are having trouble doing. This is an important first step to getting help. And when you have the help you need, you can stay as independent as possible.  How will a doctor assess your ADLs?  Asking about ADLs is part of a routine health checkup your doctor will likely do as you age. Your health check might be done in a doctor's office, in your home, or at a hospital. The goal is to find out if you are having any problems that could make it hard to care for yourself or that make it unsafe for you to be on your own.  To measure your ADLs, your doctor will ask how hard it is for you to do routine tasks. Your doctor may also want to know if you have changed the way you do a task because of a health problem. Your doctor may watch how you:  Walk back and forth.  Keep your balance while you stand or walk.  Move from sitting to standing or from a bed to a chair.  Button or unbutton a shirt or sweater.  Remove and put on your shoes.  It's common to feel a little worried or anxious if you find you can't do all the things you used to be able to do. Talking with your doctor about ADLs is a way to make sure you're as safe as possible and able to care for yourself as well as you can. You may want to bring a caregiver, friend, or family member to your checkup. They can help you talk to your doctor.  Follow-up care is a key part of your treatment and safety. Be sure to make and go to all appointments, and call your doctor if you are having problems. It's also a good idea to know your test results and keep a list of the medicines you take.  Current as of: October 24, 2024  Content Version: 14.5    5572-2860  [employer].   Care instructions adapted under license by your healthcare professional. If you have questions about a medical condition or this instruction, always ask your healthcare professional. [employer] disclaims any warranty or liability for your use of this information.    Preventing Falls: Care Instructions  Injuries and health problems such as trouble walking or poor eyesight can increase your risk of falling. So can some medicines. But there are things you can do to help prevent falls. You can exercise to get stronger. You can also arrange your home to make it safer.    Talk to your doctor about the medicines you take. Ask if any of them increase the risk of falls and whether they can be changed or stopped.   Try to exercise regularly. It can help improve your strength and balance. This can help lower your risk of falling.         Practice fall safety and prevention.   Wear low-heeled shoes that fit well and give your feet good support. Talk to your doctor if you have foot problems that make this hard.  Carry a cellphone or wear a medical alert device that you can use to call for help.  Use stepladders instead of chairs to reach high objects. Don't climb if you're at risk for falls. Ask for help, if needed.  Wear the correct eyeglasses, if you need them.        Make your home safer.   Remove rugs, cords, clutter, and furniture from walkways.  Keep your house well lit. Use night-lights in hallways and bathrooms.  Install and use sturdy handrails on stairways.  Wear nonskid footwear, even inside. Don't walk barefoot or in socks without shoes.        Be safe outside.   Use handrails, curb cuts, and ramps whenever possible.  Keep your hands free by using a shoulder bag or backpack.  Try to walk in well-lit areas. Watch out for uneven ground, changes in pavement, and debris.  Be careful in the winter. Walk on the grass or gravel when sidewalks are slippery. Use de-icer on steps and  "walkways. Add non-slip devices to shoes.    Put grab bars and nonskid mats in your shower or tub and near the toilet. Try to use a shower chair or bath bench when bathing.   Get into a tub or shower by putting in your weaker leg first. Get out with your strong side first. Have a phone or medical alert device in the bathroom with you.   Where can you learn more?  Go to https://www.[employer]/patiented  Enter G117 in the search box to learn more about \"Preventing Falls: Care Instructions.\"  Current as of: July 31, 2024  Content Version: 14.5    3210-0199 [employer].   Care instructions adapted under license by your healthcare professional. If you have questions about a medical condition or this instruction, always ask your healthcare professional. [employer] disclaims any warranty or liability for your use of this information.    Learning About Stress  What is stress?     Stress is your body's response to a hard situation. Your body can have a physical, emotional, or mental response. Stress is a fact of life for most people, and it affects everyone differently. What causes stress for you may not be stressful for someone else.  A lot of things can cause stress. You may feel stress when you go on a job interview, take a test, or run a race. This kind of short-term stress is normal and even useful. It can help you if you need to work hard or react quickly. For example, stress can help you finish an important job on time.  Long-term stress is caused by ongoing stressful situations or events. Examples of long-term stress include long-term health problems, ongoing problems at work, or conflicts in your family. Long-term stress can harm your health.  How does stress affect your health?  When you are stressed, your body responds as though you are in danger. It makes hormones that speed up your heart, make you breathe faster, and give you a burst of energy. This is called the fight-or-flight stress " response. If the stress is over quickly, your body goes back to normal and no harm is done.  But if stress happens too often or lasts too long, it can have bad effects. Long-term stress can make you more likely to get sick, and it can make symptoms of some diseases worse. If you tense up when you are stressed, you may develop neck, shoulder, or low back pain. Stress is linked to high blood pressure and heart disease.  Stress also harms your emotional health. It can make you layton, tense, or depressed. Your relationships may suffer, and you may not do well at work or school.  What can you do to manage stress?  You can try these things to help manage stress:   Do something active. Exercise or activity can help reduce stress. Walking is a great way to get started. Even everyday activities such as housecleaning or yard work can help.  Try yoga or liane chi. These techniques combine exercise and meditation. You may need some training at first to learn them.  Do something you enjoy. For example, listen to music or go to a movie. Practice your hobby or do volunteer work.  Meditate. This can help you relax, because you are not worrying about what happened before or what may happen in the future.  Do guided imagery. Imagine yourself in any setting that helps you feel calm. You can use online videos, books, or a teacher to guide you.  Do breathing exercises. For example:  From a standing position, bend forward from the waist with your knees slightly bent. Let your arms dangle close to the floor.  Breathe in slowly and deeply as you return to a standing position. Roll up slowly and lift your head last.  Hold your breath for just a few seconds in the standing position.  Breathe out slowly and bend forward from the waist.  Let your feelings out. Talk, laugh, cry, and express anger when you need to. Talking with supportive friends or family, a counselor, or a fiordaliza leader about your feelings is a healthy way to relieve stress.  "Avoid discussing your feelings with people who make you feel worse.  Write. It may help to write about things that are bothering you. This helps you find out how much stress you feel and what is causing it. When you know this, you can find better ways to cope.  What can you do to prevent stress?  You might try some of these things to help prevent stress:  Manage your time. This helps you find time to do the things you want and need to do.  Get enough sleep. Your body recovers from the stresses of the day while you are sleeping.  Get support. Your family, friends, and community can make a difference in how you experience stress.  Limit your news feed. Avoid or limit time on social media or news that may make you feel stressed.  Do something active. Exercise or activity can help reduce stress. Walking is a great way to get started.  Where can you learn more?  Go to https://www.[employer]ision/patiented  Enter N032 in the search box to learn more about \"Learning About Stress.\"  Current as of: October 24, 2024  Content Version: 14.5    1598-3299 [employer].   Care instructions adapted under license by your healthcare professional. If you have questions about a medical condition or this instruction, always ask your healthcare professional. [employer] disclaims any warranty or liability for your use of this information.    Learning About Sleeping Well  What does sleeping well mean?     Sleeping well means getting enough sleep to feel good and stay healthy. How much sleep is enough varies among people.  The number of hours you sleep and how you feel when you wake up are both important. If you do not feel refreshed, you probably need more sleep. Another sign of not getting enough sleep is feeling tired during the day.  Experts recommend that adults get at least 7 or more hours of sleep per day. Children and older adults need more sleep.  Why is getting enough sleep important?  Getting enough quality " "sleep is a basic part of good health. When your sleep suffers, your physical health, mood, and your thoughts can suffer too. You may find yourself feeling more grumpy or stressed. Not getting enough sleep also can lead to serious problems, including injury, accidents, anxiety, and depression.  What might cause poor sleeping?  Many things can cause sleep problems, including:  Changes to your sleep schedule.  Stress. Stress can be caused by fear about a single event, such as giving a speech. Or you may have ongoing stress, such as worry about work or school.  Depression, anxiety, and other mental or emotional conditions.  Changes in your sleep habits or surroundings. This includes changes that happen where you sleep, such as noise, light, or sleeping in a different bed. It also includes changes in your sleep pattern, such as having jet lag or working a late shift.  Health problems, such as pain, breathing problems, and restless legs syndrome.  Lack of regular exercise.  Using alcohol, nicotine, or caffeine before bed.  How can you help yourself?  Here are some tips that may help you sleep more soundly and wake up feeling more refreshed.  Your sleeping area   Use your bedroom only for sleeping and sex. A bit of light reading may help you fall asleep. But if it doesn't, do your reading elsewhere in the house. Try not to use your TV, computer, smartphone, or tablet while you are in bed.  Be sure your bed is big enough to stretch out comfortably, especially if you have a sleep partner.  Keep your bedroom quiet, dark, and cool. Use curtains, blinds, or a sleep mask to block out light. To block out noise, use earplugs, soothing music, or a \"white noise\" machine.  Your evening and bedtime routine   Create a relaxing bedtime routine. You might want to take a warm shower or bath, or listen to soothing music.  Go to bed at the same time every night. And get up at the same time every morning, even if you feel tired.  What to " "avoid   Limit caffeine (coffee, tea, caffeinated sodas) during the day, and don't have any for at least 6 hours before bedtime.  Avoid drinking alcohol before bedtime. Alcohol can cause you to wake up more often during the night.  Try not to smoke or use tobacco, especially in the evening. Nicotine can keep you awake.  Limit naps during the day, especially close to bedtime.  Avoid lying in bed awake for too long. If you can't fall asleep or if you wake up in the middle of the night and can't get back to sleep within about 20 minutes, get out of bed and go to another room until you feel sleepy.  Avoid taking medicine right before bed that may keep you awake or make you feel hyper or energized. Your doctor can tell you if your medicine may do this and if you can take it earlier in the day.  If you can't sleep   Imagine yourself in a peaceful, pleasant scene. Focus on the details and feelings of being in a place that is relaxing.  Get up and do a quiet or boring activity until you feel sleepy.  Avoid drinking any liquids before going to bed to help prevent waking up often to use the bathroom.  Where can you learn more?  Go to https://www.StyleQ.net/patiented  Enter J942 in the search box to learn more about \"Learning About Sleeping Well.\"  Current as of: July 31, 2024  Content Version: 14.5 2024-2025 [employer].   Care instructions adapted under license by your healthcare professional. If you have questions about a medical condition or this instruction, always ask your healthcare professional. [employer] disclaims any warranty or liability for your use of this information.    Bladder Training: Care Instructions  Your Care Instructions     Bladder training is used to treat urge incontinence and stress incontinence. Urge incontinence means that the need to urinate comes on so fast that you can't get to a toilet in time. Stress incontinence means that you leak urine because of pressure on " your bladder. For example, it may happen when you laugh, cough, or lift something heavy.  Bladder training can increase how long you can wait before you have to urinate. It can also help your bladder hold more urine. And it can give you better control over the urge to urinate.  It is important to remember that bladder training takes a few weeks to a few months to make a difference. You may not see results right away, but don't give up.  Follow-up care is a key part of your treatment and safety. Be sure to make and go to all appointments, and call your doctor if you are having problems. It's also a good idea to know your test results and keep a list of the medicines you take.  How can you care for yourself at home?  Work with your doctor to come up with a bladder training program that is right for you. You may use one or more of the following methods.  Delayed urination  In the beginning, try to keep from urinating for 5 minutes after you first feel the need to go.  While you wait, take deep, slow breaths to relax. Kegel exercises can also help you delay the need to go to the bathroom.  After some practice, when you can easily wait 5 minutes to urinate, try to wait 10 minutes before you urinate.  Slowly increase the waiting period until you are able to control when you have to urinate.  Scheduled urination  Empty your bladder when you first wake up in the morning.  Schedule times throughout the day when you will urinate.  Start by going to the bathroom every hour, even if you don't need to go.  Slowly increase the time between trips to the bathroom.  When you have found a schedule that works well for you, keep doing it.  If you wake up during the night and have to urinate, do it. Apply your schedule to waking hours only.  Kegel exercises  These tighten and strengthen pelvic muscles, which can help you control the flow of urine. (If doing these exercises causes pain, stop doing them and talk with your doctor.) To do  "Kegel exercises:  Squeeze your muscles as if you were trying not to pass gas. Or squeeze your muscles as if you were stopping the flow of urine. Your belly, legs, and buttocks shouldn't move.  Hold the squeeze for 3 seconds, then relax for 5 to 10 seconds.  Start with 3 seconds, then add 1 second each week until you are able to squeeze for 10 seconds.  Repeat the exercise 10 times a session. Do 3 to 8 sessions a day.  When should you call for help?  Watch closely for changes in your health, and be sure to contact your doctor if:    Your incontinence is getting worse.     You do not get better as expected.   Where can you learn more?  Go to https://www.GoSporty.net/patiented  Enter V684 in the search box to learn more about \"Bladder Training: Care Instructions.\"  Current as of: April 30, 2024  Content Version: 14.5    8424-8729 [employer]ttn.   Care instructions adapted under license by your healthcare professional. If you have questions about a medical condition or this instruction, always ask your healthcare professional. [employer] disclaims any warranty or liability for your use of this information.    Recovering From Depression: Care Instructions  Overview    Sticking to your treatment plan is important as you recover from depression. It may take time for your symptoms to get better after you start treatment. Try not to give up if you don't feel better right away. Make sure you keep going to counseling and taking any prescribed medicine if they are part of your treatment plan.  Focus on things that can help you feel better, such as being with friends and family. Try to eat healthy foods, be active, and get enough sleep. Take things slowly as you begin to recover.  Follow-up care is a key part of your treatment and safety. Be sure to make and go to all appointments, and call your doctor if you are having problems. It's also a good idea to know your test results and keep a list of the " medicines you take.  How can you care for yourself at home?  Be realistic  If you have a large task to do, break it up into smaller steps you can handle, and just do what you can.  You may want to put off important decisions until your depression has lifted. If you have plans that will have a major impact on your life, such as marriage, divorce, or a job change, try to wait a bit. Talk it over with friends and loved ones who can help you look at the overall picture first.  Reaching out to people for help is important. Do not isolate yourself. Let your family and friends help you. Find someone you can trust and confide in, and talk to that person.  Be patient, and be kind to yourself. Remember that depression is not your fault and is not something you can overcome with willpower alone. Treatment is important for depression, just like for any other illness. Feeling better takes time, and your mood will improve little by little.  Stay active  Stay busy and get outside. Take a walk, or try some other light exercise.  Talk with your doctor about an exercise program. Exercise can help with mild depression.  Go to a movie or concert. Take part in a [employer] activity or other social gathering. Go to a ball game.  Ask a friend to have dinner with you.  Take care of yourself  Eat healthy foods such as fresh fruits and vegetables, whole grains, and lean protein. If you have lost your appetite, eat small snacks rather than large meals.  Avoid using marijuana and other drugs and drinking alcohol. Do not take medicines that have not been prescribed for you. They may interfere with medicines you may be taking for depression, or they may make your depression worse.  Take your medicines exactly as they are prescribed. You may start to feel better within 1 to 3 weeks of taking antidepressant medicine. But it can take as many as 6 to 8 weeks to see more improvement. If you have questions or concerns about your medicines, or if you do  not notice any improvement by 3 weeks, talk to your doctor.  Continue to take your medicine after your symptoms improve. Taking your medicine for at least 6 months after you feel better can help keep you from getting depressed again. If this isn't the first time you have been depressed, your doctor may recommend you to take medicine even longer.  If you have any side effects from your medicine, tell your doctor. Many side effects are mild and will go away on their own after you have been taking the medicine for a few weeks. Some may last longer. Talk to your doctor if side effects are bothering you too much. You might be able to try a different medicine.  Continue counseling. It may help prevent depression from returning, especially if you've had multiple episodes of depression. Talk with your counselor if you are having a hard time attending your sessions or you think the sessions aren't working. Don't just stop going.  Get enough sleep. Talk to your doctor if you are having problems sleeping.  Avoid sleeping pills unless they are prescribed by the doctor treating your depression. Sleeping pills may make you groggy during the day, and they may interact with other medicine you are taking.  If you have any other illnesses, such as diabetes, heart disease, or high blood pressure, make sure to continue with your treatment. Tell your doctor about all of the medicines you take, including those with or without a prescription.  Where to get help 24 hours a day, 7 days a week  If you or someone you know talks about suicide, self-harm, a mental health crisis, a substance use crisis, or any other kind of emotional distress, get help right away. You can:  Call the Suicide and Crisis Lifeline at 705.  Text HOME to 593069 to access the Crisis Text Line.  Consider saving these numbers in your phone.  Go to Catglobe.org for more information or to chat online.  Call 901 anytime you think you may need emergency care. For example,  "call if:  You feel like hurting yourself or someone else.  Someone you know has depression and is about to attempt or is attempting suicide.  Where to get help 24 hours a day, 7 days a week  If you or someone you know talks about suicide, self-harm, a mental health crisis, a substance use crisis, or any other kind of emotional distress, get help right away. You can:  Call the Suicide and Crisis Lifeline at 988.  Text HOME to 723766 to access the Crisis Text Line.  Consider saving these numbers in your phone.  Go to [employer]ilot for more information or to chat online.  Call your doctor now or seek immediate medical care if:  You hear voices.  Someone you know has depression and:  Starts to give away possessions.  Uses illegal drugs or drinks alcohol heavily.  Talks or writes about death, including writing suicide notes or talking about guns, knives, or pills.  Starts to spend a lot of time alone.  Acts very aggressively or suddenly appears calm.  Watch closely for changes in your health, and be sure to contact your doctor if:  You do not get better as expected.  Where can you learn more?  Go to https://www.Lexdir.net/patiented  Enter N529 in the search box to learn more about \"Recovering From Depression: Care Instructions.\"  Current as of: July 31, 2024  Content Version: 14.5 2024-2025 [employer].   Care instructions adapted under license by your healthcare professional. If you have questions about a medical condition or this instruction, always ask your healthcare professional. [employer] disclaims any warranty or liability for your use of this information.    Substance Use Disorder: Care Instructions  Overview     You can improve your life and health by stopping your use of alcohol or drugs. When you don't drink or use drugs, you may feel and sleep better. You may get along better with your family, friends, and coworkers. There are medicines and programs that can help with " substance use disorder.  How can you care for yourself at home?  Here are some ways to help you stay sober and prevent relapse.  If you have been given medicine to help keep you sober or reduce your cravings, be sure to take it exactly as prescribed.  Talk to your doctor about programs that can help you stop using drugs or drinking alcohol.  Do not keep alcohol or drugs in your home.  Plan ahead. Think about what you'll say if other people ask you to drink or use drugs. Try not to spend time with people who drink or use drugs.  Use the time and money spent on drinking or drugs to do something that's important to you.  Preventing a relapse  Have a plan to deal with relapse. Learn to recognize changes in your thinking that lead you to drink or use drugs. Get help before you start to drink or use drugs again.  Try to stay away from situations, friends, or places that may lead you to drink or use drugs.  If you feel the need to drink alcohol or use drugs again, seek help right away. Call a trusted friend or family member. Some people get support from organizations such as Narcotics Anonymous or [facility] or from treatment facilities.  If you relapse, get help as soon as you can. Some people make a plan with another person that outlines what they want that person to do for them if they relapse. The plan usually includes how to handle the relapse and who to notify in case of relapse.  Don't give up. Remember that a relapse doesn't mean that you have failed. Use the experience to learn the triggers that lead you to drink or use drugs. Then quit again. Recovery is a lifelong process. Many people have several relapses before they are able to quit for good.  Follow-up care is a key part of your treatment and safety. Be sure to make and go to all appointments, and call your doctor if you are having problems. It's also a good idea to know your test results and keep a list of the medicines you take.  When should you call  "for help?   Call 911  anytime you think you may need emergency care. For example, call if you or someone else:    Has overdosed or has withdrawal signs. Be sure to tell the emergency workers that you are or someone else is using or trying to quit using drugs. Overdose or withdrawal signs may include:  Losing consciousness.  Seizure.  Seeing or hearing things that aren't there (hallucinations).     Is thinking or talking about suicide or harming others.   Where to get help 24 hours a day, 7 days a week   If you or someone you know talks about suicide, self-harm, a mental health crisis, a substance use crisis, or any other kind of emotional distress, get help right away. You can:    Call the Suicide and Crisis Lifeline at 988.     Call 1-648-173-TALK (1-364.816.4934).     Text HOME to 475249 to access the Crisis Text Line.   Consider saving these numbers in your phone.  Go to [employer] for more information or to chat online.  Call your doctor now or seek immediate medical care if:    You are having withdrawal symptoms. These may include nausea or vomiting, sweating, shakiness, and anxiety.   Watch closely for changes in your health, and be sure to contact your doctor if:    You have a relapse.     You need more help or support to stop.   Where can you learn more?  Go to https://www.Nebo.net/patiented  Enter H573 in the search box to learn more about \"Substance Use Disorder: Care Instructions.\"  Current as of: August 20, 2024  Content Version: 14.5    5849-8595 [employer].   Care instructions adapted under license by your healthcare professional. If you have questions about a medical condition or this instruction, always ask your healthcare professional. [employer] disclaims any warranty or liability for your use of this information.       "

## 2025-06-25 NOTE — TELEPHONE ENCOUNTER
Called PT to get information on the facility location of where the pre-op is being faxed to as the PT should've had that information during the visit. PT EXTREMELY rude, but would like a call back.    PT will need to be called in 2 hours. Will need location and fax number of where to fax pre-op.    Different staff will need to reach out.    Mee Chan MA on 6/25/2025 at 12:56 PM

## 2025-06-25 NOTE — PROGRESS NOTES
Preventive Care Visit  [facility]  Jory Fragoso, NP, Nurse Practitioner - Family  Jun 25, 2025      Assessment & Plan     (Z00.00) Encounter for Medicare annual wellness exam  (primary encounter diagnosis)  Comment:   Plan:     (Z01.818) Pre-operative examination  Comment:   Plan:     (H26.9) Cataract of both eyes, unspecified cataract type  Comment:   Plan:     (E11.40) Type 2 diabetes mellitus with diabetic neuropathy, without long-term current use of insulin (H)  Comment: not at goal  Plan: Lipid panel reflex to direct LDL Non-fasting,         Albumin Random Urine Quantitative with Creat         Ratio, Hemoglobin A1c, Adult Diabetes Education         Referral, FOOT EXAM        Will refer to diabetes educator for a refresher.  Follow up in 3 months.    (M54.2) Neck pain  Comment:   Plan: Physical Therapy  Referral        Referral to PT given.     (R26.89) Balance problems  Comment:   Plan: Physical Therapy  Referral        Referral to PT given.     (G47.33) Obstructive sleep apnea syndrome  Comment:   Plan: Adult Sleep Eval & Management          Referral        Will refer to sleep clinic (she prefers to go to a new clinic).     (F32.1) Moderate major depression (H)  Comment: worsened  Plan: DULoxetine (CYMBALTA) 60 MG capsule        She would like to focus on increasing exercise to see if this helps her depression.  We will keep her dose the same at this time.      (E03.9) Acquired hypothyroidism  Comment: stable  Plan: levothyroxine (SYNTHROID/LEVOTHROID) 75 MCG         tablet        The current medical regimen is effective;  continue present plan and medications.     (I10) Hypertension goal BP (blood pressure) < 140/90  Comment: at goal  Plan: losartan (COZAAR) 100 MG tablet        The current medical regimen is effective;  continue present plan and medications.     (M65.329) Trigger index finger, unspecified laterality  Comment:   Plan: If  "symptoms worsen, will refer to hand ortho.     (B37.2) Cutaneous candidiasis  Comment:   Plan: nystatin (MYCOSTATIN) [id number] UNIT/GM external         cream        The current medical regimen is effective;  continue present plan and medications.     The longitudinal plan of care for the diagnosis(es)/condition(s) as documented were addressed during this visit. Due to the added complexity in care, I will continue to support Mylene in the subsequent management and with ongoing continuity of care.            BMI  Estimated body mass index is 39.87 kg/m  as calculated from the following:    Height as of this encounter: 1.575 m (5' 2\").    Weight as of 4/29/25: 98.9 kg (218 lb).       Depression Screening Follow Up        6/25/2025    10:39 AM   PHQ   PHQ-9 Total Score 11    Q9: Thoughts of better off dead/self-harm past 2 weeks Several days    F/U: Thoughts of suicide or self-harm Yes    F/U: Self harm-plan Yes    F/U: Self-harm action No    F/U: Safety concerns No        Proxy-reported                 6/25/2025    12:36 PM   C-SSRS (Brief [place])   Within the last month, have you wished you were dead or wished you could go to sleep and not wake up? No   Within the last month, have you had any actual thoughts of killing yourself? No   Within the last month, have you ever done anything, started to do anything, or prepared to do anything to end your life? No           Follow Up Actions Taken  Crisis resource information provided in the After Visit Summary    Discussed the following ways the patient can remain in a safe environment:  Reviewed preventive health counseling, as reflected in patient instructions  Counseling  Appropriate preventive services were addressed with this patient via screening, questionnaire, or discussion as appropriate for fall prevention, nutrition, physical activity, Tobacco-use cessation, social engagement, weight loss and cognition.  Checklist reviewing preventive services available has been given " to the patient.  Reviewed patient's diet, addressing concerns and/or questions.   She is at risk for psychosocial distress and has been provided with information to reduce risk.   Discussed possible causes of fatigue. Updated plan of care.  Patient reported difficulty with activities of daily living were addressed today.Patient reported safety concerns were addressed today.Information on urinary incontinence and treatment options given to patient.             Leann Chakraborty is a 75 year old, presenting for the following:  Annual Visit and Pre-Op Exam        6/25/2025    10:49 AM   Additional Questions   Roomed by Johnny Hightower   Accompanied by Self          HPI  She had her CPAP taken away because she didn't meet some criteria for insurance.  She feels very fatigued, never feels refreshed.    She does have balance problems, history of neuropathy.  She has been having more neck pain.  She hasn't been exercising as much, but had a procedure done for her hammer toe recently and is planning on trying to exercise more.    She has been craving more sweets and hasn't been watching her diet as much.  She has been having more GERD.                 Advance Care Planning    Discussed advance care planning with patient; informed AVS has link to [facility].        6/25/2025   General Health   How would you rate your overall physical health? (!) FAIR   Feel stress (tense, anxious, or unable to sleep) To some extent   (!) STRESS CONCERN      6/25/2025   Nutrition   Diet: Regular (no restrictions)    Diabetic       Multiple values from one day are sorted in reverse-chronological order         6/25/2025   Exercise   Days per week of moderate/strenous exercise 0 days   Average minutes spent exercising at this level 0 min   (!) EXERCISE CONCERN      6/25/2025   Social Factors   Frequency of gathering with friends or relatives Patient declined   Worry food won't last until get money to buy more No   Food not last or not have enough  money for food? No   Do you have housing? (Housing is defined as stable permanent housing and does not include staying outside in a car, in a tent, in an abandoned building, in an overnight shelter, or couch-surfing.) Yes   Are you worried about losing your housing? No   Lack of transportation? No   Unable to get utilities (heat,electricity)? No         6/25/2025   Fall Risk   Fallen 2 or more times in the past year? Yes    Trouble with walking or balance? Yes    Gait Speed Test (Document in seconds) 4.28   Gait Speed Test Interpretation Less than or equal to 5.00 seconds - PASS       Proxy-reported          6/25/2025   Activities of Daily Living- Home Safety   Needs help with the following daily activites Telephone use   Do you have the help that you need? (!) NO   Safety concerns in the home Throw rugs in the hallway    No grab bars in the bathroom       Multiple values from one day are sorted in reverse-chronological order         6/25/2025   Dental   Dentist two times every year? Yes         6/25/2025   Hearing Screening   Hearing concerns? None of the above         6/25/2025   Driving Risk Screening   Patient/family members have concerns about driving No         6/25/2025   General Alertness/Fatigue Screening   Have you been more tired than usual lately? (!) YES         6/25/2025   Urinary Incontinence Screening   Bothered by leaking urine in past 6 months Yes       Today's PHQ-9 Score:       6/25/2025    10:39 AM   PHQ-9 SCORE   PHQ-9 Total Score MyChart 11 (Moderate depression)   PHQ-9 Total Score 11        Proxy-reported         6/25/2025   Substance Use   Alcohol more than 3/day or more than 7/wk No   Do you have a current opioid prescription? No   How severe/bad is pain from 1 to 10? 4/10   Do you use any other substances recreationally? (!) OTHER     Social History     Tobacco Use    Smoking status: Never    Smokeless tobacco: Never    Tobacco comments:     am using sm. amt. of marijuana for sleep/pain    Vaping Use    Vaping status: Never Used   Substance Use Topics    Alcohol use: Yes     Comment: really managed/not my drug to enjoy. marijuana 3 times per week     Drug use: Yes     Types: IV, Marijuana           10/16/2024   LAST FHS-7 RESULTS   1st degree relative breast or ovarian cancer No   Any relative bilateral breast cancer No   Any male have breast cancer No   Any ONE woman have BOTH breast AND ovarian cancer No   Any woman with breast cancer before 50yrs No   2 or more relatives with breast AND/OR ovarian cancer Yes   2 or more relatives with breast AND/OR bowel cancer No            ASCVD Risk   The 10-year ASCVD risk score (Buddy ADAMS, et al., 2019) is: 35%    Values used to calculate the score:      Age: 75 years      Sex: Female      Is Non- : No      Diabetic: Yes      Tobacco smoker: No      Systolic Blood Pressure: 130 mmHg      Is BP treated: Yes      HDL Cholesterol: 36 mg/dL      Total Cholesterol: 215 mg/dL            Reviewed and updated as needed this visit by Provider                      Current providers sharing in care for this patient include:  Patient Care Team:  Jory Fragoso NP as PCP - Karen Stockton MD as MD (Neurology)  Jory Fragoso NP as Assigned PCP  Nehemias Gao MD as MD (Neurology)  Nehemias Gao MD as Assigned Neuroscience Provider  Kobi De Anda DPM as Assigned Surgical Provider  Solomon Locke DPM as Assigned Musculoskeletal Provider    The following health maintenance items are reviewed in Epic and correct as of today:  Health Maintenance   Topic Date Due    ZOSTER VACCINE (1 of 2) Never done    RSV VACCINE (1 - Risk 60-74 years 1-dose series) Never done    DIABETIC FOOT EXAM  10/12/2023    LIPID  06/19/2025    MICROALBUMIN  06/19/2025    MEDICARE ANNUAL WELLNESS VISIT  06/19/2025    DEPRESSION 12 MO INDEX REPEAT PHQ-9  07/09/2025    A1C  08/10/2025     "COVID-19 VACCINE (8 - 2024-25 season) 08/10/2025    PHQ-9  12/25/2025    BMP  02/10/2026    TSH W/FREE T4 REFLEX  02/10/2026    ANNUAL REVIEW OF HM ORDERS  02/10/2026    EYE EXAM  02/19/2026    FALL RISK ASSESSMENT  06/25/2026    MAMMO SCREENING  10/16/2026    DTAP/TDAP/TD VACCINE (2 - Td or Tdap) 07/03/2028    COLORECTAL CANCER SCREENING  03/26/2029    ADVANCE CARE PLANNING  06/25/2030    DEXA  05/05/2037    HEPATITIS C SCREENING  Completed    DEPRESSION ACTION PLAN  Completed    INFLUENZA VACCINE  Completed    PNEUMOCOCCAL VACCINE 50+ YEARS  Completed    HPV VACCINE  Aged Out    MENINGITIS VACCINE  Aged Out            Objective    Exam  /82 (BP Location: Right arm, Patient Position: Sitting, Cuff Size: Adult Large)   Pulse 81   Temp 97.5  F (36.4  C) (Temporal)   Resp 18   Ht 1.575 m (5' 2\")   LMP  (LMP Unknown)   SpO2 95%   Breastfeeding No   BMI 39.87 kg/m     Estimated body mass index is 39.87 kg/m  as calculated from the following:    Height as of this encounter: 1.575 m (5' 2\").    Weight as of 4/29/25: 98.9 kg (218 lb).    Physical Exam          6/25/2025   Mini Cog   Clock Draw Score 2 Normal   3 Item Recall 3 objects recalled   Mini Cog Total Score 5              Signed Electronically by: Jory Fragoso NP    Answers submitted by the patient for this visit:  Patient Health Questionnaire (Submitted on 6/25/2025)  If you checked off any problems, how difficult have these problems made it for you to do your work, take care of things at home, or get along with other people?: Somewhat difficult  PHQ9 TOTAL SCORE: 11    "

## 2025-06-26 ENCOUNTER — PATIENT OUTREACH (OUTPATIENT)
Dept: CARE COORDINATION | Facility: CLINIC | Age: 75
End: 2025-06-26
Payer: COMMERCIAL

## 2025-06-30 ENCOUNTER — PATIENT OUTREACH (OUTPATIENT)
Dept: CARE COORDINATION | Facility: CLINIC | Age: 75
End: 2025-06-30
Payer: MEDICARE

## 2025-07-01 ENCOUNTER — RESULTS FOLLOW-UP (OUTPATIENT)
Dept: FAMILY MEDICINE | Facility: CLINIC | Age: 75
End: 2025-07-01

## 2025-07-16 ENCOUNTER — TELEPHONE (OUTPATIENT)
Dept: PODIATRY | Facility: CLINIC | Age: 75
End: 2025-07-16

## 2025-07-16 ENCOUNTER — OFFICE VISIT (OUTPATIENT)
Dept: PODIATRY | Facility: CLINIC | Age: 75
End: 2025-07-16
Payer: COMMERCIAL

## 2025-07-16 DIAGNOSIS — L60.0 INGROWING NAIL: ICD-10-CM

## 2025-07-16 DIAGNOSIS — S90.852A FOREIGN BODY IN LEFT FOOT, INITIAL ENCOUNTER: ICD-10-CM

## 2025-07-16 DIAGNOSIS — L03.031 CELLULITIS OF GREAT TOE OF RIGHT FOOT: Primary | ICD-10-CM

## 2025-07-16 DIAGNOSIS — L03.031 PARONYCHIA OF GREAT TOE OF RIGHT FOOT: ICD-10-CM

## 2025-07-16 RX ORDER — LIDOCAINE HYDROCHLORIDE 20 MG/ML
5 INJECTION, SOLUTION INFILTRATION; PERINEURAL ONCE
Status: CANCELLED | OUTPATIENT
Start: 2025-07-16 | End: 2025-07-16

## 2025-07-16 RX ORDER — LIDOCAINE HYDROCHLORIDE 20 MG/ML
5 INJECTION, SOLUTION INFILTRATION; PERINEURAL ONCE
Status: COMPLETED | OUTPATIENT
Start: 2025-07-16 | End: 2025-07-16

## 2025-07-16 RX ORDER — CEPHALEXIN 500 MG/1
500 CAPSULE ORAL 2 TIMES DAILY
Qty: 14 CAPSULE | Refills: 0 | Status: SHIPPED | OUTPATIENT
Start: 2025-07-16 | End: 2025-07-23

## 2025-07-16 RX ADMIN — LIDOCAINE HYDROCHLORIDE 5 ML: 20 INJECTION, SOLUTION INFILTRATION; PERINEURAL at 14:58

## 2025-07-16 NOTE — TELEPHONE ENCOUNTER
Spoke with Dr. Locke and he will fit patient in today at 1:15. Called patient and let her know he can see her at [facility] at 1:15.

## 2025-07-16 NOTE — LETTER
7/16/2025      Leena Montaño  [address]  [facility] [address]      Dear Colleague,    Thank you for referring your patient, Leena Montaño, to the [facility]. Please see a copy of my visit note below.        FOOT AND ANKLE SURGERY/PODIATRY PROGRESS NOTE    ASSESSMENT:   - Cellulitis, right great toe  - Paronychia, medial right hallux  - Foreign body, deep, left heel    PLAN:  - Cellulitis, right hallux:  Discussed the patient's cellulitis in detail.  Discussed its association with the offending nail edge.  Discussed oral versus topical treatment to this area. Keflex was ordered for the patient today.    - Paronychia, medial right hallux:  Discussed treatment options for painful incurvated border(s) including foot soaks, nail slant back, temporary versus permanent removal of the nail border versus the entire nail.  Patient would like to attempt partial temp nail removal of the medial right hallux.  Discussed mechanical versus chemical matrixectomy.     Procedure - Partial Temporary Nail Avulsion, medial right hallux:  After verbal/written consent was obtained the operative area was cleaned with an alcohol prep pad. 3 ml of 2% lidocaine was in digital block.  Tourniquet was placed.  After anesthesia was tested the medial aspect of the right hallux was freed both dorsally and plantarly with a elevator.  The nail was cut with an English anvil and removed with a curved hemostat.  The area was cleaned with a sterile curette and no remaining nail spicules were noted.  3 bouts of phenol sticks were placed to the procedure site and left for 30 seconds before removal.  Sterile saline was used for irrigation.  Tourniquet was removed.  2 x 2 and Coban was placed to the toe. Postprocedure instructions were given to the patient.    - Foreign Body, Deep, Left Heel:  After verbal and written consent were obtained, the operative area was cleaned with an alcohol pad.  2  mL 2% lidocaine were placed in a V-block around the area.  Incision was made into 3 pieces of glass were found within the heel just deep to the subcutaneous tissue.  2 cc of purulence were expressed and serous fluid was expressed around the 3, triangular, pieces of white glass.  These were removed in their entirety, the area was irrigated, and a dry sterile dressing was placed to the area.    - Patient to follow up in 2 weeks or sooner should problems arise.    - Patient's questions invited and answered. Patient to return to clinic in 2 week(s) for re-evaluation. Patient was encouraged to call my office with any further questions or concerns.     Beny Locke DPM  [facility] Podiatry/Foot & Ankle Surgery      SUBJECTIVE: Leena Montaño was seen in clinic today for evaluation of her right big toe and left heel.  Patient states that she has been having left heel pain for approximately 2 weeks.  She states she has mild to moderate neuropathy and that her heel rarely hurts but that she feels that she may have stepped on something as there was blood a few weeks ago but is now gotten better.  She states that the left heel now hurts anytime she puts it on the ground.    In regards to the right big toe, she feels that the compression device she is wearing may be causing her to have an ingrown toenail.  She states that it is draining, red, puffy, and very angry.  She states it is warm and she think she saw pus coming out of it.  She would like this evaluated as well today.    Past Medical History:   Diagnosis Date     Allergic rhinitis, cause unspecified      CKD (chronic kidney disease) stage 2, GFR 60-89 ml/min 12/31/2010     Depressive disorder, not elsewhere classified      Disorder of uterus 3/5/2007    Problem list name updated by automated process. 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     Neck pain 12/13/2011     OA (osteoarthritis) of knee 10/28/2008     Osteoarthrosis, unspecified whether generalized or localized, unspecified site      Peripheral neuropathy     non-diabetic     Thyroid disease      Type 2 diabetes mellitus without complication (H) 3/28/2016     Unspecified essential hypertension      Unspecified sleep apnea        Past Surgical History:   Procedure Laterality Date     BIOPSY  10/2015    during colonoscopy     CERVICAL FUSION N/A 10/18/2017    Procedure: BILATERAL C7-T1 ANTERIOR CERVICAL DECOMPRESSION FUSION;  Surgeon: Victor Hugo Fernández MD;  Location: [facility];  Service:      COLONOSCOPY  10/2015     COLONOSCOPY N/A 3/26/2024    Procedure: COLONOSCOPY, FLEXIBLE, WITH LESION REMOVAL USING SNARE;  Surgeon: Jimmie Denton MD;  Location:  GI     JOINT REPLACEMTN, KNEE RT/LT Right 2009    Joint Replacement knee RT     JOINT REPLACEMTN, KNEE RT/LT Left 2016     TONSILLECTOMY         Allergies   Allergen Reactions     Cats      Dogs      Statins Muscle Pain (Myalgia)         Current Outpatient Medications:      benzonatate (TESSALON) 100 MG capsule, Take 1 capsule (100 mg) by mouth 3 times daily as needed for cough., Disp: 30 capsule, Rfl: 1     blood glucose (NO BRAND SPECIFIED) lancets standard, Use to test blood sugar TWO times daily or as directed., Disp: 200 each, Rfl: 3     blood glucose (NO BRAND SPECIFIED) test strip, Use to test blood sugar 2 times daily or as directed., Disp: 200 strip, Rfl: 0     blood glucose monitoring (NO BRAND SPECIFIED) meter device kit, Use to test blood sugar 2  times daily or as directed. Contour next ez, Disp: 1 kit, Rfl: 0     cephALEXin (KEFLEX) 500 MG capsule, Take 1 capsule (500 mg) by mouth 2 times daily for 7 days., Disp: 14 capsule, Rfl: 0     DULoxetine (CYMBALTA) 60 MG capsule, Take 1 capsule (60 mg) by mouth daily., Disp: 90 capsule, Rfl: 3     levothyroxine (SYNTHROID/LEVOTHROID) 75 MCG  tablet, Take 1 tablet (75 mcg) by mouth daily., Disp: 90 tablet, Rfl: 3     losartan (COZAAR) 100 MG tablet, Take 1 tablet (100 mg) by mouth daily., Disp: 90 tablet, Rfl: 3     nystatin (MYCOSTATIN) 484305 UNIT/GM external cream, Apply topically 2 times daily. Profile Rx: patient will contact pharmacy when needed, Disp: 60 g, Rfl: 12  No current facility-administered medications for this visit.    Family History   Problem Relation Age of Onset     Hypertension Father      Alcohol/Drug Father      Coronary Artery Disease Father         stroke/ carotid blockage     Cerebrovascular Disease Father         did not manage his blood pressure with meds smoked     Depression Father         came along after his first big stroke     Substance Abuse Father         alcohol     Hypertension Mother      Alcohol/Drug Mother      Depression Mother      Suicide Mother      Coronary Artery Disease Mother         surgery for carotid/surgery for femoral artery     Depression/Anxiety Mother      Cerebrovascular Disease Mother         small ones, smoked     Thyroid Disease Mother      Substance Abuse Mother         alcohol     Cerebrovascular Disease Paternal Grandfather         early age onset...60's     C.A.D. Maternal Grandfather         massive heart blow out     Depression/Anxiety Maternal Grandmother      Breast Cancer Maternal Grandmother         had a radical mastectomy....no recurrence  at     Substance Abuse Brother      Suicide Other      Depression Other      Diabetes Sister      Alcohol/Drug Brother      Substance Abuse Brother      Hypertension Brother         smoker     Cancer - colorectal No family hx of      Prostate Cancer No family hx of        Social History     Socioeconomic History     Marital status:      Spouse name: Not on file     Number of children: Not on file     Years of education: Not on file     Highest education level: Not on file   Occupational History     Not on file   Tobacco Use     Smoking  status: Never     Smokeless tobacco: Never     Tobacco comments:     am using sm. amt. of marijuana for sleep/pain   Vaping Use     Vaping status: Never Used   Substance and Sexual Activity     Alcohol use: Yes     Comment: really managed/not my drug to enjoy. marijuana 3 times per week      Drug use: Yes     Types: IV, Marijuana     Sexual activity: Not Currently     Partners: Male   Other Topics Concern     Parent/sibling w/ CABG, MI or angioplasty before 65F 55M? No   Social History Narrative    9/2022: lives alone,         Dairy/d  servings/d. Caffeine 0-1 servings/dExercise 4-5 x week-walking, kayak, without difficulty.  Sunscreen used - YesSeatbelts used - YesWorking smoke/CO detectors in the home - YesGuns stored in the home - NoSelf Breast Exams - SometimesSelf Testicular Exam - NAEye Exam up to date - NoDental Exam up to date - YesPap Smear up to date - Yes 8/20/10Mammogram up to date -NoPSA up to date - NADexa Scan up to date - NOFlex Sig / Colonoscopy up to date - NoImmunizations up to date - 2006Abuse: Current or Past(Physical, Sexual or Emotional)- Past doctors and coworkers with PhD'sDo you feel safe in your environment - Yes8/20/10Esme Tidwell [facility]     Social Drivers of Health     Financial Resource Strain: Low Risk  (6/25/2025)    Financial Resource Strain      Within the past 12 months, have you or your family members you live with been unable to get utilities (heat, electricity) when it was really needed?: No   Food Insecurity: Low Risk  (6/25/2025)    Food Insecurity      Within the past 12 months, did you worry that your food would run out before you got money to buy more?: No      Within the past 12 months, did the food you bought just not last and you didn t have money to get more?: No   Transportation Needs: Low Risk  (6/25/2025)    Transportation Needs      Within the past 12 months, has lack of transportation kept you from medical appointments, getting your medicines, non-medical meetings or  appointments, work, or from getting things that you need?: No   Physical Activity: Inactive (6/25/2025)    Exercise Vital Sign      Days of Exercise per Week: 0 days      Minutes of Exercise per Session: 0 min   Stress: Stress Concern Present (6/25/2025)    [employer] [employer] - Occupational Stress Questionnaire      Feeling of Stress : To some extent   Social Connections: Unknown (6/25/2025)    Social Connection and Isolation Panel [NHANES]      Frequency of Communication with Friends and Family: Not on file      Frequency of Social Gatherings with Friends and Family: Patient declined      Attends Confucianist Services: Not on file      Active Member of Clubs or Organizations: Not on file      Attends Club or Organization Meetings: Not on file      Marital Status: Not on file   Interpersonal Safety: Low Risk  (6/25/2025)    Interpersonal Safety      Do you feel physically and emotionally safe where you currently live?: Yes      Within the past 12 months, have you been hit, slapped, kicked or otherwise physically hurt by someone?: No      Within the past 12 months, have you been humiliated or emotionally abused in other ways by your partner or ex-partner?: No   Housing Stability: Low Risk  (6/25/2025)    Housing Stability      Do you have housing? : Yes      Are you worried about losing your housing?: No       10 point Review of Systems is negative except otherwise noted in HPI.    OBJECTIVE:  Derm: Skin is cool, thin, dry, and atropic.  Medial incurvation appreciated to the right hallux nail with crusty exudate, serous drainage, and erythema extending 2 cm proximally.  Hyperkeratotic lesion appreciated to the plantar aspect of the central left heel, upon debridement a small laceration measuring 0.3 cm is appreciated to the plantar aspect of the central left heel.     Vasc: Dorsalis pedis pulses, 1/4 bilateral. Posterior tibial pulses 1/4 bilateral. Cap fill time < 3 seconds to the digits.  Mild  nonpitting edema appreciated to the feet, hindfeet, and ankles.  Hair growth absent to the toes.      Neuro: Protective sensation diminished via SWMF examination to the feet. Gross sensation intact.      MSK:   - Semi-Rigid yvonne toe contracture of the right foot lesser digits 2-4.  - No remaining pain to palpation to the ulceration at the distal tuft of the right 3rd digit.  - Muscle strength 5/5 with dorsiflexion, plantarflexion, eversion, inversion of the feet. Compartments soft and compressible.  - Significant tenderness to palpation to the central aspect of the plantar left heel.  Tenderness palpation to the medial aspect of the right hallux nail border.    Again, thank you for allowing me to participate in the care of your patient.        Sincerely,        Solomon Locke DPM    Electronically signed

## 2025-07-16 NOTE — TELEPHONE ENCOUNTER
Other: Patient has ingrown toenail and wants to be seen by Dr Locke TODAY for appointment, already informed patient that Dr Locke is over booked for the rest of the week at all locations and that his next available is 7/23/25. Patient did not want to wait that long to be seen and did not want to be seen by a different podiatrist. No info on why she cannot wait, would like a phone call from clinic directly, did not like the answers I was giving her      Could we send this information to you in [facility] or would you prefer to receive a phone call?:   Patient would prefer a phone call   Okay to leave a detailed message?: Yes at Cell number on file:    Telephone Information:   Mobile [phone]

## 2025-07-16 NOTE — PATIENT INSTRUCTIONS
Post Nail Excision Instructions    Today you had a temporary nail removal procedure done. Please follow the instructions for 1 week.    Keep bandages clean, dry, and intact for the rest of the day of surgery.   The day after surgery, remove all bandages.  Soak foot in warm water and Lexi dish soap -OR- warm water with Epsom salt for 10 minutes daily until drainage stops.  Dry feet thoroughly.  Apply dry gauze to the affected area the first 2-3 days. Thereafter, you can cover with a flexible fabric style band aid instead.   General bathing is ok and can replace daily foot soaks.  Feel free to do normal activities as tolerated on the following day.   Do not apply hydrogen peroxide, antibiotic ointment, or waterproof band aids.     Things to expect:  You should not anticipate severe pain. If you do experience some discomfort, you can take Ibuprofen (Advil).   You can expect redness, local tenderness, and clear/yellow drainage from the area. This may last anywhere from several days to several weeks. This is NORMAL due to the chemical burn from the nail removal procedure.    **If you experience any increase in pain, warmth, white drainage, swelling extending into the foot, or malodor, call our office immediately as this may be a sign of infection.**    Activity:  Feel free to do normal activities as tolerated on the following day.  Wear open toe sandals if regular shoes are not comfortable.  Avoid shoes that are tight on the affected toe.    If you have any questions in the meantime, please do not hesitate to call Podiatry at [phone].

## 2025-07-17 NOTE — PROGRESS NOTES
FOOT AND ANKLE SURGERY/PODIATRY PROGRESS NOTE    ASSESSMENT:   - Cellulitis, right great toe  - Paronychia, medial right hallux  - Foreign body, deep, left heel    PLAN:  - Cellulitis, right hallux:  Discussed the patient's cellulitis in detail.  Discussed its association with the offending nail edge.  Discussed oral versus topical treatment to this area. Keflex was ordered for the patient today.    - Paronychia, medial right hallux:  Discussed treatment options for painful incurvated border(s) including foot soaks, nail slant back, temporary versus permanent removal of the nail border versus the entire nail.  Patient would like to attempt partial temp nail removal of the medial right hallux.  Discussed mechanical versus chemical matrixectomy.     Procedure - Partial Temporary Nail Avulsion, medial right hallux:  After verbal/written consent was obtained the operative area was cleaned with an alcohol prep pad. 3 ml of 2% lidocaine was in digital block.  Tourniquet was placed.  After anesthesia was tested the medial aspect of the right hallux was freed both dorsally and plantarly with a elevator.  The nail was cut with an English anvil and removed with a curved hemostat.  The area was cleaned with a sterile curette and no remaining nail spicules were noted.  3 bouts of phenol sticks were placed to the procedure site and left for 30 seconds before removal.  Sterile saline was used for irrigation.  Tourniquet was removed.  2 x 2 and Coban was placed to the toe. Postprocedure instructions were given to the patient.    - Foreign Body, Deep, Left Heel:  After verbal and written consent were obtained, the operative area was cleaned with an alcohol pad.  2 mL 2% lidocaine were placed in a V-block around the area.  Incision was made into 3 pieces of glass were found within the heel just deep to the subcutaneous tissue.  2 cc of purulence were expressed and serous fluid was expressed around the 3, triangular, pieces of  white glass.  These were removed in their entirety, the area was irrigated, and a dry sterile dressing was placed to the area.    - Patient to follow up in 2 weeks or sooner should problems arise.    - Patient's questions invited and answered. Patient to return to clinic in 2 week(s) for re-evaluation. Patient was encouraged to call my office with any further questions or concerns.     Beny Locke DPM  [facility] Podiatry/Foot & Ankle Surgery      SUBJECTIVE: Leena Montaño was seen in clinic today for evaluation of her right big toe and left heel.  Patient states that she has been having left heel pain for approximately 2 weeks.  She states she has mild to moderate neuropathy and that her heel rarely hurts but that she feels that she may have stepped on something as there was blood a few weeks ago but is now gotten better.  She states that the left heel now hurts anytime she puts it on the ground.    In regards to the right big toe, she feels that the compression device she is wearing may be causing her to have an ingrown toenail.  She states that it is draining, red, puffy, and very angry.  She states it is warm and she think she saw pus coming out of it.  She would like this evaluated as well today.    Past Medical History:   Diagnosis Date    Allergic rhinitis, cause unspecified     CKD (chronic kidney disease) stage 2, GFR 60-89 ml/min 12/31/2010    Depressive disorder, not elsewhere classified     Disorder of uterus 3/5/2007    Problem list name updated by automated process. Provider to review    Hyperlipidemia LDL goal <130 10/31/2010    Hypertension goal BP (blood pressure) < 130/80 12/31/2010    Lumbago     degenerative disc disease and disc protrusion    Neck pain 12/13/2011    OA (osteoarthritis) of knee 10/28/2008    Osteoarthrosis, unspecified whether generalized or localized, unspecified site     Peripheral neuropathy     non-diabetic    Thyroid disease     Type 2 diabetes mellitus  without complication (H) 3/28/2016    Unspecified essential hypertension     Unspecified sleep apnea        Past Surgical History:   Procedure Laterality Date    BIOPSY  10/2015    during colonoscopy    CERVICAL FUSION N/A 10/18/2017    Procedure: BILATERAL C7-T1 ANTERIOR CERVICAL DECOMPRESSION FUSION;  Surgeon: Victor Hugo Fernández MD;  Location: [facility];  Service:     COLONOSCOPY  10/2015    COLONOSCOPY N/A 3/26/2024    Procedure: COLONOSCOPY, FLEXIBLE, WITH LESION REMOVAL USING SNARE;  Surgeon: Jimmie Denton MD;  Location:  GI    JOINT REPLACEMTN, KNEE RT/LT Right 2009    Joint Replacement knee RT    JOINT REPLACEMTN, KNEE RT/LT Left 2016    TONSILLECTOMY         Allergies   Allergen Reactions    Cats     Dogs     Statins Muscle Pain (Myalgia)         Current Outpatient Medications:     benzonatate (TESSALON) 100 MG capsule, Take 1 capsule (100 mg) by mouth 3 times daily as needed for cough., Disp: 30 capsule, Rfl: 1    blood glucose (NO BRAND SPECIFIED) lancets standard, Use to test blood sugar TWO times daily or as directed., Disp: 200 each, Rfl: 3    blood glucose (NO BRAND SPECIFIED) test strip, Use to test blood sugar 2 times daily or as directed., Disp: 200 strip, Rfl: 0    blood glucose monitoring (NO BRAND SPECIFIED) meter device kit, Use to test blood sugar 2  times daily or as directed. Contour next ez, Disp: 1 kit, Rfl: 0    cephALEXin (KEFLEX) 500 MG capsule, Take 1 capsule (500 mg) by mouth 2 times daily for 7 days., Disp: 14 capsule, Rfl: 0    DULoxetine (CYMBALTA) 60 MG capsule, Take 1 capsule (60 mg) by mouth daily., Disp: 90 capsule, Rfl: 3    levothyroxine (SYNTHROID/LEVOTHROID) 75 MCG tablet, Take 1 tablet (75 mcg) by mouth daily., Disp: 90 tablet, Rfl: 3    losartan (COZAAR) 100 MG tablet, Take 1 tablet (100 mg) by mouth daily., Disp: 90 tablet, Rfl: 3    nystatin (MYCOSTATIN) 908774 UNIT/GM external cream, Apply topically 2 times daily. Profile Rx: patient will contact pharmacy  when needed, Disp: 60 g, Rfl: 12  No current facility-administered medications for this visit.    Family History   Problem Relation Age of Onset    Hypertension Father     Alcohol/Drug Father     Coronary Artery Disease Father         stroke/ carotid blockage    Cerebrovascular Disease Father         did not manage his blood pressure with meds smoked    Depression Father         came along after his first big stroke    Substance Abuse Father         alcohol    Hypertension Mother     Alcohol/Drug Mother     Depression Mother     Suicide Mother     Coronary Artery Disease Mother         surgery for carotid/surgery for femoral artery    Depression/Anxiety Mother     Cerebrovascular Disease Mother         small ones, smoked    Thyroid Disease Mother     Substance Abuse Mother         alcohol    Cerebrovascular Disease Paternal Grandfather         early age onset...60's    C.A.D. Maternal Grandfather         massive heart blow out    Depression/Anxiety Maternal Grandmother     Breast Cancer Maternal Grandmother         had a radical mastectomy....no recurrence  at    Substance Abuse Brother     Suicide Other     Depression Other     Diabetes Sister     Alcohol/Drug Brother     Substance Abuse Brother     Hypertension Brother         smoker    Cancer - colorectal No family hx of     Prostate Cancer No family hx of        Social History     Socioeconomic History    Marital status:      Spouse name: Not on file    Number of children: Not on file    Years of education: Not on file    Highest education level: Not on file   Occupational History    Not on file   Tobacco Use    Smoking status: Never    Smokeless tobacco: Never    Tobacco comments:     am using sm. amt. of marijuana for sleep/pain   Vaping Use    Vaping status: Never Used   Substance and Sexual Activity    Alcohol use: Yes     Comment: really managed/not my drug to enjoy. marijuana 3 times per week     Drug use: Yes     Types: IV, Marijuana    Sexual  activity: Not Currently     Partners: Male   Other Topics Concern    Parent/sibling w/ CABG, MI or angioplasty before 65F 55M? No   Social History Narrative    9/2022: lives alone,         Dairy/d  servings/d. Caffeine 0-1 servings/dExercise 4-5 x week-walking, kayak, without difficulty.  Sunscreen used - YesSeatbelts used - YesWorking smoke/CO detectors in the home - YesGuns stored in the home - NoSelf Breast Exams - SometimesSelf Testicular Exam - NAEye Exam up to date - NoDental Exam up to date - YesPap Smear up to date - Yes 8/20/10Mammogram up to date -NoPSA up to date - NADexa Scan up to date - NOFlex Sig / Colonoscopy up to date - NoImmunizations up to date - 2006Abuse: Current or Past(Physical, Sexual or Emotional)- Past doctors and coworkers with PhD'sDo you feel safe in your environment - Yes8/20/10Esme Tidwell [facility]     Social Drivers of Health     Financial Resource Strain: Low Risk  (6/25/2025)    Financial Resource Strain     Within the past 12 months, have you or your family members you live with been unable to get utilities (heat, electricity) when it was really needed?: No   Food Insecurity: Low Risk  (6/25/2025)    Food Insecurity     Within the past 12 months, did you worry that your food would run out before you got money to buy more?: No     Within the past 12 months, did the food you bought just not last and you didn t have money to get more?: No   Transportation Needs: Low Risk  (6/25/2025)    Transportation Needs     Within the past 12 months, has lack of transportation kept you from medical appointments, getting your medicines, non-medical meetings or appointments, work, or from getting things that you need?: No   Physical Activity: Inactive (6/25/2025)    Exercise Vital Sign     Days of Exercise per Week: 0 days     Minutes of Exercise per Session: 0 min   Stress: Stress Concern Present (6/25/2025)    [employer] [place] [employer] - Occupational Stress Questionnaire     Feeling of  Stress : To some extent   Social Connections: Unknown (6/25/2025)    Social Connection and Isolation Panel [NHANES]     Frequency of Communication with Friends and Family: Not on file     Frequency of Social Gatherings with Friends and Family: Patient declined     Attends Rastafarian Services: Not on file     Active Member of Clubs or Organizations: Not on file     Attends Club or Organization Meetings: Not on file     Marital Status: Not on file   Interpersonal Safety: Low Risk  (6/25/2025)    Interpersonal Safety     Do you feel physically and emotionally safe where you currently live?: Yes     Within the past 12 months, have you been hit, slapped, kicked or otherwise physically hurt by someone?: No     Within the past 12 months, have you been humiliated or emotionally abused in other ways by your partner or ex-partner?: No   Housing Stability: Low Risk  (6/25/2025)    Housing Stability     Do you have housing? : Yes     Are you worried about losing your housing?: No       10 point Review of Systems is negative except otherwise noted in HPI.    OBJECTIVE:  Derm: Skin is cool, thin, dry, and atropic.  Medial incurvation appreciated to the right hallux nail with crusty exudate, serous drainage, and erythema extending 2 cm proximally.  Hyperkeratotic lesion appreciated to the plantar aspect of the central left heel, upon debridement a small laceration measuring 0.3 cm is appreciated to the plantar aspect of the central left heel.     Vasc: Dorsalis pedis pulses, 1/4 bilateral. Posterior tibial pulses 1/4 bilateral. Cap fill time < 3 seconds to the digits.  Mild nonpitting edema appreciated to the feet, hindfeet, and ankles.  Hair growth absent to the toes.      Neuro: Protective sensation diminished via SWMF examination to the feet. Gross sensation intact.      MSK:   - Semi-Rigid yvonne toe contracture of the right foot lesser digits 2-4.  - No remaining pain to palpation to the ulceration at the distal tuft of the  right 3rd digit.  - Muscle strength 5/5 with dorsiflexion, plantarflexion, eversion, inversion of the feet. Compartments soft and compressible.  - Significant tenderness to palpation to the central aspect of the plantar left heel.  Tenderness palpation to the medial aspect of the right hallux nail border.

## 2025-07-20 ENCOUNTER — NURSE TRIAGE (OUTPATIENT)
Dept: NURSING | Facility: CLINIC | Age: 75
End: 2025-07-20
Payer: COMMERCIAL

## 2025-07-20 NOTE — TELEPHONE ENCOUNTER
Nurse Triage SBAR    Situation: Ingrown toenail - Cellulitis follow up    Background: Patient calling. Pt was seen on 7/16/2025 for cellulitis of the great toe of the right foot and was started on Cephalexin.    Assessment: Right great toe swelling. The whole toe is red. Limited mobility to the toe. Her pain is 6-7/10. She has drank less fluid today. She urinated last around 9pm. Denied feeling feverish.     Protocol Recommended Disposition: See Physician within 24 hours    Recommendation: According to the protocol, Patient should be seen within 24 hours. Advised Patient that the patient needs to be seen within 24 hours. Care advice given. Patient verbalizes understanding and agrees with plan of care. Reviewed concerning symptoms and when to call back. Connected with scheduling.     Rula Gerard RN Nursing Advisor 7/20/2025 3:21 PM     Reason for Disposition   [1] Taking antibiotic > 24 hours AND [2] cellulitis symptoms are WORSE (e.g., spreading redness, pain, swelling)    Additional Information   Negative: Shock suspected (e.g., cold/pale/clammy skin, too weak to stand, low BP, rapid pulse)   Negative: Sounds like a life-threatening emergency to the triager   Negative: SEVERE pain   Negative: [1] SEVERE pain with bending of finger (or toe) AND [2] cellulitis on hand (or foot)   Negative: [1] Widespread rash AND [2] bright red, sunburn-like   Negative: Fever > 103 F (39.4 C)   Negative: Black (necrotic), dark purple, or blisters develop in area of cellulitis   Negative: Patient sounds very sick or weak to the triager   Negative: [1] Taking antibiotic > 24 hours AND [2] fever persists   Negative: [1] Taking antibiotic > 24 hours AND [2] red streak (or line) runs from area of infection   Negative: [1] Fever > 100.0 F (37.8 C) AND [2] new-onset   Negative: [1] Red streak runs from area of infection AND [2] new-onset   Negative: [1] Caller has URGENT question AND [2] triager unable to answer question    Protocols  used: Cellulitis on Antibiotic Follow-up Call-[facility]

## 2025-07-22 ENCOUNTER — TELEPHONE (OUTPATIENT)
Dept: PODIATRY | Facility: CLINIC | Age: 75
End: 2025-07-22

## 2025-07-22 ENCOUNTER — OFFICE VISIT (OUTPATIENT)
Dept: PODIATRY | Facility: CLINIC | Age: 75
End: 2025-07-22
Payer: COMMERCIAL

## 2025-07-22 DIAGNOSIS — Z48.89 OTHER SPECIFIED AFTERCARE FOLLOWING SURGERY: Primary | ICD-10-CM

## 2025-07-22 ASSESSMENT — PAIN SCALES - GENERAL: PAINLEVEL_OUTOF10: MODERATE PAIN (5)

## 2025-07-22 NOTE — PROGRESS NOTES
FOOT AND ANKLE SURGERY/PODIATRY PROGRESS NOTE    ASSESSMENT:   - 1 week status post nail avulsion, medial right hallux    PLAN:  - Discussed the procedure site.  Discussed the patient is supposed to be soaking the foot daily so that the eschar here does not dry the necessary drainage needed after a phenol procedure.  We reviewed her postoperative instructions with her.  Patient will start soaking the foot in warm water and Epsom salt daily.    - Patient's questions invited and answered. Patient to return to clinic as needed for re-evaluation. Patient was encouraged to call my office with any further questions or concerns.     Beny Locke DPM  [facility] Podiatry/Foot & Ankle Surgery      SUBJECTIVE: Leena Montaño was seen in clinic today for continued evaluation 1 week status post nail avulsion to the medial right hallux.  She states that she is not soaking the foot at all and does not remember what she is positive postoperatively.  She is concerned as the toe is swollen and red.  She states that she knows we talked about it becoming red and swollen after the procedure but she did note become this red and swollen and she would like this evaluated.    Past Medical History:   Diagnosis Date    Allergic rhinitis, cause unspecified     CKD (chronic kidney disease) stage 2, GFR 60-89 ml/min 12/31/2010    Depressive disorder, not elsewhere classified     Disorder of uterus 3/5/2007    Problem list name updated by automated process. Provider to review    Hyperlipidemia LDL goal <130 10/31/2010    Hypertension goal BP (blood pressure) < 130/80 12/31/2010    Lumbago     degenerative disc disease and disc protrusion    Neck pain 12/13/2011    OA (osteoarthritis) of knee 10/28/2008    Osteoarthrosis, unspecified whether generalized or localized, unspecified site     Peripheral neuropathy     non-diabetic    Thyroid disease     Type 2 diabetes mellitus without complication (H) 3/28/2016    Unspecified  essential hypertension     Unspecified sleep apnea        Past Surgical History:   Procedure Laterality Date    BIOPSY  10/2015    during colonoscopy    CERVICAL FUSION N/A 10/18/2017    Procedure: BILATERAL C7-T1 ANTERIOR CERVICAL DECOMPRESSION FUSION;  Surgeon: Victor Hugo Fernández MD;  Location: [facility];  Service:     COLONOSCOPY  10/2015    COLONOSCOPY N/A 3/26/2024    Procedure: COLONOSCOPY, FLEXIBLE, WITH LESION REMOVAL USING SNARE;  Surgeon: Jimmie Denton MD;  Location:  GI    JOINT REPLACEMTN, KNEE RT/LT Right 2009    Joint Replacement knee RT    JOINT REPLACEMTN, KNEE RT/LT Left 2016    TONSILLECTOMY         Allergies   Allergen Reactions    Cats     Dogs     Statins Muscle Pain (Myalgia)         Current Outpatient Medications:     benzonatate (TESSALON) 100 MG capsule, Take 1 capsule (100 mg) by mouth 3 times daily as needed for cough., Disp: 30 capsule, Rfl: 1    blood glucose (NO BRAND SPECIFIED) lancets standard, Use to test blood sugar TWO times daily or as directed., Disp: 200 each, Rfl: 3    blood glucose (NO BRAND SPECIFIED) test strip, Use to test blood sugar 2 times daily or as directed., Disp: 200 strip, Rfl: 0    blood glucose monitoring (NO BRAND SPECIFIED) meter device kit, Use to test blood sugar 2  times daily or as directed. Contour next ez, Disp: 1 kit, Rfl: 0    cephALEXin (KEFLEX) 500 MG capsule, Take 1 capsule (500 mg) by mouth 2 times daily for 7 days., Disp: 14 capsule, Rfl: 0    DULoxetine (CYMBALTA) 60 MG capsule, Take 1 capsule (60 mg) by mouth daily., Disp: 90 capsule, Rfl: 3    levothyroxine (SYNTHROID/LEVOTHROID) 75 MCG tablet, Take 1 tablet (75 mcg) by mouth daily., Disp: 90 tablet, Rfl: 3    losartan (COZAAR) 100 MG tablet, Take 1 tablet (100 mg) by mouth daily., Disp: 90 tablet, Rfl: 3    nystatin (MYCOSTATIN) 836035 UNIT/GM external cream, Apply topically 2 times daily. Profile Rx: patient will contact pharmacy when needed, Disp: 60 g, Rfl: 12    Family History    Problem Relation Age of Onset    Hypertension Father     Alcohol/Drug Father     Coronary Artery Disease Father         stroke/ carotid blockage    Cerebrovascular Disease Father         did not manage his blood pressure with meds smoked    Depression Father         came along after his first big stroke    Substance Abuse Father         alcohol    Hypertension Mother     Alcohol/Drug Mother     Depression Mother     Suicide Mother     Coronary Artery Disease Mother         surgery for carotid/surgery for femoral artery    Depression/Anxiety Mother     Cerebrovascular Disease Mother         small ones, smoked    Thyroid Disease Mother     Substance Abuse Mother         alcohol    Cerebrovascular Disease Paternal Grandfather         early age onset...60's    C.A.D. Maternal Grandfather         massive heart blow out    Depression/Anxiety Maternal Grandmother     Breast Cancer Maternal Grandmother         had a radical mastectomy....no recurrence  at    Substance Abuse Brother     Suicide Other     Depression Other     Diabetes Sister     Alcohol/Drug Brother     Substance Abuse Brother     Hypertension Brother         smoker    Cancer - colorectal No family hx of     Prostate Cancer No family hx of        Social History     Socioeconomic History    Marital status:      Spouse name: Not on file    Number of children: Not on file    Years of education: Not on file    Highest education level: Not on file   Occupational History    Not on file   Tobacco Use    Smoking status: Never    Smokeless tobacco: Never    Tobacco comments:     am using sm. amt. of marijuana for sleep/pain   Vaping Use    Vaping status: Never Used   Substance and Sexual Activity    Alcohol use: Yes     Comment: really managed/not my drug to enjoy. marijuana 3 times per week     Drug use: Yes     Types: IV, Marijuana    Sexual activity: Not Currently     Partners: Male   Other Topics Concern    Parent/sibling w/ CABG, MI or angioplasty  before 65F 55M? No   Social History Narrative    9/2022: lives alone,         Dairy/d  servings/d. Caffeine 0-1 servings/dExercise 4-5 x week-walking, kayak, without difficulty.  Sunscreen used - YesSeatbelts used - YesWorking smoke/CO detectors in the home - YesGuns stored in the home - NoSelf Breast Exams - SometimesSelf Testicular Exam - NAEye Exam up to date - NoDental Exam up to date - YesPap Smear up to date - Yes 8/20/10Mammogram up to date -NoPSA up to date - NADexa Scan up to date - NOFlex Sig / Colonoscopy up to date - NoImmunizations up to date - 2006Abuse: Current or Past(Physical, Sexual or Emotional)- Past doctors and coworkers with PhD'sDo you feel safe in your environment - Yes8/20/10Esme LARES     Social Drivers of Health     Financial Resource Strain: Low Risk  (6/25/2025)    Financial Resource Strain     Within the past 12 months, have you or your family members you live with been unable to get utilities (heat, electricity) when it was really needed?: No   Food Insecurity: Low Risk  (6/25/2025)    Food Insecurity     Within the past 12 months, did you worry that your food would run out before you got money to buy more?: No     Within the past 12 months, did the food you bought just not last and you didn t have money to get more?: No   Transportation Needs: Low Risk  (6/25/2025)    Transportation Needs     Within the past 12 months, has lack of transportation kept you from medical appointments, getting your medicines, non-medical meetings or appointments, work, or from getting things that you need?: No   Physical Activity: Inactive (6/25/2025)    Exercise Vital Sign     Days of Exercise per Week: 0 days     Minutes of Exercise per Session: 0 min   Stress: Stress Concern Present (6/25/2025)    [employer] [place] [employer] - Occupational Stress Questionnaire     Feeling of Stress : To some extent   Social Connections: Unknown (6/25/2025)    Social Connection and Isolation Panel  [NHANES]     Frequency of Communication with Friends and Family: Not on file     Frequency of Social Gatherings with Friends and Family: Patient declined     Attends Islam Services: Not on file     Active Member of Clubs or Organizations: Not on file     Attends Club or Organization Meetings: Not on file     Marital Status: Not on file   Interpersonal Safety: Low Risk  (6/25/2025)    Interpersonal Safety     Do you feel physically and emotionally safe where you currently live?: Yes     Within the past 12 months, have you been hit, slapped, kicked or otherwise physically hurt by someone?: No     Within the past 12 months, have you been humiliated or emotionally abused in other ways by your partner or ex-partner?: No   Housing Stability: Low Risk  (6/25/2025)    Housing Stability     Do you have housing? : Yes     Are you worried about losing your housing?: No       10 point Review of Systems is negative except otherwise noted in HPI.    OBJECTIVE:  Derm: Skin is cool, thin, dry, and atropic.  No remaining incurvation noted to the medial aspect of the right hallux.  Large area of dried on stable eschar and serous drainage appreciated.  No acute signs of infection.      Vasc: Dorsalis pedis pulses, 1/4 bilateral. Posterior tibial pulses 1/4 bilateral. Cap fill time < 3 seconds to the digits.  Mild nonpitting edema appreciated to the feet, hindfeet, and ankles.  Hair growth absent to the toes.      Neuro: Protective sensation diminished via SWMF examination to the feet. Gross sensation intact.      MSK:   - Semi-Rigid yvonne toe contracture of the right foot lesser digits 2-4.  - No remaining pain to palpation to the ulceration at the distal tuft of the right 3rd digit.  - Muscle strength 5/5 with dorsiflexion, plantarflexion, eversion, inversion of the feet. Compartments soft and compressible.

## 2025-07-22 NOTE — LETTER
7/22/2025      Leena Montaño  [address]  [facility] [address]      Dear Colleague,    Thank you for referring your patient, Leena Montaño, to the [facility]. Please see a copy of my visit note below.        FOOT AND ANKLE SURGERY/PODIATRY PROGRESS NOTE    ASSESSMENT:   - 1 week status post nail avulsion, medial right hallux    PLAN:  - Discussed the procedure site.  Discussed the patient is supposed to be soaking the foot daily so that the eschar here does not dry the necessary drainage needed after a phenol procedure.  We reviewed her postoperative instructions with her.  Patient will start soaking the foot in warm water and Epsom salt daily.    - Patient's questions invited and answered. Patient to return to clinic as needed for re-evaluation. Patient was encouraged to call my office with any further questions or concerns.     Beny Locke, AARON  [facility] Podiatry/Foot & Ankle Surgery      SUBJECTIVE: Leena Montaño was seen in clinic today for continued evaluation 1 week status post nail avulsion to the medial right hallux.  She states that she is not soaking the foot at all and does not remember what she is positive postoperatively.  She is concerned as the toe is swollen and red.  She states that she knows we talked about it becoming red and swollen after the procedure but she did note become this red and swollen and she would like this evaluated.    Past Medical History:   Diagnosis Date     Allergic rhinitis, cause unspecified      CKD (chronic kidney disease) stage 2, GFR 60-89 ml/min 12/31/2010     Depressive disorder, not elsewhere classified      Disorder of uterus 3/5/2007    Problem list name updated by automated process. Provider to review     Hyperlipidemia LDL goal <130 10/31/2010     Hypertension goal BP (blood pressure) < 130/80 12/31/2010     Lumbago     degenerative disc disease and disc protrusion     Neck pain 12/13/2011      OA (osteoarthritis) of knee 10/28/2008     Osteoarthrosis, unspecified whether generalized or localized, unspecified site      Peripheral neuropathy     non-diabetic     Thyroid disease      Type 2 diabetes mellitus without complication (H) 3/28/2016     Unspecified essential hypertension      Unspecified sleep apnea        Past Surgical History:   Procedure Laterality Date     BIOPSY  10/2015    during colonoscopy     CERVICAL FUSION N/A 10/18/2017    Procedure: BILATERAL C7-T1 ANTERIOR CERVICAL DECOMPRESSION FUSION;  Surgeon: Victor Hugo Fernández MD;  Location: [facility] OR;  Service:      COLONOSCOPY  10/2015     COLONOSCOPY N/A 3/26/2024    Procedure: COLONOSCOPY, FLEXIBLE, WITH LESION REMOVAL USING SNARE;  Surgeon: Jimmie Denton MD;  Location:  GI     JOINT REPLACEMTN, KNEE RT/LT Right 2009    Joint Replacement knee RT     JOINT REPLACEMTN, KNEE RT/LT Left 2016     TONSILLECTOMY         Allergies   Allergen Reactions     Cats      Dogs      Statins Muscle Pain (Myalgia)         Current Outpatient Medications:      benzonatate (TESSALON) 100 MG capsule, Take 1 capsule (100 mg) by mouth 3 times daily as needed for cough., Disp: 30 capsule, Rfl: 1     blood glucose (NO BRAND SPECIFIED) lancets standard, Use to test blood sugar TWO times daily or as directed., Disp: 200 each, Rfl: 3     blood glucose (NO BRAND SPECIFIED) test strip, Use to test blood sugar 2 times daily or as directed., Disp: 200 strip, Rfl: 0     blood glucose monitoring (NO BRAND SPECIFIED) meter device kit, Use to test blood sugar 2  times daily or as directed. Contour next ez, Disp: 1 kit, Rfl: 0     cephALEXin (KEFLEX) 500 MG capsule, Take 1 capsule (500 mg) by mouth 2 times daily for 7 days., Disp: 14 capsule, Rfl: 0     DULoxetine (CYMBALTA) 60 MG capsule, Take 1 capsule (60 mg) by mouth daily., Disp: 90 capsule, Rfl: 3     levothyroxine (SYNTHROID/LEVOTHROID) 75 MCG tablet, Take 1 tablet (75 mcg) by mouth daily., Disp: 90 tablet,  Rfl: 3     losartan (COZAAR) 100 MG tablet, Take 1 tablet (100 mg) by mouth daily., Disp: 90 tablet, Rfl: 3     nystatin (MYCOSTATIN) 513432 UNIT/GM external cream, Apply topically 2 times daily. Profile Rx: patient will contact pharmacy when needed, Disp: 60 g, Rfl: 12    Family History   Problem Relation Age of Onset     Hypertension Father      Alcohol/Drug Father      Coronary Artery Disease Father         stroke/ carotid blockage     Cerebrovascular Disease Father         did not manage his blood pressure with meds smoked     Depression Father         came along after his first big stroke     Substance Abuse Father         alcohol     Hypertension Mother      Alcohol/Drug Mother      Depression Mother      Suicide Mother      Coronary Artery Disease Mother         surgery for carotid/surgery for femoral artery     Depression/Anxiety Mother      Cerebrovascular Disease Mother         small ones, smoked     Thyroid Disease Mother      Substance Abuse Mother         alcohol     Cerebrovascular Disease Paternal Grandfather         early age onset...60's     C.A.D. Maternal Grandfather         massive heart blow out     Depression/Anxiety Maternal Grandmother      Breast Cancer Maternal Grandmother         had a radical mastectomy....no recurrence  at     Substance Abuse Brother      Suicide Other      Depression Other      Diabetes Sister      Alcohol/Drug Brother      Substance Abuse Brother      Hypertension Brother         smoker     Cancer - colorectal No family hx of      Prostate Cancer No family hx of        Social History     Socioeconomic History     Marital status:      Spouse name: Not on file     Number of children: Not on file     Years of education: Not on file     Highest education level: Not on file   Occupational History     Not on file   Tobacco Use     Smoking status: Never     Smokeless tobacco: Never     Tobacco comments:     am using sm. amt. of marijuana for sleep/pain   Vaping  Use     Vaping status: Never Used   Substance and Sexual Activity     Alcohol use: Yes     Comment: really managed/not my drug to enjoy. marijuana 3 times per week      Drug use: Yes     Types: IV, Marijuana     Sexual activity: Not Currently     Partners: Male   Other Topics Concern     Parent/sibling w/ CABG, MI or angioplasty before 65F 55M? No   Social History Narrative    9/2022: lives alone,         Dairy/d  servings/d. Caffeine 0-1 servings/dExercise 4-5 x week-walking, kayak, without difficulty.  Sunscreen used - YesSeatbelts used - YesWorking smoke/CO detectors in the home - YesGuns stored in the home - NoSelf Breast Exams - SometimesSelf Testicular Exam - NAEye Exam up to date - NoDental Exam up to date - YesPap Smear up to date - Yes 8/20/10Mammogram up to date -NoPSA up to date - NADexa Scan up to date - NOFlex Sig / Colonoscopy up to date - NoImmunizations up to date - 2006Abuse: Current or Past(Physical, Sexual or Emotional)- Past doctors and coworkers with PhD'sDo you feel safe in your environment - Yes8/20/10Esme Tidwell [facility]     Social Drivers of Health     Financial Resource Strain: Low Risk  (6/25/2025)    Financial Resource Strain      Within the past 12 months, have you or your family members you live with been unable to get utilities (heat, electricity) when it was really needed?: No   Food Insecurity: Low Risk  (6/25/2025)    Food Insecurity      Within the past 12 months, did you worry that your food would run out before you got money to buy more?: No      Within the past 12 months, did the food you bought just not last and you didn t have money to get more?: No   Transportation Needs: Low Risk  (6/25/2025)    Transportation Needs      Within the past 12 months, has lack of transportation kept you from medical appointments, getting your medicines, non-medical meetings or appointments, work, or from getting things that you need?: No   Physical Activity: Inactive (6/25/2025)    Exercise Vital  Sign      Days of Exercise per Week: 0 days      Minutes of Exercise per Session: 0 min   Stress: Stress Concern Present (6/25/2025)    [employer] [employer] - Occupational Stress Questionnaire      Feeling of Stress : To some extent   Social Connections: Unknown (6/25/2025)    Social Connection and Isolation Panel [NHANES]      Frequency of Communication with Friends and Family: Not on file      Frequency of Social Gatherings with Friends and Family: Patient declined      Attends Jewish Services: Not on file      Active Member of Clubs or Organizations: Not on file      Attends Club or Organization Meetings: Not on file      Marital Status: Not on file   Interpersonal Safety: Low Risk  (6/25/2025)    Interpersonal Safety      Do you feel physically and emotionally safe where you currently live?: Yes      Within the past 12 months, have you been hit, slapped, kicked or otherwise physically hurt by someone?: No      Within the past 12 months, have you been humiliated or emotionally abused in other ways by your partner or ex-partner?: No   Housing Stability: Low Risk  (6/25/2025)    Housing Stability      Do you have housing? : Yes      Are you worried about losing your housing?: No       10 point Review of Systems is negative except otherwise noted in HPI.    OBJECTIVE:  Derm: Skin is cool, thin, dry, and atropic.  No remaining incurvation noted to the medial aspect of the right hallux.  Large area of dried on stable eschar and serous drainage appreciated.  No acute signs of infection.      Vasc: Dorsalis pedis pulses, 1/4 bilateral. Posterior tibial pulses 1/4 bilateral. Cap fill time < 3 seconds to the digits.  Mild nonpitting edema appreciated to the feet, hindfeet, and ankles.  Hair growth absent to the toes.      Neuro: Protective sensation diminished via SWMF examination to the feet. Gross sensation intact.      MSK:   - Semi-Rigid yvonne toe contracture of the right foot lesser digits  2-4.  - No remaining pain to palpation to the ulceration at the distal tuft of the right 3rd digit.  - Muscle strength 5/5 with dorsiflexion, plantarflexion, eversion, inversion of the feet. Compartments soft and compressible.      Again, thank you for allowing me to participate in the care of your patient.        Sincerely,        Solomon Locke DPM    Electronically signed

## 2025-07-22 NOTE — TELEPHONE ENCOUNTER
Other: Mylene called she had an ingrown toenail removed on her right big toe. She said that it has been swollen since Saturday and painful. Please call to discuss     [facility] or would you prefer to receive a phone call?:   Patient would prefer a phone call   Okay to leave a detailed message?: Yes at Cell number on file:    Telephone Information:   Mobile [phone]

## 2025-07-22 NOTE — TELEPHONE ENCOUNTER
"Called patient to discuss her symptoms. Patient states there is a shiny place after the digit and it is \"redder\". Asked patient what area of the toe was experiencing swelling and if it was extending into her foot, patient stated \"I can only bend it residential, not even residential\". Patient asked if this was normal, informed patient a little swelling after the procedure is normal per her discharge paperwork. Patient offered to measure her own circumference using a piece of string, stated \"Other people say wow that's really swollen\". Requested patient upload a picture to [employer]. Patient refused to upload picture to [employer] stated \"I'll give you multiple reasons why that's going to piss me off\". Recommended patient try to elevate her foot throughout the day. Patient stated \"You want me to just sit around forever with my foot up\". Recommended patient try 20 minutes at a time, approximately every hour. Recommended if patient was concerned about her swelling enough that she try to go to urgent care. Patient stated \"I don't really want to do that, last time I went they sent me home with drugs and missed a big diagnosis\". Recommended patient try a different urgent care site. Patient stated \"I'm not going to do that it's too complicated I want to see a foot doctor\". Scheduled patient for appointment at 10:00 am with Dr. Locke at [facility], informed her of 9:45 am check in time.  "

## 2025-07-26 ENCOUNTER — HOSPITAL ENCOUNTER (EMERGENCY)
Facility: CLINIC | Age: 75
Discharge: HOME | End: 2025-07-26
Attending: EMERGENCY MEDICINE | Admitting: EMERGENCY MEDICINE
Payer: COMMERCIAL

## 2025-07-26 ENCOUNTER — APPOINTMENT (OUTPATIENT)
Dept: CT IMAGING | Facility: CLINIC | Age: 75
End: 2025-07-26
Attending: EMERGENCY MEDICINE
Payer: COMMERCIAL

## 2025-07-26 ENCOUNTER — APPOINTMENT (OUTPATIENT)
Dept: GENERAL RADIOLOGY | Facility: CLINIC | Age: 75
End: 2025-07-26
Attending: PHYSICIAN ASSISTANT
Payer: COMMERCIAL

## 2025-07-26 VITALS
RESPIRATION RATE: 18 BRPM | TEMPERATURE: 98.5 F | DIASTOLIC BLOOD PRESSURE: 95 MMHG | HEART RATE: 100 BPM | HEIGHT: 62 IN | SYSTOLIC BLOOD PRESSURE: 137 MMHG | OXYGEN SATURATION: 98 % | WEIGHT: 220 LBS | BODY MASS INDEX: 40.48 KG/M2
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
## 2025-07-26 DIAGNOSIS — S01.01XA LACERATION OF SCALP WITHOUT FOREIGN BODY, INITIAL ENCOUNTER: ICD-10-CM

## 2025-07-26 DIAGNOSIS — S32.010A COMPRESSION FRACTURE OF L1 VERTEBRA, INITIAL ENCOUNTER (H): Primary | ICD-10-CM

## 2025-07-26 PROCEDURE — 72128 CT CHEST SPINE W/O DYE: CPT

## 2025-07-26 PROCEDURE — 72131 CT LUMBAR SPINE W/O DYE: CPT

## 2025-07-26 PROCEDURE — 72192 CT PELVIS W/O DYE: CPT

## 2025-07-26 PROCEDURE — 71250 CT THORAX DX C-: CPT

## 2025-07-26 PROCEDURE — 12002 RPR S/N/AX/GEN/TRNK2.6-7.5CM: CPT | Performed by: EMERGENCY MEDICINE

## 2025-07-26 PROCEDURE — 250N000009 HC RX 250: Performed by: EMERGENCY MEDICINE

## 2025-07-26 PROCEDURE — 99284 EMERGENCY DEPT VISIT MOD MDM: CPT | Mod: 25 | Performed by: EMERGENCY MEDICINE

## 2025-07-26 PROCEDURE — 250N000011 HC RX IP 250 OP 636: Performed by: EMERGENCY MEDICINE

## 2025-07-26 PROCEDURE — 72125 CT NECK SPINE W/O DYE: CPT

## 2025-07-26 PROCEDURE — 250N000013 HC RX MED GY IP 250 OP 250 PS 637: Performed by: EMERGENCY MEDICINE

## 2025-07-26 PROCEDURE — 70450 CT HEAD/BRAIN W/O DYE: CPT

## 2025-07-26 PROCEDURE — 72100 X-RAY EXAM L-S SPINE 2/3 VWS: CPT

## 2025-07-26 RX ORDER — CYCLOBENZAPRINE HCL 10 MG
10 TABLET ORAL 3 TIMES DAILY PRN
Qty: 21 TABLET | Refills: 0 | Status: SHIPPED | OUTPATIENT
Start: 2025-07-26

## 2025-07-26 RX ORDER — OXYCODONE HYDROCHLORIDE 5 MG/1
5 TABLET ORAL EVERY 6 HOURS PRN
Qty: 12 TABLET | Refills: 0 | Status: SHIPPED | OUTPATIENT
Start: 2025-07-26 | End: 2025-07-29

## 2025-07-26 RX ORDER — ACETAMINOPHEN 325 MG/1
975 TABLET ORAL ONCE
Status: COMPLETED | OUTPATIENT
Start: 2025-07-26 | End: 2025-07-26

## 2025-07-26 RX ORDER — CYCLOBENZAPRINE HCL 10 MG
10 TABLET ORAL 3 TIMES DAILY PRN
Qty: 21 TABLET | Refills: 0 | Status: SHIPPED | OUTPATIENT
Start: 2025-07-26 | End: 2025-07-26

## 2025-07-26 RX ADMIN — SODIUM METABISULFITE 3 ML: 1 POWDER at 13:24

## 2025-07-26 RX ADMIN — ACETAMINOPHEN 975 MG: 325 TABLET ORAL at 13:26

## 2025-07-26 ASSESSMENT — COLUMBIA-SUICIDE SEVERITY RATING SCALE - C-SSRS
1. IN THE PAST MONTH, HAVE YOU WISHED YOU WERE DEAD OR WISHED YOU COULD GO TO SLEEP AND NOT WAKE UP?: NO
2. HAVE YOU ACTUALLY HAD ANY THOUGHTS OF KILLING YOURSELF IN THE PAST MONTH?: NO
6. HAVE YOU EVER DONE ANYTHING, STARTED TO DO ANYTHING, OR PREPARED TO DO ANYTHING TO END YOUR LIFE?: NO

## 2025-07-26 ASSESSMENT — ACTIVITIES OF DAILY LIVING (ADL)
ADLS_ACUITY_SCORE: 41

## 2025-07-26 NOTE — ED TRIAGE NOTES
Pt presents with head laceration and bilateral hip pain after fall. Around 10:00 AM, pt was feeding her dog, when she tripped on her sandal and fell onto her buttocks and then fell back and hit her head on the fridge. Pt denies LOC, denies taking blood thinners.      Triage Assessment (Adult)       Row Name 07/26/25 1153          Triage Assessment    Airway WDL WDL        Respiratory WDL    Respiratory WDL WDL;all     Rhythm/Pattern, Respiratory unlabored;pattern regular;depth regular;no shortness of breath reported        Skin Circulation/Temperature WDL    Skin Circulation/Temperature WDL X  laceration to posterior head        Cardiac WDL    Cardiac WDL X  HTN, no CP, pt well-perfused        Peripheral/Neurovascular WDL    Peripheral Neurovascular WDL WDL        Cognitive/Neuro/Behavioral WDL    Cognitive/Neuro/Behavioral WDL WDL

## 2025-07-26 NOTE — ED PROVIDER NOTES
Patient contacted the emergency department stating that she was unable to fill her oxycodone secondary to not having a valid 's license.  She was asking for another class of medication to assist with her pain.  She felt comfortable with a short course of muscle relaxers and I have prescribed Flexeril.  I reviewed the prescription monitoring database and the patient's prior records and she does not have a significant amount of previous controlled substance prescriptions.     Trigger, Deacon Herrera MD  07/26/25 8875

## 2025-07-26 NOTE — DISCHARGE INSTRUCTIONS
Please return to the the ED if you notice increasing redness, warmth, swelling, drainage or fevers, this is concerning for infection.  Keep wound clean and dry, wash after the first 24 hours with warm soapy water.      Laceration remove staples 7-10 days    Please see your PCP in 2-3 days for a recheck.  If you have increasing pain, loss of bowel or bladder function, numbness in your groin, unable to walk, fevers >101 or other acute changes, return to the ED.      Discharge Instructions  Back Pain  You were seen today for back pain. Back pain can have many causes, but most will get better without surgery or other specific treatment. Sometimes there is a herniated ( slipped ) disc. We do not usually do MRI scans to look for these right away, since most herniated discs will get better on their own with time.  Today, we did not find any evidence that your back pain was caused by a serious condition. However, sometimes symptoms develop over time and cannot be found during an emergency visit, so it is very important that you follow up with your primary provider.  Generally, every Emergency Department visit should have a follow-up clinic visit with either a primary or a specialty clinic/provider. Please follow-up as instructed by your emergency provider today.    Return to the Emergency Department if:  You develop a fever with your back pain.   You have weakness or change in sensation in one or both legs.  You lose control of your bowels or bladder, or cannot empty your bladder (cannot pee).  Your pain gets much worse.     Follow-up with your provider:  Unless your pain has completely gone away, please make an appointment with your provider within one week. Most of the routine care for back pain is available in a clinic and not the Emergency Department. You may need further management of your back pain, such as more pain medication, imaging such as an X-ray or MRI, or physical therapy.    What can I do to help  myself?  Remain Active -- People are often afraid that they will hurt their back further or delay recovery by remaining active, but this is one of the best things you can do for your back. In fact, staying in bed for a long time to rest is not recommended. Studies have shown that people with low back pain recover faster when they remain active. Movement helps to bring blood flow to the muscles and relieve muscle spasms as well as preventing loss of muscle strength.  Heat -- Using a heating pad can help with low back pain during the first few weeks. Do not sleep with a heating pad, as you can be burned.   Pain medications - You may take a pain medication such as Tylenol  (acetaminophen), Advil , Motrin  (ibuprofen) or Aleve  (naproxen).  If you were given a prescription for medicine here today, be sure to read all of the information (including the package insert) that comes with your prescription.  This will include important information about the medicine, its side effects, and any warnings that you need to know about.  The pharmacist who fills the prescription can provide more information and answer questions you may have about the medicine.  If you have questions or concerns that the pharmacist cannot address, please call or return to the Emergency Department.   Remember that you can always come back to the Emergency Department if you are not able to see your regular provider in the amount of time listed above, if you get any new symptoms, or if there is anything that worries you.    Discharge Instructions  Laceration (Cut)    You were seen today for a laceration (cut).  Your provider examined your laceration for any problems such a buried foreign body (like glass, a splinter, or gravel), or injury to blood vessels, tendons, and nerves.  Your provider may have also rinsed and/or scrubbed your laceration to help prevent an infection. It may not be possible to find all problems with your laceration on the first  visit; occasionally foreign bodies or a tendon injury can go undetected.    Your laceration may have been closed in one of several ways:  No closure: many wounds will heal just fine without closure.  Stitches: regular stitches that require removal.  Staples: skin staples are often used in the scalp/head.  Wound adhesive (glue): skin glue can be used for certain lacerations and doesn t require removal.  Wound strips (aka Butterfly bandages or steri-strips): these are bandages that help to close a wound.  Absorbable stitches:  dissolving  stitches that go away on their own and usually don t require removal.    A small percentage of wounds will develop an infection regardless of how well the wound is cared for. Antibiotics are generally not indicated to prevent an infection so are only given for a small number of high-risk wounds. Some lacerations are too high risk to close, and are left open to heal because closure can increase the likelihood that an infection will develop.    Remember that all lacerations, no matter how expertly repaired, will cause scarring. We consider many factors, techniques, and materials, in our efforts to provide the best possible cosmetic outcome.    Generally, every Emergency Department visit should have a follow-up clinic visit with either a primary or a specialty clinic/provider. Please follow-up as instructed by your emergency provider today.     Return to the Emergency Department right away if:  You have more redness, swelling, pain, drainage (pus), a bad smell, or red streaking from your laceration as these symptoms could indicate an infection.  You have a fever of 100.4 F or more.  You have bleeding that you cannot stop at home. If your cut starts to bleed, hold pressure on the bleeding area with a clean cloth or put pressure over the bandage.  If the bleeding does not stop after using constant pressure for 30 minutes, you should return to the Emergency Department for further  treatment.  An area past the laceration is cool, pale, or blue compared with the other side, or has a slower return of color when squeezed.  Your dressing seems too tight or starts to get uncomfortable or painful. For children, signs of a problem might be irritability or restlessness.  You have loss of normal function or use of an area, such as being unable to straighten or bend a finger normally.  You have a numb area past the laceration.    Return to the Emergency Department or see your regular provider if:  The laceration starts to come open.   You have something coming out of the cut or a feeling that there is something in the laceration.  Your wound will not heal, or keeps breaking open. There can always be glass, wood, dirt or other things in any wound.  They will not always show up, even on x-rays.  If a wound does not heal, this may be why, and it is important to follow-up with your regular provider.    Home Care:  Take your dressing off in 12-24 hours, or as instructed by your provider, to check your laceration. Remove the dressing sooner if it seems too tight or painful, or if it is getting numb, tingly, or pale past the dressing.  Gently wash your laceration 1-2 times daily with clean water and mild soap. It is okay to shower or run clean water over the laceration, but do not let the laceration soak in water (no swimming).  If your laceration was closed with wound adhesive or strips: pat it dry and leave it open to the air. For all other repairs: after you wash your laceration, or at least 2 times a day, apply antibiotic ointment (such as Neosporin  or Bacitracin ) to the laceration, then cover it with a Band-Aid  or gauze.  Keep the laceration clean. Wear gloves or other protective clothing if you are around dirt.    Follow-up for removal:  If your wound was closed with staples or regular stitches, they need to be removed according to the instructions and timeline specified by your provider today.  If  your wound was closed with absorbable ( dissolving ) sutures, they should fall out, dissolve, or not be visible in about one week. If they are still visible, then they should be removed according to the instructions and timeline specified by your provider today.    Scars:  To help minimize scarring:  Wear sunscreen over the healed laceration when out in the sun.  Massage the area regularly once healed.  You may apply Vitamin E to the healed wound.  Wait. Scars improve in appearance over months and years.    If you were given a prescription for medicine here today, be sure to read all of the information (including the package insert) that comes with your prescription.  This will include important information about the medicine, its side effects, and any warnings that you need to know about.  The pharmacist who fills the prescription can provide more information and answer questions you may have about the medicine.  If you have questions or concerns that the pharmacist cannot address, please call or return to the Emergency Department.       Remember that you can always come back to the Emergency Department if you are not able to see your regular provider in the amount of time listed above, if you get any new symptoms, or if there is anything that worries you.

## 2025-07-26 NOTE — ED PROVIDER NOTES
Emergency Department Note      History of Present Illness     Chief Complaint   Fall and Head Laceration      HPI   Leena Montaño is a 75 year old female who presents to the emergency department with fall.  Patient reports that she fell backwards on her butt.  She hit her head on the refrigerator.  She noted blood everywhere.  This happened this morning.  She drove herself to her cataract appointment.  Her cataract appears to be healing well.  She has not had any vomiting or nausea since this happened.  She reports that her whole body is achy.  She feels like her low back took the brunt of the fall along with her low ribs.  She reports that breathing causes some pain.  She is not on a blood thinner.  She has not taken anything for pain yet.  She was able to walk and drive herself to her appointment as well as to the emergency department.    Independent Historian   None    Review of External Notes   Tetanus 2018    Reviewed primary care note from 6/25/2025 patient therefore medication management, not currently on blood thinners or antiplatelets.    Past Medical History     Medical History and Problem List   Past Medical History:   Diagnosis Date    Allergic rhinitis, cause unspecified     CKD (chronic kidney disease) stage 2, GFR 60-89 ml/min 12/31/2010    Depressive disorder, not elsewhere classified     Disorder of uterus 3/5/2007    Hyperlipidemia LDL goal <130 10/31/2010    Hypertension goal BP (blood pressure) < 130/80 12/31/2010    Lumbago     Neck pain 12/13/2011    OA (osteoarthritis) of knee 10/28/2008    Osteoarthrosis, unspecified whether generalized or localized, unspecified site     Peripheral neuropathy     Thyroid disease     Type 2 diabetes mellitus without complication (H) 3/28/2016    Unspecified essential hypertension     Unspecified sleep apnea        Medications   oxyCODONE (ROXICODONE) 5 MG tablet  benzonatate (TESSALON) 100 MG capsule  blood glucose (NO BRAND SPECIFIED) lancets  "standard  blood glucose (NO BRAND SPECIFIED) test strip  blood glucose monitoring (NO BRAND SPECIFIED) meter device kit  DULoxetine (CYMBALTA) 60 MG capsule  levothyroxine (SYNTHROID/LEVOTHROID) 75 MCG tablet  losartan (COZAAR) 100 MG tablet  nystatin (MYCOSTATIN) 119222 UNIT/GM external cream        Surgical History   Past Surgical History:   Procedure Laterality Date    BIOPSY  10/2015    during colonoscopy    CERVICAL FUSION N/A 10/18/2017    Procedure: BILATERAL C7-T1 ANTERIOR CERVICAL DECOMPRESSION FUSION;  Surgeon: Victor Hugo Fernández MD;  Location: [facility] OR;  Service:     COLONOSCOPY  10/2015    COLONOSCOPY N/A 3/26/2024    Procedure: COLONOSCOPY, FLEXIBLE, WITH LESION REMOVAL USING SNARE;  Surgeon: Jimmie Denton MD;  Location:  GI    JOINT REPLACEMTN, KNEE RT/LT Right 2009    Joint Replacement knee RT    JOINT REPLACEMTN, KNEE RT/LT Left 2016    TONSILLECTOMY         Physical Exam     Patient Vitals for the past 24 hrs:   BP Temp Temp src Pulse Resp SpO2 Height Weight   07/26/25 1305 (!) 137/95 -- -- -- -- 98 % -- --   07/26/25 1151 (!) 176/111 98.5  F (36.9  C) Oral 100 18 98 % 1.575 m (5' 2\") 99.8 kg (220 lb)     Physical Exam  General: Resting on the bed.  Head: No obvious trauma to head.  Laceration to left parietal scalp.   Ears, Nose, Throat:  External ears normal.  Nose normal.  No pharyngeal erythema, swelling or exudate.  Midline uvula.  Clear TMs.  Eyes:  Conjunctivae clear.  Pupils are equal, round, and reactive.   Neck: Normal range of motion.  Neck supple.   CV: Regular rate and rhythm.  No murmurs.      Respiratory: Effort normal and breath sounds normal.  No wheezing or crackles.  No crepitus appreciable.  Gastrointestinal: Soft.  No distension. There is no tenderness.  There is no rigidity, no rebound and no guarding.   Musculoskeletal: Normal range of motion.  Non tender extremities to palpations.  Mild tenderness noted throughout palpation of the cervical, thoracic and lumbar " spine without step-off or deformity.  Stable pelvis.  Neuro: Alert. Moving all extremities appropriately.  Normal speech.   5 out of 5, lower extremity strength 5 out of 5.  Sensation intact throughout face, upper and lower extremities.  Facial movements intact with eyebrow raise and smile.  Hearing appears intact to finger rub.  Normal mouth opening and palate elevation.  Normal gait.  No saddle anesthesia.  Skin: Skin is warm and dry.  Laceration noted to the left parietal scalp approximately 3 cm.    Diagnostics     Lab Results   Labs Ordered and Resulted from Time of ED Arrival to Time of ED Departure - No data to display    Imaging   CT Cervical Spine w/o Contrast   Final Result   IMPRESSION:   1.  No evidence of acute fracture or subluxation of the cervical spine by CT imaging.   2.  Degenerative cervical spondylosis as described above.      Chest CT w/o contrast   Final Result   IMPRESSION:    1.  No displaced rib fracture or pneumothorax.         CT Pelvis Bone wo Contrast   Preliminary Result    IMPRESSION:   1.  Both hips negative for fracture or CT evidence of avascular necrosis. There is mild degenerative change at both hip joints.   2.  Pelvis negative for fracture with additional degenerative change at the SI joints.   3.  Degenerative change lower lumbar spine.   4.  Fat-containing left inguinal hernia without evidence for incarceration or obstruction of a bowel loop.   5.  Exam otherwise negative.      CT Thoracic Spine w/o Contrast   Final Result   IMPRESSION:     Thoracic Spine CT   1.  No evidence of acute fracture or subluxation of the thoracic spine by CT imaging.      Lumbar Spine CT:   1.  Acute superior endplate L1 compression fracture with 20% vertebral body height loss.    2.  No other evidence of acute fracture or subluxation of the lumbar spine by CT imaging.   3.  Degenerative lumbar spondylosis as described above.      CT Head w/o Contrast   Final Result   IMPRESSION:     1.  No  evidence of acute intracranial hemorrhage or mass effect. Mild nonspecific white matter changes.         CT Lumbar Spine w/o Contrast   Final Result   IMPRESSION:     Thoracic Spine CT   1.  No evidence of acute fracture or subluxation of the thoracic spine by CT imaging.      Lumbar Spine CT:   1.  Acute superior endplate L1 compression fracture with 20% vertebral body height loss.    2.  No other evidence of acute fracture or subluxation of the lumbar spine by CT imaging.   3.  Degenerative lumbar spondylosis as described above.      XR Lumbar Spine 2/3 Views    (Results Pending)   XR Lumbar Spine 2/3 Views    (Results Pending)     Independent Interpretation   CT Head: No intracranial hemorrhage.    ED Course      Medications Administered   Medications   lidocaine/EPINEPHrine/tetracaine (LET) solution KIT (3 mLs Topical $Given 7/26/25 1324)   acetaminophen (TYLENOL) tablet 975 mg (975 mg Oral $Given 7/26/25 1326)       Procedures   Procedures     Laceration Repair      Procedure: Laceration Repair    Indication: Laceration    Consent: Verbal    Tetanus status reviewed    Location: Left Scalp     Length: 4 cm    Preparation: Irrigation with Sterile Saline.    Anesthesia/Sedation: Topical -LET and Lidocaine with Epinephrine - 1%      Treatment/Exploration: Wound explored, no foreign bodies found     Closure: The wound was closed with 9 staples.    Patient Status: The patient tolerated the procedure well: Yes. There were no complications.    Discussion of Management   Neurosurgery, see below    ED Course   ED Course as of 07/26/25 1610   Sat Jul 26, 2025   1544 I spoke with Misti Rider from neurosurgery.  Recommendation for light activity as tolerated, no heavy lifting over 10 pounds.  Follow-up in 6 weeks with neurosurgery.  8 to 12 weeks to heal.  Recommended upright x-ray for baseline prior to discharge.       Additional Documentation  None    Medical Decision Making / Diagnosis     CMS Diagnoses: None    MIPS    CT/MRI of the lumbar spine was obtained because of risk factors for fracture (minor trauma in elderly/osteoporosis).        JESSICA Montaño is a 75 year old female who presents after a mechanical fall.  Vital signs are stable.  Broad differential was considered including but not limited to fracture, intracranial hemorrhage, skull fracture, cauda equina, rib fracture, pneumothorax, etc.  CT head without evidence acute internal hemorrhage.  Nonfocal neurologic exam.  CT cervical spine without evidence of acute fracture.  Degenerative changes were noted.CT thoracic and lumbar spine shows a L1 compression fracture 20%.  No other acute pathology noted.  Patient is neurovascular intact.  CT of the pelvic bones shows negative for fractures fat-containing left inguinal hernia.  Patient already aware.  Otherwise unremarkable exam.  CT chest without evidence of acute pneumonia, pneumothorax or rib fracture.  Consulted neurosurgery who recommended activity restrictions, follow-up in 6 weeks, upright x-rays prior to discharge.  Patient is neurovascular intact.  Able to ambulate without difficulty.  No evidence of acute cauda equina or other spinal cord emergency.  No indication for emergent MRI.  She has no other injuries noted on head to toe exam.  Describes mechanical fall, do not think that labs or additional workup is required.  Recommendation for close follow-up with primary doctor.  Laceration was cleaned and repaired with staples after risk-benefit discussion with patient about healing and infection.  Removal of staples in 7 to 10 days.  Return to the ER if having worsening back pain, loss of bowel or bladder function, weakness, headaches or other concerns.  Patient is agreeable.    Disposition   The patient was discharged.     Diagnosis     ICD-10-CM    1. Compression fracture of L1 vertebra, initial encounter (H)  S32.010A XR Lumbar Spine 2/3 Views      2. Fall, initial encounter  W19.XXXA       3.  Laceration of scalp without foreign body, initial encounter  S01.01XA            Discharge Medications   New Prescriptions    OXYCODONE (ROXICODONE) 5 MG TABLET    Take 1 tablet (5 mg) by mouth every 6 hours as needed for pain.         MD Jasiel Gonzalez Jennifer L, MD  07/26/25 [id number]

## 2025-07-26 NOTE — PROGRESS NOTES
Paged from Dr. Weathers    75 year old presented after a fall while walking dog falling on buttock with low back pain   Imaging evidence L1 compression fracture  Neuro intact on exam    Plan  Upright lumbar XR  Pain control   Light activity- Please limit your lifting to no more that ten pounds and avoid excessive bending, twisting and turning at the lumbar spine. You should also avoid excessive jostling and jarring activities.   Follow ups: 6 and 12 weeks with XR prior     History, imaging and plans reviewed with DR. Sanchez who was in agreement    Misti Cameron PA-C  [facility]  [address] [place] [address]  Tel [phone]  Fax [phone]  Text page via [employer] Paging/Directory      Narrative & Impression   EXAM: CT THORACIC SPINE W/O CONTRAST, CT LUMBAR SPINE W/O CONTRAST  LOCATION: [facility]  DATE: 7/26/2025     INDICATION: fall  COMPARISON: None.  TECHNIQUE:   1.  Thoracic spine CT without IV contrast. Dose reduction techniques were used.  2.  Lumbar spine CT without IV contrast. Dose reduction techniques were used.     FINDINGS:  Thoracic spine CT:  No evidence of acute fracture or subluxation of the thoracic spine by CT imaging. Interbody fusion at C7/T1. Multilevel degenerative disc disease of the thoracic spine with disc height loss, most pronounced at T6/T7 and T8 11/T12.     The partially imaged lungs are unremarkable.     No significant posterior disc bulge, spinal canal, or neural foraminal narrowing at any level within the thoracic spine.     Lumbar spine CT:  5 lumbar-type vertebral bodies. Acute superior endplate L1 compression fracture with 20% vertebral body height loss. No other evidence of acute fracture or subluxation of the lumbar spine by CT imaging. Anterolisthesis of L4 and L5 measuring 5 mm. The   vertebral bodies of the lumbar spine otherwise have normal stature and alignment. Multilevel degenerative disc disease of the  lumbar spine with disc height loss, most pronounced at L1/L2 and L2/L3.     The partially imaged intra-abdominal contents are unremarkable.     T12/L1:  No posterior disc bulge or spinal canal narrowing. No neural foraminal narrowing.     L1/L2:  Symmetric disc bulge and mild facet arthropathy without significant spinal canal narrowing. Mild bilateral neural foraminal narrowing.     L2/L3:  Symmetric disc bulge without significant spinal canal narrowing. No neural foraminal narrowing.     L3/L4:  Symmetric disc bulge and moderate facet arthropathy without significant spinal canal narrowing. Moderate bilateral neural foraminal narrowing.       L4/L5:  Symmetric disc bulge without significant spinal canal narrowing. Severe bilateral neural foraminal narrowing.      L5/S1: No posterior disc bulge or spinal canal narrowing. No neural foraminal.                                                                       IMPRESSION:    Thoracic Spine CT  1.  No evidence of acute fracture or subluxation of the thoracic spine by CT imaging.     Lumbar Spine CT:  1.  Acute superior endplate L1 compression fracture with 20% vertebral body height loss.   2.  No other evidence of acute fracture or subluxation of the lumbar spine by CT imaging.  3.  Degenerative lumbar spondylosis as described above.

## 2025-08-04 ENCOUNTER — TRANSFERRED RECORDS (OUTPATIENT)
Dept: HEALTH INFORMATION MANAGEMENT | Facility: CLINIC | Age: 75
End: 2025-08-04
Payer: COMMERCIAL

## 2025-08-06 ENCOUNTER — OFFICE VISIT (OUTPATIENT)
Dept: PODIATRY | Facility: CLINIC | Age: 75
End: 2025-08-06
Payer: COMMERCIAL

## 2025-08-06 DIAGNOSIS — Z48.89 OTHER SPECIFIED AFTERCARE FOLLOWING SURGERY: Primary | ICD-10-CM

## 2025-08-06 PROCEDURE — 99213 OFFICE O/P EST LOW 20 MIN: CPT | Mod: 24 | Performed by: STUDENT IN AN ORGANIZED HEALTH CARE EDUCATION/TRAINING PROGRAM

## 2025-08-06 PROCEDURE — 1125F AMNT PAIN NOTED PAIN PRSNT: CPT | Performed by: STUDENT IN AN ORGANIZED HEALTH CARE EDUCATION/TRAINING PROGRAM

## 2025-08-06 ASSESSMENT — PAIN SCALES - GENERAL: PAINLEVEL_OUTOF10: MODERATE PAIN (5)

## 2025-08-20 ENCOUNTER — TELEPHONE (OUTPATIENT)
Dept: FAMILY MEDICINE | Facility: CLINIC | Age: 75
End: 2025-08-20
Payer: COMMERCIAL

## 2025-08-29 ENCOUNTER — HOSPITAL ENCOUNTER (EMERGENCY)
Facility: CLINIC | Age: 75
Discharge: HOME | End: 2025-08-29
Attending: SOCIAL WORKER | Admitting: SOCIAL WORKER
Payer: COMMERCIAL

## 2025-08-29 ENCOUNTER — APPOINTMENT (OUTPATIENT)
Dept: CT IMAGING | Facility: CLINIC | Age: 75
End: 2025-08-29
Attending: SOCIAL WORKER
Payer: COMMERCIAL

## 2025-08-29 VITALS
TEMPERATURE: 98.4 F | OXYGEN SATURATION: 97 % | DIASTOLIC BLOOD PRESSURE: 95 MMHG | SYSTOLIC BLOOD PRESSURE: 135 MMHG | RESPIRATION RATE: 20 BRPM | HEART RATE: 76 BPM

## 2025-08-29 DIAGNOSIS — R94.31 ABNORMAL ELECTROCARDIOGRAM: ICD-10-CM

## 2025-08-29 DIAGNOSIS — G44.209 ACUTE NON INTRACTABLE TENSION-TYPE HEADACHE: Primary | ICD-10-CM

## 2025-08-29 LAB
ANION GAP SERPL CALCULATED.3IONS-SCNC: 9 MMOL/L (ref 7–15)
ATRIAL RATE - MUSE: 110 BPM
BASOPHILS # BLD AUTO: 0.07 10E3/UL (ref 0–0.2)
BASOPHILS NFR BLD AUTO: 0.6 %
BUN SERPL-MCNC: 19.5 MG/DL (ref 8–23)
CALCIUM SERPL-MCNC: 9.1 MG/DL (ref 8.8–10.4)
CHLORIDE SERPL-SCNC: 101 MMOL/L (ref 98–107)
CREAT SERPL-MCNC: 0.81 MG/DL (ref 0.51–0.95)
DIASTOLIC BLOOD PRESSURE - MUSE: NORMAL MMHG
EGFRCR SERPLBLD CKD-EPI 2021: 76 ML/MIN/1.73M2
EOSINOPHIL # BLD AUTO: 0.15 10E3/UL (ref 0–0.7)
EOSINOPHIL NFR BLD AUTO: 1.4 %
ERYTHROCYTE [DISTWIDTH] IN BLOOD BY AUTOMATED COUNT: 13.2 % (ref 10–15)
GLUCOSE SERPL-MCNC: 125 MG/DL (ref 70–99)
HCO3 SERPL-SCNC: 24 MMOL/L (ref 22–29)
HCT VFR BLD AUTO: 39.3 % (ref 35–47)
HGB BLD-MCNC: 13.3 G/DL (ref 11.7–15.7)
HOLD SPECIMEN: NORMAL
IMM GRANULOCYTES # BLD: 0.03 10E3/UL
IMM GRANULOCYTES NFR BLD: 0.3 %
INTERPRETATION ECG - MUSE: NORMAL
LYMPHOCYTES # BLD AUTO: 3.46 10E3/UL (ref 0.8–5.3)
LYMPHOCYTES NFR BLD AUTO: 31.6 %
MCH RBC QN AUTO: 29.7 PG (ref 26.5–33)
MCHC RBC AUTO-ENTMCNC: 33.8 G/DL (ref 31.5–36.5)
MCV RBC AUTO: 87.7 FL (ref 78–100)
MONOCYTES # BLD AUTO: 1.01 10E3/UL (ref 0–1.3)
MONOCYTES NFR BLD AUTO: 9.2 %
NEUTROPHILS # BLD AUTO: 6.22 10E3/UL (ref 1.6–8.3)
NEUTROPHILS NFR BLD AUTO: 56.9 %
NRBC # BLD AUTO: <0.03 10E3/UL
NRBC BLD AUTO-RTO: 0 /100
P AXIS - MUSE: 12 DEGREES
PLATELET # BLD AUTO: 312 10E3/UL (ref 150–450)
POTASSIUM SERPL-SCNC: 4.2 MMOL/L (ref 3.4–5.3)
PR INTERVAL - MUSE: 206 MS
QRS DURATION - MUSE: 60 MS
QT - MUSE: 316 MS
QTC - MUSE: 427 MS
R AXIS - MUSE: -65 DEGREES
RBC # BLD AUTO: 4.48 10E6/UL (ref 3.8–5.2)
SODIUM SERPL-SCNC: 134 MMOL/L (ref 135–145)
SYSTOLIC BLOOD PRESSURE - MUSE: NORMAL MMHG
T AXIS - MUSE: 26 DEGREES
VENTRICULAR RATE- MUSE: 110 BPM
WBC # BLD AUTO: 10.94 10E3/UL (ref 4–11)

## 2025-08-29 PROCEDURE — 82565 ASSAY OF CREATININE: CPT | Performed by: STUDENT IN AN ORGANIZED HEALTH CARE EDUCATION/TRAINING PROGRAM

## 2025-08-29 PROCEDURE — 99285 EMERGENCY DEPT VISIT HI MDM: CPT | Mod: 25 | Performed by: SOCIAL WORKER

## 2025-08-29 PROCEDURE — 80048 BASIC METABOLIC PNL TOTAL CA: CPT | Performed by: SOCIAL WORKER

## 2025-08-29 PROCEDURE — 93005 ELECTROCARDIOGRAM TRACING: CPT

## 2025-08-29 PROCEDURE — 36415 COLL VENOUS BLD VENIPUNCTURE: CPT | Performed by: STUDENT IN AN ORGANIZED HEALTH CARE EDUCATION/TRAINING PROGRAM

## 2025-08-29 PROCEDURE — 85025 COMPLETE CBC W/AUTO DIFF WBC: CPT | Performed by: SOCIAL WORKER

## 2025-08-29 PROCEDURE — 85004 AUTOMATED DIFF WBC COUNT: CPT | Performed by: STUDENT IN AN ORGANIZED HEALTH CARE EDUCATION/TRAINING PROGRAM

## 2025-08-29 PROCEDURE — 250N000013 HC RX MED GY IP 250 OP 250 PS 637: Performed by: SOCIAL WORKER

## 2025-08-29 PROCEDURE — 70450 CT HEAD/BRAIN W/O DYE: CPT

## 2025-08-29 RX ORDER — ACETAMINOPHEN 500 MG
1000 TABLET ORAL ONCE
Status: COMPLETED | OUTPATIENT
Start: 2025-08-29 | End: 2025-08-29

## 2025-08-29 RX ADMIN — ACETAMINOPHEN 1000 MG: 500 TABLET, FILM COATED ORAL at 16:32

## 2025-08-29 ASSESSMENT — ACTIVITIES OF DAILY LIVING (ADL)
ADLS_ACUITY_SCORE: 41

## 2025-09-02 ENCOUNTER — OFFICE VISIT (OUTPATIENT)
Dept: FAMILY MEDICINE | Facility: CLINIC | Age: 75
End: 2025-09-02
Payer: COMMERCIAL

## 2025-09-02 ENCOUNTER — ANCILLARY PROCEDURE (OUTPATIENT)
Dept: GENERAL RADIOLOGY | Facility: CLINIC | Age: 75
End: 2025-09-02
Attending: NURSE PRACTITIONER
Payer: COMMERCIAL

## 2025-09-02 VITALS
OXYGEN SATURATION: 98 % | HEIGHT: 63 IN | RESPIRATION RATE: 18 BRPM | DIASTOLIC BLOOD PRESSURE: 72 MMHG | BODY MASS INDEX: 38.82 KG/M2 | WEIGHT: 219.1 LBS | TEMPERATURE: 97.2 F | HEART RATE: 101 BPM | SYSTOLIC BLOOD PRESSURE: 128 MMHG

## 2025-09-02 DIAGNOSIS — E78.5 HYPERLIPIDEMIA LDL GOAL <130: ICD-10-CM

## 2025-09-02 DIAGNOSIS — S32.010A COMPRESSION FRACTURE OF L1 VERTEBRA, INITIAL ENCOUNTER (H): ICD-10-CM

## 2025-09-02 DIAGNOSIS — R94.31 ABNORMAL ELECTROCARDIOGRAM: Primary | ICD-10-CM

## 2025-09-02 DIAGNOSIS — S32.010D COMPRESSION FRACTURE OF L1 VERTEBRA WITH ROUTINE HEALING, SUBSEQUENT ENCOUNTER: ICD-10-CM

## 2025-09-02 DIAGNOSIS — E11.69 TYPE 2 DIABETES MELLITUS WITH OTHER SPECIFIED COMPLICATION, WITHOUT LONG-TERM CURRENT USE OF INSULIN (H): ICD-10-CM

## 2025-09-02 DIAGNOSIS — Z78.0 ASYMPTOMATIC MENOPAUSAL STATE: ICD-10-CM

## 2025-09-02 PROCEDURE — 1125F AMNT PAIN NOTED PAIN PRSNT: CPT | Performed by: NURSE PRACTITIONER

## 2025-09-02 PROCEDURE — G2211 COMPLEX E/M VISIT ADD ON: HCPCS | Performed by: NURSE PRACTITIONER

## 2025-09-02 PROCEDURE — 99215 OFFICE O/P EST HI 40 MIN: CPT | Performed by: NURSE PRACTITIONER

## 2025-09-02 PROCEDURE — 72100 X-RAY EXAM L-S SPINE 2/3 VWS: CPT | Mod: TC | Performed by: RADIOLOGY

## 2025-09-02 PROCEDURE — 3078F DIAST BP <80 MM HG: CPT | Performed by: NURSE PRACTITIONER

## 2025-09-02 PROCEDURE — 3074F SYST BP LT 130 MM HG: CPT | Performed by: NURSE PRACTITIONER

## 2025-09-02 RX ORDER — ROSUVASTATIN CALCIUM 10 MG/1
10 TABLET, COATED ORAL DAILY
Qty: 90 TABLET | Refills: 3 | Status: SHIPPED | OUTPATIENT
Start: 2025-09-02

## 2025-09-02 ASSESSMENT — PAIN SCALES - GENERAL: PAINLEVEL_OUTOF10: MILD PAIN (3)

## 2025-09-10 ENCOUNTER — PRE VISIT (OUTPATIENT)
Dept: NEUROSURGERY | Facility: CLINIC | Age: 75
End: 2025-09-10

## (undated) DEVICE — DECANTER BAG 2002S

## (undated) DEVICE — SOL WATER IRRIG 1000ML BOTTLE 2F7114

## (undated) RX ORDER — ONDANSETRON 2 MG/ML
INJECTION INTRAMUSCULAR; INTRAVENOUS
Status: DISPENSED
Start: 2024-03-26

## (undated) RX ORDER — FENTANYL CITRATE 50 UG/ML
INJECTION, SOLUTION INTRAMUSCULAR; INTRAVENOUS
Status: DISPENSED
Start: 2024-03-26